# Patient Record
Sex: FEMALE | Race: WHITE | NOT HISPANIC OR LATINO | Employment: UNEMPLOYED | URBAN - METROPOLITAN AREA
[De-identification: names, ages, dates, MRNs, and addresses within clinical notes are randomized per-mention and may not be internally consistent; named-entity substitution may affect disease eponyms.]

---

## 2020-04-08 DIAGNOSIS — K21.9 GASTROESOPHAGEAL REFLUX DISEASE, ESOPHAGITIS PRESENCE NOT SPECIFIED: ICD-10-CM

## 2020-04-08 DIAGNOSIS — I10 HYPERTENSION, UNSPECIFIED TYPE: Primary | ICD-10-CM

## 2020-04-08 RX ORDER — OLANZAPINE 7.5 MG/1
7.5 TABLET ORAL
COMMUNITY
End: 2022-01-10 | Stop reason: SDUPTHER

## 2020-04-08 RX ORDER — MULTIVIT-MIN/IRON/FOLIC ACID/K 18-600-40
50 CAPSULE ORAL DAILY
COMMUNITY
End: 2020-05-05 | Stop reason: SDUPTHER

## 2020-04-08 RX ORDER — ATENOLOL 100 MG/1
100 TABLET ORAL EVERY OTHER DAY
Qty: 90 TABLET | Refills: 0 | Status: SHIPPED | OUTPATIENT
Start: 2020-04-08 | End: 2020-09-04 | Stop reason: SDUPTHER

## 2020-04-08 RX ORDER — PANTOPRAZOLE SODIUM 40 MG/1
40 TABLET, DELAYED RELEASE ORAL DAILY
Qty: 90 TABLET | Refills: 0 | Status: SHIPPED | OUTPATIENT
Start: 2020-04-08 | End: 2020-06-29 | Stop reason: SDUPTHER

## 2020-04-08 RX ORDER — ATENOLOL 100 MG/1
100 TABLET ORAL EVERY OTHER DAY
COMMUNITY
End: 2020-04-08 | Stop reason: SDUPTHER

## 2020-04-08 RX ORDER — PANTOPRAZOLE SODIUM 40 MG/1
40 TABLET, DELAYED RELEASE ORAL DAILY
COMMUNITY
End: 2020-04-08 | Stop reason: SDUPTHER

## 2020-04-08 RX ORDER — ALENDRONATE SODIUM 70 MG/1
70 TABLET ORAL
COMMUNITY
End: 2021-06-23

## 2020-04-08 RX ORDER — LEVOTHYROXINE SODIUM 0.03 MG/1
25 TABLET ORAL DAILY
COMMUNITY
End: 2020-06-29 | Stop reason: SDUPTHER

## 2020-05-05 ENCOUNTER — TELEMEDICINE (OUTPATIENT)
Dept: FAMILY MEDICINE CLINIC | Facility: CLINIC | Age: 55
End: 2020-05-05
Payer: COMMERCIAL

## 2020-05-05 VITALS — WEIGHT: 94 LBS | BODY MASS INDEX: 16.05 KG/M2 | HEIGHT: 64 IN

## 2020-05-05 DIAGNOSIS — E55.9 VITAMIN D DEFICIENCY: Primary | ICD-10-CM

## 2020-05-05 PROCEDURE — G2012 BRIEF CHECK IN BY MD/QHP: HCPCS | Performed by: NURSE PRACTITIONER

## 2020-05-05 RX ORDER — MULTIVIT-MIN/IRON/FOLIC ACID/K 18-600-40
2000 CAPSULE ORAL DAILY
Qty: 90 CAPSULE | Refills: 1 | Status: SHIPPED | OUTPATIENT
Start: 2020-05-05 | End: 2020-09-04 | Stop reason: SDUPTHER

## 2020-06-17 ENCOUNTER — TELEPHONE (OUTPATIENT)
Dept: FAMILY MEDICINE CLINIC | Facility: CLINIC | Age: 55
End: 2020-06-17

## 2020-06-18 ENCOUNTER — TELEPHONE (OUTPATIENT)
Dept: FAMILY MEDICINE CLINIC | Facility: CLINIC | Age: 55
End: 2020-06-18

## 2020-06-29 DIAGNOSIS — K21.9 GASTROESOPHAGEAL REFLUX DISEASE, ESOPHAGITIS PRESENCE NOT SPECIFIED: ICD-10-CM

## 2020-06-29 DIAGNOSIS — E03.9 HYPOTHYROIDISM, UNSPECIFIED TYPE: Primary | ICD-10-CM

## 2020-06-29 RX ORDER — LEVOTHYROXINE SODIUM 0.03 MG/1
25 TABLET ORAL DAILY
Qty: 90 TABLET | Refills: 0 | Status: SHIPPED | OUTPATIENT
Start: 2020-06-29 | End: 2020-09-04 | Stop reason: SDUPTHER

## 2020-06-29 RX ORDER — PANTOPRAZOLE SODIUM 40 MG/1
40 TABLET, DELAYED RELEASE ORAL DAILY
Qty: 90 TABLET | Refills: 0 | Status: SHIPPED | OUTPATIENT
Start: 2020-06-29 | End: 2020-09-04 | Stop reason: SDUPTHER

## 2020-08-11 ENCOUNTER — TELEPHONE (OUTPATIENT)
Dept: FAMILY MEDICINE CLINIC | Facility: CLINIC | Age: 55
End: 2020-08-11

## 2020-08-11 NOTE — TELEPHONE ENCOUNTER
COVID Pre-Visit Screening     1  Is this a family member screening? No  2  Have you traveled outside of your state in the past 2 weeks? No  3  Do you presently have a fever or flu-like symptoms? No  4  Do you have symptoms of an upper respiratory infection like runny nose, sore throat, or cough? No  5  Are you suffering from new headache that you have not had in the past?  No  6  Do you have/have you experienced any new shortness of breath recently? No  7  Do you have any new diarrhea, nausea or vomiting? No  8  Have you been in contact with anyone who has been sick or diagnosed with COVID-19? No  9  Do you have any new loss of taste or smell? No  10  Are you able to wear a mask without a valve for the entire visit? Yes  Appt confirmed and made aware of check in process    [employer]

## 2020-09-03 ENCOUNTER — TELEPHONE (OUTPATIENT)
Dept: FAMILY MEDICINE CLINIC | Facility: CLINIC | Age: 55
End: 2020-09-03

## 2020-09-03 NOTE — TELEPHONE ENCOUNTER
COVID Pre-Visit Screening     1  Is this a family member screening? No  2  Have you traveled outside of your state in the past 2 weeks? No  3  Do you presently have a fever or flu-like symptoms? No  4  Do you have symptoms of an upper respiratory infection like runny nose, sore throat, or cough? No  5  Are you suffering from new headache that you have not had in the past?  No  6  Do you have/have you experienced any new shortness of breath recently? No  7  Do you have any new diarrhea, nausea or vomiting? No  8  Have you been in contact with anyone who has been sick or diagnosed with COVID-19? No  9  Do you have any new loss of taste or smell? No  10  Are you able to wear a mask without a valve for the entire visit?  Yes  Called pt to confirm appt and made aware of check in process  [facility]

## 2020-09-04 ENCOUNTER — OFFICE VISIT (OUTPATIENT)
Dept: FAMILY MEDICINE CLINIC | Facility: CLINIC | Age: 55
End: 2020-09-04
Payer: COMMERCIAL

## 2020-09-04 VITALS
HEART RATE: 93 BPM | OXYGEN SATURATION: 98 % | TEMPERATURE: 97.6 F | HEIGHT: 64 IN | RESPIRATION RATE: 12 BRPM | WEIGHT: 97 LBS | SYSTOLIC BLOOD PRESSURE: 134 MMHG | DIASTOLIC BLOOD PRESSURE: 86 MMHG | BODY MASS INDEX: 16.56 KG/M2

## 2020-09-04 DIAGNOSIS — E56.9 DEFICIENCY OF MULTIPLE VITAMINS: ICD-10-CM

## 2020-09-04 DIAGNOSIS — E03.1 CONGENITAL HYPOTHYROIDISM WITHOUT GOITER: ICD-10-CM

## 2020-09-04 DIAGNOSIS — Z11.4 SCREENING FOR HIV WITHOUT PRESENCE OF RISK FACTORS: ICD-10-CM

## 2020-09-04 DIAGNOSIS — Z11.59 ENCOUNTER FOR HEPATITIS C SCREENING TEST FOR LOW RISK PATIENT: ICD-10-CM

## 2020-09-04 DIAGNOSIS — I10 HYPERTENSION, UNSPECIFIED TYPE: ICD-10-CM

## 2020-09-04 DIAGNOSIS — E55.9 VITAMIN D DEFICIENCY: Primary | ICD-10-CM

## 2020-09-04 DIAGNOSIS — E03.9 HYPOTHYROIDISM, UNSPECIFIED TYPE: ICD-10-CM

## 2020-09-04 DIAGNOSIS — Z76.0 ENCOUNTER FOR MEDICATION REFILL: ICD-10-CM

## 2020-09-04 DIAGNOSIS — K21.9 GASTROESOPHAGEAL REFLUX DISEASE, ESOPHAGITIS PRESENCE NOT SPECIFIED: ICD-10-CM

## 2020-09-04 PROCEDURE — 3725F SCREEN DEPRESSION PERFORMED: CPT | Performed by: FAMILY MEDICINE

## 2020-09-04 PROCEDURE — 99214 OFFICE O/P EST MOD 30 MIN: CPT | Performed by: FAMILY MEDICINE

## 2020-09-04 PROCEDURE — 3079F DIAST BP 80-89 MM HG: CPT | Performed by: FAMILY MEDICINE

## 2020-09-04 PROCEDURE — 1036F TOBACCO NON-USER: CPT | Performed by: FAMILY MEDICINE

## 2020-09-04 RX ORDER — ATENOLOL 100 MG/1
100 TABLET ORAL EVERY OTHER DAY
Qty: 90 TABLET | Refills: 0 | Status: SHIPPED | OUTPATIENT
Start: 2020-09-04 | End: 2020-11-06 | Stop reason: SDUPTHER

## 2020-09-04 RX ORDER — MULTIVIT-MIN/IRON/FOLIC ACID/K 18-600-40
2000 CAPSULE ORAL DAILY
Qty: 90 CAPSULE | Refills: 1 | Status: SHIPPED | OUTPATIENT
Start: 2020-09-04 | End: 2020-11-06 | Stop reason: SDUPTHER

## 2020-09-04 RX ORDER — PANTOPRAZOLE SODIUM 40 MG/1
40 TABLET, DELAYED RELEASE ORAL DAILY
Qty: 90 TABLET | Refills: 0 | Status: SHIPPED | OUTPATIENT
Start: 2020-09-04 | End: 2020-11-05 | Stop reason: SDUPTHER

## 2020-09-04 RX ORDER — LEVOTHYROXINE SODIUM 0.03 MG/1
25 TABLET ORAL DAILY
Qty: 90 TABLET | Refills: 0 | Status: SHIPPED | OUTPATIENT
Start: 2020-09-04 | End: 2020-09-17 | Stop reason: DRUGHIGH

## 2020-09-04 NOTE — PROGRESS NOTES
BMI Counseling: Body mass index is 16 65 kg/m²  The BMI is below normal  Patient advised to gain weight and dietary education for weight gain was provided  Assessment/Plan:         Problem List Items Addressed This Visit        Digestive    Gastroesophageal reflux disease    Relevant Medications    pantoprazole (PROTONIX) 40 mg tablet       Endocrine    Hypothyroidism    Relevant Medications    atenolol (TENORMIN) 100 mg tablet    levothyroxine 25 mcg tablet    Other Relevant Orders    TSH, 3rd generation with Free T4 reflex       Cardiovascular and Mediastinum    Hypertension    Relevant Medications    atenolol (TENORMIN) 100 mg tablet    Other Relevant Orders    CBC and differential    Comprehensive metabolic panel    Lipid panel    UA w Reflex to Microscopic w Reflex to Culture       Other    Encounter for medication refill    Vitamin D deficiency - Primary    Relevant Medications    Cholecalciferol (Vitamin D) 50 MCG (2000 UT) CAPS    Other Relevant Orders    Vitamin D 25 hydroxy    Deficiency of multiple vitamins    Relevant Medications    Multiple Vitamins-Minerals (Womens 50+ Multi Vitamin/Min) TABS      Other Visit Diagnoses     Screening for HIV without presence of risk factors        Relevant Orders    HIV 1/2 Antigen/Antibody (4th Generation) w Reflex [facility]    Encounter for hepatitis C screening test for low risk patient        Relevant Orders    Hepatitis C antibody            Subjective:      Patient ID: Catie Lee is a 63 y o  female  Patient is a 63year old female present for follow-up and medication refills  The following portions of the patient's history were reviewed and updated as appropriate:   She has a past medical history of Abnormal weight loss, Anorexia nervosa, Elevated blood sugar, Fibroadenoma of both breasts, GERD (gastroesophageal reflux disease), Hyperthyroidism, and Osteopenia ,  does not have any pertinent problems on file  ,   has a past surgical history that includes Cholecystectomy and Breast cyst excision (Left, 2011)  ,  family history is not on file  She was adopted  ,   reports that she has never smoked  She has never used smokeless tobacco  She reports current alcohol use of about 1 0 standard drinks of alcohol per week  No history on file for drug ,  has No Known Allergies     Current Outpatient Medications   Medication Sig Dispense Refill    atenolol (TENORMIN) 100 mg tablet Take 1 tablet (100 mg total) by mouth every other day Take 100 mg by mouth every other morning  90 tablet 0    Cholecalciferol (Vitamin D) 50 MCG (2000 UT) CAPS Take 1 capsule (2,000 Units total) by mouth daily Take 1 capsule every day by oral route for 30 days  90 capsule 1    levothyroxine 25 mcg tablet Take 1 tablet (25 mcg total) by mouth daily 90 tablet 0    OLANZapine (ZyPREXA) 7 5 mg tablet Take 7 5 mg by mouth daily at bedtime      pantoprazole (PROTONIX) 40 mg tablet Take 1 tablet (40 mg total) by mouth daily 90 tablet 0    alendronate (FOSAMAX) 70 mg tablet Take 70 mg by mouth every 7 days      Multiple Vitamins-Minerals (Womens 50+ Multi Vitamin/Min) TABS Take 1 tablet by mouth daily 100 tablet 1     No current facility-administered medications for this visit  Review of Systems   Constitutional: Negative for appetite change, chills, fatigue and fever  HENT: Negative for congestion, ear pain, postnasal drip, rhinorrhea, sinus pain and sore throat  Eyes: Negative for pain, redness and visual disturbance  Respiratory: Negative for cough and shortness of breath  Cardiovascular: Negative for chest pain  Gastrointestinal: Negative for abdominal pain, diarrhea, nausea and vomiting  Genitourinary: Negative for dysuria and hematuria  Musculoskeletal: Negative for arthralgias  Skin: Negative  Allergic/Immunologic: Negative for environmental allergies and food allergies  Neurological: Negative  Hematological: Negative  Psychiatric/Behavioral: Negative  Objective:  Vitals:    09/04/20 1039   BP: 134/86   BP Location: Left arm   Patient Position: Sitting   Cuff Size: Standard   Pulse: 93   Resp: 12   Temp: 97 6 °F (36 4 °C)   TempSrc: Temporal   SpO2: 98%   Weight: 44 kg (97 lb)   Height: 5' 4" (1 626 m)     Body mass index is 16 65 kg/m²  Physical Exam  Vitals signs and nursing note reviewed  Constitutional:       Appearance: She is well-developed  HENT:      Head: Normocephalic and atraumatic  Right Ear: Tympanic membrane, ear canal and external ear normal       Left Ear: Tympanic membrane, ear canal and external ear normal       Nose: Nose normal       Mouth/Throat:      Mouth: Mucous membranes are moist    Eyes:      Extraocular Movements: Extraocular movements intact  Conjunctiva/sclera: Conjunctivae normal       Pupils: Pupils are equal, round, and reactive to light  Neck:      Musculoskeletal: Normal range of motion  Cardiovascular:      Rate and Rhythm: Normal rate and regular rhythm  Heart sounds: No murmur  Pulmonary:      Effort: Pulmonary effort is normal       Breath sounds: Normal breath sounds  Abdominal:      General: Bowel sounds are normal       Palpations: Abdomen is soft  Musculoskeletal: Normal range of motion  Skin:     General: Skin is warm  Capillary Refill: Capillary refill takes less than 2 seconds  Neurological:      General: No focal deficit present  Mental Status: She is alert and oriented to person, place, and time     Psychiatric:         Mood and Affect: Mood normal          Behavior: Behavior normal

## 2020-09-04 NOTE — PATIENT INSTRUCTIONS
Vitamin D Deficiency   WHAT YOU NEED TO KNOW:   What is vitamin D deficiency? Vitamin D deficiency is a low level of vitamin D in your body  Vitamin D helps your body absorb calcium from foods  Your body makes vitamin D when your skin is exposed to sunlight  You can also get vitamin D from certain foods such as fish, eggs, and meat  Most of the vitamin D in your body comes from sunlight exposure  What increases my risk for vitamin D deficiency? · Low amount of sun exposure    · Low intake of foods that contain vitamin D    · Having dark skin    · Age older than 72    · Pregnancy or breastfeeding    · Infants who are     · Inability to absorb vitamin D from food    · Obesity    · Use of certain medicines such as antiseizure, steroid, or antifungal medicines  What are the signs and symptoms of vitamin D deficiency? Low levels of vitamin D can lead to weak and brittle bones that are more likely to fracture  You may not have any signs and symptoms, or you may have any of the following:  · Bone pain or discomfort in your lower back, pelvis, or legs    · Muscle aches and weakness    · Low back pain in women    · Poor growth, irritability, and frequent respiratory tract infections in infants    · Deformed bones and slow growth in children  How is vitamin D deficiency diagnosed and treated? Blood tests will be done to measure the amount of vitamin D in your blood  Your healthcare provider may give you high doses of vitamin D for 8 to 12 weeks to increase your levels  Your levels will then be rechecked  If your levels are still low, you will need to take vitamin D supplements for another 8 weeks  After your levels have gone back to normal, you may need to continue to take a vitamin D supplement  How much vitamin D do I need each day? The amount of vitamin D you need depends on your age  You may need more than the recommended amounts below if you take certain medicines or you are obese   Ask your healthcare provider how much vitamin D you need  · Infants up to 1 year of age: 0 international units (IU)    · Children 1 year and older: 600 IU    · Adults aged 23to 79years old: 600 IU    · Adults older than 70 years: 800 IU  How can I help prevent vitamin D deficiency? · Eat foods that are high in vitamin D  Fatty fish such as mackerel, canned tuna and sardines, and salmon are good sources of vitamin D  Certain foods such as milk, juice, and cereal are fortified with vitamin D      · Give your  infant a vitamin D supplement  of 400 IU each day  · Take vitamin D supplements as directed  High doses of vitamin D can be toxic  Your healthcare provider will tell you how much vitamin D you should take each day  Vitamin D is best absorbed when taken with food  · Expose your skin to sunlight as directed  Ask your healthcare provider how you can safely expose your skin to sunlight and for how long  Too much exposure to sunlight can cause skin cancer  When should I contact my healthcare provider? · You or your child still have symptoms, or your symptoms get worse  · You think you took too much of a vitamin D supplement, and you have nausea, vomiting, or a headache  · You have questions or concerns about your condition or care  CARE AGREEMENT:   You have the right to help plan your care  Learn about your health condition and how it may be treated  Discuss treatment options with your caregivers to decide what care you want to receive  You always have the right to refuse treatment  The above information is an  only  It is not intended as medical advice for individual conditions or treatments  Talk to your doctor, nurse or pharmacist before following any medical regimen to see if it is safe and effective for you  © 2017 [employer] Information is for End User's use only and may not be sold, redistributed or otherwise used for commercial purposes   All illustrations and images included in CareNotes® are the copyrighted property of [employer]  or [employer]up [employer]  Gastroesophageal Reflux Disease   WHAT YOU NEED TO KNOW:   What is gastroesophageal reflux disease? Gastroesophageal reflux occurs when acid and food in the stomach back up into the esophagus  Gastroesophageal reflux disease (GERD) is reflux that occurs more than twice a week for a few weeks  It usually causes heartburn and other symptoms  GERD can cause other health problems over time if it is not treated  What causes GERD? GERD often occurs when the lower muscle (sphincter) of the esophagus does not close properly  The sphincter normally opens to let food into the stomach  It then closes to keep food and stomach acid in the stomach  If the sphincter does not close properly, stomach acid and food back up (reflux) into the esophagus  The following may increase your risk for GERD:  · Certain foods such as spicy foods, chocolate, foods that contain caffeine, peppermint, and fried foods    · Hiatal hernia    · Certain medicines such as calcium channel blockers (used to treat high blood pressure), allergy medicines, sedatives, or antidepressants    · Pregnancy or obesity    · Lying down after a meal    · Drinking alcohol or smoking  What are the signs and symptoms of GERD? Heartburn is the most common symptom of GERD  You may feel burning pain in your chest or below the breast bone  This usually occurs after meals and spreads to your neck, jaw, or shoulder  The pain gets better when you change positions  You may also have any of the following:  · Bitter or acid taste in your mouth    · Dry cough    · Trouble swallowing or pain with swallowing    · Hoarseness or sore throat    · Frequent burping or hiccups    · Feeling of fullness soon after you start eating  How is GERD diagnosed? Your healthcare provider will ask about your symptoms and when they started   Tell him or her about other medical conditions you have, your eating habits, and your activities  You may also need any of the following:  · Esophageal pH monitoring  is used to place a small probe inside your esophagus and stomach to check the amount of acid  · An endoscopy  is a procedure used to look at the inside of your esophagus and stomach  An endoscope is a bendable tube with a light and camera on the end  Your healthcare provider may remove a small sample of tissue and send it to a lab for tests  · Upper GI x-rays  are done to take pictures of your stomach and intestines (bowel)  You may be given a chalky liquid to drink before the pictures are taken  This liquid helps your stomach and intestines show up better on the x-rays  · Esophageal manometry  is a test that shows how your esophagus pushes food and fluid to your stomach  It also shows the pressures in your esophagus and stomach  It may show a hiatal hernia  How is GERD treated? · Medicines  are used to decrease stomach acid  Medicine may also be used to help your lower esophageal sphincter and stomach contract (tighten) more  · Surgery  is done to wrap the upper part of the stomach around the esophageal sphincter  This will strengthen the sphincter and prevent reflux  How can I help manage GERD? · Do not have foods or drinks that may increase heartburn  These include chocolate, peppermint, fried or fatty foods, drinks that contain caffeine, or carbonated drinks (soda)  Other foods include spicy foods, onions, tomatoes, and tomato-based foods  Do not have foods or drinks that can irritate your esophagus, such as citrus fruits, juices, and alcohol  · Do not eat large meals  When you eat a lot of food at one time, your stomach needs more acid to digest it  Eat 6 small meals each day instead of 3 large ones, and eat slowly  Do not eat meals 2 to 3 hours before bedtime  · Elevate the head of your bed  Place 6-inch blocks under the head of your bed frame   You may also use more than one pillow under your head and shoulders while you sleep  · Maintain a healthy weight  If you are overweight, weight loss may help relieve symptoms of GERD  · Do not smoke  Smoking weakens the lower esophageal sphincter and increases the risk of GERD  Ask your healthcare provider for information if you currently smoke and need help to quit  E-cigarettes or smokeless tobacco still contain nicotine  Talk to your healthcare provider before you use these products  · Do not wear clothing that is tight around your waist   Tight clothing can put pressure on your stomach and cause or worsen GERD symptoms  When should I seek immediate care? · You feel full and cannot burp or vomit  · You have severe chest pain and sudden trouble breathing  · Your bowel movements are black, bloody, or tarry-looking  · Your vomit looks like coffee grounds or has blood in it  When should I contact my healthcare provider? · You vomit large amounts, or you vomit often  · You have trouble breathing after you vomit  · You have trouble swallowing, or pain with swallowing  · You are losing weight without trying  · Your symptoms get worse or do not improve with treatment  · You have questions or concerns about your condition or care  CARE AGREEMENT:   You have the right to help plan your care  Learn about your health condition and how it may be treated  Discuss treatment options with your caregivers to decide what care you want to receive  You always have the right to refuse treatment  The above information is an  only  It is not intended as medical advice for individual conditions or treatments  Talk to your doctor, nurse or pharmacist before following any medical regimen to see if it is safe and effective for you  © 2017 [facility] Information is for End User's use only and may not be sold, redistributed or otherwise used for commercial purposes   All illustrations and images included in CareNotes® are the copyrighted property of [employer]  or [employer]up Santana  Hypertension   WHAT YOU NEED TO KNOW:   What is hypertension? Hypertension is high blood pressure (BP)  Your BP is the force of your blood moving against the walls of your arteries  Normal BP is less than 120/80  Prehypertension is between 120/80 and 139/89  Hypertension is 140/90 or higher  Hypertension causes your BP to get so high that your heart has to work much harder than normal  This can damage your heart  You can control hypertension with a healthy lifestyle or medicines  A controlled blood pressure helps protect your organs, such as your heart, lungs, brain, and kidneys  What causes hypertension? The cause of hypertension may not be known  This type of hypertension is called essential or primary hypertension  Hypertension can sometimes be caused by other medical conditions, such as kidney disease  This type of hypertension is called secondary hypertension  What increases my risk for hypertension? · Age older than 54 years (men)    · Age older than 72 years (women)    · A family history of hypertension or heart disease     · Obesity or lack of exercise     · Too many high-sodium foods    · Stress     · Use of tobacco, alcohol, or illegal drugs     · A medical condition, such as diabetes, kidney disease, thyroid disease, or adrenal gland disorder     · Certain medicines, such as steroids or birth control pills  What are the signs and symptoms of hypertension? You may have no signs or symptoms, or you may have any of the following:  · Headache     · Blurred vision     · Chest pain     · Dizziness or weakness     · Trouble breathing    · Nosebleeds  How is hypertension diagnosed? Your healthcare provider will ask about your symptoms and the medicines you take  He or she will also ask if you have a family history of high blood pressure and about any health conditions you have   He or she will also check your blood pressure and weight and examine your heart, lungs, and eyes  You may need any of the following tests:  · Blood tests  may help healthcare providers find the cause of your hypertension  Blood tests can also help find other health problems caused by hypertension  · Urine tests  will be done to check your kidney function  Kidney problems can increase your risk for hypertension  How is hypertension treated? Your healthcare provider will recommend lifestyle changes to lower your BP  You may also need the following medicines:  · Medicine  may be used to help lower your BP  You may need more than one type of medicine  Take the medicine exactly as directed  · Diuretics  help decrease extra fluid that collects in your body  This will help lower your BP  You may urinate more often while you take this medicine  · Cholesterol medicine  helps lower your cholesterol level  A low cholesterol level helps prevent heart disease and makes it easier to control your blood pressure  What can I do to manage hypertension? Talk with your healthcare provider about these and other ways to manage hypertension:  · Check your BP at home  Sit and rest for 5 minutes before you take your BP  Extend your arm and support it on a flat surface  Your arm should be at the same level as your heart  Follow the directions that came with your BP monitor  If possible, take at least 2 BP readings each time  Take your BP at least twice a day at the same times each day, such as morning and evening  Keep a record of your BP readings and bring it to your follow-up visits  Ask your healthcare provider what your BP should be  · Limit sodium (salt) as directed  Too much sodium can affect your fluid balance  Check labels to find low-sodium or no-salt-added foods  Some low-sodium foods use potassium salts for flavor  Too much potassium can also cause health problems   Your healthcare provider will tell you how much sodium and potassium are safe for you to have in a day  He or she may recommend that you limit sodium to 2,300 mg a day  · Follow the meal plan recommended by your healthcare provider  A dietitian or your provider can give you more information on low-sodium plans or the DASH (Dietary Approaches to Stop Hypertension) eating plan  The DASH plan is low in sodium, unhealthy fats, and total fat  It is high in potassium, calcium, and fiber  · Exercise to maintain a healthy weight  Exercise at least 30 minutes per day, on most days of the week  This will help decrease your blood pressure  Ask your healthcare provider about the best exercise plan for you  · Decrease stress  This may help lower your BP  Learn ways to relax, such as deep breathing or listening to music  · Limit alcohol  Women should limit alcohol to 1 drink a day  Men should limit alcohol to 2 drinks a day  A drink of alcohol is 12 ounces of beer, 5 ounces of wine, or 1½ ounces of liquor  · Do not smoke  Nicotine and other chemicals in cigarettes and cigars can increase your BP and also cause lung damage  Ask your healthcare provider for information if you currently smoke and need help to quit  E-cigarettes or smokeless tobacco still contain nicotine  Talk to your healthcare provider before you use these products  · Manage any other health conditions you have  Health conditions such as diabetes can increase your risk for hypertension  Follow your healthcare provider's instructions and take all your medicines as directed  Call 911 for any of the following:   · You have discomfort in your chest that feels like squeezing, pressure, fullness, or pain  · You become confused or have difficulty speaking  · You suddenly feel lightheaded or have trouble breathing  · You have pain or discomfort in your back, neck, jaw, stomach, or arm  When should I seek immediate care? · You have a severe headache or vision loss      · You have weakness in an arm or leg  When should I contact my healthcare provider? · You feel faint, dizzy, confused, or drowsy  · You have been taking your BP medicine and your BP is still higher than your healthcare provider says it should be  · You have questions or concerns about your condition or care  CARE AGREEMENT:   You have the right to help plan your care  Learn about your health condition and how it may be treated  Discuss treatment options with your caregivers to decide what care you want to receive  You always have the right to refuse treatment  The above information is an  only  It is not intended as medical advice for individual conditions or treatments  Talk to your doctor, nurse or pharmacist before following any medical regimen to see if it is safe and effective for you  © 2017 [address] [facility] Information is for End User's use only and may not be sold, redistributed or otherwise used for commercial purposes  All illustrations and images included in CareNotes® are the copyrighted property of [employer]  or [employer]up Santana

## 2020-09-15 LAB
EXTERNAL HIV SCREEN: NORMAL
HCV AB SER-ACNC: NON REACTIVE

## 2020-09-17 DIAGNOSIS — E03.9 HYPOTHYROIDISM, UNSPECIFIED TYPE: Primary | ICD-10-CM

## 2020-09-17 RX ORDER — LEVOTHYROXINE SODIUM 0.05 MG/1
50 TABLET ORAL
Qty: 90 TABLET | Refills: 3 | Status: SHIPPED | OUTPATIENT
Start: 2020-09-17 | End: 2020-11-05 | Stop reason: SDUPTHER

## 2020-11-06 ENCOUNTER — TELEPHONE (OUTPATIENT)
Dept: OBGYN CLINIC | Facility: CLINIC | Age: 55
End: 2020-11-06

## 2020-11-06 ENCOUNTER — OFFICE VISIT (OUTPATIENT)
Dept: FAMILY MEDICINE CLINIC | Facility: CLINIC | Age: 55
End: 2020-11-06
Payer: COMMERCIAL

## 2020-11-06 VITALS
WEIGHT: 94.6 LBS | TEMPERATURE: 98.2 F | HEART RATE: 122 BPM | HEIGHT: 64 IN | SYSTOLIC BLOOD PRESSURE: 130 MMHG | OXYGEN SATURATION: 98 % | RESPIRATION RATE: 16 BRPM | DIASTOLIC BLOOD PRESSURE: 78 MMHG | BODY MASS INDEX: 16.15 KG/M2

## 2020-11-06 DIAGNOSIS — E03.9 HYPOTHYROIDISM, UNSPECIFIED TYPE: ICD-10-CM

## 2020-11-06 DIAGNOSIS — Z00.00 ANNUAL PHYSICAL EXAM: Primary | ICD-10-CM

## 2020-11-06 DIAGNOSIS — E55.9 VITAMIN D DEFICIENCY: ICD-10-CM

## 2020-11-06 DIAGNOSIS — K21.9 GERD WITHOUT ESOPHAGITIS: ICD-10-CM

## 2020-11-06 DIAGNOSIS — Z23 ENCOUNTER FOR IMMUNIZATION: ICD-10-CM

## 2020-11-06 DIAGNOSIS — Z12.4 SCREENING FOR CERVICAL CANCER: ICD-10-CM

## 2020-11-06 DIAGNOSIS — I10 HYPERTENSION, UNSPECIFIED TYPE: ICD-10-CM

## 2020-11-06 PROBLEM — Z12.12 SCREENING FOR COLORECTAL CANCER: Status: ACTIVE | Noted: 2020-11-06

## 2020-11-06 PROBLEM — Z12.11 SCREENING FOR COLORECTAL CANCER: Status: ACTIVE | Noted: 2020-11-06

## 2020-11-06 PROCEDURE — 3008F BODY MASS INDEX DOCD: CPT | Performed by: NURSE PRACTITIONER

## 2020-11-06 PROCEDURE — 3078F DIAST BP <80 MM HG: CPT | Performed by: NURSE PRACTITIONER

## 2020-11-06 PROCEDURE — 3075F SYST BP GE 130 - 139MM HG: CPT | Performed by: NURSE PRACTITIONER

## 2020-11-06 PROCEDURE — 1036F TOBACCO NON-USER: CPT | Performed by: NURSE PRACTITIONER

## 2020-11-06 PROCEDURE — 99396 PREV VISIT EST AGE 40-64: CPT | Performed by: NURSE PRACTITIONER

## 2020-11-06 RX ORDER — ATENOLOL 100 MG/1
100 TABLET ORAL EVERY OTHER DAY
Qty: 90 TABLET | Refills: 0 | Status: SHIPPED | OUTPATIENT
Start: 2020-11-06 | End: 2021-09-17 | Stop reason: SDUPTHER

## 2020-11-06 RX ORDER — LEVOTHYROXINE SODIUM 0.05 MG/1
50 TABLET ORAL
Qty: 90 TABLET | Refills: 1 | Status: SHIPPED | OUTPATIENT
Start: 2020-11-06 | End: 2021-07-01 | Stop reason: SDUPTHER

## 2020-11-06 RX ORDER — PANTOPRAZOLE SODIUM 40 MG/1
40 TABLET, DELAYED RELEASE ORAL DAILY
Qty: 90 TABLET | Refills: 1 | Status: SHIPPED | OUTPATIENT
Start: 2020-11-06 | End: 2021-05-04 | Stop reason: SDUPTHER

## 2020-11-06 RX ORDER — MULTIVIT-MIN/IRON/FOLIC ACID/K 18-600-40
2000 CAPSULE ORAL DAILY
Qty: 90 CAPSULE | Refills: 1 | Status: SHIPPED | OUTPATIENT
Start: 2020-11-06 | End: 2021-05-04 | Stop reason: SDUPTHER

## 2020-12-01 ENCOUNTER — OFFICE VISIT (OUTPATIENT)
Dept: OBGYN CLINIC | Facility: CLINIC | Age: 55
End: 2020-12-01
Payer: COMMERCIAL

## 2020-12-01 VITALS
WEIGHT: 95.6 LBS | DIASTOLIC BLOOD PRESSURE: 80 MMHG | SYSTOLIC BLOOD PRESSURE: 122 MMHG | HEIGHT: 64 IN | BODY MASS INDEX: 16.32 KG/M2

## 2020-12-01 DIAGNOSIS — M85.80 OSTEOPENIA, UNSPECIFIED LOCATION: ICD-10-CM

## 2020-12-01 DIAGNOSIS — N63.20 BILATERAL BREAST LUMP: ICD-10-CM

## 2020-12-01 DIAGNOSIS — Z01.419 WOMEN'S ANNUAL ROUTINE GYNECOLOGICAL EXAMINATION: Primary | ICD-10-CM

## 2020-12-01 DIAGNOSIS — Z11.51 SCREENING FOR HUMAN PAPILLOMAVIRUS: ICD-10-CM

## 2020-12-01 DIAGNOSIS — N63.10 BILATERAL BREAST LUMP: ICD-10-CM

## 2020-12-01 DIAGNOSIS — Z12.4 SCREENING FOR MALIGNANT NEOPLASM OF THE CERVIX: ICD-10-CM

## 2020-12-01 PROCEDURE — 3074F SYST BP LT 130 MM HG: CPT | Performed by: OBSTETRICS & GYNECOLOGY

## 2020-12-01 PROCEDURE — 87624 HPV HI-RISK TYP POOLED RSLT: CPT | Performed by: OBSTETRICS & GYNECOLOGY

## 2020-12-01 PROCEDURE — 1036F TOBACCO NON-USER: CPT | Performed by: OBSTETRICS & GYNECOLOGY

## 2020-12-01 PROCEDURE — 99386 PREV VISIT NEW AGE 40-64: CPT | Performed by: OBSTETRICS & GYNECOLOGY

## 2020-12-01 PROCEDURE — G0145 SCR C/V CYTO,THINLAYER,RESCR: HCPCS | Performed by: OBSTETRICS & GYNECOLOGY

## 2020-12-01 PROCEDURE — 3079F DIAST BP 80-89 MM HG: CPT | Performed by: OBSTETRICS & GYNECOLOGY

## 2020-12-01 PROCEDURE — 3008F BODY MASS INDEX DOCD: CPT | Performed by: OBSTETRICS & GYNECOLOGY

## 2020-12-04 LAB
HPV HR 12 DNA CVX QL NAA+PROBE: NEGATIVE
HPV16 DNA CVX QL NAA+PROBE: NEGATIVE
HPV18 DNA CVX QL NAA+PROBE: NEGATIVE

## 2020-12-09 LAB
LAB AP GYN PRIMARY INTERPRETATION: NORMAL
Lab: NORMAL

## 2021-01-04 ENCOUNTER — TRANSCRIBE ORDERS (OUTPATIENT)
Dept: ADMINISTRATIVE | Facility: HOSPITAL | Age: 56
End: 2021-01-04

## 2021-01-04 DIAGNOSIS — M85.80 OSTEOPENIA, UNSPECIFIED LOCATION: Primary | ICD-10-CM

## 2021-03-18 ENCOUNTER — HOSPITAL ENCOUNTER (OUTPATIENT)
Dept: RADIOLOGY | Facility: HOSPITAL | Age: 56
Discharge: HOME | End: 2021-03-18
Attending: RADIOLOGY

## 2021-03-18 DIAGNOSIS — Z76.89 REFERRAL OF PATIENT WITHOUT EXAMINATION OR TREATMENT: ICD-10-CM

## 2021-03-23 ENCOUNTER — HOSPITAL ENCOUNTER (OUTPATIENT)
Dept: MAMMOGRAPHY | Facility: CLINIC | Age: 56
Discharge: HOME | End: 2021-03-23
Payer: COMMERCIAL

## 2021-03-23 VITALS — WEIGHT: 95 LBS | BODY MASS INDEX: 16.22 KG/M2 | HEIGHT: 64 IN

## 2021-03-23 DIAGNOSIS — N63.0 BREAST LUMP: ICD-10-CM

## 2021-03-23 PROCEDURE — G0279 TOMOSYNTHESIS, MAMMO: HCPCS

## 2021-03-23 PROCEDURE — 77066 DX MAMMO INCL CAD BI: CPT

## 2021-03-23 PROCEDURE — 76642 ULTRASOUND BREAST LIMITED: CPT

## 2021-03-23 NOTE — PROGRESS NOTES
Patient met with Dr Augustine Durán at [facility] regarding recommendation for;      __x___ RIGHT ___x___LEFT      __x___Ultrasound guided  ______Stereotactic  Breast biopsy  _x__Verbalized understanding  Blood thinners:  _____yes __x___no    Date stopped: ___________    Biopsy teaching sheet given at [facility] and reviewed with patient today via telephone with patient verbalizing understanding and questions and concerns addressed at this time:  __x_____yes ______no      Med Hx: HTN; Hypothyroid; GERD; Schizophrenia;  Hx of multiple benign breasts biopsies

## 2021-03-26 DIAGNOSIS — R92.8 ABNORMAL MAMMOGRAM: Primary | ICD-10-CM

## 2021-03-31 DIAGNOSIS — R92.8 ABNORMAL MAMMOGRAM OF BOTH BREASTS: Primary | ICD-10-CM

## 2021-04-16 ENCOUNTER — HOSPITAL ENCOUNTER (OUTPATIENT)
Dept: MAMMOGRAPHY | Facility: CLINIC | Age: 56
Discharge: HOME | End: 2021-04-16
Payer: COMMERCIAL

## 2021-04-16 ENCOUNTER — HOSPITAL ENCOUNTER (OUTPATIENT)
Dept: ULTRASOUND IMAGING | Facility: CLINIC | Age: 56
Discharge: HOME | End: 2021-04-16
Payer: COMMERCIAL

## 2021-04-16 VITALS — HEART RATE: 64 BPM | SYSTOLIC BLOOD PRESSURE: 110 MMHG | DIASTOLIC BLOOD PRESSURE: 78 MMHG

## 2021-04-16 DIAGNOSIS — R92.8 ABNORMAL ULTRASOUND OF BREAST: ICD-10-CM

## 2021-04-16 DIAGNOSIS — R92.8 ABNORMAL MAMMOGRAM: ICD-10-CM

## 2021-04-16 PROCEDURE — 88305 TISSUE EXAM BY PATHOLOGIST: CPT | Performed by: PATHOLOGY

## 2021-04-16 PROCEDURE — 88341 IMHCHEM/IMCYTCHM EA ADD ANTB: CPT | Performed by: PATHOLOGY

## 2021-04-16 PROCEDURE — 19084 BX BREAST ADD LESION US IMAG: CPT

## 2021-04-16 PROCEDURE — 88361 TUMOR IMMUNOHISTOCHEM/COMPUT: CPT | Performed by: PATHOLOGY

## 2021-04-16 PROCEDURE — 88342 IMHCHEM/IMCYTCHM 1ST ANTB: CPT | Performed by: PATHOLOGY

## 2021-04-16 PROCEDURE — A4648 IMPLANTABLE TISSUE MARKER: HCPCS

## 2021-04-16 PROCEDURE — 19083 BX BREAST 1ST LESION US IMAG: CPT

## 2021-04-16 RX ORDER — LIDOCAINE HYDROCHLORIDE 10 MG/ML
5 INJECTION, SOLUTION EPIDURAL; INFILTRATION; INTRACAUDAL; PERINEURAL ONCE
Status: COMPLETED | OUTPATIENT
Start: 2021-04-16 | End: 2021-04-16

## 2021-04-16 RX ORDER — LIDOCAINE HYDROCHLORIDE 10 MG/ML
5 INJECTION, SOLUTION EPIDURAL; INFILTRATION; INTRACAUDAL; PERINEURAL ONCE
Status: DISCONTINUED | OUTPATIENT
Start: 2021-04-16 | End: 2021-04-17 | Stop reason: HOSPADM

## 2021-04-16 RX ADMIN — LIDOCAINE HYDROCHLORIDE 5 ML: 10 INJECTION, SOLUTION EPIDURAL; INFILTRATION; INTRACAUDAL; PERINEURAL at 15:09

## 2021-04-16 RX ADMIN — LIDOCAINE HYDROCHLORIDE 5 ML: 10 INJECTION, SOLUTION EPIDURAL; INFILTRATION; INTRACAUDAL; PERINEURAL at 15:00

## 2021-04-16 NOTE — DISCHARGE INSTR - OTHER ORDERS
POST LARGE CORE BREAST BIOPSY PATIENT INFORMATION      1  Place an ice pack inside your bra over the top of the dressing every hour for 20 minutes (20 minutes on, 60 minutes off)  Do this until bedtime  2  Do not shower or bathe until the following morning  3  You may bathe your breast carefully with the steri-strips in place  Be careful    Not to loosen them  The steri-strips will fall off in 3-5 days  4  You may have mild discomfort, and you may have some bruising where the   Needle entered the skin  This should clear within 5-7 days  5  If you need medicine for discomfort, take acetaminophen products such as   Tylenol  You may also take Advil or Motrin products  6  Do not participate in strenuous activities such as-tennis, aerobics, skiing,  Weight lifting, etc  for 24 hours  Refrain from swimming/soaking for 72 hours  7  Wearing a bra for sleeping may be more comfortable for the first 24-48 hours  8  Watch for continued bleeding, pain or fever over 101; please call with any questions or concerns  For procedures done at the [facility] "[facility] call:  Digna De La Cruz RN at [facility] RN at [phone]                    *After 4 PM call the Interventional Radiology Department                    [phone] and ask to speak with the nurse on call  For procedures done at the [facility] call:         Sonia Downs RN at   *After 4 PM call the Interventional Radiology Department   [phone] and ask to speak with the nurse on call  For procedures done at [address] [place] [address] call: The Radiology Nurse at [phone]  *After 4 PM call your physician, or go to the Emergency Department  9          The final results of your biopsy are usually available within one week

## 2021-04-16 NOTE — PROGRESS NOTES
Procedure type:    __x___ultrasound guided _____stereotactic    Breast:    _____Left __x___Right    Location: 11 oclock 7 cmfn    Needle: 12g Marquee    # of passes: 3    Clip: Ultraxlip Wing    Performed by: Dr Kavya Kaufman held for 5 minutes by: Ara Nageotte    Steri Strips:    __x___yes _____no    Band aid:    ___x__yes_____no    Tape and guaze:    _____yes __x___no    Tolerated procedure:    __x___yes _____no                Procedure type:    __x___ultrasound guided _____stereotactic    Breast:    _____Left ___x__Right    Location: 10 oclock 7 cmfn    Needle: 12g Marquee    # of passes: 4    Clip: Ultraclip Ribbon    Performed by: Dr Kavya Kaufman held for 5 minutes by: Ara Nageotte Steri Strips:    __x___yes _____no    Band aid:    __x___yes_____no    Tape and guaze:    _____yes __x___no    Tolerated procedure:    ___x__yes _____no

## 2021-04-16 NOTE — PROGRESS NOTES
Patient arrived via:    _x____ambulatory    _____wheelchair    _____stretcher      Domestic violence screen    ___x___negative______positive    Breast Implants:    _______yes ___x_____no

## 2021-04-19 NOTE — PROGRESS NOTES
Post procedure call completed    Bleeding: _____yes __X___no    Pain: _____yes ___X___no    Redness/Swelling: ______yes ___X___no    Band aid removed: _____yes __x___no (patient will remove today)    Steri-Strips intact: ___X___yes _____no (discussed with patient to remove steri strips after 5 days if they have not come off on their own)    Pt with no questions at this time, adv will call when results available, adv to call with any questions or concerns, has name/# for contact

## 2021-04-20 ENCOUNTER — TELEPHONE (OUTPATIENT)
Dept: MAMMOGRAPHY | Facility: CLINIC | Age: 56
End: 2021-04-20

## 2021-04-20 ENCOUNTER — TELEPHONE (OUTPATIENT)
Dept: HEMATOLOGY ONCOLOGY | Facility: CLINIC | Age: 56
End: 2021-04-20

## 2021-04-20 NOTE — TELEPHONE ENCOUNTER
Spoke with the patient, results discussed explained to her importance of following up with breast surgeon and patient informed to call us back if she has any question or concern

## 2021-04-20 NOTE — TELEPHONE ENCOUNTER
New Patient Breast Form   Patient Details: Julio Cesar [facility]     1965     [id number]     Background Information:   [address] starts by opening a telephone encounter and gathering the following information   Who is calling to schedule and relationship? [facility] Gavi Avilez   Referring Provider [facility]   To which speciality is the referral?  Surgical Oncology   Reason for Visit? New Diagnosis   Tumor Type? Rt Breast - Invasive Lobular Carcinoma/Invasive Mammary Carcinoma   Is there a confimed diagnosis from biopsy/tissue reviewed by Pathology? Yes   Date of Tissue Diagnosis (If done outside of [facility] please obtain report and slides) 4/16/21   Is patient aware of diagnosis, and who made them aware? yes   If no tissue diagnosis, please stop and discuss with Navigator prior to scheduling   When was the diagnosis made? 4/20/21   Were outside slides requested  No   If biopsy done externally, obtain reports and slides for internal review   Have you had any imaging or labs done? Yes   If YES, when and  where was the blood work done? [facility]   If outside of [facility] obtain records   Was the patient told to bring a disk? No   Are records in EPIC? Yes   Is there a personal history of cancer? No   If YES please list type and YR diagnosed na   If patient has a prior history of breast cancer were old records obtained? na   Have you ever had genetic testing done for breast cancer? If so, can you please bring a copy to appointment for timely treatment planning No   Is there a family history of cancer? No   If YES please list type na   Scheduling Information:   Preferred Alm [facility]   Are there any days the patient cannot be seen? Tuesdays and Fridays    How was New Patient Packet Sent? Will Come Early   Miscellaneous:   After completing the above information, please route to finance, nurse navigation and clinical trials for review

## 2021-04-22 DIAGNOSIS — C50.919 INVASIVE LOBULAR CARCINOMA OF BREAST IN FEMALE (HCC): Primary | ICD-10-CM

## 2021-04-23 DIAGNOSIS — C50.411 MALIGNANT NEOPLASM OF UPPER-OUTER QUADRANT OF RIGHT FEMALE BREAST, UNSPECIFIED ESTROGEN RECEPTOR STATUS (HCC): Primary | ICD-10-CM

## 2021-04-30 ENCOUNTER — HOSPITAL ENCOUNTER (OUTPATIENT)
Dept: RADIOLOGY | Facility: HOSPITAL | Age: 56
Discharge: HOME | End: 2021-04-30
Attending: SURGERY
Payer: COMMERCIAL

## 2021-04-30 DIAGNOSIS — C50.411 MALIGNANT NEOPLASM OF UPPER-OUTER QUADRANT OF RIGHT FEMALE BREAST, UNSPECIFIED ESTROGEN RECEPTOR STATUS (HCC): ICD-10-CM

## 2021-04-30 PROCEDURE — A9585 GADOBUTROL INJECTION: HCPCS | Performed by: SURGERY

## 2021-04-30 PROCEDURE — C8908 MRI W/O FOL W/CONT, BREAST,: HCPCS

## 2021-04-30 PROCEDURE — G1004 CDSM NDSC: HCPCS

## 2021-04-30 RX ADMIN — GADOBUTROL 4 ML: 604.72 INJECTION INTRAVENOUS at 20:19

## 2021-05-04 DIAGNOSIS — K21.9 GERD WITHOUT ESOPHAGITIS: ICD-10-CM

## 2021-05-04 DIAGNOSIS — E55.9 VITAMIN D DEFICIENCY: ICD-10-CM

## 2021-05-04 RX ORDER — MULTIVIT-MIN/IRON/FOLIC ACID/K 18-600-40
2000 CAPSULE ORAL DAILY
Qty: 90 CAPSULE | Refills: 1 | Status: SHIPPED | OUTPATIENT
Start: 2021-05-04 | End: 2021-11-08

## 2021-05-04 RX ORDER — PANTOPRAZOLE SODIUM 40 MG/1
40 TABLET, DELAYED RELEASE ORAL DAILY
Qty: 90 TABLET | Refills: 1 | Status: SHIPPED | OUTPATIENT
Start: 2021-05-04 | End: 2021-11-08

## 2021-05-10 PROBLEM — Z23 ENCOUNTER FOR IMMUNIZATION: Status: RESOLVED | Noted: 2020-11-06 | Resolved: 2021-05-10

## 2021-05-10 PROBLEM — Z17.0 MALIGNANT NEOPLASM OF OVERLAPPING SITES OF RIGHT BREAST IN FEMALE, ESTROGEN RECEPTOR POSITIVE (HCC): Status: ACTIVE | Noted: 2021-04-23

## 2021-05-10 PROBLEM — Z76.0 ENCOUNTER FOR MEDICATION REFILL: Status: RESOLVED | Noted: 2020-09-04 | Resolved: 2021-05-10

## 2021-05-10 PROBLEM — Z12.4 SCREENING FOR CERVICAL CANCER: Status: RESOLVED | Noted: 2020-11-06 | Resolved: 2021-05-10

## 2021-05-10 PROBLEM — C50.811 MALIGNANT NEOPLASM OF OVERLAPPING SITES OF RIGHT BREAST IN FEMALE, ESTROGEN RECEPTOR POSITIVE (HCC): Status: ACTIVE | Noted: 2021-04-23

## 2021-05-11 ENCOUNTER — TELEPHONE (OUTPATIENT)
Dept: SURGICAL ONCOLOGY | Facility: CLINIC | Age: 56
End: 2021-05-11

## 2021-05-12 ENCOUNTER — TRANSCRIBE ORDERS (OUTPATIENT)
Dept: LAB | Facility: CLINIC | Age: 56
End: 2021-05-12

## 2021-05-12 ENCOUNTER — APPOINTMENT (OUTPATIENT)
Dept: LAB | Facility: CLINIC | Age: 56
End: 2021-05-12
Payer: COMMERCIAL

## 2021-05-12 ENCOUNTER — CONSULT (OUTPATIENT)
Dept: SURGICAL ONCOLOGY | Facility: CLINIC | Age: 56
End: 2021-05-12
Payer: COMMERCIAL

## 2021-05-12 ENCOUNTER — OFFICE VISIT (OUTPATIENT)
Dept: LAB | Facility: CLINIC | Age: 56
End: 2021-05-12
Payer: COMMERCIAL

## 2021-05-12 ENCOUNTER — HOSPITAL ENCOUNTER (OUTPATIENT)
Dept: RADIOLOGY | Facility: HOSPITAL | Age: 56
Discharge: HOME | End: 2021-05-12
Attending: SURGERY
Payer: COMMERCIAL

## 2021-05-12 VITALS
RESPIRATION RATE: 16 BRPM | WEIGHT: 100 LBS | TEMPERATURE: 98 F | HEART RATE: 100 BPM | SYSTOLIC BLOOD PRESSURE: 138 MMHG | HEIGHT: 64 IN | BODY MASS INDEX: 17.07 KG/M2 | DIASTOLIC BLOOD PRESSURE: 80 MMHG

## 2021-05-12 DIAGNOSIS — I10 HYPERTENSION, UNSPECIFIED TYPE: ICD-10-CM

## 2021-05-12 DIAGNOSIS — Z17.0 MALIGNANT NEOPLASM OF OVERLAPPING SITES OF RIGHT BREAST IN FEMALE, ESTROGEN RECEPTOR POSITIVE (HCC): ICD-10-CM

## 2021-05-12 DIAGNOSIS — Z17.0 MALIGNANT NEOPLASM OF OVERLAPPING SITES OF RIGHT BREAST IN FEMALE, ESTROGEN RECEPTOR POSITIVE (HCC): Primary | ICD-10-CM

## 2021-05-12 DIAGNOSIS — C50.811 MALIGNANT NEOPLASM OF OVERLAPPING SITES OF RIGHT FEMALE BREAST, UNSPECIFIED ESTROGEN RECEPTOR STATUS (HCC): Primary | ICD-10-CM

## 2021-05-12 DIAGNOSIS — C50.811 MALIGNANT NEOPLASM OF OVERLAPPING SITES OF RIGHT BREAST IN FEMALE, ESTROGEN RECEPTOR POSITIVE (HCC): ICD-10-CM

## 2021-05-12 DIAGNOSIS — Z01.818 PREOPERATIVE TESTING: ICD-10-CM

## 2021-05-12 DIAGNOSIS — E55.9 VITAMIN D DEFICIENCY: ICD-10-CM

## 2021-05-12 DIAGNOSIS — Z11.59 ENCOUNTER FOR HEPATITIS C SCREENING TEST FOR LOW RISK PATIENT: ICD-10-CM

## 2021-05-12 DIAGNOSIS — C50.811 MALIGNANT NEOPLASM OF OVERLAPPING SITES OF RIGHT BREAST IN FEMALE, ESTROGEN RECEPTOR POSITIVE (HCC): Primary | ICD-10-CM

## 2021-05-12 DIAGNOSIS — Z11.4 SCREENING FOR HIV WITHOUT PRESENCE OF RISK FACTORS: ICD-10-CM

## 2021-05-12 LAB
25(OH)D3 SERPL-MCNC: 66.3 NG/ML (ref 30–100)
ALBUMIN SERPL BCP-MCNC: 4.2 G/DL (ref 3.5–5)
ALP SERPL-CCNC: 72 U/L (ref 46–116)
ALT SERPL W P-5'-P-CCNC: 36 U/L (ref 12–78)
ANION GAP SERPL CALCULATED.3IONS-SCNC: 10 MMOL/L (ref 4–13)
AST SERPL W P-5'-P-CCNC: 12 U/L (ref 5–45)
ATRIAL RATE: 64 BPM
BASOPHILS # BLD AUTO: 0.05 THOUSANDS/ΜL (ref 0–0.1)
BASOPHILS NFR BLD AUTO: 1 % (ref 0–1)
BILIRUB SERPL-MCNC: 0.53 MG/DL (ref 0.2–1)
BILIRUB UR QL STRIP: NEGATIVE
BUN SERPL-MCNC: 15 MG/DL (ref 5–25)
CALCIUM SERPL-MCNC: 9 MG/DL (ref 8.3–10.1)
CHLORIDE SERPL-SCNC: 105 MMOL/L (ref 100–108)
CLARITY UR: CLEAR
CO2 SERPL-SCNC: 27 MMOL/L (ref 21–32)
COLOR UR: ABNORMAL
CREAT SERPL-MCNC: 0.91 MG/DL (ref 0.6–1.3)
EOSINOPHIL # BLD AUTO: 0.02 THOUSAND/ΜL (ref 0–0.61)
EOSINOPHIL NFR BLD AUTO: 0 % (ref 0–6)
ERYTHROCYTE [DISTWIDTH] IN BLOOD BY AUTOMATED COUNT: 12.2 % (ref 11.6–15.1)
GFR SERPL CREATININE-BSD FRML MDRD: 70 ML/MIN/1.73SQ M
GLUCOSE SERPL-MCNC: 87 MG/DL (ref 65–140)
GLUCOSE UR STRIP-MCNC: ABNORMAL MG/DL
HCT VFR BLD AUTO: 41.2 % (ref 34.8–46.1)
HCV AB SER QL: NORMAL
HGB BLD-MCNC: 13.3 G/DL (ref 11.5–15.4)
HGB UR QL STRIP.AUTO: NEGATIVE
IMM GRANULOCYTES # BLD AUTO: 0.01 THOUSAND/UL (ref 0–0.2)
IMM GRANULOCYTES NFR BLD AUTO: 0 % (ref 0–2)
KETONES UR STRIP-MCNC: NEGATIVE MG/DL
LEUKOCYTE ESTERASE UR QL STRIP: NEGATIVE
LYMPHOCYTES # BLD AUTO: 1.21 THOUSANDS/ΜL (ref 0.6–4.47)
LYMPHOCYTES NFR BLD AUTO: 21 % (ref 14–44)
MCH RBC QN AUTO: 30.8 PG (ref 26.8–34.3)
MCHC RBC AUTO-ENTMCNC: 32.3 G/DL (ref 31.4–37.4)
MCV RBC AUTO: 95 FL (ref 82–98)
MONOCYTES # BLD AUTO: 0.46 THOUSAND/ΜL (ref 0.17–1.22)
MONOCYTES NFR BLD AUTO: 8 % (ref 4–12)
NEUTROPHILS # BLD AUTO: 4.04 THOUSANDS/ΜL (ref 1.85–7.62)
NEUTS SEG NFR BLD AUTO: 70 % (ref 43–75)
NITRITE UR QL STRIP: NEGATIVE
NRBC BLD AUTO-RTO: 0 /100 WBCS
P AXIS: 68 DEGREES
PH UR STRIP.AUTO: 7 [PH]
PLATELET # BLD AUTO: 274 THOUSANDS/UL (ref 149–390)
PMV BLD AUTO: 11.4 FL (ref 8.9–12.7)
POTASSIUM SERPL-SCNC: 4.2 MMOL/L (ref 3.5–5.3)
PR INTERVAL: 142 MS
PROT SERPL-MCNC: 7.8 G/DL (ref 6.4–8.2)
PROT UR STRIP-MCNC: NEGATIVE MG/DL
QRS AXIS: 51 DEGREES
QRSD INTERVAL: 86 MS
QT INTERVAL: 408 MS
QTC INTERVAL: 420 MS
RBC # BLD AUTO: 4.32 MILLION/UL (ref 3.81–5.12)
SODIUM SERPL-SCNC: 142 MMOL/L (ref 136–145)
SP GR UR STRIP.AUTO: 1.01 (ref 1–1.03)
T WAVE AXIS: 59 DEGREES
UROBILINOGEN UR QL STRIP.AUTO: 0.2 E.U./DL
VENTRICULAR RATE: 64 BPM
WBC # BLD AUTO: 5.79 THOUSAND/UL (ref 4.31–10.16)

## 2021-05-12 PROCEDURE — 93010 ELECTROCARDIOGRAM REPORT: CPT | Performed by: INTERNAL MEDICINE

## 2021-05-12 PROCEDURE — 86803 HEPATITIS C AB TEST: CPT

## 2021-05-12 PROCEDURE — 80053 COMPREHEN METABOLIC PANEL: CPT

## 2021-05-12 PROCEDURE — 93005 ELECTROCARDIOGRAM TRACING: CPT

## 2021-05-12 PROCEDURE — 71046 X-RAY EXAM CHEST 2 VIEWS: CPT

## 2021-05-12 PROCEDURE — 87389 HIV-1 AG W/HIV-1&-2 AB AG IA: CPT

## 2021-05-12 PROCEDURE — 36415 COLL VENOUS BLD VENIPUNCTURE: CPT

## 2021-05-12 PROCEDURE — 81003 URINALYSIS AUTO W/O SCOPE: CPT | Performed by: NURSE PRACTITIONER

## 2021-05-12 PROCEDURE — 82306 VITAMIN D 25 HYDROXY: CPT

## 2021-05-12 PROCEDURE — 99245 OFF/OP CONSLTJ NEW/EST HI 55: CPT | Performed by: SURGERY

## 2021-05-12 PROCEDURE — 85025 COMPLETE CBC W/AUTO DIFF WBC: CPT

## 2021-05-12 RX ORDER — OXYCODONE HYDROCHLORIDE AND ACETAMINOPHEN 5; 325 MG/1; MG/1
1 TABLET ORAL EVERY 6 HOURS PRN
Qty: 15 TABLET | Refills: 0 | Status: SHIPPED | OUTPATIENT
Start: 2021-05-12

## 2021-05-12 NOTE — PATIENT INSTRUCTIONS
Pre-Surgery Instructions:   Medication Instructions    atenolol (TENORMIN) 100 mg tablet Take morning of surgery if this is when you would normally take it    Cholecalciferol (Vitamin D) 50 MCG (2000 UT) CAPS Stop taking 1 week prior to surgery    levothyroxine 50 mcg tablet Take morning of surgery    Multiple Vitamins-Minerals (Womens 50+ Multi Vitamin/Min) TABS Stop taking 1 week prior to surgery    OLANZapine (ZyPREXA) 7 5 mg tablet Take morning of surgery if this is when you would normally take it    pantoprazole (PROTONIX) 40 mg tablet Stop taking 1 days prior to surgery     Please stop all supplements and vitamins one week prior to your surgery  Mastectomy   AMBULATORY CARE:   What you need to know about a mastectomy:  A mastectomy is surgery to remove all or part of one or both breasts  Tissue, lymph nodes, or muscle near the breast may also be removed  A mastectomy may be done to treat breast cancer or prevent the cancer from spreading  The type of mastectomy used depends on the size of the tumor and if the cancer has spread  A mastectomy can also be done to prevent breast cancer  This may be a choice if you are at high risk for breast cancer  How to prepare for a mastectomy:   · Your surgeon will talk to you about how to prepare for surgery  He or she may tell you not to eat or drink anything after midnight on the day before your surgery  Arrange for someone to drive you home and stay with you after surgery  This person can help care for you and watch for complications from surgery  · Tell your surgeon about all medicines you currently take  He or she will tell you if you need to stop any medicine for the surgery, and when to stop  You may need to stop taking aspirin or blood thinners several days before surgery  He or she will tell you which medicines to take or not take on the day of surgery      · Tell your healthcare provider or surgeon about all your allergies, including allergic reactions to medicine, anesthesia, or antibiotics  What will happen during a mastectomy:   · You will be given general anesthesia to keep you asleep and free from pain during surgery  You may be given an antibiotic to help prevent a bacterial infection  · Your surgeon will make an incision over your breast  He or she will remove the tumor and breast tissue  The nipple and areola (area around the nipple) may be removed  Surgery that leaves these in place is called nipple-sparing mastectomy  Your surgeon may also remove muscle from behind your breast  If lymph nodes will be taken, a small incision will be made in your armpit  The lymph nodes will be removed and tested for cancer  · Your surgeon may leave extra skin if you are having reconstruction surgery  This surgery is called skin-sparing mastectomy  Reconstruction surgery helps make the breast look more natural  It is done right after the mastectomy  · One or more drains may be inserted near your incision  A drain removes extra fluid and helps your incision heal  Your surgeon will close your incision with stitches and cover it with a bandage  He or she may also wrap a tight-fitting bandage around both of your breasts  This may decrease swelling, bleeding, and pain  What to expect after a mastectomy:   · You will be helped to walk around after surgery to help prevent blood clots  · Healthcare providers will monitor you until you are awake  You may need to spend 1 to 2 nights in the hospital     · You may have trouble moving the arm closest to your mastectomy  This should get better in a few days  Risks of a mastectomy:  You may bleed more than expected or develop an infection  Nerves, blood vessels, and muscles may be damaged during your surgery  Blood or fluid may collect under your skin  You may need other procedures to remove the fluid or blood  You may have swelling in the arm closest to the mastectomy or where lymph nodes were removed  This swelling is called lymphedema  Lymphedema may cause tingling, numbness, stiffness, and weakness in your arm  This may be permanent  You may get a blood clot in your arm or leg  The blood clot may travel to your heart, lungs, or brain  This may become life-threatening  Call your local emergency number (911 in the [facility] [facility]) for any of the following:   · You feel lightheaded, short of breath, and have chest pain  · You have trouble breathing  · You cough up blood  Seek care immediately if:   · Blood soaks through your bandage  · Your stitches come apart  · Your bruise suddenly gets bigger  · Your leg or arm is larger than usual and painful  Call your doctor or surgeon if:   · You have a fever or chills  · Your wound is red, swollen, or draining pus  · You have nausea or are vomiting  · Your skin is itchy, swollen, or you have a rash  · Your pain does not get better after you take pain medicine  · Your drain falls out or stops draining fluid  · Your drain has pus or foul-smelling fluid coming out of it  · You have numbness, tingling, or swelling in your arm or hand  · You feel very sad or anxious, or are having trouble coping with changes from the mastectomy  · You have questions or concerns about your condition or care  Medicines: You may need any of the following:  · Antibiotics  help prevent a bacterial infection  · Prescription pain medicine  may be given  Ask your healthcare provider how to take this medicine safely  Some prescription pain medicines contain acetaminophen  Do not take other medicines that contain acetaminophen without talking to your healthcare provider  Too much acetaminophen may cause liver damage  Prescription pain medicine may cause constipation  Ask your healthcare provider how to prevent or treat constipation  · NSAIDs , such as ibuprofen, help decrease swelling, pain, and fever   NSAIDs can cause stomach bleeding or kidney problems in certain people  If you take blood thinner medicine, always ask your healthcare provider if NSAIDs are safe for you  Always read the medicine label and follow directions  · Take your medicine as directed  Contact your healthcare provider if you think your medicine is not helping or if you have side effects  Tell him or her if you are allergic to any medicine  Keep a list of the medicines, vitamins, and herbs you take  Include the amounts, and when and why you take them  Bring the list or the pill bottles to follow-up visits  Carry your medicine list with you in case of an emergency  Care for your wound as directed: If you have a tight-fitting bandage, you can remove it in 24 to 48 hours, or as directed  Ask your healthcare provider when your incision can get wet  You may need to take a sponge bath until your drain is removed  Carefully wash around the incision with soap and water  It is okay to allow the soap and water to gently run over your incision  Gently pat dry the area and put on new, clean bandages as directed  Change your bandages when they get wet or dirty  If lymph nodes were removed from your armpit, ask your healthcare provider when you can wear deodorant  Check your incision every day for redness, pus, or swelling  Self-care:   · Apply ice  on your incision for 15 to 20 minutes every hour or as directed  Use an ice pack, or put crushed ice in a plastic bag  Cover it with a towel  Ice helps prevent tissue damage and decreases swelling and pain  · Elevate  your arm nearest to your incision above the level of your heart  Do this as often as you can  This will help decrease swelling and pain  Prop your arm on pillows or blankets to keep it elevated comfortably  · Rest  as directed  Do not lift anything heavier than 5 pounds  Do not push or pull with your arms  You can use your arms to groom, eat, and bathe  Take short walks around the house  Gradually walk further as you feel better   Ask your healthcare provider when you can return to your normal activities  · Do not sleep on your stomach  This will put too much pressure on your incision  Sleep on your back or on the opposite side of your incision  · Empty your drain  as directed  You may need to write down how much fluid you empty from your drain each day  Ask your healthcare provider for more information about how to empty your drain  · Wear a supportive bra  as directed  Wait until you remove the tight-fitting bandage to wear a bra  You may be given a surgical bra or told to wear a sports bra  A supportive bra may help hold your bandages in place  It may also help with swelling and pain  Do not  wear bras with lace or underwire  They may rub against your incision and cause discomfort  Arm stretches: Your healthcare provider may show you how to do arm stretches  Arm stretches may prevent stiff arms or shoulders  You may need to wait until after your drains are removed to begin stretching  Do not do arm stretches until your healthcare provider says it is okay  Ask your healthcare provider for more information about arm stretches  Follow up with your doctor or surgeon as directed:  Write down your questions so you remember to ask them during your visits  For support and more information:  You may have difficulty coping with the changes to your body  Talk to your family or friends about how you are feeling  Ask your healthcare provider about support groups  It may be helpful to talk with other women who have had a mastectomy  · Celeste Wilson [address] [place] [address]  Phone: [phone]  Web Address: http://[employer]/  org  · [address] [place] [address]  Phone: [phone]  Web Address: http://[employer]/  [address] Information is for End User's use only and may not be sold, redistributed or otherwise used for commercial purposes  All illustrations and images included in CareNotes® are the copyrighted property of [employer]  or Gaurav Archer  The above information is an  only  It is not intended as medical advice for individual conditions or treatments  Talk to your doctor, nurse or pharmacist before following any medical regimen to see if it is safe and effective for you  Breast Cancer [place] Lymph Node Biopsy   AMBULATORY CARE:   What you need to know about a sentinel lymph node biopsy (SLNB):  A sentinel lymph node (SLN) is usually the lymph node closest to the breast tumor  It is usually found in the armpit, or along the sternum (breastbone) or collarbone  A biopsy is a procedure used to find and remove a SLN  During the biopsy, the SLN will be tested for cancer cells  If the test is positive, it may mean that breast cancer has spread outside of your breast  This information can help your healthcare provider decide what other treatments you need  How to prepare for a SLNB:   · You may need a nuclear scan before your procedure  During a nuclear scan, healthcare providers will inject a small amount of radioactive liquid in your breast  Radioactive liquid will move to the location of your lymph nodes and help them show up better in pictures  A camera will take pictures of the lymph nodes  The pictures will help your healthcare provider plan for your procedure  · Your healthcare provider will talk to you about how to prepare for your procedure  He or she may tell you not to eat or drink anything after midnight on the day of your procedure  He or she will tell you what medicines to take or not take on the day of your procedure  You may be given contrast liquid during your biopsy  Tell your healthcare provider if you have ever had an allergic reaction to contrast liquid  Arrange for someone to drive you home and stay with you after your procedure      What will happen during a SLNB: · You may be given an antibiotic through your IV to help prevent a bacterial infection  Tell the healthcare provider if you have ever had an allergic reaction to an antibiotic  You may be given general anesthesia to keep you asleep and free from pain during your procedure  You may instead be given local anesthesia to numb the area  With local anesthesia, you may still feel pressure or pushing during the procedure, but you should not feel any pain  · Your healthcare provider will inject blue contrast liquid, radioactive liquid, or both near the tumor  The liquid will move to the SLN  Your healthcare provider may use an instrument to help find the SLN  He or she will do this by gently moving an instrument over your skin  The instrument will show pictures of the SLN on a monitor  Your healthcare provider will make a small incision in the skin that covers the SLN  The incision is usually in your armpit or chest  The SLN will be removed and checked for cancer cells  If cancer is found, your healthcare provider may remove several more lymph nodes for testing  Your incision may be closed with stitches or strips of medical tape and covered with a bandage  What will happen after a SLNB:  Healthcare providers will monitor you until you are awake  You may be able to go home after you are awake and your pain is controlled  Your urine or bowel movement may be blue for 24 to 48 hours after your procedure  This is caused by the blue contrast liquid given to you during the procedure  You may have bruising or swelling at the biopsy site  This is normal and expected  The arm closest to the biopsy site may be sore  This should get better within 48 to 72 hours  Risks of a SLNB:  You may bleed more than expected or get an infection  You may develop a condition called lymphedema  Lymphedema is tissue swelling in your arm nearest to where the SLN was removed  You may have long-term pain or discomfort in your arm   Your skin in the arm may be permanently thick or hard  Your nerves may be damaged during your procedure  This may cause numbness or tingling in your arm  It may also cause difficulty moving your arm  You may have an allergic reaction to the contrast liquid  This may require medicine or other treatments  Seek care immediately if:   · Blood soaks through your bandage  · Your stitches come apart  · Your bruise suddenly gets larger and feels firm  Call your doctor if:   · You have a fever or chills  · Your wound is red, swollen, or draining pus  · You have nausea or are vomiting  · Your skin is itchy, swollen, or you have a rash  · Your pain does not get better after you take medicine for pain  · You have questions or concerns about your condition or care  Medicines: You may need any of the following:  · NSAIDs , such as ibuprofen, help decrease swelling, pain, and fever  This medicine is available with or without a doctor's order  NSAIDs can cause stomach bleeding or kidney problems in certain people  If you take blood thinner medicine, always ask your healthcare provider if NSAIDs are safe for you  Always read the medicine label and follow directions  · Acetaminophen  decreases pain and fever  It is available without a doctor's order  Ask how much to take and how often to take it  Follow directions  Read the labels of all other medicines you are using to see if they also contain acetaminophen, or ask your doctor or pharmacist  Acetaminophen can cause liver damage if not taken correctly  Do not use more than 4 grams (4,000 milligrams) total of acetaminophen in one day  · Prescription pain medicine  may be given  Ask your healthcare provider how to take this medicine safely  Some prescription pain medicines contain acetaminophen  Do not take other medicines that contain acetaminophen without talking to your healthcare provider  Too much acetaminophen may cause liver damage   Prescription pain medicine may cause constipation  Ask your healthcare provider how to prevent or treat constipation  · Take your medicine as directed  Contact your healthcare provider if you think your medicine is not helping or if you have side effects  Tell him or her if you are allergic to any medicine  Keep a list of the medicines, vitamins, and herbs you take  Include the amounts, and when and why you take them  Bring the list or the pill bottles to follow-up visits  Carry your medicine list with you in case of an emergency  Care for your incision wound as directed:  Ask your healthcare provider when your wound can get wet  Carefully wash around the wound with soap and water  It is okay to let soap and water gently run over your wound  Do not  scrub your wound  Gently pat dry the area and put on new, clean bandages as directed  Change your bandages when they get wet or dirty  If you have strips of medical tape, let them fall of on their own  It may take 10 to 14 days for them to fall off  Check your wound every day for signs of infection, such as redness, swelling, or pus  Do not put powders or lotions on your wound  If lymph nodes have been taken from your armpit, ask your healthcare provider when you can wear deodorant  Self-care:   · Apply ice  on your wound for 15 to 20 minutes every hour or as directed  Use an ice pack, or put crushed ice in a plastic bag  Cover it with a towel before you apply it to your skin  Ice helps prevent tissue damage and decreases swelling and pain  · Elevate  your arm nearest to the biopsy site as often as you can  This will help decrease swelling and pain  Prop your arm on pillows or blankets to keep it elevated above the level of your heart comfortably  · Do not do strenuous activities  for 24 to 48 hours  Strenuous activities include heavy lifting, sports, or running  If lymph nodes were taken from your armpit, do not push or pull with your arm   These activities may put too much stress on your wound  Rest and take short walks around the house  Ask your healthcare provider when you can return to your normal activities  · Drink plenty of liquids  as directed  This will help flush out contrast liquid from your body  Ask how much liquid to drink each day and which liquids are best for you  Ask your healthcare provider how to prevent lymphedema and infection:  Lymphedema is fluid buildup in fatty tissues under your skin  Lymphedema may happen in the arm closest to where lymph nodes were removed  An infection in your skin can make lymphedema worse  Ask your healthcare provider how you can decrease your risk for skin infections and lymphedema  Follow up with your doctor as directed:  Write down your questions so you remember to ask them during your visits  © Copyright [address] Information is for End User's use only and may not be sold, redistributed or otherwise used for commercial purposes  All illustrations and images included in CareNotes® are the copyrighted property of A D A M , Inc  or [employer]   The above information is an  only  It is not intended as medical advice for individual conditions or treatments  Talk to your doctor, nurse or pharmacist before following any medical regimen to see if it is safe and effective for you  Collin-Seals Drain Care   AMBULATORY CARE:   A Collin-Seals (UMM) drain  is used to remove fluids that build up in an area of your body after surgery  The UMM drain is a bulb shaped device connected to a tube  One end of the tube is placed inside you during surgery  The other end comes out through a small cut in your skin  The bulb is connected to this end  You may have a stitch to hold the tube in place  Seek care immediately if:   · Your UMM drain breaks or comes out  · You have cloudy yellow or brown drainage from your UMM drain site, or the drainage smells bad      Contact your healthcare provider if:   · You drain less than 30 milliliters (2 tablespoons) in 24 hours  This may mean your drain can be removed  · You suddenly stop draining fluid or think your UMM drain is blocked  · You have a fever higher than 101 5°F (38 6°C)  · You have increased pain, redness, or swelling around the drain site  · You have questions about your UMM drain care  How a Collin-Seals drain works: The UMM drain removes fluids by creating suction in the tube  The bulb is squeezed flat and connected to the tube that sticks out of your body  The bulb expands as it fills with fluid  How to change the bandage around your Collin-Seals drain:  If you have a bandage, change it once a day  You may need to change your bandage more than once a day if it gets completely wet  · Wash your hands with soap and water  · Loosen the tape and gently remove the old bandage  Throw the old bandage into a plastic trash bag  · Use soap and water or saline (salt water) solution to clean your UMM drain site  Dip a cotton swab or gauze pad in the solution and gently clean your skin  · Pat the area dry  · Place a new bandage on your UMM drain site and secure it to your skin with medical tape  · Wash your hands  How to empty the Collin-Seals drain:  Empty the bulb when it is half full or every 8 to 12 hours  · Wash your hands with soap and water  · Remove the plug from the bulb  · Pour the fluid into a measuring cup  · Clean the plug with an alcohol swab or a cotton ball dipped in rubbing alcohol  · Squeeze the bulb flat and put the plug back in  The bulb should stay flat until it starts to fill with fluid again  · Measure the amount of fluid you pour out  Write down how much fluid you empty from the UMM drain and the date and time you collected it  · Flush the fluid down the toilet  Wash your hands      Clear clogged tubing: Use the following steps to clear your Collin-Seals tubing:  · Hold the tubing between your thumb and first finger at the place closest to your skin  This hand will prevent the tube from being pulled out of your skin  · Use your other thumb and first finger to slide the clog down the tubing toward the bulb  You may have to repeat the sliding until the tubing is unclogged  Collin-Seals drain removal:  The amount of fluid that you drain will decrease as your wound heals  The UMM drain usually is removed when less than 30 milliliters (2 tablespoons) is collected in 24 hours  Ask your healthcare provider when and how your UMM drain will be removed  Follow up with your healthcare provider as directed:  Write down your questions so you remember to ask them during your visits  © Copyright [address] Information is for End User's use only and may not be sold, redistributed or otherwise used for commercial purposes  All illustrations and images included in CareNotes® are the copyrighted property of [employer] D [employer] M , Inc  or [facility] Kandis Shah   The above information is an  only  It is not intended as medical advice for individual conditions or treatments  Talk to your doctor, nurse or pharmacist before following any medical regimen to see if it is safe and effective for you

## 2021-05-12 NOTE — PROGRESS NOTES
Surgical Oncology Consult Note       [address]  [facility]  [address]  [facility] [address]    [facility]  1965  [id number]  [address] [facility],[address] [facility]  [facility]  [address] [address]      Chief Complaint:     Chief Complaint   Patient presents with    Consult    Breast Cancer     New Diagnosis       Assessment and Plan:   Assessment/Plan     The patient has had new diagnosis of multifocal right breast cancer  I have recommended a mastectomy  The 2 tumors encompass an area of approximately 5 cm  And they are both ER OR positive and HER2 negative  I have recommended we place a ANJALI clip in the smaller of the tumor which is adjacent to the skin the other tumor is palpable and is also adjacent to the skin  The patient does not want reconstruction  Oncology History:     Oncology History   Malignant neoplasm of overlapping sites of right breast in female, estrogen receptor positive ([facility] [address] )   4/16/2021 Biopsy    Right breast US guided biopsy:  A  11 o'clock 7 cm from the nipple  Invasive lobular carcinoma  Grade 1  ER/OR/HER2 pending  Lymphovascular invasion: not identified    B  10 o'clock 7 cm from the nipple  Invasive mammary carcinoma of no special type  Grade 2  ER/OR/HER2 pending  Lymphovascular invasion: not identified    Concordant  Malignancy appears multifocal  As seen on the additional imaging on the day of biopsy there is a suspicion for possible additional smaller foci along the radiating between the primary mass in the nipple  Given the lobular tissue type, if conservation surgery is being considered, suggest further evaluation with MRI  Right axilla negative        4/30/2021 Observation    Bilateral breast MRI  Multifocal right breast cancer  10:00 mass measures up to 1 8 cm  The 2 areas of carcinoma have a total extent of disease of 5 cm  10:00 mass abuts pectoral muscle, without evidence of invasion  Left breast clear  Axilla negative  History of Present Illness: This is a 59-year-old woman who was adopted and does not know her family history  She has dense breast and presented for a  Diagnostic mammograms and she was able to palpate 1 of the masses  Two lesions were identified and ultimately biopsied the large 1 was at the 10 o'clock position 7 cm from the nipple was invasive ductal carcinoma 1 8 cm and was adjacent to the skin by 2 mm and on the pectoralis muscle  It was grade 2 % % and HER2 1+/ negative  There is a ribbon clip in this biopsy  The 2nd lesion also in the right breast at the 11 o'clock position 7 cm from the nipple was invasive lobular carcinoma grade 1 ER 90%, IL 45%, HER2 negative  This tumors approximately 4 mm in size  She had a wean clip placed here  Her axillary ultrasound was negative  She subsequent had an MRI which demonstrated the 2 areas encompass an area of approximately 5 cm  Review of Systems:   Review of Systems   Constitutional: Negative for activity change, appetite change and fatigue  HENT: Negative  Eyes: Negative  Respiratory: Negative for cough, shortness of breath and wheezing  Cardiovascular: Negative for chest pain and leg swelling  Gastrointestinal: Negative  Endocrine: Negative  Genitourinary: Negative  Musculoskeletal:        No new changes or complaints of bone pain   Skin: Negative  Allergic/Immunologic: Negative  Neurological: Negative  Hematological: Negative  Psychiatric/Behavioral: Negative          Past Medical History:      Patient Active Problem List   Diagnosis    GERD without esophagitis    Hypothyroidism    Vitamin D deficiency    Hypertension    Deficiency of multiple vitamins    Malignant neoplasm of overlapping sites of right breast in female, estrogen receptor positive ([facility] [address] )        Past Medical History:   Diagnosis Date    Abnormal weight loss     Anorexia nervosa     Elevated blood sugar     Occasional     Fibroadenoma of both breasts     GERD (gastroesophageal reflux disease)     Hyperthyroidism     Osteopenia         Past Surgical History:   Procedure Laterality Date    BREAST BIOPSY Right 04/16/2021    BREAST CYST EXCISION Left 2011    CHOLECYSTECTOMY      US GUIDANCE BREAST BIOPSY RIGHT EACH ADDITIONAL Right 4/16/2021    US GUIDED BREAST BIOPSY RIGHT COMPLETE Right 4/16/2021        Family History   Adopted: Yes        Social History     Socioeconomic History    Marital status: Single     Spouse name: Not on file    Number of children: 0    Years of education: 15    Highest education level: Not on file   Occupational History    Occupation: never worked    Social Needs    Financial resource strain: Not hard at all   [employer] insecurity     Worry: Never true     Inability: Never true    Transportation needs     Medical: No     Non-medical: No   Tobacco Use    Smoking status: Never Smoker    Smokeless tobacco: Never Used   Substance and Sexual Activity    Alcohol use:  Yes     Alcohol/week: 6 0 standard drinks     Types: 6 Cans of beer per week     Frequency: 4 or more times a week     Drinks per session: 1 or 2    Drug use: Never     Comment: Illicit drugs:   none - As per Paula Gallagher Sexual activity: Not Currently     Comment: Sexually active:   No - As per Jose Angel Hastings    Lifestyle    Physical activity     Days per week: 7 days     Minutes per session: 60 min    Stress: Not at all   Relationships    Social connections     Talks on phone: More than three times a week     Gets together: More than three times a week     Attends Latter-day service: Never     Active member of club or organization: No     Attends meetings of clubs or organizations: Never     Relationship status: Never     Intimate partner violence     Fear of current or ex partner: No     Emotionally abused: No     Physically abused: No     Forced sexual activity: No   Other Topics Concern    Not on file   Social History Narrative    · Most recent tobacco use screenin2019      · Do you currently or have you served in the [employer]:   No      · Were you activated, into active duty, as a member of the [employer] or as a Reservist:   No      · Caffeine intake:   None decaf coffee only     · Sexual orientation:   Heterosexual      · General stress level:   Low      · Diet:   Regular      · Single or multi-level home/work:   single level home      · Live alone or with others:   alone      · Exercise level: Moderate outside walk only     · Overweight:   No      · Obese:   No      · Guns present in home:   No      · Seat belts used routinely:   Yes      · Advance directive: Yes      · Sunscreen used routinely:   Yes      · Smoke alarm in home: Yes      · Performs monthly self-breast exam:   Yes      · Legally blind in one or both eyes:   No      · Hard of hearing or deaf in one or both ears:   No      · Presence of domestic violence:   No      · Are there stairs in your home:   No      · Pets:   No         Current Outpatient Medications:     atenolol (TENORMIN) 100 mg tablet, Take 1 tablet (100 mg total) by mouth every other day Take 100 mg by mouth every other morning , Disp: 90 tablet, Rfl: 0    Cholecalciferol (Vitamin D) 50 MCG (2000 UT) CAPS, Take 1 capsule (2,000 Units total) by mouth daily Take 1 capsule every day by oral route for 30 days  , Disp: 90 capsule, Rfl: 1    levothyroxine 50 mcg tablet, Take 1 tablet (50 mcg total) by mouth daily in the early morning, Disp: 90 tablet, Rfl: 1    Multiple Vitamins-Minerals (Womens 50+ Multi Vitamin/Min) TABS, Take 1 tablet by mouth daily, Disp: 100 tablet, Rfl: 1    OLANZapine (ZyPREXA) 7 5 mg tablet, Take 7 5 mg by mouth daily at bedtime, Disp: , Rfl:     pantoprazole (PROTONIX) 40 mg tablet, Take 1 tablet (40 mg total) by mouth daily, Disp: 90 tablet, Rfl: 1    alendronate (FOSAMAX) 70 mg tablet, Take 70 mg by mouth every 7 days, Disp: , Rfl:     oxyCODONE-acetaminophen (PERCOCET) 5-325 mg per tablet, Take 1 tablet by mouth every 6 (six) hours as needed for moderate painMax Daily Amount: 4 tablets, Disp: 15 tablet, Rfl: 0   No Known Allergies    Physical Exam:     Vitals:    05/12/21 1006   BP: 138/80   Pulse: 100   Resp: 16   Temp: 98 °F (36 7 °C)     Physical Exam  Vitals signs reviewed  Constitutional:       Appearance: She is well-developed  HENT:      Head: Normocephalic and atraumatic  Eyes:      Pupils: Pupils are equal, round, and reactive to light  Neck:      Musculoskeletal: Normal range of motion and neck supple  Thyroid: No thyromegaly  Vascular: No JVD  Trachea: No tracheal deviation  Cardiovascular:      Rate and Rhythm: Normal rate and regular rhythm  Heart sounds: Normal heart sounds  No murmur  No friction rub  No gallop  Pulmonary:      Effort: Pulmonary effort is normal  No respiratory distress  Breath sounds: Normal breath sounds  No wheezing or rales  Chest:          Comments:   Examination of the right breast demonstrates a dominant masses outlined on the diagram   This seems some we more in the 9 o'clock position when she is supine  The location of the 2nd tumor is somewhat dubious   By examination as well as reviewing her images  I cannot appreciate any axillary adenopathy there are no worrisome skin findings  The left breast was examined in the sitting and supine position  There are no worrisome skin changes, tenderness, inverted nipple, nipple discharge, swelling, bleeding or evidence of a mass in any quadrant  Noble survey demonstrated no evidence of any clinically suspicious axillary, pectoral or paraclavicular lymph nodes  Abdominal:      General: There is no distension  Palpations: Abdomen is soft  There is no hepatomegaly or mass  Tenderness: There is no abdominal tenderness   There is no guarding or rebound  Musculoskeletal: Normal range of motion  General: No tenderness  Lymphadenopathy:      Cervical: No cervical adenopathy  Skin:     General: Skin is warm and dry  Findings: No erythema or rash  Neurological:      Mental Status: She is alert and oriented to person, place, and time  Cranial Nerves: No cranial nerve deficit  Psychiatric:         Behavior: Behavior normal          Results:     I reviewed her MRI as well as her mammograms as well as discuss this with Dr Danita Singh  I explained the smaller lesion is difficult to determine anatomically and I have recommended and he concurs, a ANJALI clip be placed at the smaller lesion to help make sure it is encompassed during the mastectomy  ( this tumor also is quite close to the skin)  Discussion/Summary:    given the small breast as well as the large tumor and also close proximity to the skin as well as a smaller 2nd tumor I have recommended a mastectomy  The patient is agreeable to this approach  She does not want reconstruction  We also talked about removing lymph node and possibly more lymph nodes and she is agreeable to this as well  She understands she may need adjuvant therapies and certainly an anti hormone pill  The patient and I underwent the process of consent for   Right mastectomy  (With ANJALI direction) in conjunction with intraoperative lymphatic mapping with blue and radioactive dye sentinel lymph node biopsy and possible axillary dissection  The complications outlined on the consent including relatively minor problems (wound infection, wound healing problems, hematomas, scarring, chronic discomfort/pain), moderate problems (injury to nerves or blood vessels, allergic reactions) and major complications (extensive blood loss requiring transfusions or possible addtional surgeries, cardiac arrest, stroke and possible death)    We also reviewed specific complications as outlined on the consent form including recurrent cancer, arm swelling, need for additional treatments  All questions were answered to the patient's satisfaction  We will coordinate the surgery at our next mutual convience  Advance Care Planning/Advance Directives:  I discussed the disease status, treatment plans and follow-up with the patient

## 2021-05-13 LAB — HIV 1+2 AB+HIV1 P24 AG SERPL QL IA: NORMAL

## 2021-05-13 NOTE — PROGRESS NOTES
Call placed to patient regarding recommendation for;    __X___ RIGHT ______LEFT      __X___SAVI  placement  Procedure explained to patient, additional questions answered at this time    __X___Verbalized understanding        Blood thinners:  _____yes __X___no    Date stopped: ___N/A____    All teaching points discussed during call, pt with no questions at this time, pt adv to arrive at 1430 for 1500 insertion    Pt given name/# for any further questions/needs

## 2021-05-14 ENCOUNTER — PATIENT OUTREACH (OUTPATIENT)
Dept: SURGICAL ONCOLOGY | Facility: CLINIC | Age: 56
End: 2021-05-14

## 2021-05-17 ENCOUNTER — PATIENT OUTREACH (OUTPATIENT)
Dept: SURGICAL ONCOLOGY | Facility: CLINIC | Age: 56
End: 2021-05-17

## 2021-05-17 NOTE — PROGRESS NOTES
Patient called with questions regarding if and when she can safely receive COVID vaccine as well as when her surgery date and time is  Instructed that she can get her COVID vaccine anytime except for 24 hours prior to surgery and provided with surgery date, advised that she will receive a call the evening prior to surgery with a surgical arrival time  Patient verbalized understanding and with no additional questions at this time  Encouraged to call as needed with any additional questions or needs

## 2021-06-10 ENCOUNTER — OFFICE VISIT (OUTPATIENT)
Dept: SURGICAL ONCOLOGY | Facility: CLINIC | Age: 56
End: 2021-06-10
Payer: COMMERCIAL

## 2021-06-10 ENCOUNTER — APPOINTMENT (OUTPATIENT)
Dept: LAB | Facility: CLINIC | Age: 56
End: 2021-06-10
Payer: COMMERCIAL

## 2021-06-10 VITALS
HEART RATE: 88 BPM | BODY MASS INDEX: 16.22 KG/M2 | HEIGHT: 64 IN | DIASTOLIC BLOOD PRESSURE: 78 MMHG | RESPIRATION RATE: 14 BRPM | SYSTOLIC BLOOD PRESSURE: 118 MMHG | WEIGHT: 95 LBS | OXYGEN SATURATION: 100 % | TEMPERATURE: 98.6 F

## 2021-06-10 DIAGNOSIS — Z17.0 MALIGNANT NEOPLASM OF OVERLAPPING SITES OF RIGHT BREAST IN FEMALE, ESTROGEN RECEPTOR POSITIVE (HCC): ICD-10-CM

## 2021-06-10 DIAGNOSIS — C50.811 MALIGNANT NEOPLASM OF OVERLAPPING SITES OF RIGHT BREAST IN FEMALE, ESTROGEN RECEPTOR POSITIVE (HCC): ICD-10-CM

## 2021-06-10 DIAGNOSIS — C50.811 MALIGNANT NEOPLASM OF OVERLAPPING SITES OF RIGHT FEMALE BREAST, UNSPECIFIED ESTROGEN RECEPTOR STATUS (HCC): ICD-10-CM

## 2021-06-10 DIAGNOSIS — Z01.818 PREOP EXAMINATION: Primary | ICD-10-CM

## 2021-06-10 LAB — MISCELLANEOUS LAB TEST RESULT: NORMAL

## 2021-06-10 PROCEDURE — 3074F SYST BP LT 130 MM HG: CPT | Performed by: SURGERY

## 2021-06-10 PROCEDURE — 1036F TOBACCO NON-USER: CPT | Performed by: SURGERY

## 2021-06-10 PROCEDURE — 36415 COLL VENOUS BLD VENIPUNCTURE: CPT

## 2021-06-10 PROCEDURE — 3078F DIAST BP <80 MM HG: CPT | Performed by: SURGERY

## 2021-06-10 PROCEDURE — 3008F BODY MASS INDEX DOCD: CPT | Performed by: SURGERY

## 2021-06-10 PROCEDURE — 99213 OFFICE O/P EST LOW 20 MIN: CPT | Performed by: SURGERY

## 2021-06-10 NOTE — H&P (VIEW-ONLY)
Surgical Oncology Follow Up       [address]  [facility]  [address]  [facility] [address]    [facility]  1965  [id number]  [address]  [facility]  [address] [address]    Chief Complaint   Patient presents with    Updated History and Physical          Assessment & Plan:     Patient would like genetic testing  She will have her ANJALI  placed on Monday  Cancer History:     Oncology History   Malignant neoplasm of overlapping sites of right breast in female, estrogen receptor positive ([address] )   4/16/2021 Biopsy    Right breast US guided biopsy:  A  11 o'clock 7 cm from the nipple  Invasive lobular carcinoma  Grade 1  ER 90, ID 45, HER2 1+  Lymphovascular invasion: not identified    B  10 o'clock 7 cm from the nipple  Invasive mammary carcinoma of no special type  Grade 2  , , HER2 1+  Lymphovascular invasion: not identified    Concordant  Malignancy appears multifocal  MRI recommended  4/30/2021 Observation    Bilateral breast MRI  Multifocal right breast cancer  10:00 mass measures up to 1 8 cm  The 2 areas of carcinoma have a total extent of disease of 5 cm  10:00 mass abuts pectoral muscle, without evidence of invasion  Left breast clear  Axilla negative  Interval History:     Patient presents today for preoperative check  She asked if her EKG and chest x-ray were normal   I clarified that they were  She also asked for genetic testing  She will try to coordinate that for her   Today  She has not appreciated any changes in her overall health or breast     Review of Systems:   Review of Systems   All other systems reviewed and are negative        Past Medical History     Patient Active Problem List   Diagnosis    GERD without esophagitis    Hypothyroidism    Vitamin D deficiency    Hypertension    Deficiency of multiple vitamins    Malignant neoplasm of overlapping sites of right breast in female, estrogen receptor positive ([address] )     Past Medical History:   Diagnosis Date    Abnormal weight loss     Anorexia nervosa     Elevated blood sugar     Occasional     Fibroadenoma of both breasts     GERD (gastroesophageal reflux disease)     Hyperthyroidism     Osteopenia      Past Surgical History:   Procedure Laterality Date    BREAST BIOPSY Right 04/16/2021    BREAST CYST EXCISION Left 2011    CHOLECYSTECTOMY      US GUIDANCE BREAST BIOPSY RIGHT EACH ADDITIONAL Right 4/16/2021    US GUIDED BREAST BIOPSY RIGHT COMPLETE Right 4/16/2021     Family History   Adopted: Yes     Social History     Socioeconomic History    Marital status: Single     Spouse name: Not on file    Number of children: 0    Years of education: 15    Highest education level: Not on file   Occupational History    Occupation: never worked    Social Needs    Financial resource strain: Not hard at all   [employer] [place] insecurity     Worry: Never true     Inability: Never true    Transportation needs     Medical: No     Non-medical: No   Tobacco Use    Smoking status: Never Smoker    Smokeless tobacco: Never Used   Substance and Sexual Activity    Alcohol use:  Yes     Alcohol/week: 6 0 standard drinks     Types: 6 Cans of beer per week     Frequency: 4 or more times a week     Drinks per session: 1 or 2    Drug use: Never     Comment: Illicit drugs:   none - As per Meir Tenorio Sexual activity: Not Currently     Comment: Sexually active:   No - As per [place]    Lifestyle    Physical activity     Days per week: 7 days     Minutes per session: 60 min    Stress: Not at all   Relationships    Social connections     Talks on phone: More than three times a week     Gets together: More than three times a week     Attends Mosque service: Never     Active member of club or organization: No     Attends meetings of clubs or organizations: Never     Relationship status: Never     Intimate partner violence     Fear of current or ex partner: No     Emotionally abused: No     Physically abused: No     Forced sexual activity: No   Other Topics Concern    Not on file   Social History Narrative    · Most recent tobacco use screenin2019      · Do you currently or have you served in the [employer] [address] [employer]:   No      · Were you activated, into active duty, as a member of the [employer] or as a Reservist:   No      · Caffeine intake:   None decaf coffee only     · Sexual orientation:   Heterosexual      · General stress level:   Low      · Diet:   Regular      · Single or multi-level home/work:   single level home      · Live alone or with others:   alone      · Exercise level: Moderate outside walk only     · Overweight:   No      · Obese:   No      · Guns present in home:   No      · Seat belts used routinely:   Yes      · Advance directive: Yes      · Sunscreen used routinely:   Yes      · Smoke alarm in home: Yes      · Performs monthly self-breast exam:   Yes      · Legally blind in one or both eyes:   No      · Hard of hearing or deaf in one or both ears:   No      · Presence of domestic violence:   No      · Are there stairs in your home:   No      · Pets:   No        Current Outpatient Medications:     alendronate (FOSAMAX) 70 mg tablet, Take 70 mg by mouth every 7 days, Disp: , Rfl:     atenolol (TENORMIN) 100 mg tablet, Take 1 tablet (100 mg total) by mouth every other day Take 100 mg by mouth every other morning , Disp: 90 tablet, Rfl: 0    Cholecalciferol (Vitamin D) 50 MCG (2000 UT) CAPS, Take 1 capsule (2,000 Units total) by mouth daily Take 1 capsule every day by oral route for 30 days  , Disp: 90 capsule, Rfl: 1    levothyroxine 50 mcg tablet, Take 1 tablet (50 mcg total) by mouth daily in the early morning, Disp: 90 tablet, Rfl: 1    Multiple Vitamins-Minerals (Womens 50+ Multi Vitamin/Min) TABS, Take 1 tablet by mouth daily, Disp: 100 tablet, Rfl: 1    OLANZapine (ZyPREXA) 7 5 mg tablet, Take 7 5 mg by mouth daily at bedtime, Disp: , Rfl:     pantoprazole (PROTONIX) 40 mg tablet, Take 1 tablet (40 mg total) by mouth daily, Disp: 90 tablet, Rfl: 1    oxyCODONE-acetaminophen (PERCOCET) 5-325 mg per tablet, Take 1 tablet by mouth every 6 (six) hours as needed for moderate painMax Daily Amount: 4 tablets (Patient not taking: Reported on 6/10/2021), Disp: 15 tablet, Rfl: 0  No Known Allergies    Physical Exam:     Vitals:    06/10/21 1005   BP: 118/78   Pulse: 88   Resp: 14   Temp: 98 6 °F (37 °C)   SpO2: 100%     Physical Exam  Vitals signs reviewed  Constitutional:       Appearance: She is well-developed  HENT:      Head: Normocephalic and atraumatic  Eyes:      Pupils: Pupils are equal, round, and reactive to light  Neck:      Musculoskeletal: Normal range of motion and neck supple  Thyroid: No thyromegaly  Vascular: No JVD  Trachea: No tracheal deviation  Cardiovascular:      Rate and Rhythm: Normal rate and regular rhythm  Heart sounds: Normal heart sounds  No murmur  No friction rub  No gallop  Pulmonary:      Effort: Pulmonary effort is normal  No respiratory distress  Breath sounds: Normal breath sounds  No wheezing or rales  Chest:      Breasts: Breasts are symmetrical          Right: No inverted nipple, mass, nipple discharge, skin change or tenderness  Left: No inverted nipple, mass, nipple discharge, skin change or tenderness  Comments:   Examination the right breast demonstrates a subtle dominant mass in the upper outer quadrant  This appears to be slightly different last time when she was supine but is most certainly the same area  Abdominal:      General: There is no distension  Palpations: Abdomen is soft  There is no hepatomegaly or mass  Tenderness: There is no abdominal tenderness  There is no guarding or rebound  Musculoskeletal: Normal range of motion           General: No tenderness  Lymphadenopathy:      Cervical: No cervical adenopathy  Skin:     General: Skin is warm and dry  Findings: No erythema or rash  Neurological:      Mental Status: She is alert and oriented to person, place, and time  Cranial Nerves: No cranial nerve deficit  Psychiatric:         Behavior: Behavior normal            Results & Discussion:    patient would like to have genetic testing  We will try to coordinate that for her today  Her physical exam is essentially unchanged  Her preoperative studies are normal   She will have her ANJALI is  clip placed next Monday  Advance Care Planning/Advance Directives:  I discussed the disease status, treatment plans and follow-up with the patient

## 2021-06-10 NOTE — PROGRESS NOTES
Surgical Oncology Follow Up       [address] Danette Menendez Rajesh  [facility]  [address]  [facility] [address]    [facility]  1965  [id number]  [address] Danette Mena  CANCER [facility]COL[facility]  [address] [address]    Chief Complaint   Patient presents with    Updated History and Physical          Assessment & Plan:     Patient would like genetic testing  She will have her ANJALI  placed on Monday  Cancer History:     Oncology History   Malignant neoplasm of overlapping sites of right breast in female, estrogen receptor positive ([facility] [address] )   4/16/2021 Biopsy    Right breast US guided biopsy:  A  11 o'clock 7 cm from the nipple  Invasive lobular carcinoma  Grade 1  ER 90, WY 45, HER2 1+  Lymphovascular invasion: not identified    B  10 o'clock 7 cm from the nipple  Invasive mammary carcinoma of no special type  Grade 2  , , HER2 1+  Lymphovascular invasion: not identified    Concordant  Malignancy appears multifocal  MRI recommended  4/30/2021 Observation    Bilateral breast MRI  Multifocal right breast cancer  10:00 mass measures up to 1 8 cm  The 2 areas of carcinoma have a total extent of disease of 5 cm  10:00 mass abuts pectoral muscle, without evidence of invasion  Left breast clear  Axilla negative  Interval History:     Patient presents today for preoperative check  She asked if her EKG and chest x-ray were normal   I clarified that they were  She also asked for genetic testing  She will try to coordinate that for her   Today  She has not appreciated any changes in her overall health or breast     Review of Systems:   Review of Systems   All other systems reviewed and are negative        Past Medical History     Patient Active Problem List   Diagnosis    GERD without esophagitis    Hypothyroidism    Vitamin D deficiency    Hypertension    Deficiency of multiple vitamins    Malignant neoplasm of overlapping sites of right breast in female, estrogen receptor positive ([address] )     Past Medical History:   Diagnosis Date    Abnormal weight loss     Anorexia nervosa     Elevated blood sugar     Occasional     Fibroadenoma of both breasts     GERD (gastroesophageal reflux disease)     Hyperthyroidism     Osteopenia      Past Surgical History:   Procedure Laterality Date    BREAST BIOPSY Right 04/16/2021    BREAST CYST EXCISION Left 2011    CHOLECYSTECTOMY      US GUIDANCE BREAST BIOPSY RIGHT EACH ADDITIONAL Right 4/16/2021    US GUIDED BREAST BIOPSY RIGHT COMPLETE Right 4/16/2021     Family History   Adopted: Yes     Social History     Socioeconomic History    Marital status: Single     Spouse name: Not on file    Number of children: 0    Years of education: 15    Highest education level: Not on file   Occupational History    Occupation: never worked    Social Needs    Financial resource strain: Not hard at all   [employer] insecurity     Worry: Never true     Inability: Never true    Transportation needs     Medical: No     Non-medical: No   Tobacco Use    Smoking status: Never Smoker    Smokeless tobacco: Never Used   Substance and Sexual Activity    Alcohol use:  Yes     Alcohol/week: 6 0 standard drinks     Types: 6 Cans of beer per week     Frequency: 4 or more times a week     Drinks per session: 1 or 2    Drug use: Never     Comment: Illicit drugs:   none - As per [place] [facility] Sexual activity: Not Currently     Comment: Sexually active:   No - As per Luz Vargas    Lifestyle    Physical activity     Days per week: 7 days     Minutes per session: 60 min    Stress: Not at all   Relationships    Social connections     Talks on phone: More than three times a week     Gets together: More than three times a week     Attends Scientologist service: Never     Active member of club or organization: No     Attends meetings of clubs or organizations: Never     Relationship status: Never     Intimate partner violence     Fear of current or ex partner: No     Emotionally abused: No     Physically abused: No     Forced sexual activity: No   Other Topics Concern    Not on file   Social History Narrative    · Most recent tobacco use screenin2019      · Do you currently or have you served in the [address] [employer]:   No      · Were you activated, into active duty, as a member of the [employer] or as a Reservist:   No      · Caffeine intake:   None decaf coffee only     · Sexual orientation:   Heterosexual      · General stress level:   Low      · Diet:   Regular      · Single or multi-level home/work:   single level home      · Live alone or with others:   alone      · Exercise level: Moderate outside walk only     · Overweight:   No      · Obese:   No      · Guns present in home:   No      · Seat belts used routinely:   Yes      · Advance directive: Yes      · Sunscreen used routinely:   Yes      · Smoke alarm in home: Yes      · Performs monthly self-breast exam:   Yes      · Legally blind in one or both eyes:   No      · Hard of hearing or deaf in one or both ears:   No      · Presence of domestic violence:   No      · Are there stairs in your home:   No      · Pets:   No        Current Outpatient Medications:     alendronate (FOSAMAX) 70 mg tablet, Take 70 mg by mouth every 7 days, Disp: , Rfl:     atenolol (TENORMIN) 100 mg tablet, Take 1 tablet (100 mg total) by mouth every other day Take 100 mg by mouth every other morning , Disp: 90 tablet, Rfl: 0    Cholecalciferol (Vitamin D) 50 MCG (2000 UT) CAPS, Take 1 capsule (2,000 Units total) by mouth daily Take 1 capsule every day by oral route for 30 days  , Disp: 90 capsule, Rfl: 1    levothyroxine 50 mcg tablet, Take 1 tablet (50 mcg total) by mouth daily in the early morning, Disp: 90 tablet, Rfl: 1    Multiple Vitamins-Minerals (Womens 50+ Multi Vitamin/Min) TABS, Take 1 tablet by mouth daily, Disp: 100 tablet, Rfl: 1    OLANZapine (ZyPREXA) 7 5 mg tablet, Take 7 5 mg by mouth daily at bedtime, Disp: , Rfl:     pantoprazole (PROTONIX) 40 mg tablet, Take 1 tablet (40 mg total) by mouth daily, Disp: 90 tablet, Rfl: 1    oxyCODONE-acetaminophen (PERCOCET) 5-325 mg per tablet, Take 1 tablet by mouth every 6 (six) hours as needed for moderate painMax Daily Amount: 4 tablets (Patient not taking: Reported on 6/10/2021), Disp: 15 tablet, Rfl: 0  No Known Allergies    Physical Exam:     Vitals:    06/10/21 1005   BP: 118/78   Pulse: 88   Resp: 14   Temp: 98 6 °F (37 °C)   SpO2: 100%     Physical Exam  Vitals signs reviewed  Constitutional:       Appearance: She is well-developed  HENT:      Head: Normocephalic and atraumatic  Eyes:      Pupils: Pupils are equal, round, and reactive to light  Neck:      Musculoskeletal: Normal range of motion and neck supple  Thyroid: No thyromegaly  Vascular: No JVD  Trachea: No tracheal deviation  Cardiovascular:      Rate and Rhythm: Normal rate and regular rhythm  Heart sounds: Normal heart sounds  No murmur  No friction rub  No gallop  Pulmonary:      Effort: Pulmonary effort is normal  No respiratory distress  Breath sounds: Normal breath sounds  No wheezing or rales  Chest:      Breasts: Breasts are symmetrical          Right: No inverted nipple, mass, nipple discharge, skin change or tenderness  Left: No inverted nipple, mass, nipple discharge, skin change or tenderness  Comments:   Examination the right breast demonstrates a subtle dominant mass in the upper outer quadrant  This appears to be slightly different last time when she was supine but is most certainly the same area  Abdominal:      General: There is no distension  Palpations: Abdomen is soft  There is no hepatomegaly or mass  Tenderness: There is no abdominal tenderness  There is no guarding or rebound  Musculoskeletal: Normal range of motion           General: No tenderness  Lymphadenopathy:      Cervical: No cervical adenopathy  Skin:     General: Skin is warm and dry  Findings: No erythema or rash  Neurological:      Mental Status: She is alert and oriented to person, place, and time  Cranial Nerves: No cranial nerve deficit  Psychiatric:         Behavior: Behavior normal            Results & Discussion:    patient would like to have genetic testing  We will try to coordinate that for her today  Her physical exam is essentially unchanged  Her preoperative studies are normal   She will have her ANJALI is  clip placed next Monday  Advance Care Planning/Advance Directives:  I discussed the disease status, treatment plans and follow-up with the patient

## 2021-06-14 ENCOUNTER — HOSPITAL ENCOUNTER (OUTPATIENT)
Dept: MAMMOGRAPHY | Facility: CLINIC | Age: 56
Discharge: HOME | End: 2021-06-14
Payer: COMMERCIAL

## 2021-06-14 ENCOUNTER — HOSPITAL ENCOUNTER (OUTPATIENT)
Dept: ULTRASOUND IMAGING | Facility: CLINIC | Age: 56
Discharge: HOME | End: 2021-06-14
Payer: COMMERCIAL

## 2021-06-14 DIAGNOSIS — Z17.0 MALIGNANT NEOPLASM OF OVERLAPPING SITES OF RIGHT BREAST IN FEMALE, ESTROGEN RECEPTOR POSITIVE (HCC): ICD-10-CM

## 2021-06-14 DIAGNOSIS — C50.811 MALIGNANT NEOPLASM OF OVERLAPPING SITES OF RIGHT BREAST IN FEMALE, ESTROGEN RECEPTOR POSITIVE (HCC): ICD-10-CM

## 2021-06-14 DIAGNOSIS — R93.89 ABNORMAL ULTRASOUND: ICD-10-CM

## 2021-06-14 PROCEDURE — A4648 IMPLANTABLE TISSUE MARKER: HCPCS

## 2021-06-14 PROCEDURE — 19286 PERQ DEV BREAST ADD US IMAG: CPT

## 2021-06-14 PROCEDURE — 19285 PERQ DEV BREAST 1ST US IMAG: CPT

## 2021-06-14 RX ORDER — LIDOCAINE HYDROCHLORIDE 10 MG/ML
5 INJECTION, SOLUTION EPIDURAL; INFILTRATION; INTRACAUDAL; PERINEURAL ONCE
Status: COMPLETED | OUTPATIENT
Start: 2021-06-14 | End: 2021-06-14

## 2021-06-14 RX ADMIN — LIDOCAINE HYDROCHLORIDE 5 ML: 10 INJECTION, SOLUTION EPIDURAL; INFILTRATION; INTRACAUDAL; PERINEURAL at 13:44

## 2021-06-14 RX ADMIN — LIDOCAINE HYDROCHLORIDE 5 ML: 10 INJECTION, SOLUTION EPIDURAL; INFILTRATION; INTRACAUDAL; PERINEURAL at 13:40

## 2021-06-15 VITALS — HEART RATE: 84 BPM | DIASTOLIC BLOOD PRESSURE: 87 MMHG | SYSTOLIC BLOOD PRESSURE: 140 MMHG

## 2021-06-15 NOTE — PROGRESS NOTES
Post procedure call completed    Bleeding: _____yes _x____no    Pain: _____yes ___x___no    Redness/Swelling: ______yes __x____no "slight streak of bruising"    Band aid removed: _____yes __x___no    Steri-Strips intact: ______yes ___x__none

## 2021-06-15 NOTE — PROGRESS NOTES
Patient arrived via:    __X___ambulatory    _____wheelchair    _____stretcher      Domestic violence screen    __X____negative______positive    Breast Implants:    _______yes ______X__no

## 2021-06-15 NOTE — PROGRESS NOTES
Procedure type:    __X___ultrasound guided jenna placement _____stereotactic    Breast:    _____Left ___X__Right    Location: Site 1: 11 o'clock 7 cm from nipple                   Site 2: 10 o'clock 7 cm from nipple                  Site3: 11 o'clock 7 cm from nipple       Needle: 7 5 cm; 16 gauge     # of passes: N/A    Clip: Jenna  reflector     Performed by: Dr Vergie Litten held for 5 minutes by: Lindsay CALDWELL    Steri Strips:    _____yes __X___no    Band aid:    __X___yes_____no    Tape and guaze:    _____yes ___X__no    Tolerated procedure:    __X___yes _____no

## 2021-06-15 NOTE — DISCHARGE INSTR - OTHER ORDERS
POST LARGE CORE BREAST BIOPSY PATIENT INFORMATION      1  Place an ice pack inside your bra over the top of the dressing every hour for 20 minutes (20 minutes on, 60 minutes off)  Do this until bedtime  2  Do not shower or bathe until the following morning  3  You may bathe your breast carefully with the steri-strips in place  Be careful    Not to loosen them  The steri-strips will fall off in 3-5 days  4  You may have mild discomfort, and you may have some bruising where the   Needle entered the skin  This should clear within 5-7 days  5  If you need medicine for discomfort, take acetaminophen products such as   Tylenol  You may also take Advil or Motrin products  6  Do not participate in strenuous activities such as-tennis, aerobics, skiing,  Weight lifting, etc  for 24 hours  Refrain from swimming/soaking for 72 hours  7  Wearing a bra for sleeping may be more comfortable for the first 24-48 hours  8  Watch for continued bleeding, pain or fever over 101; please call with any questions or concerns  For procedures done at the [facility] "[address] [address] call:  Damir Up RN at [facility] RN at [phone]                    *After 4 PM call the Interventional Radiology Department                    [phone] and ask to speak with the nurse on call  For procedures done at the [address] [place] [address] call:         Raven Ledbetter RN at   *After 4 PM call the Interventional Radiology Department   [phone] and ask to speak with the nurse on call  For procedures done at [address] [place] [address] call: The Radiology Nurse at [phone]  *After 4 PM call your physician, or go to the Emergency Department  9          The final results of your biopsy are usually available within one week

## 2021-06-15 NOTE — PROGRESS NOTES
Ice pack given:    __X___yes _____no    Discharge instructions signed by patient:    __X___yes _____no    Discharge instructions given to patient:    __X___yes _____no    Discharged via:    ___X__ambulatory    _____wheelchair    _____stretcher    Stable on discharge:    ___X__yes; although the patient's pre-vital HR was high at 80; nurse and the patient were made aware    ____no

## 2021-06-18 ENCOUNTER — TELEPHONE (OUTPATIENT)
Dept: SURGICAL ONCOLOGY | Facility: CLINIC | Age: 56
End: 2021-06-18

## 2021-06-23 NOTE — PRE-PROCEDURE INSTRUCTIONS
Pre-Surgery Instructions:   Medication Instructions    atenolol (TENORMIN) 100 mg tablet Instructed to take per normal schedule including DOS with sips water    Cholecalciferol (Vitamin D) 50 MCG (2000 UT) CAPS Instructed to take per normal schedule except DOS    levothyroxine 50 mcg tablet Instructed to take per normal schedule including DOS with sips water    Multiple Vitamins-Minerals (Womens 50+ Multi Vitamin/Min) TABS Instructed to avoid all ASA/NSAIDs and OTC Vit/Supp from now until after surgery  Tylenol ok prn    OLANZapine (ZyPREXA) 7 5 mg tablet Instructed to take per normal schedule    pantoprazole (PROTONIX) 40 mg tablet Instructed to take as needed including DOS with sips water    Pre op,medications and showering instructions reviewed-Patient has hibiclens  Instructed to avoid all ASA/NSAIDs and OTC Vit/Supp from now until after surgery  Tylenol ok prn  Pt  Verbalized an understanding of all instructions reviewed and offers no concerns at this time

## 2021-06-24 ENCOUNTER — TELEPHONE (OUTPATIENT)
Dept: SURGICAL ONCOLOGY | Facility: CLINIC | Age: 56
End: 2021-06-24

## 2021-06-24 ENCOUNTER — ANESTHESIA EVENT (OUTPATIENT)
Dept: PERIOP | Facility: HOSPITAL | Age: 56
End: 2021-06-24
Payer: COMMERCIAL

## 2021-06-24 NOTE — TELEPHONE ENCOUNTER
Called patient to discuss the message received  Patient wanted to know if she needed to bring a urine sample along with her in the "container" that was given to her in the office  Explained to patient that the container provided was for her to use to measure the drain output only and that she did not need a urine test  Reviewed post-op drain instructions with patient again, in detail  Patient verbalized understanding

## 2021-06-24 NOTE — TELEPHONE ENCOUNTER
Patient called and asked if she would need a urine sample before her surgery tomorrow  Otilia Roe can be contacted at [phone]

## 2021-06-25 ENCOUNTER — HOSPITAL ENCOUNTER (OUTPATIENT)
Dept: NUCLEAR MEDICINE | Facility: HOSPITAL | Age: 56
Discharge: HOME | End: 2021-06-25
Attending: SURGERY
Payer: COMMERCIAL

## 2021-06-25 ENCOUNTER — HOSPITAL ENCOUNTER (OUTPATIENT)
Facility: HOSPITAL | Age: 56
Setting detail: OUTPATIENT SURGERY
Discharge: HOME | End: 2021-06-26
Attending: SURGERY | Admitting: SURGERY
Payer: COMMERCIAL

## 2021-06-25 ENCOUNTER — ANESTHESIA (OUTPATIENT)
Dept: PERIOP | Facility: HOSPITAL | Age: 56
End: 2021-06-25
Payer: COMMERCIAL

## 2021-06-25 DIAGNOSIS — C50.811 MALIGNANT NEOPLASM OF OVERLAPPING SITES OF RIGHT BREAST IN FEMALE, ESTROGEN RECEPTOR POSITIVE (HCC): Primary | ICD-10-CM

## 2021-06-25 DIAGNOSIS — Z17.0 MALIGNANT NEOPLASM OF OVERLAPPING SITES OF RIGHT BREAST IN FEMALE, ESTROGEN RECEPTOR POSITIVE (HCC): Primary | ICD-10-CM

## 2021-06-25 DIAGNOSIS — C50.811 MALIGNANT NEOPLASM OF OVERLAPPING SITES OF RIGHT BREAST IN FEMALE, ESTROGEN RECEPTOR POSITIVE (HCC): ICD-10-CM

## 2021-06-25 DIAGNOSIS — Z17.0 MALIGNANT NEOPLASM OF OVERLAPPING SITES OF RIGHT BREAST IN FEMALE, ESTROGEN RECEPTOR POSITIVE (HCC): ICD-10-CM

## 2021-06-25 PROCEDURE — 88307 TISSUE EXAM BY PATHOLOGIST: CPT | Performed by: PATHOLOGY

## 2021-06-25 PROCEDURE — 88342 IMHCHEM/IMCYTCHM 1ST ANTB: CPT | Performed by: PATHOLOGY

## 2021-06-25 PROCEDURE — 88341 IMHCHEM/IMCYTCHM EA ADD ANTB: CPT | Performed by: PATHOLOGY

## 2021-06-25 PROCEDURE — 38792 RA TRACER ID OF SENTINL NODE: CPT

## 2021-06-25 PROCEDURE — 88333 PATH CONSLTJ SURG CYTO XM 1: CPT | Performed by: PATHOLOGY

## 2021-06-25 PROCEDURE — 19307 MAST MOD RAD: CPT | Performed by: PHYSICIAN ASSISTANT

## 2021-06-25 PROCEDURE — NC001 PR NO CHARGE: Performed by: PHYSICIAN ASSISTANT

## 2021-06-25 PROCEDURE — 38792 RA TRACER ID OF SENTINL NODE: CPT | Performed by: SURGERY

## 2021-06-25 PROCEDURE — A9541 TC99M SULFUR COLLOID: HCPCS

## 2021-06-25 PROCEDURE — 19307 MAST MOD RAD: CPT | Performed by: SURGERY

## 2021-06-25 PROCEDURE — 38900 IO MAP OF SENT LYMPH NODE: CPT | Performed by: SURGERY

## 2021-06-25 PROCEDURE — C9290 INJ, BUPIVACAINE LIPOSOME: HCPCS | Performed by: ANESTHESIOLOGY

## 2021-06-25 RX ORDER — GLYCOPYRROLATE 0.2 MG/ML
INJECTION INTRAMUSCULAR; INTRAVENOUS AS NEEDED
Status: DISCONTINUED | OUTPATIENT
Start: 2021-06-25 | End: 2021-06-25

## 2021-06-25 RX ORDER — ACETAMINOPHEN 325 MG/1
650 TABLET ORAL EVERY 6 HOURS PRN
Status: DISCONTINUED | OUTPATIENT
Start: 2021-06-25 | End: 2021-06-26 | Stop reason: HOSPADM

## 2021-06-25 RX ORDER — PANTOPRAZOLE SODIUM 40 MG/1
40 TABLET, DELAYED RELEASE ORAL DAILY
Status: DISCONTINUED | OUTPATIENT
Start: 2021-06-26 | End: 2021-06-26 | Stop reason: HOSPADM

## 2021-06-25 RX ORDER — LIDOCAINE HYDROCHLORIDE 10 MG/ML
0.5 INJECTION, SOLUTION EPIDURAL; INFILTRATION; INTRACAUDAL; PERINEURAL ONCE AS NEEDED
Status: DISCONTINUED | OUTPATIENT
Start: 2021-06-25 | End: 2021-06-25 | Stop reason: HOSPADM

## 2021-06-25 RX ORDER — FENTANYL CITRATE/PF 50 MCG/ML
25 SYRINGE (ML) INJECTION
Status: DISCONTINUED | OUTPATIENT
Start: 2021-06-25 | End: 2021-06-25 | Stop reason: HOSPADM

## 2021-06-25 RX ORDER — OLANZAPINE 2.5 MG/1
7.5 TABLET ORAL
Status: DISCONTINUED | OUTPATIENT
Start: 2021-06-25 | End: 2021-06-26 | Stop reason: HOSPADM

## 2021-06-25 RX ORDER — SODIUM CHLORIDE 9 MG/ML
INJECTION, SOLUTION INTRAVENOUS CONTINUOUS PRN
Status: DISCONTINUED | OUTPATIENT
Start: 2021-06-25 | End: 2021-06-25

## 2021-06-25 RX ORDER — ONDANSETRON 2 MG/ML
4 INJECTION INTRAMUSCULAR; INTRAVENOUS ONCE AS NEEDED
Status: COMPLETED | OUTPATIENT
Start: 2021-06-25 | End: 2021-06-25

## 2021-06-25 RX ORDER — FENTANYL CITRATE 50 UG/ML
INJECTION, SOLUTION INTRAMUSCULAR; INTRAVENOUS AS NEEDED
Status: DISCONTINUED | OUTPATIENT
Start: 2021-06-25 | End: 2021-06-25

## 2021-06-25 RX ORDER — MIDAZOLAM HYDROCHLORIDE 2 MG/2ML
INJECTION, SOLUTION INTRAMUSCULAR; INTRAVENOUS AS NEEDED
Status: DISCONTINUED | OUTPATIENT
Start: 2021-06-25 | End: 2021-06-25

## 2021-06-25 RX ORDER — OXYCODONE HYDROCHLORIDE 5 MG/1
5 TABLET ORAL EVERY 4 HOURS PRN
Status: DISCONTINUED | OUTPATIENT
Start: 2021-06-25 | End: 2021-06-26 | Stop reason: HOSPADM

## 2021-06-25 RX ORDER — LEVOTHYROXINE SODIUM 0.05 MG/1
50 TABLET ORAL
Status: DISCONTINUED | OUTPATIENT
Start: 2021-06-26 | End: 2021-06-26 | Stop reason: HOSPADM

## 2021-06-25 RX ORDER — BUPIVACAINE HYDROCHLORIDE 2.5 MG/ML
INJECTION, SOLUTION EPIDURAL; INFILTRATION; INTRACAUDAL
Status: DISCONTINUED | OUTPATIENT
Start: 2021-06-25 | End: 2021-06-25

## 2021-06-25 RX ORDER — HYDROMORPHONE HCL IN WATER/PF 6 MG/30 ML
0.2 PATIENT CONTROLLED ANALGESIA SYRINGE INTRAVENOUS
Status: DISCONTINUED | OUTPATIENT
Start: 2021-06-25 | End: 2021-06-25 | Stop reason: HOSPADM

## 2021-06-25 RX ORDER — CEFAZOLIN SODIUM 1 G/50ML
1000 SOLUTION INTRAVENOUS ONCE
Status: COMPLETED | OUTPATIENT
Start: 2021-06-25 | End: 2021-06-25

## 2021-06-25 RX ORDER — OXYCODONE HYDROCHLORIDE 10 MG/1
10 TABLET ORAL EVERY 4 HOURS PRN
Status: DISCONTINUED | OUTPATIENT
Start: 2021-06-25 | End: 2021-06-26 | Stop reason: HOSPADM

## 2021-06-25 RX ORDER — LIDOCAINE HYDROCHLORIDE 10 MG/ML
INJECTION, SOLUTION EPIDURAL; INFILTRATION; INTRACAUDAL; PERINEURAL AS NEEDED
Status: DISCONTINUED | OUTPATIENT
Start: 2021-06-25 | End: 2021-06-25

## 2021-06-25 RX ORDER — HYDROMORPHONE HCL/PF 1 MG/ML
0.5 SYRINGE (ML) INJECTION EVERY 4 HOURS PRN
Status: DISCONTINUED | OUTPATIENT
Start: 2021-06-25 | End: 2021-06-26 | Stop reason: HOSPADM

## 2021-06-25 RX ORDER — NEOSTIGMINE METHYLSULFATE 1 MG/ML
INJECTION INTRAVENOUS AS NEEDED
Status: DISCONTINUED | OUTPATIENT
Start: 2021-06-25 | End: 2021-06-25

## 2021-06-25 RX ORDER — ONDANSETRON 2 MG/ML
INJECTION INTRAMUSCULAR; INTRAVENOUS AS NEEDED
Status: DISCONTINUED | OUTPATIENT
Start: 2021-06-25 | End: 2021-06-25

## 2021-06-25 RX ORDER — DEXAMETHASONE SODIUM PHOSPHATE 4 MG/ML
INJECTION, SOLUTION INTRA-ARTICULAR; INTRALESIONAL; INTRAMUSCULAR; INTRAVENOUS; SOFT TISSUE AS NEEDED
Status: DISCONTINUED | OUTPATIENT
Start: 2021-06-25 | End: 2021-06-25

## 2021-06-25 RX ORDER — ONDANSETRON 2 MG/ML
4 INJECTION INTRAMUSCULAR; INTRAVENOUS EVERY 6 HOURS PRN
Status: DISCONTINUED | OUTPATIENT
Start: 2021-06-25 | End: 2021-06-26 | Stop reason: HOSPADM

## 2021-06-25 RX ORDER — BUPIVACAINE HYDROCHLORIDE AND EPINEPHRINE 2.5; 5 MG/ML; UG/ML
INJECTION, SOLUTION EPIDURAL; INFILTRATION; INTRACAUDAL; PERINEURAL AS NEEDED
Status: DISCONTINUED | OUTPATIENT
Start: 2021-06-25 | End: 2021-06-25 | Stop reason: HOSPADM

## 2021-06-25 RX ORDER — ROCURONIUM BROMIDE 10 MG/ML
INJECTION, SOLUTION INTRAVENOUS AS NEEDED
Status: DISCONTINUED | OUTPATIENT
Start: 2021-06-25 | End: 2021-06-25

## 2021-06-25 RX ORDER — PROPOFOL 10 MG/ML
INJECTION, EMULSION INTRAVENOUS AS NEEDED
Status: DISCONTINUED | OUTPATIENT
Start: 2021-06-25 | End: 2021-06-25

## 2021-06-25 RX ORDER — SODIUM CHLORIDE, SODIUM LACTATE, POTASSIUM CHLORIDE, CALCIUM CHLORIDE 600; 310; 30; 20 MG/100ML; MG/100ML; MG/100ML; MG/100ML
75 INJECTION, SOLUTION INTRAVENOUS CONTINUOUS
Status: DISCONTINUED | OUTPATIENT
Start: 2021-06-25 | End: 2021-06-26

## 2021-06-25 RX ADMIN — PHENYLEPHRINE HYDROCHLORIDE 20 MCG/MIN: 10 INJECTION INTRAVENOUS at 13:33

## 2021-06-25 RX ADMIN — MIDAZOLAM HYDROCHLORIDE 2 MG: 1 INJECTION, SOLUTION INTRAMUSCULAR; INTRAVENOUS at 12:55

## 2021-06-25 RX ADMIN — NEOSTIGMINE METHYLSULFATE 3 MG: 1 INJECTION INTRAVENOUS at 14:42

## 2021-06-25 RX ADMIN — OLANZAPINE 7.5 MG: 2.5 TABLET, FILM COATED ORAL at 21:34

## 2021-06-25 RX ADMIN — SODIUM CHLORIDE, SODIUM LACTATE, POTASSIUM CHLORIDE, AND CALCIUM CHLORIDE: .6; .31; .03; .02 INJECTION, SOLUTION INTRAVENOUS at 12:57

## 2021-06-25 RX ADMIN — LIDOCAINE HYDROCHLORIDE 50 MG: 10 INJECTION, SOLUTION EPIDURAL; INFILTRATION; INTRACAUDAL at 13:01

## 2021-06-25 RX ADMIN — CEFAZOLIN SODIUM 1000 MG: 1 SOLUTION INTRAVENOUS at 13:00

## 2021-06-25 RX ADMIN — BUPIVACAINE HYDROCHLORIDE 10 ML: 2.5 INJECTION, SOLUTION EPIDURAL; INFILTRATION; INTRACAUDAL at 15:12

## 2021-06-25 RX ADMIN — ROCURONIUM BROMIDE 30 MG: 10 SOLUTION INTRAVENOUS at 13:01

## 2021-06-25 RX ADMIN — FENTANYL CITRATE 50 MCG: 50 INJECTION, SOLUTION INTRAMUSCULAR; INTRAVENOUS at 13:01

## 2021-06-25 RX ADMIN — DEXAMETHASONE SODIUM PHOSPHATE 4 MG: 4 INJECTION INTRA-ARTICULAR; INTRALESIONAL; INTRAMUSCULAR; INTRAVENOUS; SOFT TISSUE at 13:22

## 2021-06-25 RX ADMIN — ONDANSETRON 4 MG: 2 INJECTION INTRAMUSCULAR; INTRAVENOUS at 14:35

## 2021-06-25 RX ADMIN — ACETAMINOPHEN 650 MG: 325 TABLET, FILM COATED ORAL at 23:38

## 2021-06-25 RX ADMIN — ONDANSETRON 4 MG: 2 INJECTION INTRAMUSCULAR; INTRAVENOUS at 15:14

## 2021-06-25 RX ADMIN — SODIUM CHLORIDE: 0.9 INJECTION, SOLUTION INTRAVENOUS at 13:10

## 2021-06-25 RX ADMIN — GLYCOPYRROLATE 0.4 MG: 0.2 INJECTION, SOLUTION INTRAMUSCULAR; INTRAVENOUS at 14:42

## 2021-06-25 RX ADMIN — SODIUM CHLORIDE, SODIUM LACTATE, POTASSIUM CHLORIDE, AND CALCIUM CHLORIDE 75 ML/HR: .6; .31; .03; .02 INJECTION, SOLUTION INTRAVENOUS at 17:38

## 2021-06-25 RX ADMIN — PROPOFOL 150 MG: 10 INJECTION, EMULSION INTRAVENOUS at 13:01

## 2021-06-25 NOTE — ANESTHESIA PREPROCEDURE EVALUATION
Procedure:  BREAST MASTECTOMY WITH BIOPSY LYMPH NODE SENTINEL; ANJALI  GUIDED, LYMPHATIC MAPPING WITH BLUE DYE AND RADIOACTIVE DYE (INJECT AT 36 BY DR WALSH IN THE OR) (Right Breast)    Relevant Problems   CARDIO   (+) Hypertension      ENDO   (+) Hypothyroidism      GI/HEPATIC   (+) GERD without esophagitis      GYN   (+) Malignant neoplasm of overlapping sites of right breast in female, estrogen receptor positive ([address] )      Other   (+) Anorexia nervosa        Physical Exam    Airway    Mallampati score: II  TM Distance: >3 FB  Neck ROM: full     Dental       Cardiovascular  Rhythm: regular, Rate: normal,     Pulmonary  Breath sounds clear to auscultation,     Other Findings        Anesthesia Plan  ASA Score- 2     Anesthesia Type- general and regional with ASA Monitors  Additional Monitors:   Airway Plan: ETT  Comment: Right PEC block  Plan Factors-    Chart reviewed  Patient is not a current smoker  Induction- intravenous  Postoperative Plan- Plan for postoperative opioid use  Informed Consent- Anesthetic plan and risks discussed with patient  I personally reviewed this patient with the CRNA  Discussed and agreed on the Anesthesia Plan with the CRNA  Raúl Bautista

## 2021-06-25 NOTE — OP NOTE
OPERATIVE REPORT  PATIENT NAME: Ericka Kingsley    :  1965  MRN: [id number]  Pt Location: AN OR ROOM 01    SURGERY DATE: 2021    Surgeon(s) and Role:     * Lorie Mooney MD - Primary     * Celestino Bridges PA-C - Assisting    Preop Diagnosis:  Malignant neoplasm of overlapping sites of right breast in female, estrogen receptor positive ([facility] [address] ) [C50 811, Z17 0]    Post-Op Diagnosis Codes:     * Malignant neoplasm of overlapping sites of right breast in female, estrogen receptor positive ([facility] [address] ) [C50 811, Z17 0]    Procedure(s) (LRB):  BREAST MASTECTOMY WITH BIOPSY LYMPH NODE SENTINEL; ANJALI  GUIDED, LYMPHATIC MAPPING WITH BLUE DYE AND RADIOACTIVE DYE (INJECT AT 1130 BY DR WALSH IN THE OR) (Right)    Specimen(s):  ID Type Source Tests Collected by Time Destination   1 : right axillary sentinrl lymph node Tissue Lymph Node, [place] TISSUE EXAM Lorie Mooney MD 2021 1349    2 : right breast Tissue Breast, Right TISSUE EXAM Lorie Mooney MD 2021 1403        Estimated Blood Loss:   Minimal    Drains:  Closed/Suction Drain Lateral;Right Breast Bulb 19 Fr  (Active)   Number of days: 0       Closed/Suction Drain Lateral;Right Chest Bulb 19 Fr  (Active)   Number of days: 0       Anesthesia Type:   General    Operative Indications:  Malignant neoplasm of overlapping sites of right breast in female, estrogen receptor positive ([facility] [address] ) [C50 811, Z17 0]      Operative Findings:  [place] lymph node was blue and radioactive and grossly normal it was negative on touch prep  Patient had a ANJALI clips within the specimen based on the ANJALI detector  Complications:   None    Preoperatively the patient had a pectoral nerve block by Anesthesia        Procedure and Technique:  The patient was brought to the operation room and placed under general anesthesia   Attention was paid to ensure appropriate padding and correct positioning   The right breast was injected intradermally with 0 3cc of methylene blue and technetium sulfur colloid and then prepped and draped in a sterile fashion   I initiated a time-out, identifying the patient, the correct side and the above procedure   All parties agreed with the time out      The planned incision was then sharply incised   Thin flaps were then elevated using electrocautery starting superiorly and then continuing medially, inferiorly and finally laterally   The tail Laurence Wilson was then dissected away from the axilla proper leaving the breast tethered to the underlying musculature       The sentinel lymph node was identified and removed  The node was  blue, radioactive, and grossly normal   There were no other radioactive, blue or enlarged lymph nodes in the axilla  The sentinel lymph node was sent to pathology and there was no microscopic evidence of metastasis  The breast was then removed from the muscles in a sub-facial plane   The breast was oriented with sutures (short=superior; medium=medial; long=lateral)  The breast was sent to pathology in formalin for permanent pathologic evaluation  Meticulous hemostasis was maintained with electrocautery   The wound was extensively irrigated and two 19mm, slotted, round UMM drains were placed and brought out through separate incisions and secured with nylon sutures   The wound was then closed in two layers - an inner layer of 2-0 vicryl and a subcuticular closure with 4-0 Monocryl  Steri-strips were applied  The counts were correct x2  I was present for the entire case  A surgical resident was not available  I used and advanced practitioner to facilitate retraction dissection and closure      Patient Disposition:  PACU     SIGNATURE: Vanessa Erazo MD  DATE: June 25, 2021  TIME: 2:27 PM

## 2021-06-25 NOTE — ANESTHESIA POSTPROCEDURE EVALUATION
Post-Op Assessment Note    CV Status:  Stable    Pain management: adequate     Mental Status:  Arousable and sleepy   Hydration Status:  Euvolemic   PONV Controlled:  Controlled   Airway Patency:  Patent      Post Op Vitals Reviewed: Yes      Staff: CRNA         No complications documented      BP   125/80   Temp  96 9   Pulse  60   Resp   14   SpO2   100 3 L fm

## 2021-06-25 NOTE — INTERVAL H&P NOTE
H&P reviewed  After examining the patient I find no changes in the patients condition since the H&P had been written      Vitals:    06/25/21 1115   BP: 164/94   Pulse: 76   Resp: 17   Temp: (!) 96 7 °F (35 9 °C)   SpO2: 100%

## 2021-06-25 NOTE — ANESTHESIA PROCEDURE NOTES
Peripheral Block    Patient location during procedure: OR  Start time: 6/25/2021 1:16 PM  Reason for block: procedure for pain, at surgeon's request and post-op pain management  Staffing  Anesthesiologist: Barrera Anders MD  Resident/CRNA: Arlette Rodriguez CRNA  Preanesthetic Checklist  Completed: patient identified, IV checked, site marked, risks and benefits discussed, surgical consent, monitors and equipment checked, pre-op evaluation and timeout performed  Peripheral Block  Patient position: supine  Prep: ChloraPrep  Patient monitoring: heart rate, cardiac monitor, continuous pulse ox and frequent blood pressure checks  Anesthesia block type: PEC 1 and 2 Block  Laterality: right  Injection technique: single-shot  Procedures: ultrasound guided, Ultrasound guidance required for the procedure to increase accuracy and safety of medication placement and decrease risk of complications    Ultrasound permanent image savedbupivacaine (MARCAINE) 0 25 % perineural infiltration, 10 mL (with 20 of exparel)  Needle  Needle type: Stimuplex   Needle gauge: 22 G  Needle length: 10 cm  Needle localization: ultrasound guidance  Assessment  Injection assessment: incremental injection, negative aspiration for heme and no paresthesia on injection  Paresthesia pain: none  Heart rate change: no  Slow fractionated injection: yes  Post-procedure:  site cleaned  patient tolerated the procedure well with no immediate complications

## 2021-06-26 VITALS
WEIGHT: 95 LBS | DIASTOLIC BLOOD PRESSURE: 68 MMHG | HEART RATE: 71 BPM | BODY MASS INDEX: 16.22 KG/M2 | TEMPERATURE: 97.7 F | SYSTOLIC BLOOD PRESSURE: 102 MMHG | HEIGHT: 64 IN | RESPIRATION RATE: 15 BRPM | OXYGEN SATURATION: 98 %

## 2021-06-26 LAB
BASOPHILS # BLD AUTO: 0.07 THOUSANDS/ΜL (ref 0–0.1)
BASOPHILS NFR BLD AUTO: 1 % (ref 0–1)
EOSINOPHIL # BLD AUTO: 0.01 THOUSAND/ΜL (ref 0–0.61)
EOSINOPHIL NFR BLD AUTO: 0 % (ref 0–6)
ERYTHROCYTE [DISTWIDTH] IN BLOOD BY AUTOMATED COUNT: 12.8 % (ref 11.6–15.1)
HCT VFR BLD AUTO: 34.6 % (ref 34.8–46.1)
HGB BLD-MCNC: 11.2 G/DL (ref 11.5–15.4)
IMM GRANULOCYTES # BLD AUTO: 0.04 THOUSAND/UL (ref 0–0.2)
IMM GRANULOCYTES NFR BLD AUTO: 0 % (ref 0–2)
LYMPHOCYTES # BLD AUTO: 2.03 THOUSANDS/ΜL (ref 0.6–4.47)
LYMPHOCYTES NFR BLD AUTO: 21 % (ref 14–44)
MCH RBC QN AUTO: 30.8 PG (ref 26.8–34.3)
MCHC RBC AUTO-ENTMCNC: 32.4 G/DL (ref 31.4–37.4)
MCV RBC AUTO: 95 FL (ref 82–98)
MONOCYTES # BLD AUTO: 0.87 THOUSAND/ΜL (ref 0.17–1.22)
MONOCYTES NFR BLD AUTO: 9 % (ref 4–12)
NEUTROPHILS # BLD AUTO: 6.75 THOUSANDS/ΜL (ref 1.85–7.62)
NEUTS SEG NFR BLD AUTO: 69 % (ref 43–75)
NRBC BLD AUTO-RTO: 0 /100 WBCS
PLATELET # BLD AUTO: 230 THOUSANDS/UL (ref 149–390)
PMV BLD AUTO: 10.9 FL (ref 8.9–12.7)
RBC # BLD AUTO: 3.64 MILLION/UL (ref 3.81–5.12)
WBC # BLD AUTO: 9.77 THOUSAND/UL (ref 4.31–10.16)

## 2021-06-26 PROCEDURE — 85025 COMPLETE CBC W/AUTO DIFF WBC: CPT | Performed by: PHYSICIAN ASSISTANT

## 2021-06-26 PROCEDURE — NC001 PR NO CHARGE: Performed by: SURGERY

## 2021-06-26 PROCEDURE — 99024 POSTOP FOLLOW-UP VISIT: CPT | Performed by: SURGERY

## 2021-06-26 RX ADMIN — PANTOPRAZOLE SODIUM 40 MG: 40 TABLET, DELAYED RELEASE ORAL at 09:31

## 2021-06-26 RX ADMIN — SODIUM CHLORIDE, SODIUM LACTATE, POTASSIUM CHLORIDE, AND CALCIUM CHLORIDE 75 ML/HR: .6; .31; .03; .02 INJECTION, SOLUTION INTRAVENOUS at 06:13

## 2021-06-26 RX ADMIN — LEVOTHYROXINE SODIUM 50 MCG: 50 TABLET ORAL at 06:11

## 2021-06-26 RX ADMIN — ACETAMINOPHEN 650 MG: 325 TABLET, FILM COATED ORAL at 06:11

## 2021-06-26 NOTE — PROGRESS NOTES
Progress Note - General Surgery   [facility] Amant 61 y o  female MRN: [id number]  Unit/Bed#: S -01 Encounter: [id number]    Assessment:  25-year-old female status post right mastectomy with sentinel lymph node biopsy on 06/25    Vital stable  UMM drains with serosanguineous output UMM 1: 80 cc, UMM 2: 55 cc  Hemoglobin 11 2 from 13 3  Plan:  -Diet:  As tolerated  -I/Os  -DVT ppx  -Control Pain/Nausea   -OOB as tolerated  -Incentive Spirometry  -lines continue UMM drain to suction record output  -case management for home VNA  -plan for DC today      Subjective/Objective       Subjective:  Patient seen examined at bedside  Patient states her pain is well controlled  She denies any nausea, vomiting, fever, chills  Objective:    Blood pressure 140/80, pulse (!) 54, temperature 97 7 °F (36 5 °C), temperature source Oral, resp  rate 15, height 5' 4" (1 626 m), weight 43 1 kg (95 lb), SpO2 96 %  ,Body mass index is 16 31 kg/m²  Intake/Output Summary (Last 24 hours) at 6/26/2021 0631  Last data filed at 6/26/2021 8642  Gross per 24 hour   Intake 2470 ml   Output 1045 ml   Net 1425 ml       Invasive Devices     Peripheral Intravenous Line            Peripheral IV 06/25/21 Distal;Left Forearm <1 day    Peripheral IV 06/25/21 Left Arm <1 day          Drain            Closed/Suction Drain Lateral;Right Breast Bulb 19 Fr  <1 day    Closed/Suction Drain Lateral;Right Chest Bulb 19 Fr  <1 day                Physical Exam:  General: NAD  Head: normocephalic, atraumatic  CV: Pulse regular  Lungs: no conversational dyspnea  Chest:  Incision clean dry intact, UMM drains with serosanguineous output  Abdomen: soft, non distended, non tender, no guarding or rebound  Extremities: LACY, motor sensory intact  Neuro: awake, alert, answers questions appropriately      Lab, Imaging and other studies:I have personally reviewed pertinent lab results      VTE Pharmacologic Prophylaxis: Sequential compression device (Venodyne)   VTE Mechanical Prophylaxis: sequential compression device

## 2021-06-26 NOTE — CASE MANAGEMENT
LUZ MARINA met with Pt to discuss the recommendation of [facility] services for drainage care which she reported being agreeable to and requested a referral to [facility]   CM discussed freedom of choice and made the requested referral

## 2021-06-26 NOTE — PLAN OF CARE
Problem: METABOLIC, FLUID AND ELECTROLYTES - ADULT  Goal: Electrolytes maintained within normal limits  Description: INTERVENTIONS:  - Monitor labs and assess patient for signs and symptoms of electrolyte imbalances  - Administer electrolyte replacement as ordered  - Monitor response to electrolyte replacements, including repeat lab results as appropriate  - Instruct patient on fluid and nutrition as appropriate  Outcome: Progressing  Goal: Fluid balance maintained  Description: INTERVENTIONS:  - Monitor labs   - Monitor I/O and WT  - Instruct patient on fluid and nutrition as appropriate  - Assess for signs & symptoms of volume excess or deficit  Outcome: Progressing  Goal: Glucose maintained within target range  Description: INTERVENTIONS:  - Monitor Blood Glucose as ordered  - Assess for signs and symptoms of hyperglycemia and hypoglycemia  - Administer ordered medications to maintain glucose within target range  - Assess nutritional intake and initiate nutrition service referral as needed  Outcome: Progressing     Problem: SKIN/TISSUE INTEGRITY - ADULT  Goal: Incision(s), wounds(s) or drain site(s) healing without S/S of infection  Description: INTERVENTIONS  - Assess and document dressing, incision, wound bed, drain sites and surrounding tissue  - Provide patient and family education  - Perform skin care/dressing changes PRN  Outcome: Progressing     Problem: HEMATOLOGIC - ADULT  Goal: Maintains hematologic stability  Description: INTERVENTIONS  - Assess for signs and symptoms of bleeding or hemorrhage  - Monitor labs  - Administer supportive blood products/factors as ordered and appropriate  Outcome: Progressing     Problem: PAIN - ADULT  Goal: Verbalizes/displays adequate comfort level or baseline comfort level  Description: Interventions:  - Encourage patient to monitor pain and request assistance  - Assess pain using appropriate pain scale  - Administer analgesics based on type and severity of pain and evaluate response  - Implement non-pharmacological measures as appropriate and evaluate response  - Consider cultural and social influences on pain and pain management  - Notify physician/advanced practitioner if interventions unsuccessful or patient reports new pain  Outcome: Progressing     Problem: SAFETY ADULT  Goal: Patient will remain free of falls  Description: INTERVENTIONS:  - Educate patient/family on patient safety including physical limitations  - Instruct patient to call for assistance with activity   - Consult OT/PT to assist with strengthening/mobility   - Keep Call bell within reach  - Keep bed low and locked with side rails adjusted as appropriate  - Keep care items and personal belongings within reach  - Initiate and maintain comfort rounds  - Make Fall Risk Sign visible to staff  - Apply yellow socks and bracelet for high fall risk patients  - Consider moving patient to room near nurses station  Outcome: Progressing  Goal: Maintain or return to baseline ADL function  Description: INTERVENTIONS:  -  Assess patient's ability to carry out ADLs; assess patient's baseline for ADL function and identify physical deficits which impact ability to perform ADLs (bathing, care of mouth/teeth, toileting, grooming, dressing, etc )  - Assess/evaluate cause of self-care deficits   - Assess range of motion  - Assess patient's mobility; develop plan if impaired  - Assess patient's need for assistive devices and provide as appropriate  - Encourage maximum independence but intervene and supervise when necessary  - Involve family in performance of ADLs  - Assess for home care needs following discharge   - Consider OT consult to assist with ADL evaluation and planning for discharge  - Provide patient education as appropriate  Outcome: Progressing  Goal: Maintains/Returns to pre admission functional level  Description: INTERVENTIONS:  - Perform BMAT or MOVE assessment daily    - Set and communicate daily mobility goal to care team and patient/family/caregiver     - Collaborate with rehabilitation services on mobility goals if consulted  - Out of bed for toileting  - Record patient progress and toleration of activity level   Outcome: Progressing     Problem: DISCHARGE PLANNING  Goal: Discharge to home or other facility with appropriate resources  Description: INTERVENTIONS:  - Identify barriers to discharge w/patient and caregiver  - Arrange for needed discharge resources and transportation as appropriate  - Identify discharge learning needs (meds, wound care, etc )  - Arrange for interpretive services to assist at discharge as needed  - Refer to Case Management Department for coordinating discharge planning if the patient needs post-hospital services based on physician/advanced practitioner order or complex needs related to functional status, cognitive ability, or social support system  Outcome: Progressing

## 2021-06-26 NOTE — DISCHARGE SUMMARY
Discharge Summary - Surgical Oncology   [facility] 61 y o  female MRN: [id number]  Unit/Bed#: S -01 Encounter: [id number]    Admission Date:     Admitting Diagnosis: Malignant neoplasm of overlapping sites of right breast in female, estrogen receptor positive ([facility] [address] ) [C50 811, Z17 0]    HPI:  41-year-old female presents with right-sided multifocal breast cancer  Presents for right mastectomy  Procedures Performed: No orders of the defined types were placed in this encounter  BREAST MASTECTOMY WITH BIOPSY LYMPH NODE SENTINEL; ANJALI  GUIDED, LYMPHATIC MAPPING WITH BLUE DYE AND RADIOACTIVE DYE (INJECT AT 1130 BY DR WALSH IN THE OR) (Right)    Hospital Course: Following the procedure the patient was admitted for observation  On postop day 1 her pain was well controlled  She was tolerating diet  UMM drains were functional appropriate output  She was cleared for discharge after being seen by attending on postoperative day 1   was consulted for VNA and home drain care  Significant Findings, Care, Treatment and Services Provided:  A  Lymph Node, [place], right axillary sentinel lymph node, lymphadenectomy:  - One lymph node (1/1mi) positive for micrometastatic carcinoma  -- Subcapsular and sinusoidal metastases; largest metastatic deposit 0 4 mm in a 1 1 mm area  -- No extranodal extension  -- Confirmed by positive pankeratin (CKAE1/3) immunostain       B  Breast, Right, right breast, mastectomy:  Multifocal (2) invasive mammary carcinoma  Largest invasive carcinoma  - Invasive mammary carcinoma of no special type (ductal), 20 mm in greatest gross dimension,      present in lower outer quadrant (LOQ)      -- [place] histologic grade 2 of 3 (total score: 7 of 9)        * Glandular (acinar) Tubular Differentiation 10-75%, score 2         * Nuclear Pleomorphism 2-3 of 3, score 3         * Mitotic Rate 4-7/mm2, (8-14 mitoses/10HPF; 12 mitoses/10 HPF), score 2     -- Confirmed by         * Negative: p63 and SMMHC immunostains  -- Associated prior biopsy site changes with ribbon clip and ANJALI  marker  Second invasive carcinoma  - Invasive lobular carcinoma, classic pattern with histiocytoid differentiation; 7 mm in      greatest gross dimension, present in upper outer quadrant (UOQ)  -- [place] histologic grade 2 of 3 (total score: 6 of 9)        * Glandular (acinar) Tubular Differentiation <10%, score 3         * Nuclear Pleomorphism 1-2 of 3, score 2         * Mitotic Rate < 3/mm2, (</= 7 mitoses/10HPF; 0 mitoses/10 hPF), score 1     -- Confirmed by tumor cell immunophenotype:        * Positive: Pankeratin (CKAE1/3) and P120 (cytoplasmic)  -- Associated prior biopsy site changes with wing clip and two ANJALI  markers  - Focus suspicious for microinvasive carcinoma of no special type (ductal), present in tissue     between both invasive carcinomas; 0 7 mm   - Ductal carcinoma in-situ (DCIS): Present; not an extensive intraductal component; associated with     largest invasive carcinoma (< 5% of total tumor)  -- Size and Extent: 7 mm in greatest estimated extent; present in 1 of 21 blocks  -- Architectural Patterns: Micropapillary and cribriform  -- Nuclear grade: II (intermediate)  -- Necrosis: Present, focal necrosis  - Margins uninvolved by invasive and in-situ carcinoma  -- Invasive carcinoma 5 mm from nearest posterior margin; 7 mm from nearest anterior UOQ margin  -- DCIS > 5 mm from nearest gross posterior margin    - Benign nipple and skin  -- Invasive carcinoma does not invade into the dermis or epidermis; no ulceration  -- DCIS does not involve the nipple epidermis  -- No dermal lymphovascular invasion    - Lymphovascular invasion: Present  -- Confirmed by D2-40 and CD31 immunostains    - Perineural invasion: Present     - Microcalcifications: Present, associated with invasive carcinoma, lobular neoplasia and non-neoplastic breast tissue    - Benign proliferative and non-proliferative fibrocystic changes with atypia consisting of extensive     non-invasive lobular neoplasia (lobular carcinoma in-situ and atypical lobular hyperplasia; focally     involving sclerosing adenosis), sclerosing and apocrine adenosis, fibroadenomatoid changes and     cystic and papillary apocrine metaplasia  -- Lobular neoplasia confirmed by positive p120 (cytoplasmic), SMMHC and p63; negative E-cadherin        immunostains  Lab Results: I have personally reviewed pertinent lab results  Complications:  None immediate    Discharge Diagnosis:  Breast cancer    Medical Problems     Resolved Problems  Date Reviewed: 6/10/2021    None                Condition at Discharge: good         Discharge instructions/Information to patient and family:   See after visit summary for information provided to patient and family  Provisions for Follow-Up Care:  See after visit summary for information related to follow-up care and any pertinent home health orders  Disposition: See After Visit Summary for discharge disposition information  Planned Readmission: No    Discharge Statement   I spent 30 minutes discharging the patient  This time was spent on the day of discharge  I had direct contact with the patient on the day of discharge  Additional documentation is required if more than 30 minutes were spent on discharge  Discharge Medications:  See after visit summary for reconciled discharge medications provided to patient and family

## 2021-06-26 NOTE — DISCHARGE INSTRUCTIONS
You may resume your home medications  You have Steri-Strips over your incisions that will fall off on their own  You may shower  No bath tubs or swimming  Please walk frequently  No lifting anything heavier than 15 lbs  Please into your drains daily and record how much is coming out as well as the color  Bring this record with you to your office visit  Please call or return to the ER if you have nausea, vomiting, fevers, chills, chest pain, shortness of breath, redness around your incisions, or discharge from your incisions  Please follow up in one week  Call the office to schedule your appointment  Collin-Seals Drain Care   AMBULATORY CARE:   A Collin-Seals (UMM) drain  is used to remove fluids that build up in an area of your body after surgery  The UMM drain is a bulb shaped device connected to a tube  One end of the tube is placed inside you during surgery  The other end comes out through a small cut in your skin  The bulb is connected to this end  You may have a stitch to hold the tube in place  Seek care immediately if:   · Your UMM drain breaks or comes out  · You have cloudy yellow or brown drainage from your UMM drain site, or the drainage smells bad  Contact your healthcare provider if:   · You drain less than 30 milliliters (2 tablespoons) in 24 hours  This may mean your drain can be removed  · You suddenly stop draining fluid or think your UMM drain is blocked  · You have a fever higher than 101 5°F (38 6°C)  · You have increased pain, redness, or swelling around the drain site  · You have questions about your UMM drain care  How a Collin-Seals drain works: The UMM drain removes fluids by creating suction in the tube  The bulb is squeezed flat and connected to the tube that sticks out of your body  The bulb expands as it fills with fluid  How to change the bandage around your Collin-Seals drain:  If you have a bandage, change it once a day   You may need to change your bandage more than once a day if it gets completely wet  · Wash your hands with soap and water  · Loosen the tape and gently remove the old bandage  Throw the old bandage into a plastic trash bag  · Use soap and water or saline (salt water) solution to clean your UMM drain site  Dip a cotton swab or gauze pad in the solution and gently clean your skin  · Pat the area dry  · Place a new bandage on your UMM drain site and secure it to your skin with medical tape  · Wash your hands  How to empty the Collin-Seals drain:  Empty the bulb when it is half full or every 8 to 12 hours  · Wash your hands with soap and water  · Remove the plug from the bulb  · Pour the fluid into a measuring cup  · Clean the plug with an alcohol swab or a cotton ball dipped in rubbing alcohol  · Squeeze the bulb flat and put the plug back in  The bulb should stay flat until it starts to fill with fluid again  · Measure the amount of fluid you pour out  Write down how much fluid you empty from the UMM drain and the date and time you collected it  · Flush the fluid down the toilet  Wash your hands  Clear clogged tubing: Use the following steps to clear your Collin-Seals tubing:  · Hold the tubing between your thumb and first finger at the place closest to your skin  This hand will prevent the tube from being pulled out of your skin  · Use your other thumb and first finger to slide the clog down the tubing toward the bulb  You may have to repeat the sliding until the tubing is unclogged  Collin-Seals drain removal:  The amount of fluid that you drain will decrease as your wound heals  The UMM drain usually is removed when less than 30 milliliters (2 tablespoons) is collected in 24 hours  Ask your healthcare provider when and how your UMM drain will be removed    Follow up with your healthcare provider as directed:  Write down your questions so you remember to ask them during your visits  © Copyright [address] [place] [address] Information is for End User's use only and may not be sold, redistributed or otherwise used for commercial purposes  All illustrations and images included in CareNotes® are the copyrighted property of A D A M , Inc  or Gaurav Archer  The above information is an  only  It is not intended as medical advice for individual conditions or treatments  Talk to your doctor, nurse or pharmacist before following any medical regimen to see if it is safe and effective for you

## 2021-06-28 ENCOUNTER — TELEPHONE (OUTPATIENT)
Dept: FAMILY MEDICINE CLINIC | Facility: CLINIC | Age: 56
End: 2021-06-28

## 2021-06-30 NOTE — PATIENT INSTRUCTIONS
Arm Stretches After Mastectomy or Breast Reconstruction   Breastcancer  org: Exercise after surgery  KHANH Esqueda  2014  Available from URL: NotSimilar no  [email or web address]  As accessed 2016-01-13  Cancer  org: Exercises after breast surgery  [address], [address] [facility]  Available from URL: [email or web address]  [email or web address]/[email or web address]-[email or web address]-surgery  As accessed 2016-01-13  Thyroid (By mouth)   Thyroid (THYE-roid)  Treats hypothyroidism (the body does not produce enough thyroid hormone)  Brand Name(s): Broadbent Thyroid, NP Thyroid, Nature-Throid, WP Thyroid, Westhroid   There may be other brand names for this medicine  When This Medicine Should Not Be Used: You should not use this medicine if you have had an allergic reaction to any type of thyroid hormone  How to Use This Medicine:   Tablet, Capsule  · Your doctor will tell you how much to take and how often  · Take your medicine at the same time every day  · Take the medicine on an empty stomach  If a dose is missed:   · Take your medicine as soon as you remember that you missed a dose  · Skip the missed dose if it is almost time for your next dose  · You should not use two doses at the same time  How to Store and Dispose of This Medicine:   · Store at room temperature, away from heat and moisture  · Keep all medicine out of the reach of children  Drugs and Foods to Avoid:   Ask your doctor or pharmacist before using any other medicine, including over-the-counter medicines, vitamins, and herbal products  · If you are taking cholestyramine Ethelene Alcaraz), take it either 4 hours before or 4 hours after you take levothyroxine  Warnings While Using This Medicine:   · Talk with your doctor before taking this medicine if you have heart disease, high blood pressure, or diabetes  · Do not stop taking this medicine without first checking with your doctor    Possible Side Effects While Using This Medicine:   Call your doctor right away if you notice any of these side effects:  · Skin rash or hives with intense itching  · Shortness of breath or trouble breathing  · Chest pain  · Severe irritability or nervousness, tremors  · Fast or irregular heartbeat  · Severe diarrhea or intense sweating  If you notice these less serious side effects, talk with your doctor:   · Anxiety  · Headache  · Trouble sleeping  · Appetite changes, weight loss  · Irregular menstrual periods  If you notice other side effects that you think are caused by this medicine, tell your doctor  Call your doctor for medical advice about side effects  You may report side effects to FDA at 1-961-FDA-6904  © Copyright [employer] 2021 Information is for End User's use only and may not be sold, redistributed or otherwise used for commercial purposes  The above information is an  only  It is not intended as medical advice for individual conditions or treatments  Talk to your doctor, nurse or pharmacist before following any medical regimen to see if it is safe and effective for you  Vitamin D (By mouth)   Vitamin D (VYE-ta-min D)  Maintains calcium and phosphorus in your body and makes your bones strong  This medicine is a vitamin  Brand Name(s): Baby Vitamin D, Basic's Natural Vitamin D-3, Calcidol, Penaloza Baby Ddrops, Vincent Hand for [employer], Decara, Delta D3, Dialyvite Vitamin D3 Max, Drisdol, Enfamil D-Vi-Sol, Infants Aqueous Vitamin D, Shankar Natural , Nature's Blend Super Strength Vitamin D3, Thera-D 2000   There may be other brand names for this medicine  When This Medicine Should Not Be Used: You should not use this medicine if you have had an allergic reaction to vitamin D  How to Use This Medicine:   Tablet, Liquid, Liquid Filled Capsule  · Your doctor will tell you how much medicine to use  Do not use more than directed    · Follow the instructions on the medicine label if you are using this medicine without a prescription  · It is best to take this medicine with food or milk  · Carefully follow your doctor's instructions about any special diet  If a dose is missed:   · Take a dose as soon as you remember  If it is almost time for your next dose, wait until then and take a regular dose  Do not take extra medicine to make up for a missed dose  How to Store and Dispose of This Medicine:   · Store the medicine in a closed container at room temperature, away from heat, moisture, and direct light  · Ask your pharmacist, doctor, or health caregiver about the best way to dispose of any outdated medicine or medicine no longer needed  · Keep all medicine out of the reach of children  Never share your medicine with anyone  Drugs and Foods to Avoid:   Ask your doctor or pharmacist before using any other medicine, including over-the-counter medicines, vitamins, and herbal products  · Make sure your doctor knows about all other medicines you are using  Warnings While Using This Medicine:   · Make sure your doctor knows if you are pregnant or breast feeding  · Make sure your doctor knows if you have kidney stones, sarcoidosis, or overactive parathyroid gland  · You should not use this medicine if you are under 25years of age  Possible Side Effects While Using This Medicine:   Call your doctor right away if you notice any of these side effects:  · Allergic reaction: Itching or hives, swelling in your face or hands, swelling or tingling in your mouth or throat, chest tightness, trouble breathing  · Change in how much or how often you urinate  If you notice these less serious side effects, talk with your doctor:   · Diarrhea  · Headache  · Weight loss  If you notice other side effects that you think are caused by this medicine, tell your doctor  Call your doctor for medical advice about side effects   You may report side effects to FDA at 1-944-FDA-8360  © Copyright [employer] 2021 Information is for [employer] use only and may not be sold, redistributed or otherwise used for commercial purposes  The above information is an  only  It is not intended as medical advice for individual conditions or treatments  Talk to your doctor, nurse or pharmacist before following any medical regimen to see if it is safe and effective for you  Breastcancer  org: Mastectomy: what to expect  KHANH Mccarthy  2013  Available from URL: NotSimilar no  [email or web address]/expectations  As accessed 2016-01-13  © [address] Information is for End User's use only and may not be sold, redistributed or otherwise used for commercial purposes  All illustrations and images included in CareNotes® are the copyrighted property of A D A M , Inc  or [facility] Kandis Shah   The above information is an  only  It is not intended as medical advice for individual conditions or treatments  Talk to your doctor, nurse or pharmacist before following any medical regimen to see if it is safe and effective for you

## 2021-06-30 NOTE — PROGRESS NOTES
BMI Counseling: Body mass index is 16 82 kg/m²  The BMI is below normal  Patient advised to gain weight and dietary education for weight gain was provided  Depression Screening and Follow-up Plan: Clincally patient does not have depression  No treatment is required  Assessment/Plan:         Problem List Items Addressed This Visit        Digestive    GERD without esophagitis     And takes Protonix 40 mg p o  Daily  Maintained on the GERD diet  Endocrine    Hypothyroidism - Primary     Patient on levothyroxine 50 mcg p o  Daily  Patient ordered for thyroid function studies at this time for evaluation  Cardiovascular and Mediastinum    Hypertension     Blood pressure currently under control at 108/68  Patient takes atenolol 100 mg every other day  Instructed on low-sodium diet  Routine exercise 3 to 5 times a week for at least 30 60 minutes  Other    Vitamin D deficiency     Patient takes vitamin-D supplementation of vitamin-D 50 mcg p o  Daily 2000 units  Currently order for routine vitamin-D level  Malignant neoplasm of overlapping sites of right breast in female, estrogen receptor positive ([facility] [address] )     24 cm right anterior chest incision cdi, with steri-strips present  Right lateral UMM drainage tubes present x2  Draining thick tan secretions  Patient tolerated well no complaints of pain site looks clean, dry and intact  Other Visit Diagnoses     Screening for lipid disorders                Subjective:      Patient ID: Nahum Rincon is a 61 y o  female  Patient is a 25-year-old female presents for follow-up evaluation sub post right breast mastectomy on 06/25/2021  Doing well tolerated procedure well no cane or discomfort noted  Currently right side incision clean dry intact with Steri-Strips present  Right side thoracic drain clean dry intact x2 draining clear tan secretions to [employer]  No reports of incisional pain    Blood pressure currently BP- 108/68, pulse -70, respirations-18, temperature - 97 3  O2 saturation on room air 98%  The following portions of the patient's history were reviewed and updated as appropriate:   Past Medical History:  She has a past medical history of Abnormal weight loss, Anorexia nervosa, Disease of thyroid gland, Elevated blood sugar, Fibroadenoma of both breasts, GERD (gastroesophageal reflux disease), Hypertension, Hyperthyroidism, and Osteopenia  ,  _______________________________________________________________________  Medical Problems:  does not have any pertinent problems on file ,  _______________________________________________________________________  Past Surgical History:   has a past surgical history that includes Cholecystectomy; Breast cyst excision (Left, 2011); Breast biopsy (Right, 04/16/2021); US guided breast biopsy right complete (Right, 4/16/2021); US guidance breast biopsy right each additional (Right, 4/16/2021); US breast medardo  right (Right, 6/14/2021); US breast needle loc right each additional (Right, 6/14/2021); and Mastectomy w/ sentinel node biopsy (Right, 6/25/2021)  ,  _______________________________________________________________________  Family History:  family history is not on file  She was adopted  ,  _______________________________________________________________________  Social History:   reports that she has never smoked  She has never used smokeless tobacco  She reports current alcohol use of about 6 0 standard drinks of alcohol per week  She reports that she does not use drugs  ,  _______________________________________________________________________  Allergies:  has No Known Allergies     _______________________________________________________________________  Current Outpatient Medications   Medication Sig Dispense Refill    atenolol (TENORMIN) 100 mg tablet Take 1 tablet (100 mg total) by mouth every other day Take 100 mg by mouth every other morning   90 tablet 0    Cholecalciferol (Vitamin D) 50 MCG (2000 UT) CAPS Take 1 capsule (2,000 Units total) by mouth daily Take 1 capsule every day by oral route for 30 days  90 capsule 1    levothyroxine 50 mcg tablet Take 1 tablet (50 mcg total) by mouth daily in the early morning 90 tablet 1    Multiple Vitamins-Minerals (Womens 50+ Multi Vitamin/Min) TABS Take 1 tablet by mouth daily 100 tablet 1    OLANZapine (ZyPREXA) 7 5 mg tablet Take 7 5 mg by mouth daily at bedtime      oxyCODONE-acetaminophen (PERCOCET) 5-325 mg per tablet Take 1 tablet by mouth every 6 (six) hours as needed for moderate painMax Daily Amount: 4 tablets 15 tablet 0    pantoprazole (PROTONIX) 40 mg tablet Take 1 tablet (40 mg total) by mouth daily 90 tablet 1     No current facility-administered medications for this visit      _______________________________________________________________________  Review of Systems   Constitutional: Negative for activity change, appetite change, chills, fatigue, fever and unexpected weight change  HENT: Negative for congestion, ear discharge, ear pain, nosebleeds, postnasal drip, rhinorrhea, sinus pressure, sinus pain, sneezing, sore throat and voice change  Eyes: Negative for pain, redness and visual disturbance  Respiratory: Negative for cough, chest tightness, shortness of breath and wheezing  Cardiovascular: Negative for chest pain and palpitations  Gastrointestinal: Negative for abdominal distention, abdominal pain, diarrhea, nausea and vomiting  Endocrine: Negative  Genitourinary: Negative for difficulty urinating, dysuria, flank pain, frequency, hematuria, pelvic pain and urgency  Musculoskeletal: Negative for arthralgias and myalgias  Skin: Positive for wound  24 cm right anterior chest incision cdi, with steri-strips present  Right lateral UMM drainage tubes present x2  Draining thick tan secretions  Allergic/Immunologic: Negative  Neurological: Negative      Hematological: Negative  Psychiatric/Behavioral: Negative  Objective:  Vitals:    07/01/21 1539   BP: 108/68   Pulse: 70   Resp: 18   Temp: (!) 96 3 °F (35 7 °C)   SpO2: 98%   Weight: 44 5 kg (98 lb)   Height: 5' 4" (1 626 m)     Body mass index is 16 82 kg/m²  Physical Exam  Vitals and nursing note reviewed  Constitutional:       Appearance: Normal appearance  She is well-developed and normal weight  HENT:      Head: Normocephalic and atraumatic  Right Ear: Tympanic membrane, ear canal and external ear normal       Left Ear: Tympanic membrane, ear canal and external ear normal       Nose: Nose normal       Mouth/Throat:      Mouth: Mucous membranes are moist    Eyes:      Extraocular Movements: Extraocular movements intact  Conjunctiva/sclera: Conjunctivae normal       Pupils: Pupils are equal, round, and reactive to light  Cardiovascular:      Rate and Rhythm: Normal rate and regular rhythm  Pulses: Normal pulses  Heart sounds: Normal heart sounds  No murmur heard  Pulmonary:      Effort: Pulmonary effort is normal       Breath sounds: Normal breath sounds  Abdominal:      General: Bowel sounds are normal       Palpations: Abdomen is soft  Musculoskeletal:         General: Normal range of motion  Cervical back: Normal range of motion and neck supple  Skin:     General: Skin is warm  Capillary Refill: Capillary refill takes less than 2 seconds  Comments: 24 cm right anterior chest incision cdi, with steri-strips present  Right lateral UMM drainage tubes present x2  Draining thick tan secretions  Neurological:      General: No focal deficit present  Mental Status: She is alert and oriented to person, place, and time     Psychiatric:         Mood and Affect: Mood normal          Behavior: Behavior normal

## 2021-07-01 ENCOUNTER — OFFICE VISIT (OUTPATIENT)
Dept: FAMILY MEDICINE CLINIC | Facility: CLINIC | Age: 56
End: 2021-07-01
Payer: COMMERCIAL

## 2021-07-01 ENCOUNTER — TELEPHONE (OUTPATIENT)
Dept: FAMILY MEDICINE CLINIC | Facility: CLINIC | Age: 56
End: 2021-07-01

## 2021-07-01 VITALS
DIASTOLIC BLOOD PRESSURE: 68 MMHG | BODY MASS INDEX: 16.73 KG/M2 | OXYGEN SATURATION: 98 % | TEMPERATURE: 96.3 F | HEART RATE: 70 BPM | WEIGHT: 98 LBS | HEIGHT: 64 IN | SYSTOLIC BLOOD PRESSURE: 108 MMHG | RESPIRATION RATE: 18 BRPM

## 2021-07-01 DIAGNOSIS — E03.9 HYPOTHYROIDISM, UNSPECIFIED TYPE: Primary | ICD-10-CM

## 2021-07-01 DIAGNOSIS — C50.811 MALIGNANT NEOPLASM OF OVERLAPPING SITES OF RIGHT BREAST IN FEMALE, ESTROGEN RECEPTOR POSITIVE (HCC): ICD-10-CM

## 2021-07-01 DIAGNOSIS — I10 ESSENTIAL HYPERTENSION: ICD-10-CM

## 2021-07-01 DIAGNOSIS — E03.8 OTHER SPECIFIED HYPOTHYROIDISM: ICD-10-CM

## 2021-07-01 DIAGNOSIS — Z13.220 SCREENING FOR LIPID DISORDERS: ICD-10-CM

## 2021-07-01 DIAGNOSIS — Z17.0 MALIGNANT NEOPLASM OF OVERLAPPING SITES OF RIGHT BREAST IN FEMALE, ESTROGEN RECEPTOR POSITIVE (HCC): ICD-10-CM

## 2021-07-01 DIAGNOSIS — K21.9 GERD WITHOUT ESOPHAGITIS: ICD-10-CM

## 2021-07-01 DIAGNOSIS — E55.9 VITAMIN D DEFICIENCY: ICD-10-CM

## 2021-07-01 PROCEDURE — 1036F TOBACCO NON-USER: CPT | Performed by: NURSE PRACTITIONER

## 2021-07-01 PROCEDURE — 3074F SYST BP LT 130 MM HG: CPT | Performed by: NURSE PRACTITIONER

## 2021-07-01 PROCEDURE — 99214 OFFICE O/P EST MOD 30 MIN: CPT | Performed by: NURSE PRACTITIONER

## 2021-07-01 PROCEDURE — 3078F DIAST BP <80 MM HG: CPT | Performed by: NURSE PRACTITIONER

## 2021-07-01 RX ORDER — LEVOTHYROXINE SODIUM 0.05 MG/1
50 TABLET ORAL
Qty: 90 TABLET | Refills: 1 | Status: SHIPPED | OUTPATIENT
Start: 2021-07-01 | End: 2022-01-10 | Stop reason: SDUPTHER

## 2021-07-01 NOTE — ASSESSMENT & PLAN NOTE
Blood pressure currently under control at 108/68  Patient takes atenolol 100 mg every other day  Instructed on low-sodium diet  Routine exercise 3 to 5 times a week for at least 30 60 minutes

## 2021-07-01 NOTE — ASSESSMENT & PLAN NOTE
Patient on levothyroxine 50 mcg p o  Daily  Patient ordered for thyroid function studies at this time for evaluation

## 2021-07-01 NOTE — ASSESSMENT & PLAN NOTE
24 cm right anterior chest incision cdi, with steri-strips present  Right lateral MUM drainage tubes present x2  Draining thick tan secretions  Patient tolerated well no complaints of pain site looks clean, dry and intact

## 2021-07-01 NOTE — ASSESSMENT & PLAN NOTE
Patient takes vitamin-D supplementation of vitamin-D 50 mcg p o  Daily 2000 units  Currently order for routine vitamin-D level

## 2021-07-07 ENCOUNTER — OFFICE VISIT (OUTPATIENT)
Dept: SURGICAL ONCOLOGY | Facility: CLINIC | Age: 56
End: 2021-07-07

## 2021-07-07 ENCOUNTER — TELEPHONE (OUTPATIENT)
Dept: HEMATOLOGY ONCOLOGY | Facility: CLINIC | Age: 56
End: 2021-07-07

## 2021-07-07 ENCOUNTER — DOCUMENTATION (OUTPATIENT)
Dept: OTHER | Facility: HOSPITAL | Age: 56
End: 2021-07-07

## 2021-07-07 VITALS
HEIGHT: 64 IN | RESPIRATION RATE: 14 BRPM | HEART RATE: 88 BPM | SYSTOLIC BLOOD PRESSURE: 120 MMHG | TEMPERATURE: 98.1 F | DIASTOLIC BLOOD PRESSURE: 80 MMHG | OXYGEN SATURATION: 99 % | BODY MASS INDEX: 15.96 KG/M2 | WEIGHT: 93.5 LBS

## 2021-07-07 DIAGNOSIS — Z90.11 STATUS POST RIGHT MASTECTOMY: ICD-10-CM

## 2021-07-07 DIAGNOSIS — Z71.89 OTHER SPECIFIED COUNSELING: Primary | ICD-10-CM

## 2021-07-07 DIAGNOSIS — C50.811 MALIGNANT NEOPLASM OF OVERLAPPING SITES OF RIGHT BREAST IN FEMALE, ESTROGEN RECEPTOR POSITIVE (HCC): ICD-10-CM

## 2021-07-07 DIAGNOSIS — Z17.0 MALIGNANT NEOPLASM OF OVERLAPPING SITES OF RIGHT BREAST IN FEMALE, ESTROGEN RECEPTOR POSITIVE (HCC): ICD-10-CM

## 2021-07-07 PROCEDURE — 3008F BODY MASS INDEX DOCD: CPT | Performed by: NURSE PRACTITIONER

## 2021-07-07 PROCEDURE — 99024 POSTOP FOLLOW-UP VISIT: CPT | Performed by: SURGERY

## 2021-07-07 NOTE — TELEPHONE ENCOUNTER
New Patient Request   Patient Details: Julio Cesar [facility]      1965      [id number]      Reason for Appointment   Who is calling to schedule? Physician Office   If not Patient, what is their name? What is the diagnosis? Malignant neoplasm of overlapping sites of right breast in female, estrogen receptor positive ([facility] [address] )   Who is the referring doctor? Dr Reji Juan are you scheduling with ? [address] Number to call back on? If calling from the [facility], use the Nurse number   [phone]   Miscellaneous Information:     Please advise the patient, a new patient  will be calling them back within 1 business day

## 2021-07-07 NOTE — TELEPHONE ENCOUNTER
Patient is calling questioning since she had her drains out at today's office visit with Dr Yuni Choi if she can have a beer with dinner? Patient was advised that Dr Yuni Choi did refer her to Medical Oncology  Patient advised that a new patient navigator would be calling her within 1 business day  Patient verbalized understanding of above  Will forward to Dr Cullen Arce RN and Surgical Oncology [facility] [place] to address above question      Jojo Biswas (Self) [phone] (M)

## 2021-07-07 NOTE — PROGRESS NOTES
Documentation of Informed Consent Process for Clinical Research Study    Study Title: [facility]  Patient Name: Amada Hoffmann  YOB: 1965    This signed and dated document shall serve as certification that all of the below listed required elements of informed consent were provided to the subject or legally authorized representative signing the actual Informed Consent Document, both in written and verbal format  The HIPAA consent is contained within the Informed Consent document, and has also been discussed  A copy of the actual signed and dated Informed Consent has also been provided to the subject or legally authorized representative, and the original signed document shall be maintained in the research shadow chart  Element of Informed Consent Discussed Date Initials/ Person obtaining consent   A statement that the study involves research, an explanation of the purposes of the research and the expected duration of the subject's participation, a description of the procedures to be followed, and identification of any procedures which are experimental 7/7/2021 VC   A description of any reasonably foreseeable risks or discomforts to the subject  7/7/2021 VC   A description of any benefits to the subject or to others which may reasonably be expected from the research  7/7/2021 VC   A disclosure of appropriate alternative procedures or courses of treatment, if any, that might be advantageous to the subject   7/7/2021 VC   A statement describing the extent, if any, to which confidentiality of records identifying the subject will be maintained and that notes the possibility that the Food and Drug Administration may inspect the records 7/7/2021 VC   For research involving more than minimal risk, an explanation as to whether any compensation and an explanation as to whether any medical treatments are available if injury occurs and, if so, what they consist of, or where further information may be obtained  7/7/2021 VC   An explanation of whom to contact for answers to pertinent questions about the research and research subjects' rights, and whom to contact in the event of a research-related injury to the subject  7/7/2021 VC   A statement that participation is voluntary, that refusal to participate will involve no penalty or loss of benefits to which the subject is otherwise entitled, and that the subject may discontinue participation at any time without penalty or loss of benefits to which the subject is otherwise entitled  7/7/2021 VC   A statement that the particular treatment or procedure may involve risks to the subject (or to the embryo or fetus, if the subject is or may become pregnant) which are currently unforeseeable 7/7/2021 VC   Anticipated circumstances under which the subject's participation may be terminated by the investigator without regard to the subject's consent  7/7/2021 VC   Any additional costs to the subject that may result from participation in the research 7/7/2021 VC   The consequences of a subjects' decision to withdraw from the research and procedures for orderly termination of participation by the subject  7/7/2021 VC   A statement that significant new findings developed during the course of the research which may relate to the subject's willingness to continue participation will be provided to the subject  7/7/2021 VC   The approximate number of subjects involved in the study  7/7/2021 VC     The patient speaks, reads and understands English? YES    If NO, Name of :______Yes_____________________________      speaks, reads, and understands English? Process utilized to obtain consent:_______________________________________________     Subject meets eligibility criteria as outline in the current protocol?  YES   N/A - Reason: _________Yes__________________________________________________    The protocol consent was reviewed with the patient and all questions were addressed and answered? YES    The Informed Consent Teach-back section was reviewed with the subject and all questions and/or lack of understanding were addressed? YES     The subject agreed to participate and the consent was signed and dated? YES       Consent was obtained prior to any research procedures being performed? YES       Date & Time ICF signed _7/7/2021 1:30pm      Were any recruitment materials or advertisements used/discussed with the subject?  NO    **If yes file a copy with the signed consent form and provide a copy to the subject**      Certification of Person Conducting the Consent Process:                                                                                _Jacqui Pittman__ ___  Printed Name Alert-The patient is alert, awake and responds to voice. The patient is oriented to time, place, and person. The triage nurse is able to obtain subjective information.

## 2021-07-07 NOTE — PROGRESS NOTES
Surgical Oncology Breast Post-Op       [address] [facility] SURGICAL ONCOLOGY [place]  [address]  [place] KHANH Soto Amant  1965  [id number]  [facility] [place]  [address] [id number]    Chief Complaint: Denys Chacon is seen for a post-operative visit of her recent breast surgery  Oncology History:     Oncology History   Malignant neoplasm of overlapping sites of right breast in female, estrogen receptor positive ([facility] [address] )   4/16/2021 Biopsy    Right breast US guided biopsy:  A  11 o'clock 7 cm from the nipple  Invasive lobular carcinoma  Grade 1  ER 90, VA 45, HER2 1+  Lymphovascular invasion: not identified    B  10 o'clock 7 cm from the nipple  Invasive mammary carcinoma of no special type  Grade 2  , , HER2 1+  Lymphovascular invasion: not identified    Concordant  Malignancy appears multifocal  MRI recommended  4/30/2021 Observation    Bilateral breast MRI  Multifocal right breast cancer; 10:00 mass measures up to 1 8 cm and abuts pectoral muscle, without evidence of invasion  Two areas of carcinoma have a total extent of disease of 5 cm  Left breast clear  Axilla negative  6/10/2021 Genetic Testing    The following genes were evaluated: BATSHEVA, BRCA1, BRCA2, CDH1, CHEK2, PALB2, PTEN, STK11, TP53  Additional genes analyzed for a total of 20  Negative result  No pathogenic sequence variants or deletions/dupllications identified  [employer]     6/25/2021 Surgery    Right breast ANJALI  directed mastectomy with sentinel lymph node biopsy  Invasive carcinoma of no special type (ductal)  Grade 2  2 cm (2 foci)  Margins negative  1/1 Lymph node with micrometastases (0 4 mm)  Anatomic Stage IB  Prognostic Stage IA         Assessment & Plan:   Assessment/Plan      Patient presents for a postoperative visit  Her tumors were removed with a mastectomy her margins are clear  Her largest tumors approximately 2 cm there was a 2nd smaller tumor  She did have a micro met in 1 of her sentinel lymph nodes measuring 0 4 mm  I have recommended she see Medical Oncology for an opinion regarding adjuvant therapies  I have also ordered a mammo print to help clarify the role of chemotherapy  Since she does have a small amount of cancer to lymph node I have recommended she see Radiation Oncology for an opinion though I think the benefit might be very small if any     History of Present Illness:   See Oncology History    Interval History:   See Assessment & Plan,   The patient has no complaints referable to her surgery  We will remove her drains today  She has no evidence of hematoma seroma  I will have her see Medical Oncology as well as Radi    Review of Systems:   Review of Systems   All other systems reviewed and are negative  Past Medical History:     Patient Active Problem List   Diagnosis    GERD without esophagitis    Hypothyroidism    Vitamin D deficiency    Hypertension    Deficiency of multiple vitamins    Screening for lipid disorders    Malignant neoplasm of overlapping sites of right breast in female, estrogen receptor positive ([facility] [address] )    Anorexia nervosa     Past Medical History:   Diagnosis Date    Abnormal weight loss     Anorexia nervosa     Disease of thyroid gland     Elevated blood sugar     Occasional     Fibroadenoma of both breasts     GERD (gastroesophageal reflux disease)     Hypertension     Hyperthyroidism     Osteopenia      Past Surgical History:   Procedure Laterality Date    BREAST BIOPSY Right 04/16/2021    BREAST CYST EXCISION Left 2011    CHOLECYSTECTOMY      MASTECTOMY W/ SENTINEL NODE BIOPSY Right 6/25/2021    Procedure: BREAST MASTECTOMY WITH BIOPSY LYMPH NODE SENTINEL; ANJALI  GUIDED, LYMPHATIC MAPPING WITH BLUE DYE AND RADIOACTIVE DYE (INJECT AT 1130 BY DR WALSH IN THE OR);   Surgeon: River Samaniego MD;  Location: [facility]; Service: Surgical Oncology    US BREAST NEEDLE LOC RIGHT EACH ADDITIONAL Right 2021    US BREAST ANJALI  NEEDLE LOC RIGHT Right 2021    US GUIDANCE BREAST BIOPSY RIGHT EACH ADDITIONAL Right 2021    US GUIDED BREAST BIOPSY RIGHT COMPLETE Right 2021     Family History   Adopted: Yes     Social History     Socioeconomic History    Marital status: Single     Spouse name: Not on file    Number of children: 0    Years of education: 12    Highest education level: Not on file   Occupational History    Occupation: never worked    Tobacco Use    Smoking status: Never Smoker    Smokeless tobacco: Never Used   Vaping Use    Vaping Use: Never used   Substance and Sexual Activity    Alcohol use: Yes     Alcohol/week: 6 0 standard drinks     Types: 6 Cans of beer per week    Drug use: Never     Comment: Illicit drugs:   none - As per Meir Borer Sexual activity: Not Currently     Comment: Sexually active:   No - As per [place] Tierney    Other Topics Concern    Not on file   Social History Narrative    · Most recent tobacco use screenin2019      · Do you currently or have you served in the [employer]:   No      · Were you activated, into active duty, as a member of the [employer] or as a Reservist:   No      · Caffeine intake:   None decaf coffee only     · Sexual orientation:   Heterosexual      · General stress level:   Low      · Diet:   Regular      · Single or multi-level home/work:   single level home      · Live alone or with others:   alone      · Exercise level: Moderate outside walk only     · Overweight:   No      · Obese:   No      · Guns present in home:   No      · Seat belts used routinely:   Yes      · Advance directive: Yes      · Sunscreen used routinely:   Yes      · Smoke alarm in home:    Yes      · Performs monthly self-breast exam:   Yes      · Legally blind in one or both eyes:   No      · Hard of hearing or deaf in one or both ears:   No      · Presence of domestic violence:   No      · Are there stairs in your home:   No      · Pets:   No      Social Determinants of Health     Financial Resource Strain:     Difficulty of Paying Living Expenses:    Food Insecurity:     Worried About Running Out of Food in the Last Year:     [address] in the Last Year:    Transportation Needs:     Lack of Transportation (Medical):  Lack of Transportation (Non-Medical):    Physical Activity:     Days of Exercise per Week:     Minutes of Exercise per Session:    Stress:     Feeling of Stress :    Social Connections:     Frequency of Communication with Friends and Family:     Frequency of Social Gatherings with Friends and Family:     Attends [employer] Services:     Active Member of Clubs or Organizations:     Attends Club or Organization Meetings:     Marital Status:    Intimate Partner Violence:     Fear of Current or Ex-Partner:     Emotionally Abused:     Physically Abused:     Sexually Abused:        Current Outpatient Medications:     atenolol (TENORMIN) 100 mg tablet, Take 1 tablet (100 mg total) by mouth every other day Take 100 mg by mouth every other morning , Disp: 90 tablet, Rfl: 0    Cholecalciferol (Vitamin D) 50 MCG (2000 UT) CAPS, Take 1 capsule (2,000 Units total) by mouth daily Take 1 capsule every day by oral route for 30 days  , Disp: 90 capsule, Rfl: 1    levothyroxine 50 mcg tablet, Take 1 tablet (50 mcg total) by mouth daily in the early morning, Disp: 90 tablet, Rfl: 1    Multiple Vitamins-Minerals (Womens 50+ Multi Vitamin/Min) TABS, Take 1 tablet by mouth daily, Disp: 100 tablet, Rfl: 1    OLANZapine (ZyPREXA) 7 5 mg tablet, Take 7 5 mg by mouth daily at bedtime, Disp: , Rfl:     pantoprazole (PROTONIX) 40 mg tablet, Take 1 tablet (40 mg total) by mouth daily, Disp: 90 tablet, Rfl: 1    oxyCODONE-acetaminophen (PERCOCET) 5-325 mg per tablet, Take 1 tablet by mouth every 6 (six) hours as needed for moderate painMax Daily Amount: 4 tablets, Disp: 15 tablet, Rfl: 0  No Known Allergies    Physical Exams:     Vitals:    07/07/21 1250   BP: 120/80   Pulse: 88   Resp: 14   Temp: 98 1 °F (36 7 °C)   SpO2: 99%     Physical Exam  Chest:      Comments:  Examination of the right mastectomy site shows no evidence of infection  There is no hematoma her drains were removed without difficulty  Results & Discussion:     The patient's margins are free  She has a micro met in a single lymph node  I will have her see Dr Alonzo Kwok for adjuvant therapies  I have ordered a mammo print to help clarify any issues  I have asked her to see Radiation Oncology for an opinion though I think her benefit from RT is relatively small  We will see her back in 3 months

## 2021-07-08 ENCOUNTER — TELEPHONE (OUTPATIENT)
Dept: SURGICAL ONCOLOGY | Facility: CLINIC | Age: 56
End: 2021-07-08

## 2021-07-13 LAB
CHOLEST SERPL-MCNC: 156 MG/DL
CHOLEST/HDLC SERPL: 2.1 (CALC)
HDLC SERPL-MCNC: 76 MG/DL
LDLC SERPL CALC-MCNC: 64 MG/DL (CALC)
NONHDLC SERPL-MCNC: 80 MG/DL (CALC)
TRIGL SERPL-MCNC: 79 MG/DL
TSH SERPL-ACNC: 1.05 MIU/L (ref 0.4–4.5)

## 2021-07-19 ENCOUNTER — TELEPHONE (OUTPATIENT)
Dept: SURGICAL ONCOLOGY | Facility: CLINIC | Age: 56
End: 2021-07-19

## 2021-07-19 NOTE — TELEPHONE ENCOUNTER
Called patient to review her MammaPrint results  Explained that it came back low risk and that it was unlikely that she would need chemotherapy  Also discussed that the ultimate decision would be between her and Dr Pamela Gleason  Informed patient that it would be explained in more detail at her medical oncology consult next week  Patient verbalized understanding and was appreciative of the phone call

## 2021-07-20 ENCOUNTER — TELEPHONE (OUTPATIENT)
Dept: HEMATOLOGY ONCOLOGY | Facility: CLINIC | Age: 56
End: 2021-07-20

## 2021-07-20 NOTE — TELEPHONE ENCOUNTER
César Willett from [facility] called to inform the office that they have ended services as of today because her wounds are all healed

## 2021-07-22 LAB — SCAN RESULT: NORMAL

## 2021-07-27 ENCOUNTER — EVALUATION (OUTPATIENT)
Dept: PHYSICAL THERAPY | Facility: CLINIC | Age: 56
End: 2021-07-27
Payer: COMMERCIAL

## 2021-07-27 DIAGNOSIS — Z17.0 MALIGNANT NEOPLASM OF OVERLAPPING SITES OF RIGHT BREAST IN FEMALE, ESTROGEN RECEPTOR POSITIVE (HCC): ICD-10-CM

## 2021-07-27 DIAGNOSIS — C50.811 MALIGNANT NEOPLASM OF OVERLAPPING SITES OF RIGHT BREAST IN FEMALE, ESTROGEN RECEPTOR POSITIVE (HCC): ICD-10-CM

## 2021-07-27 PROCEDURE — 97110 THERAPEUTIC EXERCISES: CPT | Performed by: PHYSICAL THERAPIST

## 2021-07-27 PROCEDURE — 97161 PT EVAL LOW COMPLEX 20 MIN: CPT | Performed by: PHYSICAL THERAPIST

## 2021-07-27 NOTE — PROGRESS NOTES
PT Evaluation and Discharge    Today's date: 2021  Patient name: Jesusita Maxwell  : 1965  MRN: [id number]  Referring provider: Sylvia Sánchez MD  Dx:   Encounter Diagnosis     ICD-10-CM    1  Malignant neoplasm of overlapping sites of right breast in female, estrogen receptor positive [facility])  C50 [id number] Ambulatory referral to Physical Therapy    Z17 0               No further treatment indicated at this time  Patient may return if radiation therapy is warranted  Thank you for your referral     Assessment  Assessment details: Jesusita Maxwell is a 61 y o  female s/p R mastectomy with SLNB  She presents with  decreased ROM, mild axillary web syndrome and mild functional deficits    She will benefit from education regarding lymphedema s/s as well as hep     She has been given an home exercise program and is in agreement with the plan of care  Thank you for your referral     Impairments: abnormal or restricted ROM and lacks appropriate home exercise program    Symptom irritability: lowUnderstanding of Dx/Px/POC: excellent  Plan  Patient would benefit from: skilled physical therapy  Planned therapy interventions: joint mobilization, manual therapy, massage, neuromuscular re-education, strengthening, stretching, therapeutic activities, therapeutic exercise and home exercise program  Frequency: 2x week  Duration in visits: 20  Duration in weeks: 20  Plan of Care beginning date: 2021  Plan of Care expiration date: 10/27/2021  Treatment plan discussed with: patient        Subjective Evaluation    History of Present Illness  Mechanism of injury: Patient underwent R mastectomy on 21SLNB  She is not a candidate for chemotherapy at this time  StaginB   She is scheduled to see radiation therapy in the next few days  CC:  Patient reports very little pain or dysfunction in the arm or chest area  She indicates that if she has any discomfort it has been in the armpit    She denies having swelling  Function:  She is able to garden, vacuum and has been walking  She notes no discomfort with sleeping either  She is not working at this time nor does she drive  She is RHD            Recurrent probem    Quality of life: excellent    Pain  Current pain ratin  At best pain ratin  At worst pain ratin          Objective     Postural Observations  Seated posture: fair  Standing posture: fair        Observations     Additional Observation Details  Well healed incision;  Mild axillary cording noted    Active Range of Motion   Left Shoulder   Flexion: 130 degrees   Abduction: 145 degrees     Right Shoulder   Flexion: 133 degrees   Abduction: 117 degrees WFL and with pain  External rotation BTH: C5 WFL    Swelling   Left shoulder swelling details: Lymphedema MMTs                             LEFT    palm               17 7  wrist                           14   Wrist + 10 cm               16 5   Wrist + 16 cm     18 5  Elbow                 20 8  Wrist + 35 5                19 5   Wrist + 40                 22       Right shoulder swelling details: Lymphedema MMTs RIGHT          palm                 18 5  wrist                  14 4  Wrist + 10 cm       15  Wrist + 16 cm       18 8   Elbow                    21  Wrist + 32                   18 5   Wrist + 40         21 5                 Precautions:BCA      Manuals                                                                 Neuro Re-Ed                                                                                                        Ther Ex                          Wall Climbs 5 x :20                                                                                          Ther Activity                                       Gait Training                                       Modalities

## 2021-07-28 ENCOUNTER — TELEPHONE (OUTPATIENT)
Dept: HEMATOLOGY ONCOLOGY | Facility: CLINIC | Age: 56
End: 2021-07-28

## 2021-07-28 ENCOUNTER — CONSULT (OUTPATIENT)
Dept: HEMATOLOGY ONCOLOGY | Facility: CLINIC | Age: 56
End: 2021-07-28
Payer: COMMERCIAL

## 2021-07-28 ENCOUNTER — APPOINTMENT (OUTPATIENT)
Dept: LAB | Facility: CLINIC | Age: 56
End: 2021-07-28
Payer: COMMERCIAL

## 2021-07-28 VITALS
BODY MASS INDEX: 15.54 KG/M2 | DIASTOLIC BLOOD PRESSURE: 78 MMHG | HEART RATE: 60 BPM | WEIGHT: 91 LBS | TEMPERATURE: 96.3 F | SYSTOLIC BLOOD PRESSURE: 122 MMHG | RESPIRATION RATE: 18 BRPM | OXYGEN SATURATION: 99 % | HEIGHT: 64 IN

## 2021-07-28 DIAGNOSIS — C50.811 MALIGNANT NEOPLASM OF OVERLAPPING SITES OF RIGHT BREAST IN FEMALE, ESTROGEN RECEPTOR POSITIVE (HCC): ICD-10-CM

## 2021-07-28 DIAGNOSIS — Z17.0 MALIGNANT NEOPLASM OF OVERLAPPING SITES OF RIGHT BREAST IN FEMALE, ESTROGEN RECEPTOR POSITIVE (HCC): Primary | ICD-10-CM

## 2021-07-28 DIAGNOSIS — Z17.0 MALIGNANT NEOPLASM OF OVERLAPPING SITES OF RIGHT BREAST IN FEMALE, ESTROGEN RECEPTOR POSITIVE (HCC): ICD-10-CM

## 2021-07-28 DIAGNOSIS — C50.811 MALIGNANT NEOPLASM OF OVERLAPPING SITES OF RIGHT BREAST IN FEMALE, ESTROGEN RECEPTOR POSITIVE (HCC): Primary | ICD-10-CM

## 2021-07-28 LAB
ESTRADIOL SERPL-MCNC: <11 PG/ML
FSH SERPL-ACNC: 194.5 MIU/ML

## 2021-07-28 PROCEDURE — 99245 OFF/OP CONSLTJ NEW/EST HI 55: CPT | Performed by: INTERNAL MEDICINE

## 2021-07-28 PROCEDURE — 83001 ASSAY OF GONADOTROPIN (FSH): CPT

## 2021-07-28 PROCEDURE — 82670 ASSAY OF TOTAL ESTRADIOL: CPT

## 2021-07-28 PROCEDURE — 36415 COLL VENOUS BLD VENIPUNCTURE: CPT

## 2021-07-28 RX ORDER — ANASTROZOLE 1 MG/1
1 TABLET ORAL DAILY
Qty: 90 TABLET | Refills: 1 | Status: SHIPPED | OUTPATIENT
Start: 2021-07-28 | End: 2022-01-27

## 2021-07-28 NOTE — TELEPHONE ENCOUNTER
----- Message from Anatoliy Donnelly MD sent at 7/28/2021  1:53 PM EDT -----  She is postmenopausal based on estradiol and FSH  Let her start anastrozole  Let her know

## 2021-07-28 NOTE — LETTER
July 28, 2021     [address]    Patient: Arvin Quiroz   YOB: 1965   Date of Visit: 7/28/2021       Dear Dr Juliette Peterson: Thank you for referring Arvin Quiroz to me for evaluation  Below are my notes for this consultation  If you have questions, please do not hesitate to call me  I look forward to following your patient along with you  Sincerely,        Cary Briceño MD        CC: MD Cary Boyd MD  7/28/2021 10:48 AM  Sign when Signing Visit  Hematology / Oncology Outpatient Consult Note    Christina Patches Amant 61 y o  female RII0/41/0004 [id number]         Date:  7/28/2021    Assessment / Plan:    A 77-year-old female with newly diagnosed stage II A right breast cancer, multifocal disease with combination of invasive ductal as well as invasive lobular carcinoma, grade 2  This was ER 90% positive, IL 45% positive, HER2 negative disease  Her tumor was low risk, based on MammaPrint  She underwent mastectomy and sentinel lymph node biopsy, resulting in TAMELA  She had 1 positive lymph node with micrometastasis measuring 0 4 mm  She presents today with her sister to discuss adjuvant treatment options  We had extensive discussion regarding the diagnosis, staging information, tumor phenotype, interpretation of MammaPrint, good prognosis and treatment options  Due to the phenotype and tumor grade as was low risk and a MammaPrint, adjuvant chemotherapy is not indicated  I recommended her to have adjuvant hormonal therapy  She had regular menstrual cycle until 2-3 months ago  Therefore, her menopausal condition is not known  I recommended her to have estradiol and FSH to determine this  If she is still premenopausal, I would recommend tamoxifen 20 mg daily    Side effects of tamoxifen was thoroughly discussed, including but not limited to hot flashes, small chance of DVT, stroke and endometrial cancer  If she is postmenopausal, I would recommend anastrozole 1 mg once a day  Side effects of anastrozole was thoroughly discussed, including but not limited to hot flashes, musculoskeletal symptom and bone mineral density loss  I recommended her to take calcium vitamin-D on a regular basis  She is in agreement with my recommendation  Once obtain the [facility] and estradiol, I will contact her and giver prescription of appropriate medication accordingly  I will see her again in 6 months for routine follow-up  All the patient and her sister's questions were answered to their satisfaction  Subjective:     HPI:   A 58-year-old female who had regular menstrual cycle until 2-3 months ago  She was found to have abnormality in her right breast based on a screening mammography  She had right breast biopsy for the multiple lesions in April 6, 2021  1 biopsy showed invasive ductal carcinoma, grade 2, % positive, % positive, HER2 negative disease  The other biopsy from the same breast showed invasive lobular carcinoma, grade 2, ER 90% positive, NH 45% positive, HER2 negative disease  She underwent genetic testing which showed negative for BRCA gene mutation  Because of the multiple lesions in the right breast, she underwent mastectomy and sentinel lymph node biopsy by Dr Tre Quiroz in June 25, 2021  She had multifocal disease measuring 20 mm of invasive ductal carcinoma, grade 2 as well as 7 mm of invasive lobular carcinoma  Lymphovascular invasion was present  1 sentinel lymph node was positive for micrometastasis measuring 0 4 mm  She did not have reconstruction  She presents today with her sister to discuss the adjuvant treatment options  Her tumor was found to be low risk, based on MammaPrint  She has history of left breast biopsy which was benign approximately 2 years ago  Her recent MRI of the bilateral breast was not concerning in her left breast, BI-RADS 2  She feels well    She has no complaint of pain  She has no respiratory symptoms  She has hypertension as well as hypothyroidism, for which she takes respective medications  She is adopted  Therefore, she does not know biological family history  She is a lifetime never smoker  Her performance status is normal         Interval History:          Objective:     Primary Diagnosis:    1  Right breast cancer, stage II A ( pT1c, pN1 (mi ), M0) grade 2, ER 90% positive, DC 45% positive, HER2 negative disease  MammaPrint low risk  Diagnosed in June 2021     2  BRCA gene mutation negative  Cancer Staging:  Cancer Staging  Malignant neoplasm of overlapping sites of right breast in female, estrogen receptor positive ([facility] [address] )  Staging form: Breast, AJCC 8th Edition  - Pathologic: Stage IA (pT1c, pN1mi(sn), cM0, G2, ER+, DC+, HER2-) - Unsigned  Method of lymph node assessment: [place] lymph node biopsy  Histologic grading system: 3 grade system        Previous Hematologic/ Oncologic Treatment:         Current Hematologic/ Oncologic Treatment:        Adjuvant hormonal therapy with anastrozole to be started in August 2021  Disease Status:       TAMELA status post mastectomy and sentinel lymph node biopsy  Test Results:    Pathology:     multifocal invasive mammary carcinoma, grade 2  20 mm of invasive ductal carcinoma as well as 7 mm of invasive lobular carcinoma  Lymphovascular invasion was present  1 sentinel lymph node had micrometastasis measuring 0 4 mm   1 lesion was % positive, % positive, HER2 negative disease  The other lesion was ER 90% positive, DC 45% positive, HER2 negative disease  MammaPrint low risk  Stage II A ( pT1c, pN1 (mi ), M0)    Radiology:     chest x-ray was negative for pulmonary disease  Laboratory:      See below      Physical Exam:      General Appearance:    Alert, oriented        Eyes:    PERRL   Ears:    Normal external ear canals, both ears   Nose:   Nares normal, septum midline   Throat: Mucosa moist  Pharynx without injection  Neck:   Supple       Lungs:     Clear to auscultation bilaterally   Chest Wall:    No tenderness or deformity    Heart:    Regular rate and rhythm       Abdomen:     Soft, non-tender, bowel sounds +, no organomegaly           Extremities:   Extremities no cyanosis or edema       Skin:   no rash or icterus  Lymph nodes:   Cervical, supraclavicular, and axillary nodes normal   Neurologic:   CNII-XII intact, normal strength, sensation and reflexes     Throughout          Breast exam:     Status post right mastectomy without reconstruction  No palpable abnormality in her right chest wall  Left breast was quite lumpy  ROS: Review of Systems   All other systems reviewed and are negative  Imaging: No results found  Labs:   Lab Results   Component Value Date    WBC 9 77 06/26/2021    HGB 11 2 (L) 06/26/2021    HCT 34 6 (L) 06/26/2021    MCV 95 06/26/2021     06/26/2021     Lab Results   Component Value Date    K 4 2 05/12/2021     05/12/2021    CO2 27 05/12/2021    BUN 15 05/12/2021    CREATININE 0 91 05/12/2021    CALCIUM 9 0 05/12/2021    AST 12 05/12/2021    ALT 36 05/12/2021    ALKPHOS 72 05/12/2021    EGFR 70 05/12/2021         Vital Sign:    Body surface area is 1 4 meters squared      Wt Readings from Last 3 Encounters:   07/28/21 41 3 kg (91 lb)   07/07/21 42 4 kg (93 lb 8 oz)   07/01/21 44 5 kg (98 lb)        Temp Readings from Last 3 Encounters:   07/28/21 (!) 96 3 °F (35 7 °C) (Tympanic Core)   07/07/21 98 1 °F (36 7 °C)   07/01/21 (!) 96 3 °F (35 7 °C)        BP Readings from Last 3 Encounters:   07/28/21 122/78   07/07/21 120/80   07/01/21 108/68         Pulse Readings from Last 3 Encounters:   07/28/21 60   07/07/21 88   07/01/21 70     @LASTSAO2(3)@    Active Problems:   Patient Active Problem List   Diagnosis    GERD without esophagitis    Hypothyroidism    Vitamin D deficiency    Hypertension    Deficiency of multiple vitamins    Screening for lipid disorders    Malignant neoplasm of overlapping sites of right breast in female, estrogen receptor positive ([facility] [address] )    Anorexia nervosa       Past Medical History:   Past Medical History:   Diagnosis Date    Abnormal weight loss     Anorexia nervosa     Disease of thyroid gland     Elevated blood sugar     Occasional     Fibroadenoma of both breasts     GERD (gastroesophageal reflux disease)     Hypertension     Hyperthyroidism     Osteopenia        Surgical History:   Past Surgical History:   Procedure Laterality Date    BREAST BIOPSY Right 04/16/2021    BREAST CYST EXCISION Left 2011    CHOLECYSTECTOMY      MASTECTOMY W/ SENTINEL NODE BIOPSY Right 6/25/2021    Procedure: BREAST MASTECTOMY WITH BIOPSY LYMPH NODE SENTINEL; ANJALI  GUIDED, LYMPHATIC MAPPING WITH BLUE DYE AND RADIOACTIVE DYE (INJECT AT 1130 BY DR WALSH IN THE OR); Surgeon: River Samaniego MD;  Location: [facility];  Service: Surgical Oncology    US BREAST NEEDLE LOC RIGHT EACH ADDITIONAL Right 6/14/2021    US BREAST ANJALI  NEEDLE LOC RIGHT Right 6/14/2021    US GUIDANCE BREAST BIOPSY RIGHT EACH ADDITIONAL Right 4/16/2021    US GUIDED BREAST BIOPSY RIGHT COMPLETE Right 4/16/2021       Family History:    Family History   Adopted: Yes       Cancer-related family history is not on file  She was adopted  Social History:   Social History     Socioeconomic History    Marital status: Single     Spouse name: Not on file    Number of children: 0    Years of education: 15    Highest education level: Not on file   Occupational History    Occupation: never worked    Tobacco Use    Smoking status: Never Smoker    Smokeless tobacco: Never Used   Vaping Use    Vaping Use: Never used   Substance and Sexual Activity    Alcohol use:  Yes     Alcohol/week: 6 0 standard drinks     Types: 6 Cans of beer per week    Drug use: Never     Comment: Illicit drugs:   none - As per [facility]al[facility] Sexual activity: Not Currently     Comment: Sexually active:   No - As per [place]    Other Topics Concern    Not on file   Social History Narrative    · Most recent tobacco use screenin2019      · Do you currently or have you served in the [employer]:   No      · Were you activated, into active duty, as a member of the [employer] or as a Reservist:   No      · Caffeine intake:   None decaf coffee only     · Sexual orientation:   Heterosexual      · General stress level:   Low      · Diet:   Regular      · Single or multi-level home/work:   single level home      · Live alone or with others:   alone      · Exercise level: Moderate outside walk only     · Overweight:   No      · Obese:   No      · Guns present in home:   No      · Seat belts used routinely:   Yes      · Advance directive: Yes      · Sunscreen used routinely:   Yes      · Smoke alarm in home: Yes      · Performs monthly self-breast exam:   Yes      · Legally blind in one or both eyes:   No      · Hard of hearing or deaf in one or both ears:   No      · Presence of domestic violence:   No      · Are there stairs in your home:   No      · Pets:   No      Social Determinants of Health     Financial Resource Strain:     Difficulty of Paying Living Expenses:    Food Insecurity:     Worried About Running Out of Food in the Last Year:     [address] in the Last Year:    Transportation Needs:     Lack of Transportation (Medical):      Lack of Transportation (Non-Medical):    Physical Activity:     Days of Exercise per Week:     Minutes of Exercise per Session:    Stress:     Feeling of Stress :    Social Connections:     Frequency of Communication with Friends and Family:     Frequency of Social Gatherings with Friends and Family:     Attends [employer] Services:     Active Member of Clubs or Organizations:     Attends Club or Organization Meetings:     Marital Status:    Intimate Partner Violence:     Fear of Current or Ex-Partner:  Emotionally Abused:     Physically Abused:     Sexually Abused:        Current Medications:   Current Outpatient Medications   Medication Sig Dispense Refill    atenolol (TENORMIN) 100 mg tablet Take 1 tablet (100 mg total) by mouth every other day Take 100 mg by mouth every other morning  90 tablet 0    Cholecalciferol (Vitamin D) 50 MCG (2000 UT) CAPS Take 1 capsule (2,000 Units total) by mouth daily Take 1 capsule every day by oral route for 30 days  90 capsule 1    levothyroxine 50 mcg tablet Take 1 tablet (50 mcg total) by mouth daily in the early morning 90 tablet 1    Multiple Vitamins-Minerals (Womens 50+ Multi Vitamin/Min) TABS Take 1 tablet by mouth daily 100 tablet 1    OLANZapine (ZyPREXA) 7 5 mg tablet Take 7 5 mg by mouth daily at bedtime      oxyCODONE-acetaminophen (PERCOCET) 5-325 mg per tablet Take 1 tablet by mouth every 6 (six) hours as needed for moderate painMax Daily Amount: 4 tablets 15 tablet 0    pantoprazole (PROTONIX) 40 mg tablet Take 1 tablet (40 mg total) by mouth daily 90 tablet 1     No current facility-administered medications for this visit         Allergies: No Known Allergies

## 2021-07-28 NOTE — PROGRESS NOTES
Hematology / Oncology Outpatient Consult Note    Nolan Kingsley 61 y o  female [id number]         Date:  7/28/2021    Assessment / Plan:    A 58-year-old female with newly diagnosed stage II A right breast cancer, multifocal disease with combination of invasive ductal as well as invasive lobular carcinoma, grade 2  This was ER 90% positive, ME 45% positive, HER2 negative disease  Her tumor was low risk, based on MammaPrint  She underwent mastectomy and sentinel lymph node biopsy, resulting in TAMELA  She had 1 positive lymph node with micrometastasis measuring 0 4 mm  She presents today with her sister to discuss adjuvant treatment options  We had extensive discussion regarding the diagnosis, staging information, tumor phenotype, interpretation of MammaPrint, good prognosis and treatment options  Due to the phenotype and tumor grade as was low risk and a MammaPrint, adjuvant chemotherapy is not indicated  I recommended her to have adjuvant hormonal therapy  She had regular menstrual cycle until 2-3 months ago  Therefore, her menopausal condition is not known  I recommended her to have estradiol and FSH to determine this  If she is still premenopausal, I would recommend tamoxifen 20 mg daily  Side effects of tamoxifen was thoroughly discussed, including but not limited to hot flashes, small chance of DVT, stroke and endometrial cancer  If she is postmenopausal, I would recommend anastrozole 1 mg once a day  Side effects of anastrozole was thoroughly discussed, including but not limited to hot flashes, musculoskeletal symptom and bone mineral density loss  I recommended her to take calcium vitamin-D on a regular basis  She is in agreement with my recommendation  Once obtain the [facility] and estradiol, I will contact her and giver prescription of appropriate medication accordingly  I will see her again in 6 months for routine follow-up    All the patient and her sister's questions were answered to their satisfaction  Subjective:     HPI:   A 58-year-old female who had regular menstrual cycle until 2-3 months ago  She was found to have abnormality in her right breast based on a screening mammography  She had right breast biopsy for the multiple lesions in April 6, 2021  1 biopsy showed invasive ductal carcinoma, grade 2, % positive, % positive, HER2 negative disease  The other biopsy from the same breast showed invasive lobular carcinoma, grade 2, ER 90% positive, ND 45% positive, HER2 negative disease  She underwent genetic testing which showed negative for BRCA gene mutation  Because of the multiple lesions in the right breast, she underwent mastectomy and sentinel lymph node biopsy by Dr Shannon Erazo in June 25, 2021  She had multifocal disease measuring 20 mm of invasive ductal carcinoma, grade 2 as well as 7 mm of invasive lobular carcinoma  Lymphovascular invasion was present  1 sentinel lymph node was positive for micrometastasis measuring 0 4 mm  She did not have reconstruction  She presents today with her sister to discuss the adjuvant treatment options  Her tumor was found to be low risk, based on MammaPrint  She has history of left breast biopsy which was benign approximately 2 years ago  Her recent MRI of the bilateral breast was not concerning in her left breast, BI-RADS 2  She feels well  She has no complaint of pain  She has no respiratory symptoms  She has hypertension as well as hypothyroidism, for which she takes respective medications  She is adopted  Therefore, she does not know biological family history  She is a lifetime never smoker  Her performance status is normal         Interval History:          Objective:     Primary Diagnosis:    1  Right breast cancer, stage II A ( pT1c, pN1 (mi ), M0) grade 2, ER 90% positive, ND 45% positive, HER2 negative disease  MammaPrint low risk  Diagnosed in June 2021     2    BRCA gene mutation negative  Cancer Staging:  Cancer Staging  Malignant neoplasm of overlapping sites of right breast in female, estrogen receptor positive ([facility] [address] )  Staging form: Breast, AJCC 8th Edition  - Pathologic: Stage IA (pT1c, pN1mi(sn), cM0, G2, ER+, MI+, HER2-) - Unsigned  Method of lymph node assessment: [facility] lymph node biopsy  Histologic grading system: 3 grade system        Previous Hematologic/ Oncologic Treatment:         Current Hematologic/ Oncologic Treatment:        Adjuvant hormonal therapy with anastrozole to be started in August 2021  Disease Status:       TAMELA status post mastectomy and sentinel lymph node biopsy  Test Results:    Pathology:     multifocal invasive mammary carcinoma, grade 2  20 mm of invasive ductal carcinoma as well as 7 mm of invasive lobular carcinoma  Lymphovascular invasion was present  1 sentinel lymph node had micrometastasis measuring 0 4 mm   1 lesion was % positive, % positive, HER2 negative disease  The other lesion was ER 90% positive, MI 45% positive, HER2 negative disease  MammaPrint low risk  Stage II A ( pT1c, pN1 (mi ), M0)    Radiology:     chest x-ray was negative for pulmonary disease  Laboratory:      See below  Physical Exam:      General Appearance:    Alert, oriented        Eyes:    PERRL   Ears:    Normal external ear canals, both ears   Nose:   Nares normal, septum midline   Throat:   Mucosa moist  Pharynx without injection  Neck:   Supple       Lungs:     Clear to auscultation bilaterally   Chest Wall:    No tenderness or deformity    Heart:    Regular rate and rhythm       Abdomen:     Soft, non-tender, bowel sounds +, no organomegaly           Extremities:   Extremities no cyanosis or edema       Skin:   no rash or icterus      Lymph nodes:   Cervical, supraclavicular, and axillary nodes normal   Neurologic:   CNII-XII intact, normal strength, sensation and reflexes     Throughout          Breast exam:     Status post right mastectomy without reconstruction  No palpable abnormality in her right chest wall  Left breast was quite lumpy  ROS: Review of Systems   All other systems reviewed and are negative  Imaging: No results found  Labs:   Lab Results   Component Value Date    WBC 9 77 06/26/2021    HGB 11 2 (L) 06/26/2021    HCT 34 6 (L) 06/26/2021    MCV 95 06/26/2021     06/26/2021     Lab Results   Component Value Date    K 4 2 05/12/2021     05/12/2021    CO2 27 05/12/2021    BUN 15 05/12/2021    CREATININE 0 91 05/12/2021    CALCIUM 9 0 05/12/2021    AST 12 05/12/2021    ALT 36 05/12/2021    ALKPHOS 72 05/12/2021    EGFR 70 05/12/2021         Vital Sign:    Body surface area is 1 4 meters squared      Wt Readings from Last 3 Encounters:   07/28/21 41 3 kg (91 lb)   07/07/21 42 4 kg (93 lb 8 oz)   07/01/21 44 5 kg (98 lb)        Temp Readings from Last 3 Encounters:   07/28/21 (!) 96 3 °F (35 7 °C) (Tympanic Core)   07/07/21 98 1 °F (36 7 °C)   07/01/21 (!) 96 3 °F (35 7 °C)        BP Readings from Last 3 Encounters:   07/28/21 122/78   07/07/21 120/80   07/01/21 108/68         Pulse Readings from Last 3 Encounters:   07/28/21 60   07/07/21 88   07/01/21 70     @LASTSAO2(3)@    Active Problems:   Patient Active Problem List   Diagnosis    GERD without esophagitis    Hypothyroidism    Vitamin D deficiency    Hypertension    Deficiency of multiple vitamins    Screening for lipid disorders    Malignant neoplasm of overlapping sites of right breast in female, estrogen receptor positive ([facility] [address] )    Anorexia nervosa       Past Medical History:   Past Medical History:   Diagnosis Date    Abnormal weight loss     Anorexia nervosa     Disease of thyroid gland     Elevated blood sugar     Occasional     Fibroadenoma of both breasts     GERD (gastroesophageal reflux disease)     Hypertension     Hyperthyroidism     Osteopenia        Surgical History:   Past Surgical History:   Procedure Laterality Date    BREAST BIOPSY Right 2021    BREAST CYST EXCISION Left     CHOLECYSTECTOMY      MASTECTOMY W/ SENTINEL NODE BIOPSY Right 2021    Procedure: BREAST MASTECTOMY WITH BIOPSY LYMPH NODE SENTINEL; ANJALI  GUIDED, LYMPHATIC MAPPING WITH BLUE DYE AND RADIOACTIVE DYE (INJECT AT 1130 BY DR WALSH IN THE OR); Surgeon: Amina An MD;  [facility];  Service: Surgical Oncology    US BREAST NEEDLE LOC RIGHT EACH ADDITIONAL Right 2021    US BREAST ANJALI  NEEDLE LOC RIGHT Right 2021    US GUIDANCE BREAST BIOPSY RIGHT EACH ADDITIONAL Right 2021    US GUIDED BREAST BIOPSY RIGHT COMPLETE Right 2021       Family History:    Family History   Adopted: Yes       Cancer-related family history is not on file  She was adopted  Social History:   Social History     Socioeconomic History    Marital status: Single     Spouse name: Not on file    Number of children: 0    Years of education: 15    Highest education level: Not on file   Occupational History    Occupation: never worked    Tobacco Use    Smoking status: Never Smoker    Smokeless tobacco: Never Used   Vaping Use    Vaping Use: Never used   Substance and Sexual Activity    Alcohol use:  Yes     Alcohol/week: 6 0 standard drinks     Types: 6 Cans of beer per week    Drug use: Never     Comment: Illicit drugs:   none - As per Annice Files Sexual activity: Not Currently     Comment: Sexually active:   No - As per Kenia Rivers    Other Topics Concern    Not on file   Social History Narrative    · Most recent tobacco use screenin2019      · Do you currently or have you served in the [employer]:   No      · Were you activated, into active duty, as a member of the [employer] and [employer] or as a Reservist:   No      · Caffeine intake:   None decaf coffee only     · Sexual orientation:   Heterosexual      · General stress level:   Low      · Diet:   Regular      · Single or multi-level home/work:   single level home      · Live alone or with others:   alone      · Exercise level: Moderate outside walk only     · Overweight:   No      · Obese:   No      · Guns present in home:   No      · Seat belts used routinely:   Yes      · Advance directive: Yes      · Sunscreen used routinely:   Yes      · Smoke alarm in home: Yes      · Performs monthly self-breast exam:   Yes      · Legally blind in one or both eyes:   No      · Hard of hearing or deaf in one or both ears:   No      · Presence of domestic violence:   No      · Are there stairs in your home:   No      · Pets:   No      Social Determinants of Health     Financial Resource Strain:     Difficulty of Paying Living Expenses:    Food Insecurity:     Worried About Running Out of Food in the Last Year:     [address] in the Last Year:    Transportation Needs:     Lack of Transportation (Medical):  Lack of Transportation (Non-Medical):    Physical Activity:     Days of Exercise per Week:     Minutes of Exercise per Session:    Stress:     Feeling of Stress :    Social Connections:     Frequency of Communication with Friends and Family:     Frequency of Social Gatherings with Friends and Family:     Attends [employer] Services:     Active Member of Clubs or Organizations:     Attends Club or Organization Meetings:     Marital Status:    Intimate Partner Violence:     Fear of Current or Ex-Partner:     Emotionally Abused:     Physically Abused:     Sexually Abused:        Current Medications:   Current Outpatient Medications   Medication Sig Dispense Refill    atenolol (TENORMIN) 100 mg tablet Take 1 tablet (100 mg total) by mouth every other day Take 100 mg by mouth every other morning  90 tablet 0    Cholecalciferol (Vitamin D) 50 MCG (2000 UT) CAPS Take 1 capsule (2,000 Units total) by mouth daily Take 1 capsule every day by oral route for 30 days   90 capsule 1    levothyroxine 50 mcg tablet Take 1 tablet (50 mcg total) by mouth daily in the early morning 90 tablet 1    Multiple Vitamins-Minerals (Womens 50+ Multi Vitamin/Min) TABS Take 1 tablet by mouth daily 100 tablet 1    OLANZapine (ZyPREXA) 7 5 mg tablet Take 7 5 mg by mouth daily at bedtime      oxyCODONE-acetaminophen (PERCOCET) 5-325 mg per tablet Take 1 tablet by mouth every 6 (six) hours as needed for moderate painMax Daily Amount: 4 tablets 15 tablet 0    pantoprazole (PROTONIX) 40 mg tablet Take 1 tablet (40 mg total) by mouth daily 90 tablet 1     No current facility-administered medications for this visit         Allergies: No Known Allergies

## 2021-07-29 ENCOUNTER — CLINICAL SUPPORT (OUTPATIENT)
Dept: RADIATION ONCOLOGY | Facility: HOSPITAL | Age: 56
End: 2021-07-29
Attending: STUDENT IN AN ORGANIZED HEALTH CARE EDUCATION/TRAINING PROGRAM
Payer: COMMERCIAL

## 2021-07-29 VITALS
TEMPERATURE: 97.8 F | HEIGHT: 64 IN | OXYGEN SATURATION: 100 % | WEIGHT: 91.2 LBS | RESPIRATION RATE: 16 BRPM | DIASTOLIC BLOOD PRESSURE: 70 MMHG | BODY MASS INDEX: 15.57 KG/M2 | HEART RATE: 67 BPM | SYSTOLIC BLOOD PRESSURE: 100 MMHG

## 2021-07-29 DIAGNOSIS — C50.811 MALIGNANT NEOPLASM OF OVERLAPPING SITES OF RIGHT BREAST IN FEMALE, ESTROGEN RECEPTOR POSITIVE (HCC): ICD-10-CM

## 2021-07-29 DIAGNOSIS — Z17.0 MALIGNANT NEOPLASM OF OVERLAPPING SITES OF RIGHT BREAST IN FEMALE, ESTROGEN RECEPTOR POSITIVE (HCC): ICD-10-CM

## 2021-07-29 DIAGNOSIS — C50.811 MALIGNANT NEOPLASM OF OVERLAPPING SITES OF RIGHT BREAST IN FEMALE, ESTROGEN RECEPTOR POSITIVE (HCC): Primary | ICD-10-CM

## 2021-07-29 DIAGNOSIS — Z17.0 MALIGNANT NEOPLASM OF OVERLAPPING SITES OF RIGHT BREAST IN FEMALE, ESTROGEN RECEPTOR POSITIVE (HCC): Primary | ICD-10-CM

## 2021-07-29 PROCEDURE — 99211 OFF/OP EST MAY X REQ PHY/QHP: CPT | Performed by: STUDENT IN AN ORGANIZED HEALTH CARE EDUCATION/TRAINING PROGRAM

## 2021-07-29 PROCEDURE — 99244 OFF/OP CNSLTJ NEW/EST MOD 40: CPT | Performed by: STUDENT IN AN ORGANIZED HEALTH CARE EDUCATION/TRAINING PROGRAM

## 2021-07-29 NOTE — PROGRESS NOTES
[address] [place] [address] 1965 is a 61 y o  female    Patient here for radiation consult for right breast cancer referred by Dr Adnrew Crowell  61year old female presents with bilateral breast lumps  Diagnostic mammogram and ultrasound reveals suspicious appearing mass in the right upper outer breast  Previously biopsied mass in the upper outer left breast has increased in size  There is a left axillary lymph node with asymmetric cortical thickening  After reviewing prior imaging of left breast, it does not show a suspicious nodule or lymph node per radiology reading  Right breast biopsy reveals multifocal cancer, with combination of invasive ductal as well as invasive lobular carcinoma, grade 2  ER/DE positive and HER2 negative  BRCA negative     Right mastectomy performed on 6/26/2021 by Dr Andrew Crowell  Tumors were removed with clear margins  The largest tumor was approximately 2 cm, 1 sentinel lymph node with micrometastases measuring 0 4 mm  Mammaprint low risk  Dr Donnie Gabriel does not recommend adjuvant chemotherapy  She was started on anastrozole 7/28/2021  Upcoming:  10/7/2021 Surg/Onc - Dr Andrew Crowell  2/1/2021: Hem/Onc - Dr Donnie Gabriel    3/23/2021 Mammogram Diagnostic bilateral w 3d & cad  FINDINGS:   LEFT  2) MASS  Mammo diagnostic bilateral w 3d & cad: There is a 2 cm oval mass with circumscribed margins seen in the upper outer quadrant of the left breast in the posterior depth  There is a biopsy marker in place in keeping with history of prior biopsy of this mass  US breast bilateral limited (diagnostic): There is an 18 mm x 21 mm x 12 mm oval, parallel, hypoechoic mass with circumscribed margins seen in the left breast at 2 o'clock, 7 cm from the nipple  The mass correlates with the palpable mass noted during physical examination  This was previously biopsied with benign results however has increased in size in the interval   Therefore ultrasound-guided biopsy is recommended    Sonographic evaluation of the left axilla demonstrates a prominent left axillary lymph node with asymmetrical cortical thickening measuring up to 5 mm (labeled as left axilla 2 ) Ultrasound-guided sampling of this lymph node is also recommended  Right  1) MASS  Mammo diagnostic bilateral w 3d & cad: There is a 2 cm high density, irregularly shaped mass with spiculated margins seen in the upper outer quadrant of the right breast in the posterior depth  The mass correlates with the palpable mass noted during physical examination  US breast bilateral limited (diagnostic): There is a 21 mm x 11 mm x 19 mm irregularly shaped, hypoechoic mass with spiculated margins seen in the right breast at 10 o'clock, 7 cm from the nipple  The mass correlates with the palpable mass noted during physical examination  This finding is suspicious in appearance and ultrasound-guided biopsy is recommended  At the 2nd area of palpable concern right breast 10 o'clock position 6 cm from the nipple there is a 9 mm cyst   Targeted sonographic evaluation of the right axilla demonstrates typical appearing lymph nodes without suspicious cortical thickening or flow  These findings were discussed with the patient at time of examination  IMPRESSION:  1  Suspicious appearing mass in the right upper outer breast at area of palpable concern for which ultrasound-guided biopsy is recommended  2   Previously biopsied mass in the upper outer left breast has increased in size and therefore repeat biopsy is recommended  3  There is a left axillary lymph node with asymmetric cortical thickening  Ultrasound-guided sampling of this lymph node is also recommended  4/30/2021 MRI breast bilateral w and wo contrast w cad  IMPRESSION:   1  Multifocal right breast cancer  10:00 o'clock mass measures up to 1 8 cm in greatest dimension  The 2 areas of biopsy-proven carcinoma have a total linear extent of disease of approximately 5 cm    10:00 o'clock mass abuts pectoral muscle, without evidence of muscle invasion  2  No evidence of contralateral left breast cancer  3  No axillary or internal mammary adenopathy             Oncology History   Malignant neoplasm of overlapping sites of right breast in female, estrogen receptor positive ([facility] [address] )   4/16/2021 Biopsy    Right breast US guided biopsy:  A  11 o'clock 7 cm from the nipple  Invasive lobular carcinoma  Grade 1  ER 90, VT 45, HER2 1+  Lymphovascular invasion: not identified    B  10 o'clock 7 cm from the nipple  Invasive mammary carcinoma of no special type  Grade 2  , , HER2 1+  Lymphovascular invasion: not identified    Concordant  Malignancy appears multifocal  MRI recommended  4/30/2021 Observation    Bilateral breast MRI  Multifocal right breast cancer; 10:00 mass measures up to 1 8 cm and abuts pectoral muscle, without evidence of invasion  Two areas of carcinoma have a total extent of disease of 5 cm  Left breast clear  Axilla negative  6/10/2021 Genetic Testing    The following genes were evaluated: BATSHEVA, BRCA1, BRCA2, CDH1, CHEK2, PALB2, PTEN, STK11, TP53  Additional genes analyzed for a total of 20  Negative result   No pathogenic sequence variants or deletions/dupllications identified  [employer]     6/25/2021 Surgery    Right breast ANJALI  directed mastectomy with sentinel lymph node biopsy  Invasive carcinoma of no special type (ductal)  Grade 2  2 cm (2 foci)  Margins negative  1/1 Lymph node with micrometastases (0 4 mm)  Anatomic Stage IB  Prognostic Stage IA     7/7/2021 Genomic Testing    MammaPrint for FLEX trial  Low risk (Luminal A)         Clinical Trial: no        Health Maintenance   Topic Date Due    Pneumococcal Vaccine: Pediatrics (0 to 5 Years) and At-Risk Patients (6 to 59 Years) (1 of 4 - PCV13) Never done    COVID-19 Vaccine (1) Never done    Influenza Vaccine (1) 09/01/2021    DTaP,Tdap,and Td Vaccines (1 - Tdap) 11/06/2021 (Originally 2/27/1985)    Colorectal Cancer Screening 12/06/2028 (Originally 2/27/2014)    Annual Physical  12/01/2021    Breast Cancer Screening: Mammogram  03/23/2022    BMI: Followup Plan  07/01/2022    BMI: Adult  07/28/2022    Depression Screening PHQ  07/29/2022    Cervical Cancer Screening  12/01/2025    HIV Screening  Completed    Hepatitis C Screening  Completed    HIB Vaccine  Aged Out    Hepatitis B Vaccine  Aged Out    IPV Vaccine  Aged Out    Hepatitis A Vaccine  Aged Out    Meningococcal ACWY Vaccine  Aged Out    HPV Vaccine  Aged Out       Past Medical History:   Diagnosis Date    Abnormal weight loss     Anorexia nervosa     Breast cancer ([address] )     Disease of thyroid gland     Elevated blood sugar     Occasional     Fibroadenoma of both breasts     GERD (gastroesophageal reflux disease)     Hypertension     Hyperthyroidism     Osteopenia        Past Surgical History:   Procedure Laterality Date    BREAST BIOPSY Right 04/16/2021    BREAST CYST EXCISION Left 2011    CHOLECYSTECTOMY      MASTECTOMY W/ SENTINEL NODE BIOPSY Right 6/25/2021    Procedure: BREAST MASTECTOMY WITH BIOPSY LYMPH NODE SENTINEL; ANJALI  GUIDED, LYMPHATIC MAPPING WITH BLUE DYE AND RADIOACTIVE DYE (INJECT AT 1130 BY DR WALSH IN THE OR); Surgeon: Sunita Washburn MD;  [facility];  Service: Surgical Oncology    US BREAST NEEDLE LOC RIGHT EACH ADDITIONAL Right 6/14/2021    US BREAST ANJALI  NEEDLE LOC RIGHT Right 6/14/2021    US GUIDANCE BREAST BIOPSY RIGHT EACH ADDITIONAL Right 4/16/2021    US GUIDED BREAST BIOPSY RIGHT COMPLETE Right 4/16/2021       Family History   Adopted: Yes       Social History     Tobacco Use    Smoking status: Never Smoker    Smokeless tobacco: Never Used   Vaping Use    Vaping Use: Never used   Substance Use Topics    Alcohol use:  Yes     Alcohol/week: 2 0 standard drinks     Types: 2 Cans of beer per week    Drug use: Never     Comment: Illicit drugs:   none - As per Conda Chilo           Current Outpatient Medications:     anastrozole (ARIMIDEX) 1 mg tablet, Take 1 tablet (1 mg total) by mouth daily, Disp: 90 tablet, Rfl: 1    atenolol (TENORMIN) 100 mg tablet, Take 1 tablet (100 mg total) by mouth every other day Take 100 mg by mouth every other morning , Disp: 90 tablet, Rfl: 0    Cholecalciferol (Vitamin D) 50 MCG (2000 UT) CAPS, Take 1 capsule (2,000 Units total) by mouth daily Take 1 capsule every day by oral route for 30 days  , Disp: 90 capsule, Rfl: 1    levothyroxine 50 mcg tablet, Take 1 tablet (50 mcg total) by mouth daily in the early morning, Disp: 90 tablet, Rfl: 1    Multiple Vitamins-Minerals (Womens 50+ Multi Vitamin/Min) TABS, Take 1 tablet by mouth daily, Disp: 100 tablet, Rfl: 1    OLANZapine (ZyPREXA) 7 5 mg tablet, Take 7 5 mg by mouth daily at bedtime, Disp: , Rfl:     pantoprazole (PROTONIX) 40 mg tablet, Take 1 tablet (40 mg total) by mouth daily, Disp: 90 tablet, Rfl: 1    oxyCODONE-acetaminophen (PERCOCET) 5-325 mg per tablet, Take 1 tablet by mouth every 6 (six) hours as needed for moderate painMax Daily Amount: 4 tablets (Patient not taking: Reported on 7/29/2021), Disp: 15 tablet, Rfl: 0    No Known Allergies     Review of Systems:  Review of Systems   Constitutional: Positive for unexpected weight change (over past 3 years she has lost about 45lbs)  HENT: Negative  Eyes: Negative  Respiratory: Negative  Cardiovascular: Negative  Gastrointestinal: Negative  Endocrine: Negative  Genitourinary: Negative  Musculoskeletal: Negative  Skin: Negative  Incision is dry and intact   Allergic/Immunologic: Positive for food allergies (scallops)  Neurological: Negative  Hematological: Negative  Psychiatric/Behavioral: Negative          Vitals:    07/29/21 1349   BP: 100/70   BP Location: Left arm   Patient Position: Sitting   Cuff Size: Standard   Pulse: 67   Resp: 16   Temp: 97 8 °F (36 6 °C)   TempSrc: Temporal   SpO2: 100%   Weight: 41 4 kg (91 lb 3 2 oz)   Height: 5' 4" (1 626 m)       Pain Score: 0-No pain    OB/GYN History:  The patient underwent menarche at 11 years  Menopause Status postmenapausal  Menopause at 61  Menopause Reason: natural  Hormone replacement therapy: no      0   Para 0   Birth control pills: no     Pregnancy test needed:  no    PFT: na    Imaging:No images are attached to the encounter       Teaching: [facility] Radiation therapy, skin reactions, fatigue    MST: completed    Implantable Devices (Port, Pacemaker, pain stimulator): no    Hip Replacement: no

## 2021-07-29 NOTE — PROGRESS NOTES
Consultation - Radiation Oncology      OD : 1965  Encounter: [id number]  Patient Information: [address] COMPLAINT  Chief Complaint   Patient presents with    Consult     Cancer Staging  Malignant neoplasm of overlapping sites of right breast in female, estrogen receptor positive ([facility] [address] )  Staging form: Breast, AJCC 8th Edition  - Pathologic: Stage IA (pT1c, pN1mi(sn), cM0, G2, ER+, [place]+, HER2-) - Unsigned  Method of lymph node assessment: [place] lymph node biopsy  Histologic grading system: 3 grade system           History of Present Illness   Patient here for radiation consult for right breast cancer referred by Dr Meron Mayorga  Ms Edis Barreto is a 61year old perimenopausal woman with recently diagnosed [id number] IDC and pT1bN0 ILC of the right breast s/p mastectomy with SLNBx  Pathology was notable for a 20mm IDC primary, G2, LVSI+, 1/1 SLNs+ (0 4mm), margins-, low mammaprint  She is seen today in consideration of adjuvant RT  The patient was in her usual state of health until early  when she presented with bilateral palpable breast masses  3/23/2021 Mammogram Diagnostic bilateral w 3d & cad  FINDINGS:   LEFT  2) MASS  Mammo diagnostic bilateral w 3d & cad: There is a 2 cm oval mass with circumscribed margins seen in the upper outer quadrant of the left breast in the posterior depth  There is a biopsy marker in place in keeping with history of prior biopsy of this mass  US breast bilateral limited (diagnostic): There is an 18 mm x 21 mm x 12 mm oval, parallel, hypoechoic mass with circumscribed margins seen in the left breast at 2 o'clock, 7 cm from the nipple  The mass correlates with the palpable mass noted during physical examination  This was previously biopsied with benign results however has increased in size in the interval   Therefore ultrasound-guided biopsy is recommended    Sonographic evaluation of the left axilla demonstrates a prominent left axillary lymph node with asymmetrical cortical thickening measuring up to 5 mm (labeled as left axilla 2 ) Ultrasound-guided sampling of this lymph node is also recommended     Right  1) MASS  Mammo diagnostic bilateral w 3d & cad: There is a 2 cm high density, irregularly shaped mass with spiculated margins seen in the upper outer quadrant of the right breast in the posterior depth  The mass correlates with the palpable mass noted during physical examination  US breast bilateral limited (diagnostic): There is a 21 mm x 11 mm x 19 mm irregularly shaped, hypoechoic mass with spiculated margins seen in the right breast at 10 o'clock, 7 cm from the nipple  The mass correlates with the palpable mass noted during physical examination  This finding is suspicious in appearance and ultrasound-guided biopsy is recommended  At the 2nd area of palpable concern right breast 10 o'clock position 6 cm from the nipple there is a 9 mm cyst   Targeted sonographic evaluation of the right axilla demonstrates typical appearing lymph nodes without suspicious cortical thickening or flow  These findings were discussed with the patient at time of examination  IMPRESSION:  1   Suspicious appearing mass in the right upper outer breast at area of palpable concern for which ultrasound-guided biopsy is recommended  2   Previously biopsied mass in the upper outer left breast has increased in size and therefore repeat biopsy is recommended  3  There is a left axillary lymph node with asymmetric cortical thickening   Ultrasound-guided sampling of this lymph node is also recommended       After reviewing prior imaging of left breast, radiology felt the left breast nodule and LN were non-supicious  She proceeded with right breast biopsy revealing multifocal cancer, with combination of invasive ductal as well as invasive lobular carcinoma, grade 2  ER/OR positive and HER2 negative  BRCA negative        4/30/2021 MRI breast bilateral w and wo contrast w cad  IMPRESSION:   1  Multifocal right breast cancer   10:00 o'clock mass measures up to 1 8 cm in greatest dimension   The 2 areas of biopsy-proven carcinoma have a total linear extent of disease of approximately 5 cm  10:00 o'clock mass abuts pectoral muscle, without evidence of muscle invasion  2  No evidence of contralateral left breast cancer  3  No axillary or internal mammary adenopathy      She underwent right mastectomy (6/26/2021) under the care of Dr Dayna Denton; pathology demonstrated multifocal invasive mammary carcinoma; largest 20mm ductal carcinoma, G2 ([place] 7/9), second 7mm ILC, classic type, G2 ([place] 6/9); non-extensive DCIS present; margins- (nearest 5mm); LVSI+; 1/1 SLNs involved (0 4mm, JOSIAH-), ER+ (100%,3+)/IL+(100%,3+)/Her2-(1+ IHC), pT1c(m)N1a  Mammaprint genetic testing was low risk  She was seen by Dr Sina Romero, who did not recommend adjuvant chemotherapy  She was started on anastrozole 7/28/2021      Upcoming:  10/7/2021 Surg/Onc - Dr Dayna Denton  2/1/2021: Hem/Onc - Dr Sina Romero     Currently the patient is doing well overall  She has recovered well from mastectomy with minimal morbidity  She has some discomfort with stretching her arm upwards but otherwise has no pain, no swelling, and no edema  She is otherwise without complaints          Historical Information   Oncology History   Malignant neoplasm of overlapping sites of right breast in female, estrogen receptor positive ([facility] [address] )   4/16/2021 Biopsy    Right breast US guided biopsy:  A  11 o'clock 7 cm from the nipple  Invasive lobular carcinoma  Grade 1  ER 90, IL 45, HER2 1+  Lymphovascular invasion: not identified    B  10 o'clock 7 cm from the nipple  Invasive mammary carcinoma of no special type  Grade 2  , , HER2 1+  Lymphovascular invasion: not identified    Concordant  Malignancy appears multifocal  MRI recommended       4/30/2021 Observation    Bilateral breast MRI  Multifocal right breast cancer; 10:00 mass measures up to 1 8 cm and abuts pectoral muscle, without evidence of invasion  Two areas of carcinoma have a total extent of disease of 5 cm  Left breast clear  Axilla negative  6/10/2021 Genetic Testing    The following genes were evaluated: BATSHEVA, BRCA1, BRCA2, CDH1, CHEK2, PALB2, PTEN, STK11, TP53  Additional genes analyzed for a total of 20  Negative result  No pathogenic sequence variants or deletions/dupllications identified  [employer]     6/25/2021 Surgery    Right breast ANJALI  directed mastectomy with sentinel lymph node biopsy  Invasive carcinoma of no special type (ductal)  Grade 2  2 cm (2 foci)  Margins negative  1/1 Lymph node with micrometastases (0 4 mm)  Anatomic Stage IB  Prognostic Stage IA     7/7/2021 Genomic Testing    MammaPrint for FLEX trial  Low risk (Luminal A)     7/28/2021 -  Hormone Therapy    Anastrozole 1 mg           Past Medical History:   Diagnosis Date    Abnormal weight loss     Anorexia nervosa     Breast cancer (HCC)     Disease of thyroid gland     Elevated blood sugar     Occasional     Fibroadenoma of both breasts     GERD (gastroesophageal reflux disease)     Hypertension     Hyperthyroidism     Osteopenia      Past Surgical History:   Procedure Laterality Date    BREAST BIOPSY Right 04/16/2021    BREAST CYST EXCISION Left 2011    CHOLECYSTECTOMY      MASTECTOMY W/ SENTINEL NODE BIOPSY Right 6/25/2021    Procedure: BREAST MASTECTOMY WITH BIOPSY LYMPH NODE SENTINEL; ANJALI  GUIDED, LYMPHATIC MAPPING WITH BLUE DYE AND RADIOACTIVE DYE (INJECT AT 1130 BY DR WALSH IN THE OR);   Surgeon: Mercedes Perdomo MD;  Location: [facility];  Service: Surgical Oncology    US BREAST NEEDLE LOC RIGHT EACH ADDITIONAL Right 6/14/2021    US BREAST ANJALI  NEEDLE LOC RIGHT Right 6/14/2021    US GUIDANCE BREAST BIOPSY RIGHT EACH ADDITIONAL Right 4/16/2021    US GUIDED BREAST BIOPSY RIGHT COMPLETE Right 4/16/2021     OB/GYN History:  The patient underwent menarche at 11 years  Menopause Status postmenapausal  Menopause at 61  Menopause Reason: natural  Hormone replacement therapy: no      0   Para 0   Birth control pills: no     Pregnancy test needed:  no    Family History   Adopted: Yes       Social History   Social History     Substance and Sexual Activity   Alcohol Use Yes    Alcohol/week: 2 0 standard drinks    Types: 2 Cans of beer per week     Social History     Substance and Sexual Activity   Drug Use Never    Comment: Illicit drugs:   none - As per [place]      Social History     Tobacco Use   Smoking Status Never Smoker   Smokeless Tobacco Never Used       Meds/Allergies     Current Outpatient Medications:     anastrozole (ARIMIDEX) 1 mg tablet, Take 1 tablet (1 mg total) by mouth daily, Disp: 90 tablet, Rfl: 1    atenolol (TENORMIN) 100 mg tablet, Take 1 tablet (100 mg total) by mouth every other day Take 100 mg by mouth every other morning , Disp: 90 tablet, Rfl: 0    Cholecalciferol (Vitamin D) 50 MCG (2000 UT) CAPS, Take 1 capsule (2,000 Units total) by mouth daily Take 1 capsule every day by oral route for 30 days  , Disp: 90 capsule, Rfl: 1    levothyroxine 50 mcg tablet, Take 1 tablet (50 mcg total) by mouth daily in the early morning, Disp: 90 tablet, Rfl: 1    Multiple Vitamins-Minerals (Womens 50+ Multi Vitamin/Min) TABS, Take 1 tablet by mouth daily, Disp: 100 tablet, Rfl: 1    OLANZapine (ZyPREXA) 7 5 mg tablet, Take 7 5 mg by mouth daily at bedtime, Disp: , Rfl:     pantoprazole (PROTONIX) 40 mg tablet, Take 1 tablet (40 mg total) by mouth daily, Disp: 90 tablet, Rfl: 1    oxyCODONE-acetaminophen (PERCOCET) 5-325 mg per tablet, Take 1 tablet by mouth every 6 (six) hours as needed for moderate painMax Daily Amount: 4 tablets (Patient not taking: Reported on 2021), Disp: 15 tablet, Rfl: 0  No Known Allergies    Review of Systems:  Review of Systems   Constitutional: Positive for unexpected weight change (over past 3 years she has lost about 45lbs)  HENT: Negative  Eyes: Negative  Respiratory: Negative  Cardiovascular: Negative  Gastrointestinal: Negative  Endocrine: Negative  Genitourinary: Negative  Musculoskeletal: Negative  Skin: Negative  Incision is dry and intact   Allergic/Immunologic: Positive for food allergies (scallops)  Neurological: Negative  Hematological: Negative  Psychiatric/Behavioral: Negative  OBJECTIVE:   /70 (BP Location: Left arm, Patient Position: Sitting, Cuff Size: Standard)   Pulse 67   Temp 97 8 °F (36 6 °C) (Temporal)   Resp 16   Ht 5' 4" (1 626 m)   Wt 41 4 kg (91 lb 3 2 oz)   SpO2 100%   BMI 15 65 kg/m²   Pain Assessment:  0  Performance Status: ECOG/[place]ub[place]/WHO: 0 - Asymptomatic    Physical Exam   Well appearing, NAD  No appreciable cervical, supraclavicular, or axillary adenopathy bilaterally  Right sided mastectomy incision clean dry and intact; no masses appreciated along the right chest wall  Left breast with an ~2cm palpable firm, mobile mass (site of prior benign biopsy per patient)  RRR, no murmurs  CTA bilaterally, no increased work of breathing  No edema of the bilateral UEs  Normal ROM  RESULTS  Lab Results    Chemistry        Component Value Date/Time    K 4 2 05/12/2021 1149     05/12/2021 1149    CO2 27 05/12/2021 1149    BUN 15 05/12/2021 1149    CREATININE 0 91 05/12/2021 1149        Component Value Date/Time    CALCIUM 9 0 05/12/2021 1149    ALKPHOS 72 05/12/2021 1149    AST 12 05/12/2021 1149    ALT 36 05/12/2021 1149            Lab Results   Component Value Date    WBC 9 77 06/26/2021    HGB 11 2 (L) 06/26/2021    HCT 34 6 (L) 06/26/2021    MCV 95 06/26/2021     06/26/2021         Imaging Studies  No results found  Pathology:   A  Lymph Node, [place], right axillary sentinel lymph node, lymphadenectomy:  - One lymph node (1/1mi) positive for micrometastatic carcinoma       -- Subcapsular and sinusoidal metastases; largest metastatic deposit 0 4 mm in a 1 1 mm area  -- No extranodal extension  -- Confirmed by positive pankeratin (CKAE1/3) immunostain       B  Breast, Right, right breast, mastectomy:  Multifocal (2) invasive mammary carcinoma  Largest invasive carcinoma  - Invasive mammary carcinoma of no special type (ductal), 20 mm in greatest gross dimension,      present in lower outer quadrant (LOQ)  -- [place] histologic grade 2 of 3 (total score: 7 of 9)        * Glandular (acinar) Tubular Differentiation 10-75%, score 2         * Nuclear Pleomorphism 2-3 of 3, score 3         * Mitotic Rate 4-7/mm2, (8-14 mitoses/10HPF; 12 mitoses/10 HPF), score 2     -- Confirmed by         * Negative: p63 and SMMHC immunostains  -- Associated prior biopsy site changes with ribbon clip and ANJALI  marker  Second invasive carcinoma  - Invasive lobular carcinoma, classic pattern with histiocytoid differentiation; 7 mm in      greatest gross dimension, present in upper outer quadrant (UOQ)  -- [place] histologic grade 2 of 3 (total score: 6 of 9)        * Glandular (acinar) Tubular Differentiation <10%, score 3         * Nuclear Pleomorphism 1-2 of 3, score 2         * Mitotic Rate < 3/mm2, (</= 7 mitoses/10HPF; 0 mitoses/10 hPF), score 1     -- Confirmed by tumor cell immunophenotype:        * Positive: Pankeratin (CKAE1/3) and P120 (cytoplasmic)  -- Associated prior biopsy site changes with wing clip and two ANJALI  markers  - Focus suspicious for microinvasive carcinoma of no special type (ductal), present in tissue     between both invasive carcinomas; 0 7 mm   - Ductal carcinoma in-situ (DCIS): Present; not an extensive intraductal component; associated with     largest invasive carcinoma (< 5% of total tumor)  -- Size and Extent: 7 mm in greatest estimated extent; present in 1 of 21 blocks  -- Architectural Patterns: Micropapillary and cribriform       -- Nuclear grade: II (intermediate)  -- Necrosis: Present, focal necrosis  - Margins uninvolved by invasive and in-situ carcinoma  -- Invasive carcinoma 5 mm from nearest posterior margin; 7 mm from nearest anterior UOQ margin  -- DCIS > 5 mm from nearest gross posterior margin    - Benign nipple and skin  -- Invasive carcinoma does not invade into the dermis or epidermis; no ulceration  -- DCIS does not involve the nipple epidermis  -- No dermal lymphovascular invasion    - Lymphovascular invasion: Present  -- Confirmed by D2-40 and CD31 immunostains    - Perineural invasion: Present  - Microcalcifications: Present, associated with invasive carcinoma, lobular neoplasia and     non-neoplastic breast tissue    - Benign proliferative and non-proliferative fibrocystic changes with atypia consisting of extensive     non-invasive lobular neoplasia (lobular carcinoma in-situ and atypical lobular hyperplasia; focally     involving sclerosing adenosis), sclerosing and apocrine adenosis, fibroadenomatoid changes and     cystic and papillary apocrine metaplasia  -- Lobular neoplasia confirmed by positive p120 (cytoplasmic), SMMHC and p63; negative E-cadherin        immunostains    - Duct ectasia  ASSESSMENT  1  Malignant neoplasm of overlapping sites of right breast in female, estrogen receptor positive ([facility] [address] )  Ambulatory referral to Radiation Oncology     Cancer Staging  Malignant neoplasm of overlapping sites of right breast in female, estrogen receptor positive ([facility] [address] )  Staging form: Breast, AJCC 8th Edition  - Pathologic: Stage IA (pT1c, pN1mi(sn), cM0, G2, ER+, [place]+, HER2-) - Unsigned  Method of lymph node assessment: [place] lymph node biopsy  Histologic grading system: 3 grade system        PLAN/DISCUSSION  No orders of the defined types were placed in this encounter       Ms Arvin Quiroz is a 61year old woman with recently diagnosed stage IA ([id number]) G2, ER+/MA+/HER2- IDC of the right breast  She is s/p mastectomy with pathology demonstrating a 20mm IDC primary, G2, LVSI+, 1/1 SLNs+ (0 4mm), margins-, low mammaprint  She has been started on adjuvant anastrazole and is seen today in consideration of adjuvant RT  We discussed the role of adjuvant RT following mastectomy for early stage breast cancer  We additionally reviewed the clinical and pathologic features most pertinent to the patient's care  While she was found to have a micrometastasis in a single sentinel LN and an LVSI+ primary, we discussed that I would not strongly advocate for post-operative RT based on these risk factors alone  Given her other, more favorable risk features (G2 disease, T1 primary, HR+ disease, margins-) I believe she is at low risk for a recurrence and it is unclear whether the benefits of PMRT outweigh the risks  We discussed the potential acute and late toxicities associated with post mastectomy RT  Acute side effects of RT include, but are note limited to, fatigue, erythema of the irradiated skin, and chest wall pain  Late effects of RT include, but are not limited to, pneumonitis, fib pain, shoulder stiffness, lymphedema, and the non-trivial risk for a secondary radiation induced malignancy  The patient and her sister were given the opportunity to ask multiple questions, all of which were answered to their satisfaction  Following extensive discussion, the patient opted to forgo post-mastectomy RT at this time  She will continue to follow with her other managing medical providers including Dr Shannon Erazo and Dr Pelayo Lat  We will remain available should other radiation related questions arise  Shweta Lees MD  1/87/4181,3:86 PM      Portions of the record may have been created with voice recognition software  Occasional wrong word or "sound a like" substitutions may have occurred due to the inherent limitations of voice recognition software    Read the chart carefully and recognize, using context, where substitutions have occurred

## 2021-09-17 DIAGNOSIS — I10 HYPERTENSION, UNSPECIFIED TYPE: ICD-10-CM

## 2021-09-17 RX ORDER — ATENOLOL 100 MG/1
100 TABLET ORAL EVERY OTHER DAY
Qty: 90 TABLET | Refills: 0 | Status: SHIPPED | OUTPATIENT
Start: 2021-09-17 | End: 2022-01-10 | Stop reason: SDUPTHER

## 2021-09-17 NOTE — TELEPHONE ENCOUNTER
Pharmacy requesting refill on patients behalf  Atenolol 100 mg   Medication pended for your approval

## 2021-10-04 PROBLEM — Z79.811 USE OF ANASTROZOLE (ARIMIDEX): Status: ACTIVE | Noted: 2021-10-04

## 2021-10-07 ENCOUNTER — OFFICE VISIT (OUTPATIENT)
Dept: SURGICAL ONCOLOGY | Facility: CLINIC | Age: 56
End: 2021-10-07
Payer: COMMERCIAL

## 2021-10-07 VITALS
RESPIRATION RATE: 18 BRPM | DIASTOLIC BLOOD PRESSURE: 74 MMHG | TEMPERATURE: 97.6 F | HEIGHT: 64 IN | WEIGHT: 89.5 LBS | HEART RATE: 81 BPM | SYSTOLIC BLOOD PRESSURE: 120 MMHG | BODY MASS INDEX: 15.28 KG/M2 | OXYGEN SATURATION: 99 %

## 2021-10-07 DIAGNOSIS — C50.811 MALIGNANT NEOPLASM OF OVERLAPPING SITES OF RIGHT BREAST IN FEMALE, ESTROGEN RECEPTOR POSITIVE (HCC): Primary | ICD-10-CM

## 2021-10-07 DIAGNOSIS — Z79.811 USE OF ANASTROZOLE (ARIMIDEX): ICD-10-CM

## 2021-10-07 DIAGNOSIS — Z17.0 MALIGNANT NEOPLASM OF OVERLAPPING SITES OF RIGHT BREAST IN FEMALE, ESTROGEN RECEPTOR POSITIVE (HCC): Primary | ICD-10-CM

## 2021-10-07 PROCEDURE — 99214 OFFICE O/P EST MOD 30 MIN: CPT | Performed by: SURGERY

## 2021-10-07 RX ORDER — DIPHENOXYLATE HYDROCHLORIDE AND ATROPINE SULFATE 2.5; .025 MG/1; MG/1
TABLET ORAL
COMMUNITY
Start: 2021-10-05 | End: 2021-10-07

## 2021-10-15 ENCOUNTER — TELEPHONE (OUTPATIENT)
Dept: FAMILY MEDICINE CLINIC | Facility: CLINIC | Age: 56
End: 2021-10-15

## 2021-11-08 DIAGNOSIS — K21.9 GERD WITHOUT ESOPHAGITIS: ICD-10-CM

## 2021-11-08 DIAGNOSIS — E55.9 VITAMIN D DEFICIENCY: ICD-10-CM

## 2021-11-08 RX ORDER — ACETAMINOPHEN 160 MG
TABLET,DISINTEGRATING ORAL
Qty: 30 CAPSULE | Refills: 0 | Status: SHIPPED | OUTPATIENT
Start: 2021-11-08 | End: 2021-12-08

## 2021-11-08 RX ORDER — PANTOPRAZOLE SODIUM 40 MG/1
40 TABLET, DELAYED RELEASE ORAL DAILY
Qty: 30 TABLET | Refills: 0 | Status: SHIPPED | OUTPATIENT
Start: 2021-11-08 | End: 2021-12-08

## 2021-12-08 DIAGNOSIS — E55.9 VITAMIN D DEFICIENCY: ICD-10-CM

## 2021-12-08 DIAGNOSIS — K21.9 GERD WITHOUT ESOPHAGITIS: ICD-10-CM

## 2021-12-08 RX ORDER — ACETAMINOPHEN 160 MG
TABLET,DISINTEGRATING ORAL
Qty: 30 CAPSULE | Refills: 0 | Status: SHIPPED | OUTPATIENT
Start: 2021-12-08 | End: 2022-01-10

## 2021-12-08 RX ORDER — PANTOPRAZOLE SODIUM 40 MG/1
40 TABLET, DELAYED RELEASE ORAL DAILY
Qty: 30 TABLET | Refills: 0 | Status: SHIPPED | OUTPATIENT
Start: 2021-12-08 | End: 2022-01-10

## 2022-01-07 NOTE — PROGRESS NOTES
BMI Counseling: Body mass index is 15 21 kg/m²  The BMI is below normal  Patient advised to gain weight and dietary education for weight gain was provided  Rationale for BMI follow-up plan is due to patient being underweight  Depression Screening and Follow-up Plan: Clincally patient does not have depression  No treatment is required  Assessment/Plan:         Problem List Items Addressed This Visit        Digestive    GERD without esophagitis     Patient takes Protonix 40 mg p o  Daily  She enjoys spicy foods  Instructed on a GERD diet  Endocrine    Hypothyroidism     Patient currently taking levothyroxine 50 mcg p o  Daily  Will order for thyroid function studies in 6 months  Cardiovascular and Mediastinum    Hypertension     BP currently stable 118/66  Patient currently taking atenolol 100 mg p o  Daily  Instructed on low-sodium diet  Patient exercise 7 days a week for least lower half of moderate walking  Nervous and Auditory    Excessive cerumen in ear canal, bilateral     Patient order for Debrox drops for cerumen in the ears  May have irrigation in 6 months if she chooses to do so  Other    Vitamin D deficiency     Patient order for vitamin-D level to evaluate for deficiency  Currently taking vitamin D3 50 mcg 2000 unit dose capsules daily  Annual physical exam - Primary     Annual physical exam completed at this time  Anorexia nervosa     Patient instructed on increasing weight current BMI 15 21 kg/M2 goal of 18 kg/M2  Nutrition consult refused at this time  Patient encouraged to increase protein in the diet and drink Ensure protein drinks  Subjective:      Patient ID: Toña Galaviz is a 61 y o  female  Patient is a 50-year-old female presents for follow-up evaluation sub post right breast mastectomy, hypothyroidism, malnutrition, hypertension and vitamin-D deficiency        The following portions of the patient's history were reviewed and updated as appropriate:   Past Medical History:  She has a past medical history of Abnormal weight loss, Anorexia nervosa, Breast cancer ([address] ), Disease of thyroid gland, Elevated blood sugar, Fibroadenoma of both breasts, GERD (gastroesophageal reflux disease), Hypertension, Hyperthyroidism, and Osteopenia  ,  _______________________________________________________________________  Medical Problems:  does not have any pertinent problems on file ,  _______________________________________________________________________  Past Surgical History:   has a past surgical history that includes Cholecystectomy; Breast cyst excision (Left, 2011); Breast biopsy (Right, 04/16/2021); US guided breast biopsy right complete (Right, 4/16/2021); US guidance breast biopsy right each additional (Right, 4/16/2021); US breast medardo  right (Right, 6/14/2021); US breast needle loc right each additional (Right, 6/14/2021); and Mastectomy w/ sentinel node biopsy (Right, 6/25/2021)  ,  _______________________________________________________________________  Family History:  family history is not on file  She was adopted  ,  _______________________________________________________________________  Social History:   reports that she has never smoked  She has never used smokeless tobacco  She reports current alcohol use of about 2 0 standard drinks of alcohol per week  She reports that she does not use drugs  ,  _______________________________________________________________________  Allergies:  has No Known Allergies     _______________________________________________________________________  Current Outpatient Medications   Medication Sig Dispense Refill    anastrozole (ARIMIDEX) 1 mg tablet Take 1 tablet (1 mg total) by mouth daily 90 tablet 1    atenolol (TENORMIN) 100 mg tablet Take 1 tablet (100 mg total) by mouth every other day Take 100 mg by mouth every other morning   90 tablet 0    Cholecalciferol (Vitamin D3) 50 MCG (2000 UT) capsule Take 1 capsule (2,000 Units total) by mouth daily Take 1 capsule every day by oral route for 30 days  30 capsule 0    levothyroxine 50 mcg tablet Take 1 tablet (50 mcg total) by mouth daily in the early morning 90 tablet 1    Multiple Vitamins-Minerals (Womens 50+ Multi Vitamin/Min) TABS Take 1 tablet by mouth daily 100 tablet 1    multivitamin (THERAGRAN) TABS       OLANZapine (ZyPREXA) 7 5 mg tablet Take 7 5 mg by mouth daily at bedtime      oxyCODONE-acetaminophen (PERCOCET) 5-325 mg per tablet Take 1 tablet by mouth every 6 (six) hours as needed for moderate painMax Daily Amount: 4 tablets (Patient not taking: Reported on 7/29/2021) 15 tablet 0    pantoprazole (PROTONIX) 40 mg tablet Take 1 tablet (40 mg total) by mouth daily 30 tablet 0     No current facility-administered medications for this visit      _______________________________________________________________________  Review of Systems   Constitutional: Negative for activity change, appetite change, chills, fatigue, fever and unexpected weight change  HENT: Negative for congestion, ear discharge, ear pain, nosebleeds, postnasal drip, rhinorrhea, sinus pressure, sinus pain, sneezing, sore throat and voice change  Eyes: Negative for pain, redness and visual disturbance  Respiratory: Negative for cough, chest tightness, shortness of breath and wheezing  Cardiovascular: Negative for chest pain and palpitations  Gastrointestinal: Negative for abdominal distention, abdominal pain, constipation, diarrhea, nausea and vomiting  Endocrine: Negative  Genitourinary: Negative for difficulty urinating, dysuria, flank pain, frequency, hematuria and urgency  Musculoskeletal: Negative for arthralgias and myalgias  Skin: Negative  Allergic/Immunologic: Negative  Neurological: Negative  Hematological: Negative  Psychiatric/Behavioral: Negative            Objective:  Vitals:    01/10/22 1125   BP: 118/66   Pulse: 105   Resp: 18   Temp: (!) 96 5 °F (35 8 °C)   SpO2: 99%   Weight: 40 2 kg (88 lb 9 6 oz)   Height: 5' 4" (1 626 m)     Body mass index is 15 21 kg/m²  Physical Exam  Vitals and nursing note reviewed  Constitutional:       Appearance: Normal appearance  She is well-developed  HENT:      Head: Normocephalic and atraumatic  Right Ear: Tympanic membrane, ear canal and external ear normal       Left Ear: Tympanic membrane, ear canal and external ear normal       Ears:      Comments: Excess cerumen bilateral ears  Nose: Nose normal  No congestion or rhinorrhea  Mouth/Throat:      Mouth: Mucous membranes are moist       Pharynx: No oropharyngeal exudate or posterior oropharyngeal erythema  Eyes:      Extraocular Movements: Extraocular movements intact  Conjunctiva/sclera: Conjunctivae normal       Pupils: Pupils are equal, round, and reactive to light  Cardiovascular:      Rate and Rhythm: Normal rate and regular rhythm  Pulses: Normal pulses  Heart sounds: Normal heart sounds  Pulmonary:      Effort: Pulmonary effort is normal       Breath sounds: Normal breath sounds  Abdominal:      General: Bowel sounds are normal       Palpations: Abdomen is soft  Musculoskeletal:         General: Normal range of motion  Cervical back: Normal range of motion and neck supple  Skin:     General: Skin is warm and dry  Neurological:      General: No focal deficit present  Mental Status: She is alert and oriented to person, place, and time     Psychiatric:         Mood and Affect: Mood normal          Behavior: Behavior normal

## 2022-01-07 NOTE — PATIENT INSTRUCTIONS
Hypertension   WHAT YOU NEED TO KNOW:   What is hypertension? Hypertension is high blood pressure  Your blood pressure is the force of your blood moving against the walls of your arteries  Hypertension causes your blood pressure to get so high that your heart has to work much harder than normal  This can damage your heart  The cause of hypertension may not be known  This is called essential or primary hypertension  Hypertension caused by another medical condition, such as kidney disease, is called secondary hypertension  What do I need to know about the stages of hypertension? · Normal blood pressure is 119/79 or lower   Your healthcare provider may only check your blood pressure each year if it stays at a normal level  · Elevated blood pressure is 120/79 to 129/79   This is sometimes called prehypertension  Your healthcare provider may suggest lifestyle changes to help lower your blood pressure to a normal level  He or she may then check it again in 3 to 6 months  · Stage 1 hypertension is 130/80  to 139/89   Your provider may recommend lifestyle changes, medication, and checks every 3 to 6 months until your blood pressure is controlled  · Stage 2 hypertension is 140/90 or higher   Your provider will recommend lifestyle changes and have you take 2 kinds of hypertension medicines  You will also need to have your blood pressure checked monthly until it is controlled  What increases my risk for hypertension? · Age older than 54 years (men) or 72 (women)    · Stress, or a family history of hypertension or heart disease    · Obesity, lack of exercise, or too many high-sodium foods    · Use of tobacco, alcohol, or illegal drugs    · A medical condition, such as diabetes, kidney disease, thyroid disease, or adrenal gland disorder    · Certain medicines, such as steroids or birth control pills    What are the signs and symptoms of hypertension?   You may have no signs or symptoms, or you may have any of the following:  · Headache    · Blurred vision    · Chest pain    · Dizziness or weakness    · Trouble breathing    · Nosebleeds    How is hypertension diagnosed? Your healthcare provider will take your blood pressure at several visits  You may also need to check your blood pressure at home  The provider will examine you and ask what medicines you take  He or she will also ask if you have a family history of high blood pressure and about any health conditions you have  He or she will also check your blood pressure and weight and examine your heart, lungs, and eyes  You may need any of the following tests:  · An ambulatory blood pressure monitor (ABPM)  is a device that you wear  ABPM measures your blood pressure while you do your regular daily activities  It records your blood pressure every 15 to 30 minutes during the day  It also records your blood pressure every 15 minutes to 1 hour at night  The recorded blood pressures help your healthcare provider know if you have hypertension not seen at your appointment  · Blood tests  may help healthcare providers find the cause of your hypertension  Blood tests can also help find other health problems caused by hypertension  · Urine tests  will be done to check your kidney function  Kidney problems can increase your risk for hypertension  Which medicines are used to treat hypertension? · Antihypertensives  may be used to help lower your blood pressure  Several kinds of medicines are available  Your healthcare provider will choose medicines based on the kind of hypertension you have  You may need more than one type of medicine  Take the medicine exactly as directed  · Diuretics  help decrease extra fluid that collects in your body  This will help lower your blood pressure  You may urinate more often while you take this medicine  · Cholesterol medicine  helps lower your cholesterol level   A low cholesterol level helps prevent heart disease and makes it easier to control your blood pressure  What can I do to manage hypertension? · Check your blood pressure at home  Avoid smoking, caffeine, and exercise at least 30 minutes before checking your blood pressure  Sit and rest for 5 minutes before you take your blood pressure  Extend your arm and support it on a flat surface  Your arm should be at the same level as your heart  Follow the directions that came with your blood pressure monitor  Check your blood pressure 2 times, 1 minute apart, before you take your medicine in the morning  Also check your blood pressure before your evening meal  Keep a record of your readings and bring it to your follow-up visits  Ask your healthcare provider what your blood pressure should be  · Manage any other health conditions you have  Health conditions such as diabetes can increase your risk for hypertension  Follow your healthcare provider's instructions and take all your medicines as directed  · Ask about all medicines  Certain medicines can increase your blood pressure  Examples include oral birth control pills, decongestants, herbal supplements, and NSAIDs, such as ibuprofen  Your healthcare provider can tell you which medicines are safe for you to take  This includes prescription and over-the-counter medicines  What lifestyle changes can I make to manage hypertension? Your healthcare provider may recommend you work with a team to manage hypertension  The team may include medical experts such as a dietitian, an exercise or physical therapist, and a behavior therapist  Your family members may be included in helping you create lifestyle changes  · Limit sodium (salt) as directed  Too much sodium can affect your fluid balance  Check labels to find low-sodium or no-salt-added foods  Some low-sodium foods use potassium salts for flavor  Too much potassium can also cause health problems   Your healthcare provider will tell you how much sodium and potassium are safe for you to have in a day  He or she may recommend that you limit sodium to 2,300 mg a day  · Follow the meal plan recommended by your healthcare provider  A dietitian or your provider can give you more information on low-sodium plans or the DASH (Dietary Approaches to Stop Hypertension) eating plan  The DASH plan is low in sodium, processed sugar, unhealthy fats, and total fat  It is high in potassium, calcium, and fiber  These can be found in vegetables, fruit, and whole-grain foods  · Be physically active throughout the day  Physical activity, such as exercise, can help control your blood pressure and your weight  Be physically active for at least 30 minutes per day, on most days of the week  Include aerobic activity, such as walking or riding a bicycle  Also include strength training at least 2 times each week  Your healthcare providers can help you create a physical activity plan  · Decrease stress  This may help lower your blood pressure  Learn ways to relax, such as deep breathing or listening to music  · Limit alcohol as directed  Alcohol can increase your blood pressure  A drink of alcohol is 12 ounces of beer, 5 ounces of wine, or 1½ ounces of liquor  · Do not smoke  Nicotine and other chemicals in cigarettes and cigars can increase your blood pressure and also cause lung damage  Ask your healthcare provider for information if you currently smoke and need help to quit  E-cigarettes or smokeless tobacco still contain nicotine  Talk to your healthcare provider before you use these products  Call your local emergency number (911 in the [address] [place] [address],[address]) or have someone call if:   · You have chest pain  · You have any of the following signs of a heart attack:      ?  Squeezing, pressure, or pain in your chest    ? You may  also have any of the following:     § Discomfort or pain in your back, neck, jaw, stomach, or arm    § Shortness of breath    § Nausea or vomiting    § Lightheadedness or a sudden cold sweat    · You become confused or have trouble speaking  · You suddenly feel lightheaded or have trouble breathing  When should I seek immediate care? · You have a severe headache or vision loss  · You have weakness in an arm or leg  When should I call my doctor? · You feel faint, dizzy, confused, or drowsy  · You have been taking your blood pressure medicine but your pressure is higher than your provider says it should be  · You have questions or concerns about your condition or care  CARE AGREEMENT:   You have the right to help plan your care  Learn about your health condition and how it may be treated  Discuss treatment options with your healthcare providers to decide what care you want to receive  You always have the right to refuse treatment  The above information is an  only  It is not intended as medical advice for individual conditions or treatments  Talk to your doctor, nurse or pharmacist before following any medical regimen to see if it is safe and effective for you  © Copyright [employer] 2021 Information is for End User's use only and may not be sold, redistributed or otherwise used for commercial purposes  All illustrations and images included in CareNotes® are the copyrighted property of A D A M , Inc  or [address]    Vitamin D (By mouth)   Vitamin D (VYE-ta-min D)  Maintains calcium and phosphorus in your body and makes your bones strong  This medicine is a vitamin  Brand Name(s): Baby Vitamin D, Basic's Natural Vitamin D-3, Calcidol, Penaloza Baby Ddrops, Vernida Elders, [place] [employer], Decara, Delta D3, Dialyvite Vitamin D3 Max, Drisdol, Enfamil D-Vi-Sol, Infants Aqueous Vitamin D, Shankar Natural , Nature's Blend Super Strength Vitamin D3, Thera-D 2000   There may be other brand names for this medicine  When This Medicine Should Not Be Used:    You should not use this medicine if you have had an allergic reaction to vitamin D  How to Use This Medicine:   Tablet, Liquid, Liquid Filled Capsule  · Your doctor will tell you how much medicine to use  Do not use more than directed  · Follow the instructions on the medicine label if you are using this medicine without a prescription  · It is best to take this medicine with food or milk  · Carefully follow your doctor's instructions about any special diet  If a dose is missed:   · Take a dose as soon as you remember  If it is almost time for your next dose, wait until then and take a regular dose  Do not take extra medicine to make up for a missed dose  How to Store and Dispose of This Medicine:   · Store the medicine in a closed container at room temperature, away from heat, moisture, and direct light  · Ask your pharmacist, doctor, or health caregiver about the best way to dispose of any outdated medicine or medicine no longer needed  · Keep all medicine out of the reach of children  Never share your medicine with anyone  Drugs and Foods to Avoid:   Ask your doctor or pharmacist before using any other medicine, including over-the-counter medicines, vitamins, and herbal products  · Make sure your doctor knows about all other medicines you are using  Warnings While Using This Medicine:   · Make sure your doctor knows if you are pregnant or breast feeding  · Make sure your doctor knows if you have kidney stones, sarcoidosis, or overactive parathyroid gland  · You should not use this medicine if you are under 25years of age  Possible Side Effects While Using This Medicine:   Call your doctor right away if you notice any of these side effects:  · Allergic reaction: Itching or hives, swelling in your face or hands, swelling or tingling in your mouth or throat, chest tightness, trouble breathing  · Change in how much or how often you urinate    If you notice these less serious side effects, talk with your doctor: · Diarrhea  · Headache  · Weight loss  If you notice other side effects that you think are caused by this medicine, tell your doctor  Call your doctor for medical advice about side effects  You may report side effects to FDA at 0-747-FRL-8416    © Copyright [employer] 2021 Information is for End User's use only and may not be sold, redistributed or otherwise used for commercial purposes  The above information is an  only  It is not intended as medical advice for individual conditions or treatments  Talk to your doctor, nurse or pharmacist before following any medical regimen to see if it is safe and effective for you  Hypothyroidism   WHAT YOU NEED TO KNOW:   What is hypothyroidism? Hypothyroidism is a condition that develops when the thyroid gland does not make enough thyroid hormone  Thyroid hormones help control body temperature, heart rate, growth, and weight  What causes or increases my risk for hypothyroidism? · A family history of hypothyroidism    · Age 61 years or older or being female    · Autoimmune disease, such as inflammation of your thyroid, or Hashimoto disease    · Thyroid surgery, head or neck radiation therapy, or medicines such as lithium, sedatives, or narcotics    · Thyroid cancer, a goiter, or a viral infection    · Low iodine level    · Down syndrome or Farah syndrome    What are the signs and symptoms of hypothyroidism? The signs and symptoms may develop slowly, sometimes over several years  · Exhaustion, depression, or irritability    · Sensitivity to cold    · Muscle aches, headaches, weakness, or trouble concentrating    · Dry, flaky skin, brittle nails, or thinning hair    · Recent weight gain without overeating, or constipation    · Heavy or irregular monthly periods    · Puffiness around your eyes or swelling in your hands and feet    · Low heart rate, changes in your blood pressure    How is hypothyroidism diagnosed?   Your healthcare provider will ask about your symptoms and what medicines you take  He or she will ask about your medical history and if anyone in your family has hypothyroidism  A blood test will show your thyroid hormone level  How is hypothyroidism treated? Thyroid hormone replacement medicine may bring your thyroid hormone level back to normal  You will need to take this medicine for the rest of your life to control hypothyroidism  It is important to take the medicine every day as directed  You will be given instructions for what to do if you miss a dose  Ask your healthcare provider for more information on other medicines you may need  How can I manage hypothyroidism? · Get more iodine  The thyroid gland uses iodine to work correctly and to make thyroid hormones  Your healthcare provider may tell you to eat foods that are rich in iodine  He or she will tell you how much of these foods to eat  Milk and seafood are good sources of iodine  You may also need iodine supplements  · Keep track of your blood pressure and weight:      ? Check your blood pressure  and write it down as often as directed  It is important to measure your blood pressure on the same arm and in the same position each time  Keep track of your blood pressure readings, along with the date and time you took them  Take this record with you to your followup visits  ? Weigh yourself daily  before breakfast after you urinate  Weight gain may be a sign of extra fluid in your body  Keep track of your daily weights and take the record with you to your followup visits  Call your local emergency number (911 in the [address] [place] [address],[address]) or have someone call if:   · You have sudden chest pain or shortness of breath  · You have a seizure  · You feel like you are going to faint  When should I seek immediate care? · You have diarrhea, tremors, or trouble sleeping  · Your legs, ankles, or feet are swollen  When should I call my doctor? · You have a fever      · You have chills, a cough, or feel weak and achy  · You have pain and swelling in your muscles and joints  · Your skin is itchy, swollen, or you have a rash  · Your signs and symptoms return or get worse, even after treatment  · You have questions or concerns about your condition or care  CARE AGREEMENT:   You have the right to help plan your care  Learn about your health condition and how it may be treated  Discuss treatment options with your healthcare providers to decide what care you want to receive  You always have the right to refuse treatment  The above information is an  only  It is not intended as medical advice for individual conditions or treatments  Talk to your doctor, nurse or pharmacist before following any medical regimen to see if it is safe and effective for you  © Copyright [employer] 2021 Information is for End User's use only and may not be sold, redistributed or otherwise used for commercial purposes  All illustrations and images included in CareNotes® are the copyrighted property of A D A M , Inc  or [address] [place] [address]  GERD (Gastroesophageal Reflux Disease)   WHAT YOU NEED TO KNOW:   What is gastroesophageal reflux disease (GERD)? GERD is reflux that occurs more than twice a week for a few weeks  Reflux means acid and food in the stomach back up into the esophagus  It usually causes heartburn and other symptoms  GERD can cause other health problems over time if it is not treated  What causes GERD? GERD often occurs when the lower muscle (sphincter) of the esophagus does not close properly  The sphincter normally opens to let food into the stomach  It then closes to keep food and stomach acid in the stomach  If the sphincter does not close properly, stomach acid and food back up (reflux) into the esophagus   The following may increase your risk for GERD:  · Certain foods such as spicy foods, chocolate, foods that contain caffeine, peppermint, and fried foods    · Hiatal hernia    · Certain medicines such as calcium channel blockers (used to treat high blood pressure), allergy medicines, sedatives, or antidepressants    · Pregnancy or obesity    · Lying down after a meal    · Drinking alcohol or smoking    What are the signs and symptoms of GERD? · Heartburn (burning pain in your chest)    · Pain after meals that spreads to your neck, jaw, or shoulder    · Pain that gets better when you change positions    · Bitter or acid taste in your mouth    · A dry cough    · Trouble swallowing or pain with swallowing    · Hoarseness or a sore throat    · Burping or hiccups    · Feeling full soon after you start eating    How is GERD diagnosed? Your healthcare provider will ask about your symptoms and when they started  Tell him or her about other medical conditions you have, your eating habits, and your activities  You may also need any of the following:  · The amount of acid  in your esophagus and stomach may be checked  A small probe is used to check the amount  · An endoscopy  is a procedure used to look at the inside of your esophagus and stomach  An endoscope is a bendable tube with a light and camera on the end  Your healthcare provider may remove a small sample of tissue and send it to a lab for tests  · X-ray  pictures may be taken of your stomach and intestines (bowel)  You may be given a chalky liquid to drink before the pictures are taken  This liquid helps your stomach and intestines show up better on the x-rays  · Pressure and function  tests of your esophagus can help find problems such as a hiatal hernia  How is GERD treated? · Medicines  are used to decrease stomach acid  Medicine may also be used to help your lower esophageal sphincter and stomach contract (tighten) more  · Surgery  is done to wrap the upper part of the stomach around the esophageal sphincter  This will strengthen the sphincter and prevent reflux  How can I manage GERD? · Do not have foods or drinks that may increase heartburn  These include chocolate, peppermint, fried or fatty foods, drinks that contain caffeine, or carbonated drinks (soda)  Other foods include spicy foods, onions, tomatoes, and tomato-based foods  Do not have foods or drinks that can irritate your esophagus, such as citrus fruits, juices, and alcohol  · Do not eat large meals  When you eat a lot of food at one time, your stomach needs more acid to digest it  Eat 6 small meals each day instead of 3 large ones, and eat slowly  Do not eat meals 2 to 3 hours before bedtime  · Elevate the head of your bed  Place 6-inch blocks under the head of your bed frame  You may also use more than one pillow under your head and shoulders while you sleep  · Maintain a healthy weight  If you are overweight, weight loss may help relieve symptoms of GERD  · Do not smoke  Smoking weakens the lower esophageal sphincter and increases the risk of GERD  Ask your healthcare provider for information if you currently smoke and need help to quit  E-cigarettes or smokeless tobacco still contain nicotine  Talk to your healthcare provider before you use these products  · Do not wear clothing that is tight around your waist   Tight clothing can put pressure on your stomach and cause or worsen GERD symptoms  Call your local emergency number (911 in the [address]) if:   · You have severe chest pain and sudden trouble breathing  When should I seek immediate care? · You have trouble breathing after you vomit  · You have trouble swallowing, or pain with swallowing  · Your bowel movements are black, bloody, or tarry-looking  · Your vomit looks like coffee grounds or has blood in it  When should I call my doctor? · You feel full and cannot burp or vomit  · You vomit large amounts, or you vomit often  · You are losing weight without trying      · Your symptoms get worse or do not improve with treatment  · You have questions or concerns about your condition or care  CARE AGREEMENT:   You have the right to help plan your care  Learn about your health condition and how it may be treated  Discuss treatment options with your healthcare providers to decide what care you want to receive  You always have the right to refuse treatment  The above information is an  only  It is not intended as medical advice for individual conditions or treatments  Talk to your doctor, nurse or pharmacist before following any medical regimen to see if it is safe and effective for you  © Copyright [employer] 2021 Information is for End User's use only and may not be sold, redistributed or otherwise used for commercial purposes  All illustrations and images included in CareNotes® are the copyrighted property of A D A M , Inc  or [email or web address]   Vitamin D (By mouth)   Vitamin D (VYE-ta-min D)  Maintains calcium and phosphorus in your body and makes your bones strong  This medicine is a vitamin  Brand Name(s): Baby Vitamin D, Basic's Natural Vitamin D-3, Calcidol, Penaloza Baby Ddrops, [place] [employer], Decara, Delta D3, Dialyvite Vitamin D3 Max, Drisdol, Enfamil D-Vi-Sol, Infants Aqueous Vitamin D, Shankar Natural , Nature's Blend Super Strength Vitamin D3, Thera-D 2000   There may be other brand names for this medicine  When This Medicine Should Not Be Used: You should not use this medicine if you have had an allergic reaction to vitamin D  How to Use This Medicine:   Tablet, Liquid, Liquid Filled Capsule  · Your doctor will tell you how much medicine to use  Do not use more than directed  · Follow the instructions on the medicine label if you are using this medicine without a prescription  · It is best to take this medicine with food or milk  · Carefully follow your doctor's instructions about any special diet    If a dose is missed:   · Take a dose as soon as you remember  If it is almost time for your next dose, wait until then and take a regular dose  Do not take extra medicine to make up for a missed dose  How to Store and Dispose of This Medicine:   · Store the medicine in a closed container at room temperature, away from heat, moisture, and direct light  · Ask your pharmacist, doctor, or health caregiver about the best way to dispose of any outdated medicine or medicine no longer needed  · Keep all medicine out of the reach of children  Never share your medicine with anyone  Drugs and Foods to Avoid:   Ask your doctor or pharmacist before using any other medicine, including over-the-counter medicines, vitamins, and herbal products  · Make sure your doctor knows about all other medicines you are using  Warnings While Using This Medicine:   · Make sure your doctor knows if you are pregnant or breast feeding  · Make sure your doctor knows if you have kidney stones, sarcoidosis, or overactive parathyroid gland  · You should not use this medicine if you are under 25years of age  Possible Side Effects While Using This Medicine:   Call your doctor right away if you notice any of these side effects:  · Allergic reaction: Itching or hives, swelling in your face or hands, swelling or tingling in your mouth or throat, chest tightness, trouble breathing  · Change in how much or how often you urinate  If you notice these less serious side effects, talk with your doctor:   · Diarrhea  · Headache  · Weight loss  If you notice other side effects that you think are caused by this medicine, tell your doctor  Call your doctor for medical advice about side effects  You may report side effects to FDA at 5-317-FDA-5752    © Copyright [employer] 2021 Information is for End User's use only and may not be sold, redistributed or otherwise used for commercial purposes  The above information is an  only   It is not intended as medical advice for individual conditions or treatments  Talk to your doctor, nurse or pharmacist before following any medical regimen to see if it is safe and effective for you

## 2022-01-07 NOTE — PROGRESS NOTES
ADULT ANNUAL PHYSICAL  [address] [facility] [place]    NAME: Pepito Kingsley  AGE: 61 y o  SEX: female  : 1965     DATE: 1/10/2022     Assessment and Plan:     Problem List Items Addressed This Visit        Digestive    GERD without esophagitis     Patient takes Protonix 40 mg p o  Daily  She enjoys spicy foods  Instructed on a GERD diet  Endocrine    Hypothyroidism     Patient currently taking levothyroxine 50 mcg p o  Daily  Will order for thyroid function studies in 6 months  Cardiovascular and Mediastinum    Hypertension     BP currently stable 118/66  Patient currently taking atenolol 100 mg p o  Daily  Instructed on low-sodium diet  Patient exercise 7 days a week for least lower half of moderate walking  Nervous and Auditory    Excessive cerumen in ear canal, bilateral     Patient order for Debrox drops for cerumen in the ears  May have irrigation in 6 months if she chooses to do so  Other    Vitamin D deficiency     Patient order for vitamin-D level to evaluate for deficiency  Currently taking vitamin D3 50 mcg 2000 unit dose capsules daily  Annual physical exam - Primary     Annual physical exam completed at this time  Anorexia nervosa     Patient instructed on increasing weight current BMI 15 21 kg/M2 goal of 18 kg/M2  Nutrition consult refused at this time  Patient encouraged to increase protein in the diet and drink Ensure protein drinks  Immunizations and preventive care screenings were discussed with patient today  Appropriate education was printed on patient's after visit summary  Counseling:  Alcohol/drug use: discussed moderation in alcohol intake, the recommendations for healthy alcohol use, and avoidance of illicit drug use  Dental Health: discussed importance of regular tooth brushing, flossing, and dental visits    Injury prevention: discussed safety/seat belts, safety helmets, smoke detectors, carbon dioxide detectors, and smoking near bedding or upholstery  Sexual health: discussed sexually transmitted diseases, partner selection, use of condoms, avoidance of unintended pregnancy, and contraceptive alternatives  · Exercise: the importance of regular exercise/physical activity was discussed  Recommend exercise 3-5 times per week for at least 30 minutes  No follow-ups on file  Chief Complaint:     Chief Complaint   Patient presents with    Follow-up     no complaints      History of Present Illness:     Adult Annual Physical   Patient here for a comprehensive physical exam  The patient reports no problems  Diet and Physical Activity  · Diet/Nutrition: well balanced diet, limited junk food, low calorie diet, low fat diet, low carb diet, consuming 3-5 servings of fruits/vegetables daily, adequate fiber intake and adequate whole grain intake  · Exercise: walking, moderate cardiovascular exercise, 5-7 times a week on average and 1-2 hours on average  Depression Screening  PHQ-2/9 Depression Screening         General Health  · Sleep: sleeps well and gets more than 8 hours of sleep on average  · Hearing: normal - bilateral   · Vision: no vision problems and most recent eye exam >1 year ago  · Dental: no dental visits for >1 year, brushes teeth three times daily and flosses teeth occasionally  /GYN Health  · Patient is: postmenopausal  · Last menstrual period: 2019  · Contraceptive method: na      Review of Systems:     Review of Systems   Constitutional: Negative for activity change, appetite change, chills, fatigue, fever and unexpected weight change  HENT: Negative for congestion, ear discharge, ear pain, nosebleeds, postnasal drip, rhinorrhea, sinus pressure, sinus pain, sneezing, sore throat and voice change  Eyes: Negative for pain, redness and visual disturbance     Respiratory: Negative for cough, chest tightness, shortness of breath and wheezing  Cardiovascular: Negative for chest pain and palpitations  Gastrointestinal: Negative for abdominal distention, abdominal pain, constipation, diarrhea, nausea and vomiting  Endocrine: Negative  Genitourinary: Negative for difficulty urinating, dysuria, flank pain, frequency, hematuria and urgency  Musculoskeletal: Negative for arthralgias and myalgias  Skin: Negative  Allergic/Immunologic: Negative  Neurological: Negative  Hematological: Negative  Psychiatric/Behavioral: Negative  Past Medical History:     Past Medical History:   Diagnosis Date    Abnormal weight loss     Anorexia nervosa     Breast cancer ([address] )     Disease of thyroid gland     Elevated blood sugar     Occasional     Fibroadenoma of both breasts     GERD (gastroesophageal reflux disease)     Hypertension     Hyperthyroidism     Osteopenia       Past Surgical History:     Past Surgical History:   Procedure Laterality Date    BREAST BIOPSY Right 04/16/2021    BREAST CYST EXCISION Left 2011    CHOLECYSTECTOMY      MASTECTOMY W/ SENTINEL NODE BIOPSY Right 6/25/2021    Procedure: BREAST MASTECTOMY WITH BIOPSY LYMPH NODE SENTINEL; ANJALI  GUIDED, LYMPHATIC MAPPING WITH BLUE DYE AND RADIOACTIVE DYE (INJECT AT 1130 BY DR WALSH IN THE OR);   Surgeon: Fili Tucekr MD;  [facility]: [facility];  Service: Surgical Oncology    US BREAST NEEDLE LOC RIGHT EACH ADDITIONAL Right 6/14/2021    US BREAST ANJALI  NEEDLE LOC RIGHT Right 6/14/2021    US GUIDANCE BREAST BIOPSY RIGHT EACH ADDITIONAL Right 4/16/2021    US GUIDED BREAST BIOPSY RIGHT COMPLETE Right 4/16/2021      Social History:     Social History     Socioeconomic History    Marital status: Single     Spouse name: None    Number of children: 0    Years of education: 15    Highest education level: None   Occupational History    Occupation: never worked    Tobacco Use    Smoking status: Never Smoker    Smokeless tobacco: Never Used Vaping Use    Vaping Use: Never used   Substance and Sexual Activity    Alcohol use: Yes     Alcohol/week: 2 0 standard drinks     Types: 2 Cans of beer per week    Drug use: Never     Comment: Illicit drugs:   none - As per Nazario Longoria Sexual activity: Not Currently     Comment: Sexually active:   No - As per Elias Johnson    Other Topics Concern    None   Social History Narrative    · Most recent tobacco use screenin2019      · Do you currently or have you served in the [employer]:   No      · Were you activated, into active duty, as a member of the [employer] or as a Reservist:   No      · Caffeine intake:   None decaf coffee only     · Sexual orientation:   Heterosexual      · General stress level:   Low      · Diet:   Regular      · Single or multi-level home/work:   single level home      · Live alone or with others:   alone      · Exercise level: Moderate outside walk only     · Overweight:   No      · Obese:   No      · Guns present in home:   No      · Seat belts used routinely:   Yes      · Advance directive: Yes      · Sunscreen used routinely:   Yes      · Smoke alarm in home:    Yes      · Performs monthly self-breast exam:   Yes      · Legally blind in one or both eyes:   No      · Hard of hearing or deaf in one or both ears:   No      · Presence of domestic violence:   No      · Are there stairs in your home:   No      · Pets:   No      Social Determinants of Health     Financial Resource Strain: Not on file   Food Insecurity: Not on file   Transportation Needs: Not on file   Physical Activity: Not on file   Stress: Not on file   Social Connections: Not on file   Intimate Partner Violence: Not on file   Housing Stability: Not on file      Family History:     Family History   Adopted: Yes      Current Medications:     Current Outpatient Medications   Medication Sig Dispense Refill    anastrozole (ARIMIDEX) 1 mg tablet Take 1 tablet (1 mg total) by mouth daily 90 tablet 1    atenolol (TENORMIN) 100 mg tablet Take 1 tablet (100 mg total) by mouth every other day Take 100 mg by mouth every other morning  90 tablet 0    Cholecalciferol (Vitamin D3) 50 MCG (2000 UT) capsule Take 1 capsule (2,000 Units total) by mouth daily Take 1 capsule every day by oral route for 30 days  30 capsule 0    levothyroxine 50 mcg tablet Take 1 tablet (50 mcg total) by mouth daily in the early morning 90 tablet 1    Multiple Vitamins-Minerals (Womens 50+ Multi Vitamin/Min) TABS Take 1 tablet by mouth daily 100 tablet 1    multivitamin (THERAGRAN) TABS       OLANZapine (ZyPREXA) 7 5 mg tablet Take 7 5 mg by mouth daily at bedtime      oxyCODONE-acetaminophen (PERCOCET) 5-325 mg per tablet Take 1 tablet by mouth every 6 (six) hours as needed for moderate painMax Daily Amount: 4 tablets (Patient not taking: Reported on 7/29/2021) 15 tablet 0    pantoprazole (PROTONIX) 40 mg tablet Take 1 tablet (40 mg total) by mouth daily 30 tablet 0     No current facility-administered medications for this visit  Allergies:     No Known Allergies   Physical Exam:     /66   Pulse 105   Temp (!) 96 5 °F (35 8 °C)   Resp 18   Ht 5' 4" (1 626 m)   Wt 40 2 kg (88 lb 9 6 oz)   SpO2 99%   BMI 15 21 kg/m²     Physical Exam  Vitals and nursing note reviewed  Constitutional:       General: She is not in acute distress  Appearance: Normal appearance  She is well-developed  HENT:      Head: Normocephalic and atraumatic  Right Ear: Tympanic membrane, ear canal and external ear normal       Left Ear: Tympanic membrane, ear canal and external ear normal       Ears:      Comments: Excess cerumen bilateral ears     Nose: Nose normal       Mouth/Throat:      Mouth: Mucous membranes are dry  Eyes:      Extraocular Movements: Extraocular movements intact  Conjunctiva/sclera: Conjunctivae normal       Pupils: Pupils are equal, round, and reactive to light     Cardiovascular:      Rate and Rhythm: Normal rate and regular rhythm  Heart sounds: No murmur heard  Pulmonary:      Effort: Pulmonary effort is normal  No respiratory distress  Breath sounds: Normal breath sounds  Abdominal:      Palpations: Abdomen is soft  Tenderness: There is no abdominal tenderness  Musculoskeletal:         General: Normal range of motion  Cervical back: Normal range of motion and neck supple  Skin:     General: Skin is warm and dry  Neurological:      General: No focal deficit present  Mental Status: She is alert and oriented to person, place, and time     Psychiatric:         Mood and Affect: Mood normal          Behavior: Behavior normal           Iman Heart, CRNP  [facility]

## 2022-01-10 ENCOUNTER — OFFICE VISIT (OUTPATIENT)
Dept: FAMILY MEDICINE CLINIC | Facility: CLINIC | Age: 57
End: 2022-01-10
Payer: COMMERCIAL

## 2022-01-10 VITALS
BODY MASS INDEX: 15.13 KG/M2 | OXYGEN SATURATION: 99 % | RESPIRATION RATE: 18 BRPM | HEART RATE: 105 BPM | HEIGHT: 64 IN | TEMPERATURE: 96.5 F | WEIGHT: 88.6 LBS | SYSTOLIC BLOOD PRESSURE: 118 MMHG | DIASTOLIC BLOOD PRESSURE: 66 MMHG

## 2022-01-10 DIAGNOSIS — K21.9 GERD WITHOUT ESOPHAGITIS: ICD-10-CM

## 2022-01-10 DIAGNOSIS — H61.23 EXCESSIVE CERUMEN IN EAR CANAL, BILATERAL: ICD-10-CM

## 2022-01-10 DIAGNOSIS — I10 HYPERTENSION, UNSPECIFIED TYPE: ICD-10-CM

## 2022-01-10 DIAGNOSIS — Z00.00 ANNUAL PHYSICAL EXAM: Primary | ICD-10-CM

## 2022-01-10 DIAGNOSIS — E55.9 VITAMIN D DEFICIENCY: ICD-10-CM

## 2022-01-10 DIAGNOSIS — Z13.89 SCREENING FOR BLOOD OR PROTEIN IN URINE: ICD-10-CM

## 2022-01-10 DIAGNOSIS — E03.9 HYPOTHYROIDISM, UNSPECIFIED TYPE: ICD-10-CM

## 2022-01-10 DIAGNOSIS — F50.00 ANOREXIA NERVOSA: ICD-10-CM

## 2022-01-10 DIAGNOSIS — Z13.220 SCREENING, LIPID: ICD-10-CM

## 2022-01-10 PROCEDURE — 99214 OFFICE O/P EST MOD 30 MIN: CPT | Performed by: NURSE PRACTITIONER

## 2022-01-10 PROCEDURE — 99396 PREV VISIT EST AGE 40-64: CPT | Performed by: NURSE PRACTITIONER

## 2022-01-10 RX ORDER — LEVOTHYROXINE SODIUM 0.05 MG/1
50 TABLET ORAL
Qty: 90 TABLET | Refills: 3 | Status: SHIPPED | OUTPATIENT
Start: 2022-01-10

## 2022-01-10 RX ORDER — ACETAMINOPHEN 160 MG
TABLET,DISINTEGRATING ORAL
Qty: 30 CAPSULE | Refills: 0 | Status: SHIPPED | OUTPATIENT
Start: 2022-01-10 | End: 2022-02-07

## 2022-01-10 RX ORDER — OLANZAPINE 7.5 MG/1
7.5 TABLET ORAL
Qty: 90 TABLET | Refills: 3 | Status: SHIPPED | OUTPATIENT
Start: 2022-01-10

## 2022-01-10 RX ORDER — PANTOPRAZOLE SODIUM 40 MG/1
40 TABLET, DELAYED RELEASE ORAL DAILY
Qty: 30 TABLET | Refills: 0 | Status: SHIPPED | OUTPATIENT
Start: 2022-01-10 | End: 2022-01-10 | Stop reason: SDUPTHER

## 2022-01-10 RX ORDER — ATENOLOL 100 MG/1
100 TABLET ORAL EVERY OTHER DAY
Qty: 90 TABLET | Refills: 1 | Status: SHIPPED | OUTPATIENT
Start: 2022-01-10

## 2022-01-10 RX ORDER — PANTOPRAZOLE SODIUM 40 MG/1
40 TABLET, DELAYED RELEASE ORAL DAILY
Qty: 90 TABLET | Refills: 3 | Status: SHIPPED | OUTPATIENT
Start: 2022-01-10

## 2022-01-10 RX ORDER — DIPHENOXYLATE HYDROCHLORIDE AND ATROPINE SULFATE 2.5; .025 MG/1; MG/1
TABLET ORAL
COMMUNITY
Start: 2021-11-23

## 2022-01-10 NOTE — ASSESSMENT & PLAN NOTE
BP currently stable 118/66  Patient currently taking atenolol 100 mg p o  Daily  Instructed on low-sodium diet  Patient exercise 7 days a week for least lower half of moderate walking

## 2022-01-10 NOTE — ASSESSMENT & PLAN NOTE
Patient instructed on increasing weight current BMI 15 21 kg/M2 goal of 18 kg/M2  Nutrition consult refused at this time  Patient encouraged to increase protein in the diet and drink Ensure protein drinks

## 2022-01-10 NOTE — ASSESSMENT & PLAN NOTE
Patient order for Debrox drops for cerumen in the ears  May have irrigation in 6 months if she chooses to do so

## 2022-01-10 NOTE — ASSESSMENT & PLAN NOTE
Patient currently taking levothyroxine 50 mcg p o  Daily  Will order for thyroid function studies in 6 months

## 2022-01-10 NOTE — ASSESSMENT & PLAN NOTE
Patient order for vitamin-D level to evaluate for deficiency  Currently taking vitamin D3 50 mcg 2000 unit dose capsules daily

## 2022-01-26 DIAGNOSIS — C50.811 MALIGNANT NEOPLASM OF OVERLAPPING SITES OF RIGHT BREAST IN FEMALE, ESTROGEN RECEPTOR POSITIVE (HCC): ICD-10-CM

## 2022-01-26 DIAGNOSIS — Z17.0 MALIGNANT NEOPLASM OF OVERLAPPING SITES OF RIGHT BREAST IN FEMALE, ESTROGEN RECEPTOR POSITIVE (HCC): ICD-10-CM

## 2022-01-27 RX ORDER — ANASTROZOLE 1 MG/1
1 TABLET ORAL DAILY
Qty: 90 TABLET | Refills: 1 | Status: SHIPPED | OUTPATIENT
Start: 2022-01-27 | End: 2022-07-25

## 2022-01-31 ENCOUNTER — TELEPHONE (OUTPATIENT)
Dept: HEMATOLOGY ONCOLOGY | Facility: CLINIC | Age: 57
End: 2022-01-31

## 2022-01-31 NOTE — TELEPHONE ENCOUNTER
Appointment Cancellation Or Reschedule     Person calling in Patient    Provider Dr Azeem Diaz   Office Visit Date and Time 2/1/22 @ 10:40am   Office Visit [facility]   Did patient want to reschedule their office appointment? If so, when was it scheduled to? Yes 4/5/22 @ 9:20am   Is this patient calling to reschedule an infusion appointment? No   When is their next infusion appointment? n/a   Is this patient a Chemo patient? No   Reason for Cancellation or Reschedule Unable to keep appointment     If the patient is a treatment patient, please route this to the office nurse  If the patient is not on treatment, please route to the office MA

## 2022-02-03 DIAGNOSIS — N89.8 VAGINAL DRYNESS: Primary | ICD-10-CM

## 2022-02-03 RX ORDER — ESTRADIOL 0.1 MG/G
1 CREAM VAGINAL DAILY
Status: CANCELLED | OUTPATIENT
Start: 2022-02-03

## 2022-02-03 NOTE — TELEPHONE ENCOUNTER
Patient called complaining of vaginal dryness, she is postmenopausal, would like a cream called in to help, she has an annual with you in march

## 2022-02-06 LAB
25(OH)D3 SERPL-MCNC: 86 NG/ML (ref 30–100)
ALBUMIN SERPL-MCNC: 4.5 G/DL (ref 3.6–5.1)
ALBUMIN/GLOB SERPL: 1.9 (CALC) (ref 1–2.5)
ALP SERPL-CCNC: 53 U/L (ref 37–153)
ALT SERPL-CCNC: 23 U/L (ref 6–29)
APPEARANCE UR: CLEAR
AST SERPL-CCNC: 21 U/L (ref 10–35)
BACTERIA UR QL AUTO: ABNORMAL /HPF
BASOPHILS # BLD AUTO: 83 CELLS/UL (ref 0–200)
BASOPHILS NFR BLD AUTO: 1.6 %
BILIRUB SERPL-MCNC: 0.4 MG/DL (ref 0.2–1.2)
BILIRUB UR QL STRIP: NEGATIVE
BUN SERPL-MCNC: 7 MG/DL (ref 7–25)
BUN/CREAT SERPL: ABNORMAL (CALC) (ref 6–22)
CALCIUM SERPL-MCNC: 9.2 MG/DL (ref 8.6–10.4)
CHLORIDE SERPL-SCNC: 104 MMOL/L (ref 98–110)
CHOLEST SERPL-MCNC: 175 MG/DL
CHOLEST/HDLC SERPL: 2.1 (CALC)
CO2 SERPL-SCNC: 28 MMOL/L (ref 20–32)
COLOR UR: YELLOW
CREAT SERPL-MCNC: 0.81 MG/DL (ref 0.5–1.05)
EOSINOPHIL # BLD AUTO: 83 CELLS/UL (ref 15–500)
EOSINOPHIL NFR BLD AUTO: 1.6 %
ERYTHROCYTE [DISTWIDTH] IN BLOOD BY AUTOMATED COUNT: 11.8 % (ref 11–15)
GLOBULIN SER CALC-MCNC: 2.4 G/DL (CALC) (ref 1.9–3.7)
GLUCOSE SERPL-MCNC: 112 MG/DL (ref 65–99)
GLUCOSE UR QL STRIP: NEGATIVE
HCT VFR BLD AUTO: 36 % (ref 35–45)
HDLC SERPL-MCNC: 83 MG/DL
HGB BLD-MCNC: 12.3 G/DL (ref 11.7–15.5)
HGB UR QL STRIP: NEGATIVE
HYALINE CASTS #/AREA URNS LPF: ABNORMAL /LPF
KETONES UR QL STRIP: NEGATIVE
LDLC SERPL CALC-MCNC: 75 MG/DL (CALC)
LEUKOCYTE ESTERASE UR QL STRIP: ABNORMAL
LYMPHOCYTES # BLD AUTO: 1524 CELLS/UL (ref 850–3900)
LYMPHOCYTES NFR BLD AUTO: 29.3 %
MCH RBC QN AUTO: 31.4 PG (ref 27–33)
MCHC RBC AUTO-ENTMCNC: 34.2 G/DL (ref 32–36)
MCV RBC AUTO: 91.8 FL (ref 80–100)
MONOCYTES # BLD AUTO: 374 CELLS/UL (ref 200–950)
MONOCYTES NFR BLD AUTO: 7.2 %
NEUTROPHILS # BLD AUTO: 3136 CELLS/UL (ref 1500–7800)
NEUTROPHILS NFR BLD AUTO: 60.3 %
NITRITE UR QL STRIP: NEGATIVE
NONHDLC SERPL-MCNC: 92 MG/DL (CALC)
PH UR STRIP: 7 [PH] (ref 5–8)
PLATELET # BLD AUTO: 242 THOUSAND/UL (ref 140–400)
PMV BLD REES-ECKER: 11.7 FL (ref 7.5–12.5)
POTASSIUM SERPL-SCNC: 3.6 MMOL/L (ref 3.5–5.3)
PROT SERPL-MCNC: 6.9 G/DL (ref 6.1–8.1)
PROT UR QL STRIP: NEGATIVE
RBC # BLD AUTO: 3.92 MILLION/UL (ref 3.8–5.1)
RBC #/AREA URNS HPF: ABNORMAL /HPF
SL AMB EGFR AFRICAN AMERICAN: 93 ML/MIN/1.73M2
SL AMB EGFR NON AFRICAN AMERICAN: 81 ML/MIN/1.73M2
SODIUM SERPL-SCNC: 139 MMOL/L (ref 135–146)
SP GR UR STRIP: 1.01 (ref 1–1.03)
SQUAMOUS #/AREA URNS HPF: ABNORMAL /HPF
TRIGL SERPL-MCNC: 85 MG/DL
TSH SERPL-ACNC: 1.32 MIU/L (ref 0.4–4.5)
WBC # BLD AUTO: 5.2 THOUSAND/UL (ref 3.8–10.8)
WBC #/AREA URNS HPF: ABNORMAL /HPF

## 2022-02-06 NOTE — TELEPHONE ENCOUNTER
Patient has appointment schedule a March 16 will discussed with her in details all the option if she would like to come earlier any to discussed with her risk benefit of medication in the meanwhile she can use oil-based moisturizer twice a week

## 2022-02-07 DIAGNOSIS — E55.9 VITAMIN D DEFICIENCY: ICD-10-CM

## 2022-02-07 RX ORDER — ACETAMINOPHEN 160 MG
TABLET,DISINTEGRATING ORAL
Qty: 30 CAPSULE | Refills: 0 | Status: SHIPPED | OUTPATIENT
Start: 2022-02-07 | End: 2022-03-09

## 2022-02-07 NOTE — TELEPHONE ENCOUNTER
Spoke with the  patient and made aware of recommendations ,Patient has appointment schedule a March 16 will discussed with her in details all the option if she would like to come earlier any to discussed with her risk benefit of medication in the meanwhile she can use oil-based moisturizer twice a week

## 2022-02-10 ENCOUNTER — TELEPHONE (OUTPATIENT)
Dept: FAMILY MEDICINE CLINIC | Facility: CLINIC | Age: 57
End: 2022-02-10

## 2022-02-11 NOTE — TELEPHONE ENCOUNTER
Recent lab work 2/4/2022 within normal limits, I can call her Tuesday to discuss results      Urine culture    Status: Final result     Result Narrative    FASTING:YES     FASTING: YES      Imaging    Urine culture (Order: [id number]) - 2/4/2022    Patient Images     Show images for Jorje Purdy [[id number]]    Lab Order Result Printout    Urine culture (Order #[id number]) on 2/4/22     External Results Report    Open External Results Report     Lab Component SmartPhrase Guide    Urine culture (Order #[id number]) on 2/4/22     Urine culture  Order: [id number]  Status: Final result      Visible to patient: No (inaccessible in [address])      Next appt: 03/08/2022 at 10:45 AM in Radiology (BE MAMMO RBC 2)           0 Result Notes      Component    Urine Culture Result     Comment:      CULTURE, URINE, ROUTINE         Micro Number:      [id number]     Test Status:       Final     Specimen Source:   Urine     Specimen Quality:  Adequate     Result:            No Growth                Narrative    FASTING:YES     FASTING: YES      Specimen Collected: 02/04/22  9:43 AM Last Resulted: 02/06/22  2:23 AM       Order Details       View Encounter       Lab and Collection Details       Routing       Result History               Result Care Coordination        Patient Communication     Add Comments   Not seen Back to Top             Ordered On 2/4/2022  9:43 AM    Ordering Provider Authorizing Provider Ordering User Ordering Department   [facility], [address] [facility], [address] [place] [address] Lab Results In [place] [address]   Nurse Practitioner Nurse Practitioner  Family Medicine    [phone]  [phone]   [phone]     Other Results from 2/4/2022      Lipid panel  Order: [id number]  Status: Final result      Visible to patient: No (inaccessible in [address])      Next appt: 03/08/2022 at 10:45 AM in Radiology (BE MAMMO RBC 2)      0 Result Notes      Component Ref Range & Units 2/4/22 9:43 AM 7/13/21  8:10 AM   Total Cholesterol <200 mg/dL 175  156    HDL > OR = 50 mg/dL 83  76    Triglycerides <150 mg/dL 85  79    LDL Calculated mg/dL (calc) 75  64 CM    Comment: Reference range: <100       Desirable range <100 mg/dL for primary prevention;     <70 mg/dL for patients with CHD or diabetic patients   with > or = 2 CHD risk factors  LDL-C is now calculated using the Yair-Kemp   calculation, which is a validated novel method providing   better accuracy than the Friedewald equation in the   estimation of LDL-C  Irais Sales  Estela Toledo  2610;014: 3840-1771   (http://docTrackr/faq/EWB793)    Chol HDLC Ratio <5 0 (calc) 2 1  2 1    Non-HDL Cholesterol <130 mg/dL (calc) 92  80 CM    Comment: For patients with diabetes plus 1 major ASCVD risk   factor, treating to a non-HDL-C goal of <100 mg/dL   (LDL-C of <70 mg/dL) is considered a therapeutic   option                  Narrative    FASTING:YES     FASTING: YES      Specimen Collected: 02/04/22  9:43 AM Last Resulted: 02/06/22  2:23 AM        Lab Flowsheet       Order Details       View Encounter       Lab and Collection Details       Routing       Result History          CM=Additional comments          Result Care Coordination        Patient Communication     Add Comments   Not seen Back to Top             Ordered On 2/4/2022  9:43 AM    Ordering Provider Authorizing Provider Ordering User Ordering Department   [facility], 4 [facility], [address] [place] [address] [facility]   Nurse Practitioner Nurse Practitioner  Family Medicine    [phone]         Contains abnormal data Comprehensive metabolic panel  Order: [id number]  Status: Final result      Visible to patient: No (inaccessible in [address])      Next appt: 03/08/2022 at 10:45 AM in Radiology (BE MAMMO RBC 2)      0 Result Notes      Component Ref Range & Units 2/4/22  9:43 AM 5/12/21 11:49 AM   Glucose, Random 65 - 99 mg/dL 112 High   87 R, CM    Comment:                Fasting reference interval       For someone without known diabetes, a glucose value   between 100 and 125 mg/dL is consistent with   prediabetes and should be confirmed with a   follow-up test         BUN 7 - 25 mg/dL 7  15 R    Creatinine 0 50 - 1 05 mg/dL 0 81  0 91 R, CM    Comment: For patients >52years of age, the reference limit   for Creatinine is approximately 13% higher for people   identified as -American          eGFR Non African American > OR = 60 mL/min/1 73m2 81     eGFR African American > OR = 60 mL/min/1 73m2 93     SL AMB BUN/CREATININE RATIO 6 - 22 (calc) NOT APPLICABLE     Sodium 340 - 146 mmol/L 139  142 R    Potassium 3 5 - 5 3 mmol/L 3 6  4 2    Chloride 98 - 110 mmol/L 104  105 R    CO2 20 - 32 mmol/L 28  27 R    Calcium 8 6 - 10 4 mg/dL 9 2  9 0 R    Protein, Total 6 1 - 8 1 g/dL 6 9  7 8 R    Albumin 3 6 - 5 1 g/dL 4 5  4 2 R    Globulin 1 9 - 3 7 g/dL (calc) 2 4     Albumin/Globulin Ratio 1 0 - 2 5 (calc) 1 9     TOTAL BILIRUBIN 0 2 - 1 2 mg/dL 0 4  0 53 R, CM    Alkaline Phosphatase 37 - 153 U/L 53  72 R    AST 10 - 35 U/L 21  12 R, CM    ALT 6 - 29 U/L 23  36 R, CM                Narrative    FASTING:YES     FASTING: YES      Specimen Collected: 02/04/22  9:43 AM Last Resulted: 02/06/22  2:23 AM        Lab Flowsheet       Order Details       View Encounter       Lab and Collection Details       Routing       Result History          CM=Additional comments  R=Reference range differs from displayed range          Result Care Coordination        Patient Communication     Add Comments   Not seen Back to Top             Ordered On 2/4/2022  9:43 AM    Ordering Provider Authorizing Provider Ordering User Ordering Department   Jasmin Gill, [address], CRNP Interface, [facility]   Nurse Practitioner Nurse Practitioner  Family Medicine    [phone]   [phone]         Contains abnormal data UA w Reflex to Microscopic w Reflex to Culture  Order: [id number]  Status: Final result      Visible to patient: No (inaccessible in [place])      Next appt: 03/08/2022 at 10:45 AM in Radiology (BE MAMMO RBC 2)           0 Result Notes      Component Ref Range & Units 2/4/22  9:43 AM 5/12/21 11:49 AM   Color UA YELLOW YELLOW  Light Yellow R    Urine Appearance CLEAR CLEAR     Specific Gravity 1 001 - 1 035 1 010     Ph 5 0 - 8 0 7 0     Glucose, Urine NEGATIVE NEGATIVE  100 (1/10%) Abnormal  R    Bilirubin, Urine NEGATIVE NEGATIVE     Ketone, Urine NEGATIVE NEGATIVE  Negative R    Blood, Urine NEGATIVE NEGATIVE  Negative R    Protein, Urine NEGATIVE NEGATIVE  Negative R    SL AMB NITRITES URINE, QUAL   NEGATIVE NEGATIVE  Negative R    Leukocyte Esterase NEGATIVE 1+ Abnormal   Negative R    SL AMB WBC, URINE < OR = 5 /HPF 0-5     RBC, Urine < OR = 2 /HPF 0-2     Squamous Epithelial Cells < OR = 5 /HPF NONE SEEN     Bacteria, UA NONE SEEN /HPF NONE SEEN     Hyaline Casts NONE SEEN /LPF NONE SEEN                 Narrative    FASTING:YES     FASTING: YES      Specimen Collected: 02/04/22  9:43 AM Last Resulted: 02/06/22  2:23 AM        Lab Flowsheet       Order Details       View Encounter       Lab and Collection Details       Routing       Result History          R=Reference range differs from displayed range          Result Care Coordination        Patient Communication     Add Comments   Not seen Back to Top             Ordered On 2/4/2022  9:43 AM    Ordering Provider Authorizing Provider Ordering User Ordering Department   [facility], 4 [facility], [address] [place] [address] Lab Results In [place] [address]   Nurse Practitioner Nurse Practitioner  Family Medicine    [phone]  [phone]        REFLEXIVE URINE CULTURE  Order: [id number]  Status: Final result      Visible to patient: No (inaccessible in [facility]  [facility]'s MyChart)      Next appt: 03/08/2022 at 10:45 AM in Radiology (BE MAMMO RBC 2)           0 Result Notes      Component    Urine Culture, Comprehensive     Comment: CULTURE INDICATED - RESULTS TO FOLLOW               Narrative    FASTING:YES     FASTING: YES      Specimen Collected: 02/04/22  9:43 AM Last Resulted: 02/06/22  2:23 AM       Order Details       View Encounter       Lab and Collection Details       Routing       Result History               Result Care Coordination        Patient Communication     Add Comments   Not seen Back to Top             Ordered On 2/4/2022  9:43 AM    Ordering Provider Authorizing Provider Ordering User Ordering Department   Reed Saldana, 4 [address] [facility], ELZA Interface, [facility] Lab Results In [facility] 1   Nurse Practitioner Nurse Practitioner  Family Medicine    [phone]   [phone]        CBC and differential  Order: [id number]  Status: Final result      Visible to patient: No (inaccessible in [address])      Next appt: 03/08/2022 at 10:45 AM in Radiology (BE MAMMO RBC 2)      0 Result Notes       Ref Range & Units 2/4/22  9:43 AM   White Blood Cell Count 3 8 - 10 8 Thousand/uL 5 2    Red Blood Cell Count 3 80 - 5 10 Million/uL 3 92    Hemoglobin 11 7 - 15 5 g/dL 12 3    HCT 35 0 - 45 0 % 36 0    MCV 80 0 - 100 0 fL 91 8    MCH 27 0 - 33 0 pg 31 4    MCHC 32 0 - 36 0 g/dL 34 2    RDW 11 0 - 15 0 % 11 8    Platelet Count 179 - 400 Thousand/uL 242    SL AMB MPV 7 5 - 12 5 fL 11 7    Neutrophils (Absolute) 1,500 - 7,800 cells/uL 3,136    Lymphocytes (Absolute) 850 - 3,900 cells/uL 1,524    Monocytes (Absolute) 200 - 950 cells/uL 374    Eosinophils (Absolute) 15 - 500 cells/uL 83    Basophils ABS 0 - 200 cells/uL 83    Neutrophils % 60 3    Lymphocytes % 29 3    Monocytes % 7 2    Eosinophils % 1 6    Basophils PCT % 1 6                Narrative    FASTING:YES     FASTING: YES      Specimen Collected: 02/04/22  9:43 AM Last Resulted: 02/06/22  2:23 AM        Lab Flowsheet       Order Details       View Encounter       Lab and Collection Details       Routing       Result History               Result Care Coordination        Patient Communication     Add Comments   Not seen Back to Top             Ordered On 2/4/2022  9:43 AM    Ordering Provider Authorizing Provider Ordering User Ordering Department   [address], [email or web address] Lab Results In [place]   Nurse Practitioner Nurse Practitioner  Family Medicine    [phone]        TSH, 3rd generation with Free T4 reflex  Order: [id number]  Status: Final result      Visible to patient: No (inaccessible in [address])      Next appt: 03/08/2022 at 10:45 AM in Radiology (BE MAMMO RBC 2)      0 Result Notes      Component Ref Range & Units 2/4/22  9:43 AM 7/13/21  8:10 AM   TSH W/RFX TO FREE T4 0 40 - 4 50 mIU/L 1 32  1 05                Narrative    FASTING:YES     FASTING: YES      Specimen Collected: 02/04/22  9:43 AM Last Resulted: 02/06/22  2:23 AM        Lab Flowsheet       Order Details       View Encounter       Lab and Collection Details       Routing       Result History               Result Care Coordination        Patient Communication     Add Comments   Not seen Back to Top             Ordered On 2/4/2022  9:43 AM    Ordering Provider Authorizing Provider Ordering User Ordering Department   [facility], [address] [place] [address], [email or web address] [facility]   Nurse Practitioner Nurse Practitioner  Family Medicine    [phone]  [phone]   [phone]        Vitamin D 25 hydroxy  Order: [id number]  Status: Final result      Visible to patient: No (inaccessible in [address])      Next appt: 03/08/2022 at 10:45 AM in Radiology (BE MAMMO RBC 2)      0 Result Notes       Ref Range & Units 2/4/22  9:43 AM   Vitamin D, 25-Hydroxy, Serum 30 - 100 ng/mL 86    Comment: Vitamin D Status         25-OH Vitamin D:       Deficiency:                    <20 ng/mL   Insufficiency:             20 - 29 ng/mL   Optimal:                 > or = 30 ng/mL       For 25-OH Vitamin D testing on patients on   D2-supplementation and patients for whom quantitation   of D2 and D3 fractions is required, the [facility]TM)   25-OH VIT D, (D2,D3), LC/MS/MS is recommended: order   code 88524 (patients >2yrs)  See Note 1       Note 1       For additional information, please refer to   http://education  Artklikk/faq/WGP412   (This link is being provided for informational/   educational purposes only )                Narrative    FASTING:YES     FASTING: YES      Specimen Collected: 02/04/22  9:43 AM Last Resulted: 02/06/22  2:23 AM        Lab Flowsheet       Order Details       View Encounter       Lab and Collection Details       Routing       Result History               Result Care Coordination        Patient Communication     Add Comments   Not seen Back to Top             Ordered On 2/4/2022  9:43 AM    Ordering Provider Authorizing Provider Ordering User Ordering Department   Jose Manuel Fontenot, [address] Lab Results In [place] [address]   Nurse Practitioner Nurse Practitioner  Family Medicine    [phone]   Specimen Description:           2/6/2022  2:30 AM           Component   Urine Culture Result   Comment:       CULTURE, URINE, ROUTINE         Micro Number:      [id number]     Test Status:       Final     Specimen Source:   Urine     Specimen Quality:  Adequate     Result:            No Growth        Narrative    FASTING:YES     FASTING: YES     Lab Information    Lab   [facility]   No contact information on file              Additional Information    Specimen ID Bill Type Client ID   [id number]  [id number]            Specimen Date Taken Specimen Time Taken Specimen Received Date Specimen Received Time Result Date Result Time   Feb 4, 2022  9:43 AM Feb 4, 2022  9:44 AM Feb 6, 2022  2:23 AM       Routing History    Priority Sent On From To Message Type    2/6/2022  2:30 AM Interface, [facility] Lab Results In [place] Results     Results Routing Details    Order ID: [id number]   Result contact date: 2/6/22   Result status: Final result   Outcome: Routed using routing scheme   Routing Scheme Used:  SYSTEM DEFINITION RESULTS ROUTING [de-identified]   Routing Scheme Line: Default   Resulting User: Interface, [facility] Lab Results In [facility]   Routing Instant: Sun Feb 6, 2022  2:30 AM   Current Status: Routing Complete   Status History: Result contact created Sun Feb 6, 2022  2:30 AM     Routing started Sun Feb 6, 2022  2:30 AM     Routing Complete Sun Feb 6, 2022  2:30 AM   In [place] Sent: Message ID: [id number]  Recipients:         Daphne Hull, [address]              Responsible: Yes     Other Results from 2/4/2022     Lipid panel  Final result 2/4/2022    Comprehensive metabolic panel  Final result 2/4/2022    UA w Reflex to Microscopic w Reflex to Culture  Final result 2/4/2022    REFLEXIVE URINE CULTURE  Final result 2/4/2022    CBC and differential  Final result 2/4/2022    TSH, 3rd generation with Free T4 reflex  Final result 2/4/2022    Vitamin D 25 hydroxy  Final result 2/4/2022

## 2022-03-09 DIAGNOSIS — E55.9 VITAMIN D DEFICIENCY: ICD-10-CM

## 2022-03-09 RX ORDER — ACETAMINOPHEN 160 MG
TABLET,DISINTEGRATING ORAL
Qty: 30 CAPSULE | Refills: 0 | Status: SHIPPED | OUTPATIENT
Start: 2022-03-09 | End: 2022-04-08

## 2022-03-14 ENCOUNTER — ANNUAL EXAM (OUTPATIENT)
Dept: OBGYN CLINIC | Facility: CLINIC | Age: 57
End: 2022-03-14
Payer: COMMERCIAL

## 2022-03-14 VITALS
BODY MASS INDEX: 15.71 KG/M2 | DIASTOLIC BLOOD PRESSURE: 78 MMHG | HEIGHT: 64 IN | WEIGHT: 92 LBS | SYSTOLIC BLOOD PRESSURE: 126 MMHG

## 2022-03-14 DIAGNOSIS — Z78.0 MENOPAUSE: ICD-10-CM

## 2022-03-14 DIAGNOSIS — Z01.419 WOMEN'S ANNUAL ROUTINE GYNECOLOGICAL EXAMINATION: Primary | ICD-10-CM

## 2022-03-14 DIAGNOSIS — Z12.4 SCREENING FOR MALIGNANT NEOPLASM OF THE CERVIX: ICD-10-CM

## 2022-03-14 PROCEDURE — 99396 PREV VISIT EST AGE 40-64: CPT | Performed by: OBSTETRICS & GYNECOLOGY

## 2022-03-14 PROCEDURE — G0145 SCR C/V CYTO,THINLAYER,RESCR: HCPCS | Performed by: OBSTETRICS & GYNECOLOGY

## 2022-03-14 PROCEDURE — 0503F POSTPARTUM CARE VISIT: CPT | Performed by: OBSTETRICS & GYNECOLOGY

## 2022-03-14 PROCEDURE — G0476 HPV COMBO ASSAY CA SCREEN: HCPCS | Performed by: OBSTETRICS & GYNECOLOGY

## 2022-03-14 NOTE — PROGRESS NOTES
Subjective      Sapphire Doe is a 61 y o  female who presents for annual well woman exam    Patient is postmenopausal was diagnosed last year with breast cancer and has right mastectomy currently taking anastrozole    Menstrual History:  OB History        0    Para   0    Term   0       0    AB   0    Living   0       SAB   0    IAB   0    Ectopic   0    Multiple   0    Live Births   0           Obstetric Comments   Age of menarche: 14  Never pregnant              No LMP recorded  Patient is premenopausal        The following portions of the patient's history were reviewed and updated as appropriate: allergies, current medications, past family history, past medical history, past social history, past surgical history and problem list     Review of Systems  Review of Systems   Constitutional: Negative for activity change, appetite change, chills, fatigue and fever  Respiratory: Negative for cough and shortness of breath  Cardiovascular: Negative for chest pain, palpitations and leg swelling  Gastrointestinal: Negative for abdominal pain, constipation, diarrhea, nausea and vomiting  Genitourinary: Negative for difficulty urinating, dysuria, flank pain, frequency, hematuria, urgency and vaginal discharge  Neurological: Negative for dizziness and headaches  Psychiatric/Behavioral: Negative for confusion            Objective      /78 (BP Location: Left arm, Patient Position: Sitting, Cuff Size: Adult)   Ht 5' 4" (1 626 m)   Wt 41 7 kg (92 lb)   BMI 15 79 kg/m²     Physical Exam  OBGyn Exam     General:   alert and oriented, in no acute distress, alert, appears stated age and cooperative   Heart: regular rate and rhythm, S1, S2 normal, no murmur, click, rub or gallop   Lungs: clear to auscultation bilaterally   Abdomen: soft, non-tender, without masses or organomegaly   Vulva: normal   Vagina: normal mucosa, normal discharge   Cervix: no cervical motion tenderness and no lesions Uterus: normal size   Adnexa:  Breast Exam:  normal adnexa  Patient has mastectomy on the right side, on the left breast patient still have cystic mass noted at 2:00 o'clock  and density at 6:00 o'clock  patient already follow-up with breast specialist as well as Hematology Oncology  Assessment      @well woman@   59-year-old female  Annual exam  Nulliparous  Low BMI  Post menopause  Right mastectomy follow-up with breast surgeon breast CA  Unknown family history  osteopenia  Plan   Pap/HPV  Diet/exercise  dexa  Calcium/vitamin-D  Follow-up with breast specialist   All questions answered  There are no Patient Instructions on file for this visit

## 2022-03-18 LAB
LAB AP GYN PRIMARY INTERPRETATION: NORMAL
Lab: NORMAL

## 2022-03-21 ENCOUNTER — TELEPHONE (OUTPATIENT)
Dept: SURGICAL ONCOLOGY | Facility: CLINIC | Age: 57
End: 2022-03-21

## 2022-04-05 ENCOUNTER — OFFICE VISIT (OUTPATIENT)
Dept: HEMATOLOGY ONCOLOGY | Facility: CLINIC | Age: 57
End: 2022-04-05
Payer: COMMERCIAL

## 2022-04-05 VITALS
RESPIRATION RATE: 18 BRPM | BODY MASS INDEX: 15.96 KG/M2 | DIASTOLIC BLOOD PRESSURE: 70 MMHG | OXYGEN SATURATION: 99 % | HEIGHT: 64 IN | TEMPERATURE: 97.2 F | WEIGHT: 93.5 LBS | SYSTOLIC BLOOD PRESSURE: 118 MMHG | HEART RATE: 81 BPM

## 2022-04-05 DIAGNOSIS — Z17.0 MALIGNANT NEOPLASM OF OVERLAPPING SITES OF RIGHT BREAST IN FEMALE, ESTROGEN RECEPTOR POSITIVE (HCC): Primary | ICD-10-CM

## 2022-04-05 DIAGNOSIS — C50.811 MALIGNANT NEOPLASM OF OVERLAPPING SITES OF RIGHT BREAST IN FEMALE, ESTROGEN RECEPTOR POSITIVE (HCC): Primary | ICD-10-CM

## 2022-04-05 PROCEDURE — 99214 OFFICE O/P EST MOD 30 MIN: CPT | Performed by: INTERNAL MEDICINE

## 2022-04-05 NOTE — PROGRESS NOTES
Hematology / Oncology Outpatient Follow Up Note    Ayse Kingsley 61 y o  female :1965 U:[id number]         Date:  2022    Assessment / Plan:    A 70-year-old postmenopausal woman with stage II A right breast cancer, multifocal disease with combination of invasive ductal as well as invasive lobular carcinoma, grade 2  This was ER 90% positive, NC 45% positive, HER2 negative disease  Her tumor was low risk, based on MammaPrint  She underwent mastectomy and sentinel lymph node biopsy, resulting in TAMELA  She had 1 positive lymph node with micrometastasis measuring 0 4 mm   She is currently on adjuvant hormonal therapy with anastrozole with excellent tolerance  Based on her physical examination as well as her symptoms, she has no evidence recurrent disease  I recommended her to continue anastrozole 1 mg once a day  I will see her again in a year for routine follow-up  She is in agreement with my recommendations            Subjective:      HPI:   A 70-year-old female who had regular menstrual cycle until 2-3 months ago  She was found to have abnormality in her right breast based on a screening mammography  She had right breast biopsy for the multiple lesions in 2021  1 biopsy showed invasive ductal carcinoma, grade 2, % positive, % positive, HER2 negative disease  The other biopsy from the same breast showed invasive lobular carcinoma, grade 2, ER 90% positive, NC 45% positive, HER2 negative disease  She underwent genetic testing which showed negative for BRCA gene mutation  Because of the multiple lesions in the right breast, she underwent mastectomy and sentinel lymph node biopsy by Dr Ellyn Kocher in 2021  She had multifocal disease measuring 20 mm of invasive ductal carcinoma, grade 2 as well as 7 mm of invasive lobular carcinoma  Lymphovascular invasion was present  1 sentinel lymph node was positive for micrometastasis measuring 0 4 mm    She did not have reconstruction  She presents today with her sister to discuss the adjuvant treatment options  Her tumor was found to be low risk, based on MammaPrint  She has history of left breast biopsy which was benign approximately 2 years ago  Her recent MRI of the bilateral breast was not concerning in her left breast, BI-RADS 2  She feels well  She has no complaint of pain  She has no respiratory symptoms  She has hypertension as well as hypothyroidism, for which she takes respective medications  She is adopted  Therefore, she does not know biological family history  She is a lifetime never smoker  Her performance status is normal            Interval History:  A 41-year-old postmenopausal woman with stage II A right breast cancer, multifocal disease with combination of invasive ductal as well as invasive lobular carcinoma, grade 2  This was ER 90% positive, OH 45% positive, HER2 negative disease  Her tumor was low risk, based on MammaPrint  She underwent mastectomy and sentinel lymph node biopsy, resulting in TAMELA  She had 1 positive lymph node with micrometastasis measuring 0 4 mm  Since August 2021, she has been on adjuvant hormonal therapy with anastrozole  She presents today for routine follow-up  She has not noticed any side effects from anastrozole  She denied hot flashes or musculoskeletal symptoms  She has no complaint of pain  Her weight is stable  She has no respiratory symptoms  Her performance status is normal            Objective:      Primary Diagnosis:     1  Right breast cancer, stage II A ( pT1c, pN1 (mi ), M0) grade 2, ER 90% positive, OH 45% positive, HER2 negative disease  MammaPrint low risk  Diagnosed in June 2021      2    BRCA gene mutation negative      Cancer Staging:  Cancer Staging  Malignant neoplasm of overlapping sites of right breast in female, estrogen receptor positive ([facility] [address] )  Staging form: Breast, AJCC 8th Edition  - Pathologic: Stage IA (pT1c, pN1mi(sn), cM0, G2, ER+, VA+, HER2-) - Unsigned  Method of lymph node assessment: Adah lymph node biopsy  Histologic grading system: 3 grade system           Previous Hematologic/ Oncologic Treatment:            Current Hematologic/ Oncologic Treatment:         Adjuvant hormonal therapy with anastrozole since August 2021      Disease Status:        TAMELA status post mastectomy and sentinel lymph node biopsy      Test Results:     Pathology:      multifocal invasive mammary carcinoma, grade 2  20 mm of invasive ductal carcinoma as well as 7 mm of invasive lobular carcinoma  Lymphovascular invasion was present  1 sentinel lymph node had micrometastasis measuring 0 4 mm   1 lesion was % positive, % positive, HER2 negative disease  The other lesion was ER 90% positive, VA 45% positive, HER2 negative disease  MammaPrint low risk  Stage II A ( pT1c, pN1 (mi ), M0)     Radiology:      chest x-ray was negative for pulmonary disease      Laboratory:       See below      Physical Exam:        General Appearance:    Alert, oriented          Eyes:    PERRL   Ears:    Normal external ear canals, both ears   Nose:   Nares normal, septum midline   Throat:   Mucosa moist  Pharynx without injection  Neck:   Supple         Lungs:     Clear to auscultation bilaterally   Chest Wall:    No tenderness or deformity    Heart:    Regular rate and rhythm         Abdomen:     Soft, non-tender, bowel sounds +, no organomegaly               Extremities:   Extremities no cyanosis or edema         Skin:   no rash or icterus  Lymph nodes:   Cervical, supraclavicular, and axillary nodes normal   Neurologic:   CNII-XII intact, normal strength, sensation and reflexes     Throughout             Breast exam:     Status post right mastectomy without reconstruction  No palpable abnormality in her right chest wall  Left breast was quite lumpy  ROS: Review of Systems   All other systems reviewed and are negative            Imaging: No results found  Labs:   Lab Results   Component Value Date    WBC 5 2 02/04/2022    HGB 12 3 02/04/2022    HCT 36 0 02/04/2022    MCV 91 8 02/04/2022     02/04/2022     Lab Results   Component Value Date    K 3 6 02/04/2022     02/04/2022    CO2 28 02/04/2022    BUN 7 02/04/2022    CREATININE 0 81 02/04/2022    CALCIUM 9 2 02/04/2022    AST 21 02/04/2022    ALT 23 02/04/2022    ALKPHOS 53 02/04/2022    EGFR 70 05/12/2021         Current Medications: Reviewed  Allergies: Reviewed  PMH/FH/SH:  Reviewed      Vital Sign:    Body surface area is 1 42 meters squared      Wt Readings from Last 3 Encounters:   04/05/22 42 4 kg (93 lb 8 oz)   03/14/22 41 7 kg (92 lb)   01/10/22 40 2 kg (88 lb 9 6 oz)        Temp Readings from Last 3 Encounters:   04/05/22 (!) 97 2 °F (36 2 °C)   01/10/22 (!) 96 5 °F (35 8 °C)   10/07/21 97 6 °F (36 4 °C) (Tympanic)        BP Readings from Last 3 Encounters:   04/05/22 118/70   03/14/22 126/78   01/10/22 118/66         Pulse Readings from Last 3 Encounters:   04/05/22 81   01/10/22 105   10/07/21 81     @LASTSAO2(3)@

## 2022-04-07 ENCOUNTER — TELEPHONE (OUTPATIENT)
Dept: SURGICAL ONCOLOGY | Facility: CLINIC | Age: 57
End: 2022-04-07

## 2022-04-07 NOTE — TELEPHONE ENCOUNTER
Called patient again to discuss her overdue mammogram and the need for it to be performed prior to her appointment with ELZA Nunez on 4/20  Patient states that she is having transportation issues and can no longer rely on her neighbor to bring her to doctor's appointments  Patient needs transportation arranged for her follow up in addition to her imaging  Patient is aware she will receive a call later on after this has been arranged

## 2022-04-07 NOTE — TELEPHONE ENCOUNTER
Patient does not have transportation to get to her mammo appt  On 4/13/22 or her appt  With Johanne Party on 4/20/22  I checked with [facility] to see if she could get transportation to these appointments  [facility] responded that patient does not have the qualifier/paperwork completed  I checked back with [facility] to see if she could get LYFT for these appointments without the paperwork and they said, "yes"  Johanne Dias approved of this but I would need a second approval from [employer]  If this would be approved, I would then need a cost center number and we can make the arrangements  Tried calling patient to let them know that Dairl Blood will allow patient to go over paperwork verbally and can sign it when she comes in  Spoke to [facility] again confirming it would be alright for patient to sign paperwork after her appt  W/ Johanne Dias, they said, "yes"  However, transportation will only be provided if she has a confirmed cancer diagnosis prior to her mammogram     **Called patient to go over each of the Terms of Service for [employer]  She gave me the verbal "ok" to each term  I emailed the unsigned document to [facility] [employer] and Johanne Dias to print out and sign at her appointment on 4/20/22  I also emailed the Scope of Service to Maude Bansal as well to give to the patient

## 2022-04-08 DIAGNOSIS — E55.9 VITAMIN D DEFICIENCY: ICD-10-CM

## 2022-04-08 RX ORDER — ACETAMINOPHEN 160 MG
TABLET,DISINTEGRATING ORAL
Qty: 30 CAPSULE | Refills: 0 | Status: SHIPPED | OUTPATIENT
Start: 2022-04-08 | End: 2022-05-10

## 2022-04-11 ENCOUNTER — TELEPHONE (OUTPATIENT)
Dept: HEMATOLOGY ONCOLOGY | Facility: CLINIC | Age: 57
End: 2022-04-11

## 2022-04-11 NOTE — TELEPHONE ENCOUNTER
Patient is calling to confirm transportation for her visit on 4/13   I contacted [facility] [employer] and per Marti Saavedra they confirmed the patient will be picked up on 4/13 for her appointment/mammogram

## 2022-04-13 ENCOUNTER — HOSPITAL ENCOUNTER (OUTPATIENT)
Dept: MAMMOGRAPHY | Facility: CLINIC | Age: 57
Discharge: HOME | End: 2022-04-13
Payer: COMMERCIAL

## 2022-04-13 DIAGNOSIS — C50.811 MALIGNANT NEOPLASM OF OVERLAPPING SITES OF RIGHT BREAST IN FEMALE, ESTROGEN RECEPTOR POSITIVE (HCC): ICD-10-CM

## 2022-04-13 DIAGNOSIS — Z17.0 MALIGNANT NEOPLASM OF OVERLAPPING SITES OF RIGHT BREAST IN FEMALE, ESTROGEN RECEPTOR POSITIVE (HCC): ICD-10-CM

## 2022-04-13 PROCEDURE — 77065 DX MAMMO INCL CAD UNI: CPT

## 2022-04-13 PROCEDURE — G0279 TOMOSYNTHESIS, MAMMO: HCPCS

## 2022-04-20 ENCOUNTER — OFFICE VISIT (OUTPATIENT)
Dept: SURGICAL ONCOLOGY | Facility: CLINIC | Age: 57
End: 2022-04-20
Payer: COMMERCIAL

## 2022-04-20 VITALS
HEIGHT: 64 IN | TEMPERATURE: 97.7 F | SYSTOLIC BLOOD PRESSURE: 100 MMHG | WEIGHT: 94.5 LBS | OXYGEN SATURATION: 97 % | RESPIRATION RATE: 16 BRPM | HEART RATE: 80 BPM | BODY MASS INDEX: 16.13 KG/M2 | DIASTOLIC BLOOD PRESSURE: 70 MMHG

## 2022-04-20 DIAGNOSIS — Z17.0 MALIGNANT NEOPLASM OF OVERLAPPING SITES OF RIGHT BREAST IN FEMALE, ESTROGEN RECEPTOR POSITIVE (HCC): Primary | ICD-10-CM

## 2022-04-20 DIAGNOSIS — Z79.811 USE OF ANASTROZOLE (ARIMIDEX): ICD-10-CM

## 2022-04-20 DIAGNOSIS — C50.811 MALIGNANT NEOPLASM OF OVERLAPPING SITES OF RIGHT BREAST IN FEMALE, ESTROGEN RECEPTOR POSITIVE (HCC): Primary | ICD-10-CM

## 2022-04-20 PROCEDURE — 99214 OFFICE O/P EST MOD 30 MIN: CPT | Performed by: NURSE PRACTITIONER

## 2022-04-20 NOTE — PROGRESS NOTES
Surgical Oncology Follow Up       [address] [place], [place] [address] [facility]  CANCER CARE [facility] SURGICAL ONCOLOGY [place]  [address] [address]    Digna Juárez  1965  [id number]  [address] [place] [address]  CANCER CARE [facility] SURGICAL ONCOLOGY [place] [address] [address] [address]    Chief Complaint   Patient presents with    Other     office visit       Assessment/Plan:  1  Malignant neoplasm of overlapping sites of right breast in female, estrogen receptor positive ([facility] [address] )  -six-month follow-up    2  Use of anastrozole (Arimidex)  -continue use per Medical Oncology     Discussion/Summary:  Patient is a 58-year-old female presenting today for six-month follow-up for right breast cancer diagnosed in April of 2021  Pathology revealed multifocal right breast cancer ER/MN positive, HER2 negative  She underwent genetic testing which was negative  She underwent a right breast ANJALI  directed mastectomy with sentinel node biopsy with Dr Luis Antonio Brigsg  1/1 lymph nodes were positive for micrometastasis  She was low risk on mammaprint  She denied post-mastectomy RT  She is currently on anastrozole  She had a diagnostic mammogram of the left breast on 4/13/2022 which was BI-RADS 2 category 4 density  She has a benign fibroadenoma on her left breast that has been biopsied in the past and remains benign  There are no concerns on her breast exam  I will see her back in 6 months or sooner should the need arise  She was instructed to call with questions or concerns prior to that time  All questions were answered today      History of Present Illness:     Oncology History   Malignant neoplasm of overlapping sites of right breast in female, estrogen receptor positive ([facility] [address] )   4/16/2021 Biopsy    Right breast US guided biopsy:  A  11 o'clock 7 cm from the nipple  Invasive lobular carcinoma  Grade 1  ER 90, MN 45, HER2 1+  Lymphovascular invasion: not identified    B  10 o'clock 7 cm from the nipple  Invasive mammary carcinoma of no special type  Grade 2  , , HER2 1+  Lymphovascular invasion: not identified    Concordant  Malignancy appears multifocal  MRI recommended  4/30/2021 Observation    Bilateral breast MRI  Multifocal right breast cancer; 10:00 mass measures up to 1 8 cm and abuts pectoral muscle, without evidence of invasion  Two areas of carcinoma have a total extent of disease of 5 cm  Left breast clear  Axilla negative  6/10/2021 Genetic Testing    The following genes were evaluated: BATSHEVA, BRCA1, BRCA2, CDH1, CHEK2, PALB2, PTEN, STK11, TP53  Additional genes analyzed for a total of 20  Negative result  No pathogenic sequence variants or deletions/dupllications identified  [employer]     6/25/2021 Surgery    Right breast ANJALI  directed mastectomy with sentinel lymph node biopsy  Invasive carcinoma of no special type (ductal)  Grade 2  2 cm (2 foci)  Margins negative  1/1 Lymph node with micrometastases (0 4 mm)  Anatomic Stage IB  Prognostic Stage IA     7/7/2021 Genomic Testing    MammaPrint for FLEX trial  Low risk (Luminal A)     7/28/2021 -  Hormone Therapy    Anastrozole 1 mg daily  Dr Hung Muñiz     7/29/2021 - 7/29/2021 Radiation    Consult with Dr Lowell Burton  Patient declined post-mastectomy RT          -Interval History: Patient is a 62-year-old female presenting today for six-month follow-up for right breast cancer diagnosed in April of 2021  She is currently on anastrozole  She had a diagnostic mammogram of the left breast on 4/13/2022 which was BI-RADS 2 category 4 density  She denies changes on her breast exam  She denies persistent headache, bone pain, back pain, sob, or abd pain  Review of Systems:  Review of Systems   Constitutional: Negative for activity change, appetite change, fatigue and unexpected weight change  Respiratory: Negative for cough and shortness of breath  Cardiovascular: Negative for chest pain     Gastrointestinal: Negative for abdominal pain, diarrhea, nausea and vomiting  Endocrine: Negative for heat intolerance  Musculoskeletal: Negative for arthralgias, back pain and myalgias  Skin: Negative for rash  Neurological: Negative for weakness and headaches  Hematological: Negative for adenopathy  Patient Active Problem List   Diagnosis    GERD without esophagitis    Hypothyroidism    Vitamin D deficiency    Hypertension    Deficiency of multiple vitamins    Annual physical exam    Malignant neoplasm of overlapping sites of right breast in female, estrogen receptor positive [facility] )    Anorexia nervosa    Use of anastrozole (Arimidex)    Excessive cerumen in ear canal, bilateral     Past Medical History:   Diagnosis Date    Abnormal weight loss     Anorexia nervosa     Breast cancer ([facility] [address] )     Disease of thyroid gland     Elevated blood sugar     Occasional     Fibroadenoma of both breasts     GERD (gastroesophageal reflux disease)     Hypertension     Hyperthyroidism     Osteopenia      Past Surgical History:   Procedure Laterality Date    BREAST BIOPSY Right 04/16/2021    BREAST CYST EXCISION Left 2011    CHOLECYSTECTOMY      MASTECTOMY W/ SENTINEL NODE BIOPSY Right 6/25/2021    Procedure: BREAST MASTECTOMY WITH BIOPSY LYMPH NODE SENTINEL; ANJALI  GUIDED, LYMPHATIC MAPPING WITH BLUE DYE AND RADIOACTIVE DYE (INJECT AT 1130 BY DR WALSH IN THE OR);   Surgeon: Luther Huggins MD;  Location: [facility];  Service: Surgical Oncology    US BREAST NEEDLE LOC RIGHT EACH ADDITIONAL Right 6/14/2021    US BREAST ANJALI  NEEDLE LOC RIGHT Right 6/14/2021    US GUIDANCE BREAST BIOPSY RIGHT EACH ADDITIONAL Right 4/16/2021    US GUIDED BREAST BIOPSY RIGHT COMPLETE Right 4/16/2021     Family History   Adopted: Yes     Social History     Socioeconomic History    Marital status: Single     Spouse name: Not on file    Number of children: 0    Years of education: 12    Highest education level: Not on file   Occupational History    Occupation: never worked    Tobacco Use    Smoking status: Never Smoker    Smokeless tobacco: Never Used   Vaping Use    Vaping Use: Never used   Substance and Sexual Activity    Alcohol use: Yes     Alcohol/week: 2 0 standard drinks     Types: 2 Cans of beer per week    Drug use: Never     Comment: Illicit drugs:   none - As per [employer] Sexual activity: Not Currently     Comment: Sexually active:   No - As per [employer]    Other Topics Concern    Not on file   Social History Narrative    · Most recent tobacco use screenin2019      · Do you currently or have you served in the [employer]:   No      · Were you activated, into active duty, as a member of the [employer] or as a Reservist:   No      · Caffeine intake:   None decaf coffee only     · Sexual orientation:   Heterosexual      · General stress level:   Low      · Diet:   Regular      · Single or multi-level home/work:   single level home      · Live alone or with others:   alone      · Exercise level: Moderate outside walk only     · Overweight:   No      · Obese:   No      · Guns present in home:   No      · Seat belts used routinely:   Yes      · Advance directive: Yes      · Sunscreen used routinely:   Yes      · Smoke alarm in home:    Yes      · Performs monthly self-breast exam:   Yes      · Legally blind in one or both eyes:   No      · Hard of hearing or deaf in one or both ears:   No      · Presence of domestic violence:   No      · Are there stairs in your home:   No      · Pets:   No      Social Determinants of Health     Financial Resource Strain: Not on file   Food Insecurity: Not on file   Transportation Needs: Not on file   Physical Activity: Not on file   Stress: Not on file   Social Connections: Not on file   Intimate Partner Violence: Not on file   Housing Stability: Not on file       Current Outpatient Medications:     anastrozole (ARIMIDEX) 1 mg tablet, Take 1 tablet (1 mg total) by mouth daily, Disp: 90 tablet, Rfl: 1    atenolol (TENORMIN) 100 mg tablet, Take 1 tablet (100 mg total) by mouth every other day Take 100 mg by mouth every other morning , Disp: 90 tablet, Rfl: 1    carbamide peroxide (DEBROX) 6 5 % otic solution, Administer 5 drops into both ears 2 (two) times a day, Disp: 15 mL, Rfl: 2    Cholecalciferol (Vitamin D3) 50 MCG (2000 UT) capsule, Take 1 capsule (2,000 Units total) by mouth daily Take 1 capsule every day by oral route for 30 days  , Disp: 30 capsule, Rfl: 0    levothyroxine 50 mcg tablet, Take 1 tablet (50 mcg total) by mouth daily in the early morning, Disp: 90 tablet, Rfl: 3    Multiple Vitamins-Minerals (Womens 50+ Multi Vitamin/Min) TABS, Take 1 tablet by mouth daily, Disp: 100 tablet, Rfl: 1    multivitamin (THERAGRAN) TABS, , Disp: , Rfl:     OLANZapine (ZyPREXA) 7 5 mg tablet, Take 1 tablet (7 5 mg total) by mouth daily at bedtime, Disp: 90 tablet, Rfl: 3    pantoprazole (PROTONIX) 40 mg tablet, Take 1 tablet (40 mg total) by mouth daily, Disp: 90 tablet, Rfl: 3    oxyCODONE-acetaminophen (PERCOCET) 5-325 mg per tablet, Take 1 tablet by mouth every 6 (six) hours as needed for moderate painMax Daily Amount: 4 tablets (Patient not taking: Reported on 7/29/2021), Disp: 15 tablet, Rfl: 0  No Known Allergies  Vitals:    04/20/22 0908   Resp: 16       Physical Exam  Constitutional:       General: She is not in acute distress  Appearance: Normal appearance  Cardiovascular:      Rate and Rhythm: Normal rate and regular rhythm  Pulses: Normal pulses  Heart sounds: Normal heart sounds  Pulmonary:      Effort: Pulmonary effort is normal       Breath sounds: Normal breath sounds  Chest:      Chest wall: No mass  Breasts:      Right: No swelling, mass, skin change, tenderness, axillary adenopathy or supraclavicular adenopathy        Left: No swelling, bleeding, inverted nipple, mass, nipple discharge, skin change, tenderness, axillary adenopathy or supraclavicular adenopathy  Comments: Right breast mastectomy scar present  No masses, nodularity, skin changes, or adenopathy  Left breast positive for a benign mass in the upper-outer quadrant  It is negative for nodularity, skin changes, nipple changes or discharge, or adenopathy  Abdominal:      General: Abdomen is flat  Palpations: Abdomen is soft  Lymphadenopathy:      Upper Body:      Right upper body: No supraclavicular, axillary or pectoral adenopathy  Left upper body: No supraclavicular, axillary or pectoral adenopathy  Skin:     General: Skin is warm  Neurological:      General: No focal deficit present  Mental Status: She is alert and oriented to person, place, and time  Psychiatric:         Mood and Affect: Mood normal          Behavior: Behavior normal            Results:    Imaging  Mammo diagnostic left w 3d & cad    Result Date: 4/13/2022  Narrative: DIAGNOSIS: Malignant neoplasm of overlapping sites of right breast in female, estrogen receptor positive ([facility] [address] ) TECHNIQUE: Digital diagnostic mammography was performed  Computer Aided Detection (CAD) analyzed all applicable images  Computer Aided Detection (CAD) analyzed all applicable images  COMPARISONS: Prior breast imaging dated: 06/14/2021, 06/14/2021, 06/14/2021, 04/30/2021, 04/16/2021, 04/16/2021, 04/16/2021, 04/16/2021, 03/23/2021, 03/23/2021, 03/17/2020, 05/21/2019, 03/06/2019, 02/28/2018, 03/22/2016, and 04/21/2015 RELEVANT HISTORY: Family Breast Cancer History: No known family history of breast cancer  Family Medical History: No known relevant family medical history  Personal History: No known relevant hormone history  Surgical history includes breast biopsy and breast excisional biopsy  Medical history includes breast cancer  TISSUE DENSITY: The breasts are extremely dense, which lowers the sensitivity of mammography  INDICATION: Ric Scott is a 61 y o  female presenting for annual mammography  History right mastectomy  FINDINGS:  Previously biopsied left breast upper outer quadrant mass is unchanged  Left breast postsurgical scarring and distortion are unchanged  There are no suspicious masses, grouped microcalcifications or areas of unexplained architectural distortion  The skin and nipple areolar complex are unremarkable  Impression:  Benign findings  ASSESSMENT/BI-RADS CATEGORY: Left: 2 - Benign Overall: 2 - Benign RECOMMENDATION:      - Diagnostic mammogram in 1 year for the left breast  Workstation ID: [id number]      I reviewed the above imaging data  Advance Care Planning/Advance Directives:  Discussed disease status, cancer treatment plans and/or cancer treatment goals with the patient

## 2022-05-10 DIAGNOSIS — E55.9 VITAMIN D DEFICIENCY: ICD-10-CM

## 2022-05-10 RX ORDER — ACETAMINOPHEN 160 MG
TABLET,DISINTEGRATING ORAL
Qty: 30 CAPSULE | Refills: 0 | Status: SHIPPED | OUTPATIENT
Start: 2022-05-10 | End: 2022-06-08

## 2022-06-08 DIAGNOSIS — E55.9 VITAMIN D DEFICIENCY: ICD-10-CM

## 2022-06-08 RX ORDER — ACETAMINOPHEN 160 MG
TABLET,DISINTEGRATING ORAL
Qty: 30 CAPSULE | Refills: 0 | Status: SHIPPED | OUTPATIENT
Start: 2022-06-08 | End: 2022-07-11

## 2022-06-08 NOTE — TELEPHONE ENCOUNTER
"Subjective:       Patient ID: July S Sacha is a 24 y.o. female.    Vitals:  height is 5' 6" (1.676 m) and weight is 73.5 kg (162 lb). Her oral temperature is 98.3 °F (36.8 °C). Her blood pressure is 116/76 and her pulse is 66. Her respiration is 19 and oxygen saturation is 97%.     Chief Complaint: Otalgia (right) and Sore Throat    Otalgia   There is pain in the right ear. This is a new problem. The current episode started in the past 7 days (2 days ago). The problem occurs constantly. The problem has been unchanged. There has been no fever. The pain is at a severity of 7/10. The pain is moderate. Associated symptoms include neck pain and a sore throat. Pertinent negatives include no abdominal pain, coughing, diarrhea, ear discharge, headaches, hearing loss, rash or vomiting. She has tried NSAIDs for the symptoms. The treatment provided moderate relief. There is no history of a chronic ear infection or a tympanostomy tube.   Sore Throat   This is a new problem. The current episode started in the past 7 days (2 days ago). The problem has been unchanged. There has been no fever. The pain is at a severity of 1/10. The pain is mild. Associated symptoms include ear pain, a plugged ear sensation and neck pain. Pertinent negatives include no abdominal pain, congestion, coughing, diarrhea, drooling, ear discharge, headaches, shortness of breath, trouble swallowing or vomiting. She has had no exposure to strep. She has tried NSAIDs for the symptoms. The treatment provided mild relief.       Constitution: Negative for activity change, appetite change, chills, sweating, fatigue and generalized weakness.   HENT: Positive for ear pain and sore throat. Negative for ear discharge, hearing loss, drooling, tongue pain, congestion and trouble swallowing.         R ear pain for 2 days. Reports sore throat for a day but has resolved.    Neck: L side of neck pain Positive for neck pain. Negative for neck stiffness, neck swelling, " Called patient to follow up regarding her mammogram that she no showed to earlier this month  Explained that her mammogram needs to be completed before her appointment with ELZA Colón on 4/20  Phone number for central scheduling was provided to the patient so she could coordinate the study  Patient verbalized understanding of instructions and was appreciative of the phone call  degenerative disc disease and bulging disc disease.   Cardiovascular: Negative for chest pain, leg swelling, palpitations, sob on exertion and passing out.   Respiratory: Negative for cough and shortness of breath.    Gastrointestinal: Negative for abdominal pain, vomiting and diarrhea.   Musculoskeletal: Negative for pain and back pain.   Skin: Negative for rash and hives.   Allergic/Immunologic: Negative for hives.   Neurological: Negative for headaches, disorientation and altered mental status.   Psychiatric/Behavioral: Negative for altered mental status, disorientation and confusion.       Objective:      Physical Exam   Constitutional: She is oriented to person, place, and time. She appears well-developed. She is cooperative.  Non-toxic appearance. She does not appear ill. No distress.   HENT:   Head: Normocephalic and atraumatic.   Ears:   Right Ear: Hearing, tympanic membrane and external ear normal. There is swelling (ear canal red ) and tenderness (ear canal painful when using otoscope.). No drainage or cerumen not present. No foreign bodies. No mastoid tenderness. Tympanic membrane is not injected, not scarred, not perforated, not erythematous, not retracted and not bulging. No middle ear effusion. No hemotympanum. No decreased hearing is noted.   Left Ear: Hearing, tympanic membrane, external ear and ear canal normal. No drainage, swelling or tenderness. No foreign bodies. No mastoid tenderness. Tympanic membrane is not injected, not scarred, not perforated, not erythematous, not retracted and not bulging.  No middle ear effusion. No decreased hearing is noted.   Nose: Nose normal. No mucosal edema, rhinorrhea or nasal deformity. No epistaxis. Right sinus exhibits no maxillary sinus tenderness and no frontal sinus tenderness. Left sinus exhibits no maxillary sinus tenderness and no frontal sinus tenderness.   Mouth/Throat: Uvula is midline, oropharynx is clear and moist and mucous membranes are normal. No  trismus in the jaw. Normal dentition. No uvula swelling. No posterior oropharyngeal erythema.   Eyes: Conjunctivae and lids are normal. Right eye exhibits no discharge. Left eye exhibits no discharge. No scleral icterus.   Neck: Trachea normal and phonation normal. Neck supple.   Cardiovascular: Normal rate, regular rhythm, S1 normal, S2 normal, normal heart sounds and normal pulses.   Pulmonary/Chest: Effort normal and breath sounds normal. No accessory muscle usage or stridor. No apnea, no tachypnea and no bradypnea. No respiratory distress. She has no decreased breath sounds. She has no wheezes. She has no rhonchi. She has no rales.   Abdominal: Normal appearance and bowel sounds are normal. She exhibits no distension and no mass. Soft. There is no abdominal tenderness.   Musculoskeletal: Normal range of motion.         General: No deformity. Normal range of motion.   Neurological: She is alert and oriented to person, place, and time. She exhibits normal muscle tone. Coordination normal.   Skin: Skin is warm, dry, intact, not diaphoretic and not pale.   Psychiatric: Her speech is normal and behavior is normal. Judgment and thought content normal.   Nursing note and vitals reviewed.        Results for orders placed or performed in visit on 06/08/22   POCT Strep A, Molecular   Result Value Ref Range    Molecular Strep A, POC Negative Negative     Acceptable Yes      Assessment:       1. Otitis externa of right ear, unspecified chronicity, unspecified type    2. Sore throat          Plan:       Discussed with this is  otitis externa. Discussed strep results and to take chloraseptic as direct over the counter if sore throat comes back. Also reviewed to warm salt water gargles. Discussed to to use swimmers ear drops over the counter as directed by label as PT reports symptoms starting after swimming.  Discussed with PT to take full course of Floxin as prescribed, warm compresses to affected ear, elevate  head on a pillow at night. Discussed to use Tylenol or Motrin every 4 - 6 hours as needed for fever or ear pain. Reviewed to FU with your PCP in 1 week of initiating antibiotics or sooner for no improvement in symptoms. Discussed to FU in the ER for any worsening of symptoms such as new fever, increasing ear pain, neck stiffness, shortness of breath, etc. PT agrees with plan and treatment and verbalizes understandig. PT ambulatory out of clinic NAD.  Otitis externa of right ear, unspecified chronicity, unspecified type    Sore throat  -     POCT Strep A, Molecular    Other orders  -     ofloxacin (FLOXIN) 0.3 % otic solution; Place 5 drops into both ears 2 (two) times daily. for 7 days  Dispense: 4.67 mL; Refill: 0

## 2022-06-21 ENCOUNTER — TELEPHONE (OUTPATIENT)
Dept: HEMATOLOGY ONCOLOGY | Facility: CLINIC | Age: 57
End: 2022-06-21

## 2022-06-21 NOTE — TELEPHONE ENCOUNTER
Patient calling to confirm transportation  with [facility] for her appt 6/27/22 @ 10am   She has a DEXA  Scan appt    I confirmed with [facility]

## 2022-06-27 ENCOUNTER — HOSPITAL ENCOUNTER (OUTPATIENT)
Dept: RADIOLOGY | Age: 57
Discharge: HOME | End: 2022-06-27
Payer: COMMERCIAL

## 2022-06-27 DIAGNOSIS — M85.80 OSTEOPENIA, UNSPECIFIED LOCATION: ICD-10-CM

## 2022-06-27 PROCEDURE — 77080 DXA BONE DENSITY AXIAL: CPT

## 2022-07-09 DIAGNOSIS — E55.9 VITAMIN D DEFICIENCY: ICD-10-CM

## 2022-07-11 RX ORDER — ACETAMINOPHEN 160 MG
TABLET,DISINTEGRATING ORAL
Qty: 30 CAPSULE | Refills: 0 | Status: SHIPPED | OUTPATIENT
Start: 2022-07-11 | End: 2022-08-10

## 2022-07-12 NOTE — PROGRESS NOTES
BMI Counseling: Body mass index is 16 41 kg/m²  The BMI is below normal  Patient advised to gain weight and dietary education for weight gain was provided  Rationale for BMI follow-up plan is due to patient being underweight  Depression Screening and Follow-up Plan: Clincally patient does not have depression  No treatment is required  Assessment/Plan:         Problem List Items Addressed This Visit        Digestive    GERD without esophagitis     Patient currently taking Protonix 40 mg p o  Daily  To maintaining GERD diet  Endocrine    Hypothyroidism     Patient takes levothyroxine 50 mcg p o  Daily  TSH 1 32  Cardiovascular and Mediastinum    Hypertension     Patient has history of hypertension  Currently on atenolol 100 mg p o  Every other day  /80  Patient to maintain low-sodium diet  Currently patient is walking daily for 60 minutes  Recheck BP next office visit  Other    Vitamin D deficiency     Last vitamin-D level 86  Patient encouraged to get sunlight as well as continue on vitamin D3 50 mcg(2000 UT) p o  Daily  Follow-up exam - Primary            Subjective:      Patient ID: Shantel Cavanaugh is a 61 y o  female  Patient is a 61year old female present for routine follow-up office appoinment  The following portions of the patient's history were reviewed and updated as appropriate:   Past Medical History:  She has a past medical history of Abnormal weight loss, Anorexia nervosa, Breast cancer ([address] ), Disease of thyroid gland, Elevated blood sugar, Fibroadenoma of both breasts, GERD (gastroesophageal reflux disease), Hypertension, Hyperthyroidism, and Osteopenia  ,  _______________________________________________________________________  Medical Problems:  does not have any pertinent problems on file ,  _______________________________________________________________________  Past Surgical History:   has a past surgical history that includes Cholecystectomy; Breast cyst excision (Left, 2011); Breast biopsy (Right, 04/16/2021); US guided breast biopsy right complete (Right, 4/16/2021); US guidance breast biopsy right each additional (Right, 4/16/2021); US breast medardo  right (Right, 6/14/2021); US breast needle loc right each additional (Right, 6/14/2021); and Mastectomy w/ sentinel node biopsy (Right, 6/25/2021)  ,  _______________________________________________________________________  Family History:  family history is not on file  She was adopted  ,  _______________________________________________________________________  Social History:   reports that she has never smoked  She has never used smokeless tobacco  She reports current alcohol use of about 2 0 standard drinks of alcohol per week  She reports that she does not use drugs  ,  _______________________________________________________________________  Allergies:  has No Known Allergies     _______________________________________________________________________  Current Outpatient Medications   Medication Sig Dispense Refill    anastrozole (ARIMIDEX) 1 mg tablet Take 1 tablet (1 mg total) by mouth daily 90 tablet 1    atenolol (TENORMIN) 100 mg tablet Take 1 tablet (100 mg total) by mouth every other day Take 100 mg by mouth every other morning  90 tablet 1    Cholecalciferol (Vitamin D3) 50 MCG (2000 UT) capsule Take 1 capsule (2,000 Units total) by mouth daily Take 1 capsule every day by oral route for 30 days   30 capsule 0    levothyroxine 50 mcg tablet Take 1 tablet (50 mcg total) by mouth daily in the early morning 90 tablet 3    Multiple Vitamins-Minerals (Womens 50+ Multi Vitamin/Min) TABS Take 1 tablet by mouth daily 100 tablet 1    OLANZapine (ZyPREXA) 7 5 mg tablet Take 1 tablet (7 5 mg total) by mouth daily at bedtime 90 tablet 3    pantoprazole (PROTONIX) 40 mg tablet Take 1 tablet (40 mg total) by mouth daily 90 tablet 3    carbamide peroxide (DEBROX) 6 5 % otic solution Administer 5 drops into both ears 2 (two) times a day (Patient not taking: Reported on 7/14/2022) 15 mL 2    multivitamin (THERAGRAN) TABS       oxyCODONE-acetaminophen (PERCOCET) 5-325 mg per tablet Take 1 tablet by mouth every 6 (six) hours as needed for moderate painMax Daily Amount: 4 tablets (Patient not taking: Reported on 7/29/2021) 15 tablet 0     No current facility-administered medications for this visit      _______________________________________________________________________  Review of Systems   Constitutional: Negative for activity change, appetite change, chills, fatigue, fever and unexpected weight change  HENT: Negative for congestion, ear discharge, ear pain, nosebleeds, postnasal drip, rhinorrhea, sinus pressure, sinus pain, sneezing, sore throat and voice change  Eyes: Negative for pain, redness and visual disturbance  Respiratory: Negative for cough, chest tightness, shortness of breath and wheezing  Cardiovascular: Negative for chest pain and palpitations  Gastrointestinal: Negative for abdominal distention, abdominal pain, constipation, diarrhea, nausea and vomiting  Endocrine: Negative  Genitourinary: Negative for difficulty urinating, dysuria, flank pain, frequency, hematuria and urgency  Musculoskeletal: Negative for arthralgias and myalgias  Skin: Negative  Allergic/Immunologic: Negative  Neurological: Negative  Hematological: Negative  Psychiatric/Behavioral: Negative  Objective:  Vitals:    07/14/22 1008   BP: 130/80   BP Location: Left arm   Patient Position: Sitting   Cuff Size: Standard   Pulse: 70   Resp: 16   Temp: 97 6 °F (36 4 °C)   TempSrc: Temporal   SpO2: 97%   Weight: 43 4 kg (95 lb 9 6 oz)   Height: 5' 4" (1 626 m)     Body mass index is 16 41 kg/m²  Physical Exam  Vitals and nursing note reviewed  Constitutional:       Appearance: Normal appearance  She is well-developed  Comments: Slightly under weight  HENT:      Head: Normocephalic and atraumatic  Right Ear: Tympanic membrane, ear canal and external ear normal       Left Ear: Tympanic membrane, ear canal and external ear normal       Ears:      Comments: Cerumen bilateral ears  Nose: Nose normal  No congestion or rhinorrhea  Mouth/Throat:      Mouth: Mucous membranes are moist       Pharynx: No oropharyngeal exudate or posterior oropharyngeal erythema  Eyes:      Extraocular Movements: Extraocular movements intact  Conjunctiva/sclera: Conjunctivae normal       Pupils: Pupils are equal, round, and reactive to light  Cardiovascular:      Rate and Rhythm: Normal rate and regular rhythm  Pulses: Normal pulses  Heart sounds: Normal heart sounds  No murmur heard  Pulmonary:      Effort: Pulmonary effort is normal       Breath sounds: Normal breath sounds  Abdominal:      General: Bowel sounds are normal       Palpations: Abdomen is soft  Musculoskeletal:         General: Normal range of motion  Cervical back: Normal range of motion  Skin:     General: Skin is warm  Neurological:      General: No focal deficit present  Mental Status: She is alert and oriented to person, place, and time     Psychiatric:         Mood and Affect: Mood normal          Behavior: Behavior normal

## 2022-07-12 NOTE — PATIENT INSTRUCTIONS
gastroesophageal reflux disease   Gastroesophageal reflux diet discussed  Calcium and Osteoporosis   WHAT YOU NEED TO KNOW:   Why is calcium important for osteoporosis? Calcium is important for osteoporosis because calcium helps build bone mass  Osteoporosis is a long-term medical condition that causes your body to break down more bone than it makes  Your bones become weak, brittle, and more likely to fracture  How much calcium do I need? Women:      19 to 50 years: 1,000 mg    Over 50: 1,200 mg    Pregnant or breastfeeding, 19 to 50 years: 1,000 mg    Men:      19 to 70: 1,000 mg    Over 70: 1,200 mg    Which foods are high in calcium? The following list shows the number of calcium milligrams (mg) per serving  Your healthcare provider or dietitian can help you create a balanced meal plan for your calcium needs  Dairy:      1 cup of low-fat plain yogurt (415 mg) or low-fat fruit yogurt (245 to 384 mg)    1½ ounces of shredded cheddar cheese (306 mg) or part skim mozzarella cheese (275 mg)    1 cup of skim, 2%, or whole milk (300 mg)    1 cup of cottage cheese made with 2% milk fat (138 mg)    ½ cup of frozen yogurt (103 mg)    Other foods:      1 cup of calcium-fortified orange juice (300 mg)    ½ cup of cooked kalani greens (220 mg)    4 canned sardines, with bones (242 mg)    ½ cup of tofu (with added calcium) (204 mg)       How can I get extra calcium? Add powdered milk to puddings, cocoa, custard, or hot cereal     Sift powdered milk into flour when you make cakes, cookies, or breads  Use low-fat or fat-free milk instead of water in pancake mix, mashed potatoes, pudding, or hot breakfast cereal     Add low-fat or fat-free cheese to salad, soup, or pasta  Add tofu (with added calcium) to vegetable stir-rudd  Take calcium supplements if you cannot get enough calcium from the foods you eat  Your body can absorb the most calcium from supplements when you take 500 mg or less at one time   Do not take more than 2,500 mg of calcium supplements each day  CARE AGREEMENT:   You have the right to help plan your care  Discuss treatment options with your healthcare provider to decide what care you want to receive  You always have the right to refuse treatment  The above information is an  only  It is not intended as medical advice for individual conditions or treatments  Talk to your doctor, nurse or pharmacist before following any medical regimen to see if it is safe and effective for you  © Copyright [employer] 2022 Information is for End User's use only and may not be sold, redistributed or otherwise used for commercial purposes  All illustrations and images included in CareNotes® are the copyrighted property of A D A M , Inc  or [employer] [address]  Bone Density Test   WHAT YOU NEED TO KNOW:   What is bone density test?  A bone density test, or densitometry, is a scan that measures bone density  It is also called a dual energy x-ray absorptiometry (DXA) scan  The test uses x-rays to show if your bones have lost minerals, such as calcium  The loss can cause your bones to become weak and increase your risk for osteoporosis  A bone density test is usually done on your hip, spine, or forearm  It can also be done of your entire body  You may need the test to diagnose osteoporosis, or to check your risk of bone fractures  Your healthcare provider can also monitor changes in your bone density  A bone density test is recommended for healthy women 72 years or older, and healthy men 79 years or older  The test is also recommended for anyone who is at high risk for bone loss  How do I prepare for a bone density test?  Your healthcare provider will tell you how to prepare for the test  You may be told not to take calcium supplements the day of your test  You will be told what clothing to wear  Remove any metal that is near the body area being scanned   This includes jewelry, clothing with zippers, coins, body piercings, or an underwire bra  If you are a woman, tell your healthcare provider if you are or think you might be pregnant  There is a risk to your unborn baby from the radiation used during an x-ray  Your healthcare provider will talk to you about this risk  What happens during a bone density test?  You will lie on a table  A scanner will pass over the area and take pictures  You will need to stay still during your scan so the pictures of your bones are clear  The scan lasts between 10 and 30 minutes, depending on the area being scanned  What are the risks of a bone density test?  The scan may not show bone loss, and you may not get needed treatment  The scan may show abnormal bone density when you have had no bone loss  If this occurs, you may get treatment you do not need  You will be exposed to a small amount of radiation from the x-ray machine  CARE AGREEMENT:   You have the right to help plan your care  Learn about your health condition and how it may be treated  Discuss treatment options with your healthcare providers to decide what care you want to receive  You always have the right to refuse treatment  The above information is an  only  It is not intended as medical advice for individual conditions or treatments  Talk to your doctor, nurse or pharmacist before following any medical regimen to see if it is safe and effective for you  © Copyright [employer] 2022 Information is for End User's use only and may not be sold, redistributed or otherwise used for commercial purposes  All illustrations and images included in CareNotes® are the copyrighted property of [employer]  or [facility] [address]   Osteoporosis   WHAT YOU NEED TO KNOW:   What is osteoporosis? Osteoporosis is a long-term medical condition that causes your bones to become weak, brittle, and more likely to fracture  Osteoporosis occurs when your body absorbs more bone than it makes   It is also caused by a lack of calcium and estrogen (female hormone)  What increases my risk for osteoporosis? Age older than 28    Low estrogen levels    Female gender    Alcohol, tobacco, or caffeine    Lack of calcium and vitamin D in your foods    Lack of exercise    Some illnesses, such as thyroid diseases, bone cancer, and long-term lung diseases    Certain medicines, such as steroids, anticonvulsants, and blood-thinners    What are the signs and symptoms of osteoporosis? You may not have any signs or symptoms  You may break a bone after a muscle strain, bump, or fall  A break usually occurs in the hip, spine, or wrist  A collapsed vertebra (bone in your spine) may cause severe back pain or loss of height from bent posture  How is osteoporosis diagnosed? Blood and urine tests  measure your calcium, vitamin D, and estrogen levels  An x-ray or CT  may show thinned bones or a fracture  You may be given contrast liquid to help the bones show up better in the pictures  Tell the healthcare provider if you have ever had an allergic reaction to contrast liquid  Do not enter the MRI room with anything metal  Metal can cause serious injury  Tell the healthcare provider if you have any metal in or on your body  A bone density test  compares your bone thickness with what is expected for someone of your age, gender, and ethnicity  How is osteoporosis treated? Medicines may be given to prevent bone loss, build new bone, and increase estrogen  These medicines help prevent fractures and may be given as a pill or injection  Ask your healthcare provider for more information on these medicines  How can I help prevent bone loss? Eat healthy foods that are high in calcium  This helps keep your bones strong  Good sources of calcium are milk, cheese, broccoli, tofu, almonds, and canned salmon and sardines  Increase your vitamin D intake  Vitamin D is in fish oils, some vegetables, and fortified milk, cereal, and bread  Vitamin D is also formed in the skin when it is exposed to the sun  Ask your healthcare provider how much sunlight is safe for you  Drink liquids as directed  Ask your healthcare provider how much liquid to drink each day and which liquids are best for you  Do not have alcohol or caffeine  They decrease bone mineral density, which can weaken your bones  Exercise regularly  Ask your healthcare provider about the best exercise plan for you  Weight bearing exercise for 30 minutes, 3 times a week can help build and strengthen bone  Do not smoke  Nicotine and other chemicals in cigarettes and cigars can cause lung damage  Ask your healthcare provider for information if you currently smoke and need help to quit  E-cigarettes or smokeless tobacco still contain nicotine  Talk to your healthcare provider before you use these products  Go to physical therapy as directed  A physical therapist teaches you exercises to help improve movement and muscle strength  When should I call my doctor? You have severe pain  You have increasing pain after a fall  You have pain when you do your daily activities  You have questions or concerns about your condition or care  CARE AGREEMENT:   You have the right to help plan your care  Learn about your health condition and how it may be treated  Discuss treatment options with your healthcare providers to decide what care you want to receive  You always have the right to refuse treatment  The above information is an  only  It is not intended as medical advice for individual conditions or treatments  Talk to your doctor, nurse or pharmacist before following any medical regimen to see if it is safe and effective for you  © Copyright [employer] 2022 Information is for End User's use only and may not be sold, redistributed or otherwise used for commercial purposes   All illustrations and images included in CareNotes® are the copyrighted property of A D A M , Inc  or [address]     Hypertension   WHAT YOU NEED TO KNOW:   What is hypertension? Hypertension is high blood pressure  Your blood pressure is the force of your blood moving against the walls of your arteries  Hypertension causes your blood pressure to get so high that your heart has to work much harder than normal  This can damage your heart  The cause of hypertension may not be known  This is called essential or primary hypertension  Hypertension caused by another medical condition, such as kidney disease, is called secondary hypertension  What do I need to know about the stages of hypertension? Normal blood pressure is 119/79 or lower   Your healthcare provider may only check your blood pressure each year if it stays at a normal level  Elevated blood pressure is 120/79 to 129/79   This is sometimes called prehypertension  Your healthcare provider may suggest lifestyle changes to help lower your blood pressure to a normal level  He or she may then check it again in 3 to 6 months  Stage 1 hypertension is 130/80  to 139/89   Your provider may recommend lifestyle changes, medication, and checks every 3 to 6 months until your blood pressure is controlled  Stage 2 hypertension is 140/90 or higher   Your provider will recommend lifestyle changes and have you take 2 kinds of hypertension medicines  You will also need to have your blood pressure checked monthly until it is controlled  What increases my risk for hypertension? Age older than 54 years (men) or 72 (women)    Stress, or a family history of hypertension or heart disease    Obesity, lack of exercise, or too many high-sodium foods    Use of tobacco, alcohol, or illegal drugs    A medical condition, such as diabetes, kidney disease, thyroid disease, or adrenal gland disorder    Certain medicines, such as steroids or birth control pills    What are the signs and symptoms of hypertension?   You may have no signs or symptoms, or you may have any of the following:  Headache    Blurred vision    Chest pain    Dizziness or weakness    Trouble breathing    Nosebleeds    How is hypertension diagnosed? Your healthcare provider will take your blood pressure at several visits  You may also need to check your blood pressure at home  The provider will examine you and ask what medicines you take  He or she will also ask if you have a family history of high blood pressure and about any health conditions you have  He or she will also check your blood pressure and weight and examine your heart, lungs, and eyes  You may need any of the following tests: An ambulatory blood pressure monitor (ABPM)  is a device that you wear  ABPM measures your blood pressure while you do your regular daily activities  It records your blood pressure every 15 to 30 minutes during the day  It also records your blood pressure every 15 minutes to 1 hour at night  The recorded blood pressures help your healthcare provider know if you have hypertension not seen at your appointment  Blood tests  may help healthcare providers find the cause of your hypertension  Blood tests can also help find other health problems caused by hypertension  Urine tests  will be done to check your kidney function  Kidney problems can increase your risk for hypertension  Which medicines are used to treat hypertension? Antihypertensives  may be used to help lower your blood pressure  Several kinds of medicines are available  Your healthcare provider will choose medicines based on the kind of hypertension you have  You may need more than one type of medicine  Take the medicine exactly as directed  Diuretics  help decrease extra fluid that collects in your body  This will help lower your blood pressure  You may urinate more often while you take this medicine  Cholesterol medicine  helps lower your cholesterol level   A low cholesterol level helps prevent heart disease and makes it easier to control your blood pressure  What can I do to manage hypertension? Check your blood pressure at home  Avoid smoking, caffeine, and exercise at least 30 minutes before checking your blood pressure  Sit and rest for 5 minutes before you take your blood pressure  Extend your arm and support it on a flat surface  Your arm should be at the same level as your heart  Follow the directions that came with your blood pressure monitor  Check your blood pressure 2 times, 1 minute apart, before you take your medicine in the morning  Also check your blood pressure before your evening meal  Keep a record of your readings and bring it to your follow-up visits  Ask your healthcare provider what your blood pressure should be  Manage any other health conditions you have  Health conditions such as diabetes can increase your risk for hypertension  Follow your healthcare provider's instructions and take all your medicines as directed  Ask about all medicines  Certain medicines can increase your blood pressure  Examples include oral birth control pills, decongestants, herbal supplements, and NSAIDs, such as ibuprofen  Your healthcare provider can tell you which medicines are safe for you to take  This includes prescription and over-the-counter medicines  What lifestyle changes can I make to manage hypertension? Your healthcare provider may recommend you work with a team to manage hypertension  The team may include medical experts such as a dietitian, an exercise or physical therapist, and a behavior therapist  Your family members may be included in helping you create lifestyle changes  Limit sodium (salt) as directed  Too much sodium can affect your fluid balance  Check labels to find low-sodium or no-salt-added foods  Some low-sodium foods use potassium salts for flavor  Too much potassium can also cause health problems   Your healthcare provider will tell you how much sodium and potassium are safe for you to have in a day  He or she may recommend that you limit sodium to 2,300 mg a day  Follow the meal plan recommended by your healthcare provider  A dietitian or your provider can give you more information on low-sodium plans or the DASH (Dietary Approaches to Stop Hypertension) eating plan  The DASH plan is low in sodium, processed sugar, unhealthy fats, and total fat  It is high in potassium, calcium, and fiber  These can be found in vegetables, fruit, and whole-grain foods  Be physically active throughout the day  Physical activity, such as exercise, can help control your blood pressure and your weight  Be physically active for at least 30 minutes per day, on most days of the week  Include aerobic activity, such as walking or riding a bicycle  Also include strength training at least 2 times each week  Your healthcare providers can help you create a physical activity plan  Decrease stress  This may help lower your blood pressure  Learn ways to relax, such as deep breathing or listening to music  Limit alcohol as directed  Alcohol can increase your blood pressure  A drink of alcohol is 12 ounces of beer, 5 ounces of wine, or 1½ ounces of liquor  Do not smoke  Nicotine and other chemicals in cigarettes and cigars can increase your blood pressure and also cause lung damage  Ask your healthcare provider for information if you currently smoke and need help to quit  E-cigarettes or smokeless tobacco still contain nicotine  Talk to your healthcare provider before you use these products  Call your local emergency number [phone] in the [facility] [facility]) or have someone call if:   You have chest pain      You have any of the following signs of a heart attack:      Squeezing, pressure, or pain in your chest    You may  also have any of the following:     Discomfort or pain in your back, neck, jaw, stomach, or arm    Shortness of breath    Nausea or vomiting    Lightheadedness or a sudden cold sweat    You become confused or have trouble speaking  You suddenly feel lightheaded or have trouble breathing  When should I seek immediate care? You have a severe headache or vision loss  You have weakness in an arm or leg  When should I call my doctor? You feel faint, dizzy, confused, or drowsy  You have been taking your blood pressure medicine but your pressure is higher than your provider says it should be  You have questions or concerns about your condition or care  CARE AGREEMENT:   You have the right to help plan your care  Learn about your health condition and how it may be treated  Discuss treatment options with your healthcare providers to decide what care you want to receive  You always have the right to refuse treatment  The above information is an  only  It is not intended as medical advice for individual conditions or treatments  Talk to your doctor, nurse or pharmacist before following any medical regimen to see if it is safe and effective for you  © Copyright [employer] 2022 Information is for End User's use only and may not be sold, redistributed or otherwise used for commercial purposes  All illustrations and images included in CareNotes® are the copyrighted property of [employer]  or [facility] [employer]   Vitamin D (By mouth)   Vitamin D (VYE-ta-min D)  Maintains calcium and phosphorus in your body and makes your bones strong  This medicine is a vitamin  Brand Name(s): Baby Vitamin D, Basic's Natural Vitamin D-3, Calcidol, Penaloza Baby Ddrops, Marine Pall, [place] [employer], Decara, Delta D3, Dialyvite Vitamin D3 Max, Drisdol, Enfamil D-Vi-Sol, Infants Aqueous Vitamin D, Shankar Natural , Nature's Blend Super Strength Vitamin D3, Thera-D 2000   There may be other brand names for this medicine  When This Medicine Should Not Be Used: You should not use this medicine if you have had an allergic reaction to vitamin D    How to Use This Medicine:   Tablet, Liquid, Liquid Filled Capsule  Your doctor will tell you how much medicine to use  Do not use more than directed  Follow the instructions on the medicine label if you are using this medicine without a prescription  It is best to take this medicine with food or milk  Carefully follow your doctor's instructions about any special diet  If a dose is missed: Take a dose as soon as you remember  If it is almost time for your next dose, wait until then and take a regular dose  Do not take extra medicine to make up for a missed dose  How to Store and Dispose of This Medicine:   Store the medicine in a closed container at room temperature, away from heat, moisture, and direct light  Ask your pharmacist, doctor, or health caregiver about the best way to dispose of any outdated medicine or medicine no longer needed  Keep all medicine out of the reach of children  Never share your medicine with anyone  Drugs and Foods to Avoid:   Ask your doctor or pharmacist before using any other medicine, including over-the-counter medicines, vitamins, and herbal products  Make sure your doctor knows about all other medicines you are using  Warnings While Using This Medicine:   Make sure your doctor knows if you are pregnant or breast feeding  Make sure your doctor knows if you have kidney stones, sarcoidosis, or overactive parathyroid gland  You should not use this medicine if you are under 25years of age  Possible Side Effects While Using This Medicine:   Call your doctor right away if you notice any of these side effects: Allergic reaction: Itching or hives, swelling in your face or hands, swelling or tingling in your mouth or throat, chest tightness, trouble breathing  Change in how much or how often you urinate  If you notice these less serious side effects, talk with your doctor:   Diarrhea  Headache  Weight loss    If you notice other side effects that you think are caused by this medicine, tell your doctor  Call your doctor for medical advice about side effects  You may report side effects to FDA at 4-898-FDA-1174    © Copyright [employer] 2022 Information is for End User's use only and may not be sold, redistributed or otherwise used for commercial purposes  The above information is an  only  It is not intended as medical advice for individual conditions or treatments  Talk to your doctor, nurse or pharmacist before following any medical regimen to see if it is safe and effective for you  Hypothyroidism   WHAT YOU NEED TO KNOW:   What is hypothyroidism? Hypothyroidism is a condition that develops when the thyroid gland does not make enough thyroid hormone  Thyroid hormones help control body temperature, heart rate, growth, and weight  What causes or increases my risk for hypothyroidism? A family history of hypothyroidism    Age 61 years or older or being female    Autoimmune disease, such as inflammation of your thyroid, or Hashimoto disease    Thyroid surgery, head or neck radiation therapy, or medicines such as lithium, sedatives, or narcotics    Thyroid cancer, a goiter, or a viral infection    Low iodine level    Down syndrome or Farah syndrome    What are the signs and symptoms of hypothyroidism? The signs and symptoms may develop slowly, sometimes over several years  Exhaustion, depression, or irritability    Sensitivity to cold    Muscle aches, headaches, weakness, or trouble concentrating    Dry, flaky skin, brittle nails, or thinning hair    Recent weight gain without overeating, or constipation    Heavy or irregular monthly periods    Puffiness around your eyes or swelling in your hands and feet    Low heart rate, changes in your blood pressure    How is hypothyroidism diagnosed? Your healthcare provider will ask about your symptoms and what medicines you take   He or she will ask about your medical history and if anyone in your family has hypothyroidism  A blood test will show your thyroid hormone level  How is hypothyroidism treated? Thyroid hormone replacement medicine may bring your thyroid hormone level back to normal  You will need to take this medicine for the rest of your life to control hypothyroidism  It is important to take the medicine every day as directed  You will be given instructions for what to do if you miss a dose  Ask your healthcare provider for more information on other medicines you may need  How can I manage hypothyroidism? Get more iodine  The thyroid gland uses iodine to work correctly and to make thyroid hormones  Your healthcare provider may tell you to eat foods that are rich in iodine  He or she will tell you how much of these foods to eat  Milk and seafood are good sources of iodine  You may also need iodine supplements  Keep track of your blood pressure and weight:      Check your blood pressure  and write it down as often as directed  It is important to measure your blood pressure on the same arm and in the same position each time  Keep track of your blood pressure readings, along with the date and time you took them  Take this record with you to your followup visits  Weigh yourself daily  before breakfast after you urinate  Weight gain may be a sign of extra fluid in your body  Keep track of your daily weights and take the record with you to your followup visits  Call your local emergency number [phone] in the [address] [facility] or have someone call if:   You have sudden chest pain or shortness of breath  You have a seizure  You feel like you are going to faint  When should I seek immediate care? You have diarrhea, tremors, or trouble sleeping  Your legs, ankles, or feet are swollen  When should I call my doctor? You have a fever  You have chills, a cough, or feel weak and achy  You have pain and swelling in your muscles and joints      Your skin is itchy, swollen, or you have a rash     Your signs and symptoms return or get worse, even after treatment  You have questions or concerns about your condition or care  CARE AGREEMENT:   You have the right to help plan your care  Learn about your health condition and how it may be treated  Discuss treatment options with your healthcare providers to decide what care you want to receive  You always have the right to refuse treatment  The above information is an  only  It is not intended as medical advice for individual conditions or treatments  Talk to your doctor, nurse or pharmacist before following any medical regimen to see if it is safe and effective for you  © Copyright [employer] 2022 Information is for End User's use only and may not be sold, redistributed or otherwise used for commercial purposes   All illustrations and images included in CareNotes® are the copyrighted property of [employer]  or [address] [place] [address]

## 2022-07-14 ENCOUNTER — OFFICE VISIT (OUTPATIENT)
Dept: FAMILY MEDICINE CLINIC | Facility: CLINIC | Age: 57
End: 2022-07-14
Payer: COMMERCIAL

## 2022-07-14 VITALS
TEMPERATURE: 97.6 F | RESPIRATION RATE: 16 BRPM | DIASTOLIC BLOOD PRESSURE: 80 MMHG | SYSTOLIC BLOOD PRESSURE: 130 MMHG | BODY MASS INDEX: 16.32 KG/M2 | WEIGHT: 95.6 LBS | OXYGEN SATURATION: 97 % | HEIGHT: 64 IN | HEART RATE: 70 BPM

## 2022-07-14 DIAGNOSIS — Z09 FOLLOW-UP EXAM: Primary | ICD-10-CM

## 2022-07-14 DIAGNOSIS — E55.9 VITAMIN D DEFICIENCY: ICD-10-CM

## 2022-07-14 DIAGNOSIS — I10 PRIMARY HYPERTENSION: ICD-10-CM

## 2022-07-14 DIAGNOSIS — E03.8 OTHER SPECIFIED HYPOTHYROIDISM: ICD-10-CM

## 2022-07-14 DIAGNOSIS — K21.9 GERD WITHOUT ESOPHAGITIS: ICD-10-CM

## 2022-07-14 PROCEDURE — 99215 OFFICE O/P EST HI 40 MIN: CPT | Performed by: NURSE PRACTITIONER

## 2022-07-14 NOTE — ASSESSMENT & PLAN NOTE
Last vitamin-D level 86  Patient encouraged to get sunlight as well as continue on vitamin D3 50 mcg(2000 UT) p o  Daily

## 2022-07-14 NOTE — ASSESSMENT & PLAN NOTE
Patient has history of hypertension  Currently on atenolol 100 mg p o  Every other day  /80  Patient to maintain low-sodium diet  Currently patient is walking daily for 60 minutes  Recheck BP next office visit

## 2022-08-10 ENCOUNTER — OFFICE VISIT (OUTPATIENT)
Dept: OBGYN CLINIC | Facility: CLINIC | Age: 57
End: 2022-08-10
Payer: COMMERCIAL

## 2022-08-10 VITALS
SYSTOLIC BLOOD PRESSURE: 122 MMHG | BODY MASS INDEX: 16.32 KG/M2 | DIASTOLIC BLOOD PRESSURE: 68 MMHG | WEIGHT: 95.6 LBS | HEIGHT: 64 IN

## 2022-08-10 DIAGNOSIS — E55.9 VITAMIN D DEFICIENCY: ICD-10-CM

## 2022-08-10 DIAGNOSIS — M81.0 AGE-RELATED OSTEOPOROSIS WITHOUT CURRENT PATHOLOGICAL FRACTURE: Primary | ICD-10-CM

## 2022-08-10 PROCEDURE — 99214 OFFICE O/P EST MOD 30 MIN: CPT | Performed by: OBSTETRICS & GYNECOLOGY

## 2022-08-10 RX ORDER — ACETAMINOPHEN 160 MG
TABLET,DISINTEGRATING ORAL
Qty: 30 CAPSULE | Refills: 0 | Status: SHIPPED | OUTPATIENT
Start: 2022-08-10 | End: 2022-09-08

## 2022-08-11 NOTE — PROGRESS NOTES
Assessment/Plan:     Diagnoses and all orders for this visit:    Age-related osteoporosis without current pathological fracture      65-year-old female   Nulliparous  Low BMI   Post menopause  Had breast cancer with right mastectomy   Osteoporosis T-score-3   Plan   Management option for osteoporosis explained and discussed with patient I would recommend patient to take her daily calcium and vitamin-D  Weight-bearing exercise   Weight gain highly recommended   Patient also to come back for evenity based on her T scare and based on her severity of her osteoporosis patient also has bad acid reflux is not candidate for oral bisphosphonate plan explained and discussed with patient all patient questions answered and patient was satisfied    Subjective:      Patient ID: Sapphire Doe is a 61 y o  female      HPI  65-year-old female presents to the office today to discuss results of DEXA scan was done secondary to history of osteopenia DEXA scan results review and discussed with patient finding of severe osteoporosis discussed with patient management option for osteoporosis discussed with patient important of taking her daily calcium and vitamin-D important of doing weight-bearing exercise important of weight gain as patient is currently has low BMI discussed with patient as well as medical treatment for osteoporosis discussed patient mention she has bad acid reflux is not candidate for oral bisphosphonate based on her T-score she will be candidate for Evenity   Medication explained and discussed with patient with risk benefit side effect all patient questions answered and patient was satisfied to return to office for Evenity monthly for 1 year then Prolia every 6 months will repeat DEXA scan  The following portions of the patient's history were reviewed and updated as appropriate: allergies, current medications, past family history, past medical history, past social history, past surgical history and problem list     Review of Systems      Objective:      /68 (BP Location: Left arm, Patient Position: Sitting, Cuff Size: Adult)   Ht 5' 4" (1 626 m)   Wt 43 4 kg (95 lb 9 6 oz)   BMI 16 41 kg/m²        RESULTS:      LUMBAR SPINE L1-L4 :   BMD  0 651  gm/cm2   T-score -3 6         LEFT  TOTAL HIP:   BMD:  0 542  gm/cm2   T-score:  -3 3     LEFT  FEMORAL NECK:   BMD:  0 485  gm/cm2   T score: -3 3         IMPRESSION:     1  Osteoporosis      2  The 10 year risk of hip fracture is 3 8% with the 10 year risk of major osteoporotic fracture being 12% as calculated by the [facility]/WHO fracture risk assessment tool (FRAX)     3  The current NOF guidelines recommend treating patients with a T-score of -2 5 or less in the lumbar spine or hips, or in post-menopausal women and men over the age of 48 with low bone mass (osteopenia) and a FRAX 10 year risk score of >3% for hip   fracture and/or >20% for major osteoporotic fracture      4  The NOF recommends follow-up DXA in 1-2 years after initiating therapy for osteoporosis and every 2 years thereafter  More frequent evaluation is appropriate for patients with conditions associated with rapid bone loss, such as glucocorticoid   therapy  The interval between DXA screenings may be longer for individuals without major risk factors and initial T-score in the normal or upper low bone mass range            Physical Exam  Constitutional:       Appearance: Normal appearance  Neurological:      General: No focal deficit present  Mental Status: She is alert and oriented to person, place, and time     Psychiatric:         Mood and Affect: Mood normal          Behavior: Behavior normal

## 2022-09-08 DIAGNOSIS — E55.9 VITAMIN D DEFICIENCY: ICD-10-CM

## 2022-09-08 RX ORDER — ACETAMINOPHEN 160 MG
TABLET,DISINTEGRATING ORAL
Qty: 30 CAPSULE | Refills: 0 | Status: SHIPPED | OUTPATIENT
Start: 2022-09-08 | End: 2022-10-10

## 2022-09-13 DIAGNOSIS — E56.9 VITAMIN DEFICIENCY: Primary | ICD-10-CM

## 2022-09-13 RX ORDER — DIPHENOXYLATE HYDROCHLORIDE AND ATROPINE SULFATE 2.5; .025 MG/1; MG/1
TABLET ORAL
Qty: 30 TABLET | Refills: 0 | Status: SHIPPED | OUTPATIENT
Start: 2022-09-13

## 2022-10-10 DIAGNOSIS — E55.9 VITAMIN D DEFICIENCY: ICD-10-CM

## 2022-10-10 RX ORDER — ACETAMINOPHEN 160 MG
TABLET,DISINTEGRATING ORAL
Qty: 30 CAPSULE | Refills: 0 | Status: SHIPPED | OUTPATIENT
Start: 2022-10-10

## 2022-10-12 PROBLEM — H61.23 EXCESSIVE CERUMEN IN EAR CANAL, BILATERAL: Status: RESOLVED | Noted: 2022-01-10 | Resolved: 2022-10-12

## 2022-10-25 ENCOUNTER — TELEPHONE (OUTPATIENT)
Dept: HEMATOLOGY ONCOLOGY | Facility: CLINIC | Age: 57
End: 2022-10-25

## 2022-10-25 NOTE — TELEPHONE ENCOUNTER
Appointment Cancellation Or Reschedule     Person calling In self   If other than patient calling, are they listed on the communication consent form? self   Provider [id number] [place]   Office Visit Date and Time 10/25 10:30Am   Office Visit Location [facility]   Did patient want to reschedule their office appointment? If so, when was it scheduled to? Yes, 11/23 10:30AM   Did you have STAR scheduled for this appointment? YES   Do you need STAR set up for your new appointment? If yes, please send to "PATIENT RIDESHARE" pool for STAR rescheduling YES   If you are cancelling appointment, can we notify STAR to cancel ride? If yes, please send to "PATIENT RIDESHARE" pool for STAR to cancel service YES   Is this patient calling to reschedule an infusion appointment? no   When is their next infusion appointment? none   Is this patient a Chemo patient? no   Reason for Cancellation or Reschedule Missed appmt     If the patient is a treatment patient, please route this to the office nurse  If the patient is not on treatment, please route to the office MA  If the patient is a surgical oncology patient, please route to surg/onc clinical pool

## 2022-11-02 ENCOUNTER — VBI (OUTPATIENT)
Dept: ADMINISTRATIVE | Facility: OTHER | Age: 57
End: 2022-11-02

## 2022-11-09 DIAGNOSIS — E55.9 VITAMIN D DEFICIENCY: ICD-10-CM

## 2022-11-09 RX ORDER — ACETAMINOPHEN 160 MG
TABLET,DISINTEGRATING ORAL
Qty: 30 CAPSULE | Refills: 0 | Status: SHIPPED | OUTPATIENT
Start: 2022-11-09

## 2022-11-14 DIAGNOSIS — E56.9 VITAMIN DEFICIENCY: ICD-10-CM

## 2022-11-14 RX ORDER — DIPHENOXYLATE HYDROCHLORIDE AND ATROPINE SULFATE 2.5; .025 MG/1; MG/1
TABLET ORAL
Qty: 30 TABLET | Refills: 0 | Status: SHIPPED | OUTPATIENT
Start: 2022-11-14

## 2022-11-16 ENCOUNTER — TELEPHONE (OUTPATIENT)
Dept: SURGICAL ONCOLOGY | Facility: CLINIC | Age: 57
End: 2022-11-16

## 2022-12-09 DIAGNOSIS — E55.9 VITAMIN D DEFICIENCY: ICD-10-CM

## 2022-12-12 RX ORDER — ACETAMINOPHEN 160 MG
TABLET,DISINTEGRATING ORAL
Qty: 30 CAPSULE | Refills: 0 | Status: SHIPPED | OUTPATIENT
Start: 2022-12-12

## 2023-01-09 DIAGNOSIS — E56.9 VITAMIN DEFICIENCY: ICD-10-CM

## 2023-01-09 DIAGNOSIS — E55.9 VITAMIN D DEFICIENCY: ICD-10-CM

## 2023-01-09 RX ORDER — ACETAMINOPHEN 160 MG
TABLET,DISINTEGRATING ORAL
Qty: 30 CAPSULE | Refills: 0 | Status: SHIPPED | OUTPATIENT
Start: 2023-01-09 | End: 2023-01-18 | Stop reason: SDUPTHER

## 2023-01-09 RX ORDER — DIPHENOXYLATE HYDROCHLORIDE AND ATROPINE SULFATE 2.5; .025 MG/1; MG/1
TABLET ORAL
Qty: 30 TABLET | Refills: 0 | Status: SHIPPED | OUTPATIENT
Start: 2023-01-09

## 2023-01-10 PROBLEM — Z00.8 EXAM FOR POPULATION SURVEY: Status: ACTIVE | Noted: 2023-01-10

## 2023-01-13 DIAGNOSIS — I10 HYPERTENSION, UNSPECIFIED TYPE: ICD-10-CM

## 2023-01-16 DIAGNOSIS — I10 HYPERTENSION, UNSPECIFIED TYPE: ICD-10-CM

## 2023-01-16 RX ORDER — OLANZAPINE 7.5 MG/1
TABLET ORAL
Qty: 30 TABLET | Refills: 0 | Status: SHIPPED | OUTPATIENT
Start: 2023-01-16

## 2023-01-16 RX ORDER — ATENOLOL 100 MG/1
100 TABLET ORAL EVERY OTHER DAY
Qty: 45 TABLET | Refills: 0 | OUTPATIENT
Start: 2023-01-16

## 2023-01-18 ENCOUNTER — OFFICE VISIT (OUTPATIENT)
Dept: FAMILY MEDICINE CLINIC | Facility: CLINIC | Age: 58
End: 2023-01-18

## 2023-01-18 VITALS
HEIGHT: 64 IN | RESPIRATION RATE: 16 BRPM | BODY MASS INDEX: 18.13 KG/M2 | WEIGHT: 106.2 LBS | TEMPERATURE: 98.3 F | DIASTOLIC BLOOD PRESSURE: 66 MMHG | SYSTOLIC BLOOD PRESSURE: 110 MMHG | OXYGEN SATURATION: 98 % | HEART RATE: 88 BPM

## 2023-01-18 DIAGNOSIS — Z00.8 EXAM FOR POPULATION SURVEY: ICD-10-CM

## 2023-01-18 DIAGNOSIS — I10 HYPERTENSION, UNSPECIFIED TYPE: ICD-10-CM

## 2023-01-18 DIAGNOSIS — Z13.220 SCREENING, LIPID: ICD-10-CM

## 2023-01-18 DIAGNOSIS — E55.9 VITAMIN D DEFICIENCY: ICD-10-CM

## 2023-01-18 DIAGNOSIS — Z12.11 COLON CANCER SCREENING: ICD-10-CM

## 2023-01-18 DIAGNOSIS — Z13.89 SCREENING FOR BLOOD OR PROTEIN IN URINE: ICD-10-CM

## 2023-01-18 DIAGNOSIS — K21.9 GERD WITHOUT ESOPHAGITIS: ICD-10-CM

## 2023-01-18 DIAGNOSIS — I10 PRIMARY HYPERTENSION: Primary | ICD-10-CM

## 2023-01-18 DIAGNOSIS — E03.8 OTHER SPECIFIED HYPOTHYROIDISM: ICD-10-CM

## 2023-01-18 DIAGNOSIS — J30.2 SEASONAL ALLERGIES: ICD-10-CM

## 2023-01-18 RX ORDER — ACETAMINOPHEN 160 MG
TABLET,DISINTEGRATING ORAL
Qty: 90 CAPSULE | Refills: 1 | Status: SHIPPED | OUTPATIENT
Start: 2023-01-18

## 2023-01-18 RX ORDER — ATENOLOL 100 MG/1
100 TABLET ORAL EVERY OTHER DAY
Qty: 90 TABLET | Refills: 1 | Status: SHIPPED | OUTPATIENT
Start: 2023-01-18

## 2023-01-18 NOTE — ASSESSMENT & PLAN NOTE
Instructed to take over-the-counter allergy medications routinely during change of season  Also may use over-the-counter Flonase as directed

## 2023-01-18 NOTE — PROGRESS NOTES
BMI Counseling: Body mass index is 18 23 kg/m²  The BMI is below normal  Patient advised to gain weight and dietary education for weight gain was provided  Rationale for BMI follow-up plan is due to patient being underweight  Depression Screening and Follow-up Plan: Patient was screened for depression during today's encounter  They screened negative with a PHQ-2 score of 0  Assessment/Plan:         Problem List Items Addressed This Visit        Digestive    GERD without esophagitis     Patient to maintain a GERD diet  He maintained on Protonix 40 mg daily  Endocrine    Hypothyroidism     Patient has history of hypothyroidism  Maintained on levothyroxine 50 mg p o  daily  Patient needs TSH, third generation with free T4 reflex lab work done  Relevant Medications    atenolol (TENORMIN) 100 mg tablet    Other Relevant Orders    TSH, 3rd generation with Free T4 reflex       Cardiovascular and Mediastinum    Hypertension - Primary     Patient has history of hypertension  /66  Currently maintained on atenolol 100 mg every other day  To maintain a low-sodium diet  Exercise encouraged 3-5 times per week for at least 75 minutes of cardiovascular activity  Relevant Medications    atenolol (TENORMIN) 100 mg tablet       Other    Vitamin D deficiency     Patient have repeat vitamin D level for evaluation  Currently maintained on cholecalciferol vitamin D3 50 mcg 2000 unit capsule daily  Patient encouraged to eat vitamin D rich foods  Relevant Medications    Cholecalciferol (Vitamin D3) 50 MCG (2000 UT) capsule    Screening, lipid     Patient had repeat lipid panel at this time  Instructed to maintain a low-cholesterol low-fat diet  Relevant Orders    Lipid panel    Exam for population survey     CBC with differential and CMP ordered at this time           Relevant Orders    CBC and differential    Comprehensive metabolic panel    Seasonal allergies Instructed to take over-the-counter allergy medications routinely during change of season  Also may use over-the-counter Flonase as directed  Subjective:      Patient ID: Redd Moss is a 61 y o  female  Patient is a 61year old female here for refill of medication  Past medical history of GERD, Hypertension, Hypothyroidism, Vitamin D, Malignant neoplasm of right breast, Anorexia nervosa  Patient has not had lab work since last office visit  He will need to have lab work and follow-up in March for annual physical exam       The following portions of the patient's history were reviewed and updated as appropriate:   Past Medical History:  She has a past medical history of Abnormal weight loss, Anorexia nervosa, Breast cancer ([address] ), Disease of thyroid gland, Elevated blood sugar, Fibroadenoma of both breasts, GERD (gastroesophageal reflux disease), Hypertension, Hyperthyroidism, and Osteopenia  ,  _______________________________________________________________________  Medical Problems:  does not have any pertinent problems on file ,  _______________________________________________________________________  Past Surgical History:   has a past surgical history that includes Cholecystectomy; Breast cyst excision (Left, 2011); Breast biopsy (Right, 04/16/2021); US guided breast biopsy right complete (Right, 4/16/2021); US guidance breast biopsy right each additional (Right, 4/16/2021); US breast medardo  right (Right, 6/14/2021); US breast needle loc right each additional (Right, 6/14/2021); and Mastectomy w/ sentinel node biopsy (Right, 6/25/2021)  ,  _______________________________________________________________________  Family History:  family history is not on file  She was adopted  ,  _______________________________________________________________________  Social History:   reports that she has never smoked   She has never used smokeless tobacco  She reports current alcohol use of about 2 0 standard drinks per week  She reports that she does not use drugs  ,  _______________________________________________________________________  Allergies:  has No Known Allergies     _______________________________________________________________________  Current Outpatient Medications   Medication Sig Dispense Refill   • anastrozole (ARIMIDEX) 1 mg tablet Take 1 tablet (1 mg total) by mouth daily 90 tablet 2   • atenolol (TENORMIN) 100 mg tablet Take 1 tablet (100 mg total) by mouth every other day Take 100 mg by mouth every other morning  90 tablet 1   • Cholecalciferol (Vitamin D3) 50 MCG (2000 UT) capsule Take 1 capsule (2,000 Units total) by mouth daily  Take 1 capsule every day by oral route for 30 days  90 capsule 1   • levothyroxine 50 mcg tablet Take 1 tablet (50 mcg total) by mouth daily in the early morning 90 tablet 3   • Multiple Vitamins-Minerals (Womens 50+ Multi Vitamin/Min) TABS Take 1 tablet by mouth daily 100 tablet 1   • multivitamin (THERAGRAN) TABS TAKE ONE TABLET BY MOUTH EVERY DAY 30 tablet 0   • OLANZapine (ZyPREXA) 7 5 mg tablet TAKE ONE TABLET BY MOUTH AT BEDTIME 30 tablet 0   • pantoprazole (PROTONIX) 40 mg tablet Take 1 tablet (40 mg total) by mouth daily 90 tablet 3   • carbamide peroxide (DEBROX) 6 5 % otic solution Administer 5 drops into both ears 2 (two) times a day (Patient not taking: Reported on 1/18/2023) 15 mL 2   • oxyCODONE-acetaminophen (PERCOCET) 5-325 mg per tablet Take 1 tablet by mouth every 6 (six) hours as needed for moderate painMax Daily Amount: 4 tablets (Patient not taking: Reported on 1/18/2023) 15 tablet 0     No current facility-administered medications for this visit      _______________________________________________________________________  Review of Systems   Constitutional: Negative for activity change, appetite change, chills, fatigue, fever and unexpected weight change  HENT: Positive for rhinorrhea   Negative for congestion, ear discharge, ear pain, nosebleeds, postnasal drip, sinus pressure, sinus pain, sneezing, sore throat and voice change  Eyes: Negative for pain, redness and visual disturbance  Respiratory: Negative for cough, chest tightness, shortness of breath and wheezing  Cardiovascular: Negative for chest pain and palpitations  Gastrointestinal: Negative for abdominal distention, abdominal pain, constipation, diarrhea, nausea and vomiting  Endocrine: Negative  Genitourinary: Negative for difficulty urinating, dysuria, flank pain, frequency, hematuria and urgency  Musculoskeletal: Negative for arthralgias and myalgias  Skin: Negative  Allergic/Immunologic: Negative  Neurological: Negative  Hematological: Negative  Psychiatric/Behavioral: Negative  Objective:  Vitals:    01/18/23 1308   BP: 110/66   BP Location: Left arm   Patient Position: Sitting   Cuff Size: Standard   Pulse: 88   Resp: 16   Temp: 98 3 °F (36 8 °C)   TempSrc: Temporal   SpO2: 98%   Weight: 48 2 kg (106 lb 3 2 oz)   Height: 5' 4" (1 626 m)     Body mass index is 18 23 kg/m²  Physical Exam  Vitals and nursing note reviewed  Constitutional:       Appearance: Normal appearance  She is well-developed  Comments: Underweight  HENT:      Head: Normocephalic and atraumatic  Right Ear: Tympanic membrane, ear canal and external ear normal       Left Ear: Tympanic membrane, ear canal and external ear normal       Nose: Nose normal  No congestion or rhinorrhea  Mouth/Throat:      Mouth: Mucous membranes are moist       Pharynx: No oropharyngeal exudate or posterior oropharyngeal erythema  Eyes:      Extraocular Movements: Extraocular movements intact  Conjunctiva/sclera: Conjunctivae normal       Pupils: Pupils are equal, round, and reactive to light  Cardiovascular:      Rate and Rhythm: Normal rate and regular rhythm  Pulses: Normal pulses  Heart sounds: Normal heart sounds  No murmur heard    Pulmonary: Effort: Pulmonary effort is normal       Breath sounds: Normal breath sounds  Abdominal:      General: Bowel sounds are normal       Palpations: Abdomen is soft  Musculoskeletal:         General: Normal range of motion  Cervical back: Normal range of motion  Skin:     General: Skin is warm  Capillary Refill: Capillary refill takes less than 2 seconds  Neurological:      General: No focal deficit present  Mental Status: She is alert and oriented to person, place, and time     Psychiatric:         Mood and Affect: Mood normal          Behavior: Behavior normal

## 2023-01-18 NOTE — PATIENT INSTRUCTIONS
Hypertension   WHAT YOU NEED TO KNOW:   What is hypertension? Hypertension is high blood pressure  Your blood pressure is the force of your blood moving against the walls of your arteries  Hypertension causes your blood pressure to get so high that your heart has to work much harder than normal  This can damage your heart  The cause of hypertension may not be known  This is called essential or primary hypertension  Hypertension caused by another medical condition, such as kidney disease, is called secondary hypertension  What do I need to know about the stages of hypertension? Normal blood pressure is 119/79 or lower   Your healthcare provider may only check your blood pressure each year if it stays at a normal level  Elevated blood pressure is 120/79 to 129/79   This is sometimes called prehypertension  Your healthcare provider may suggest lifestyle changes to help lower your blood pressure to a normal level  He or she may then check it again in 3 to 6 months  Stage 1 hypertension is 130/80  to 139/89   Your provider may recommend lifestyle changes, medication, and checks every 3 to 6 months until your blood pressure is controlled  Stage 2 hypertension is 140/90 or higher   Your provider will recommend lifestyle changes and have you take 2 kinds of hypertension medicines  You will also need to have your blood pressure checked monthly until it is controlled  What increases my risk for hypertension? Age older than 54 years (men) or 72 (women)    Stress, or a family history of hypertension or heart disease    Obesity, lack of exercise, or too many high-sodium foods    Use of tobacco, alcohol, or illegal drugs    A medical condition, such as diabetes, kidney disease, thyroid disease, or adrenal gland disorder    Certain medicines, such as steroids or birth control pills    What are the signs and symptoms of hypertension?   You may have no signs or symptoms, or you may have any of the following:  Headache    Blurred vision    Chest pain    Dizziness or weakness    Trouble breathing    Nosebleeds    How is hypertension diagnosed? Your healthcare provider will take your blood pressure at several visits  You may also need to check your blood pressure at home  The provider will examine you and ask what medicines you take  He or she will also ask if you have a family history of high blood pressure and about any health conditions you have  He or she will also check your blood pressure and weight and examine your heart, lungs, and eyes  You may need any of the following tests: An ambulatory blood pressure monitor (ABPM)  is a device that you wear  ABPM measures your blood pressure while you do your regular daily activities  It records your blood pressure every 15 to 30 minutes during the day  It also records your blood pressure every 15 minutes to 1 hour at night  The recorded blood pressures help your healthcare provider know if you have hypertension not seen at your appointment  Blood tests  may help healthcare providers find the cause of your hypertension  Blood tests can also help find other health problems caused by hypertension  Urine tests  will be done to check your kidney function  Kidney problems can increase your risk for hypertension  Which medicines are used to treat hypertension? Antihypertensives  may be used to help lower your blood pressure  Several kinds of medicines are available  Your healthcare provider will choose medicines based on the kind of hypertension you have  You may need more than one type of medicine  Take the medicine exactly as directed  Diuretics  help decrease extra fluid that collects in your body  This will help lower your blood pressure  You may urinate more often while you take this medicine  Cholesterol medicine  helps lower your cholesterol level   A low cholesterol level helps prevent heart disease and makes it easier to control your blood pressure  What can I do to manage hypertension? Check your blood pressure at home  Avoid smoking, caffeine, and exercise at least 30 minutes before checking your blood pressure  Sit and rest for 5 minutes before you take your blood pressure  Extend your arm and support it on a flat surface  Your arm should be at the same level as your heart  Follow the directions that came with your blood pressure monitor  Check your blood pressure 2 times, 1 minute apart, before you take your medicine in the morning  Also check your blood pressure before your evening meal  Keep a record of your readings and bring it to your follow-up visits  Ask your healthcare provider what your blood pressure should be  Manage any other health conditions you have  Health conditions such as diabetes can increase your risk for hypertension  Follow your healthcare provider's instructions and take all your medicines as directed  Ask about all medicines  Certain medicines can increase your blood pressure  Examples include oral birth control pills, decongestants, herbal supplements, and NSAIDs, such as ibuprofen  Your healthcare provider can tell you which medicines are safe for you to take  This includes prescription and over-the-counter medicines  What lifestyle changes can I make to manage hypertension? Your healthcare provider may recommend you work with a team to manage hypertension  The team may include medical experts such as a dietitian, an exercise or physical therapist, and a behavior therapist  Your family members may be included in helping you create lifestyle changes  Limit sodium (salt) as directed  Too much sodium can affect your fluid balance  Check labels to find low-sodium or no-salt-added foods  Some low-sodium foods use potassium salts for flavor  Too much potassium can also cause health problems  Your healthcare provider will tell you how much sodium and potassium are safe for you to have in a day   He or she may recommend that you limit sodium to 2,300 mg a day  Follow the meal plan recommended by your healthcare provider  A dietitian or your provider can give you more information on low-sodium plans or the DASH (Dietary Approaches to Stop Hypertension) eating plan  The DASH plan is low in sodium, processed sugar, unhealthy fats, and total fat  It is high in potassium, calcium, and fiber  These can be found in vegetables, fruit, and whole-grain foods  Be physically active throughout the day  Physical activity, such as exercise, can help control your blood pressure and your weight  Be physically active for at least 30 minutes per day, on most days of the week  Include aerobic activity, such as walking or riding a bicycle  Also include strength training at least 2 times each week  Your healthcare providers can help you create a physical activity plan  Decrease stress  This may help lower your blood pressure  Learn ways to relax, such as deep breathing or listening to music  Limit alcohol as directed  Alcohol can increase your blood pressure  A drink of alcohol is 12 ounces of beer, 5 ounces of wine, or 1½ ounces of liquor  Do not smoke  Nicotine and other chemicals in cigarettes and cigars can increase your blood pressure and also cause lung damage  Ask your healthcare provider for information if you currently smoke and need help to quit  E-cigarettes or smokeless tobacco still contain nicotine  Talk to your healthcare provider before you use these products  Call your local emergency number [phone] in the [address] [facility]) or have someone call if:   You have chest pain      You have any of the following signs of a heart attack:      Squeezing, pressure, or pain in your chest    You may  also have any of the following:     Discomfort or pain in your back, neck, jaw, stomach, or arm    Shortness of breath    Nausea or vomiting    Lightheadedness or a sudden cold sweat    You become confused or have trouble speaking  You suddenly feel lightheaded or have trouble breathing  When should I seek immediate care? You have a severe headache or vision loss  You have weakness in an arm or leg  When should I call my doctor? You feel faint, dizzy, confused, or drowsy  You have been taking your blood pressure medicine but your pressure is higher than your provider says it should be  You have questions or concerns about your condition or care  CARE AGREEMENT:   You have the right to help plan your care  Learn about your health condition and how it may be treated  Discuss treatment options with your healthcare providers to decide what care you want to receive  You always have the right to refuse treatment  The above information is an  only  It is not intended as medical advice for individual conditions or treatments  Talk to your doctor, nurse or pharmacist before following any medical regimen to see if it is safe and effective for you  © Copyright [employer] 2022 Information is for End User's use only and may not be sold, redistributed or otherwise used for commercial purposes  All illustrations and images included in CareNotes® are the copyrighted property of [employer]  or [facility]  Cholesterol and [address] [facility]:   What is cholesterol? Cholesterol is a waxy, fat-like substance  Your body uses cholesterol to make hormones and new cells, and to protect nerves  Cholesterol is made by your body  It also comes from certain foods you eat, such as meat and dairy products  Your healthcare provider can help you set goals for your cholesterol levels  He or she can help you create a plan to meet your goals  What are cholesterol level goals? Your cholesterol level goals depend on your risk for heart disease, your age, and your other health conditions  The following are general guidelines:   Total cholesterol  includes low-density lipoprotein (LDL), high-density lipoprotein (HDL), and triglyceride levels  The total cholesterol level should be lower than 200 mg/dL and is best at about 150 mg/dL  LDL cholesterol  is called bad cholesterol  because it forms plaque in your arteries  As plaque builds up, your arteries become narrow, and less blood flows through  When plaque decreases blood flow to your heart, you may have chest pain  If plaque completely blocks an artery that brings blood to your heart, you may have a heart attack  Plaque can break off and form blood clots  Blood clots may block arteries in your brain and cause a stroke  The level should be less than 130 mg/dL and is best at about 100 mg/dL  HDL cholesterol  is called good cholesterol  because it helps remove LDL cholesterol from your arteries  It does this by attaching to LDL cholesterol and carrying it to your liver  Your liver breaks down LDL cholesterol so your body can get rid of it  High levels of HDL cholesterol can help prevent a heart attack and stroke  Low levels of HDL cholesterol can increase your risk for heart disease, heart attack, and stroke  The level should be 60 mg/dL or higher  Triglycerides  are a type of fat that store energy from foods you eat  High levels of triglycerides also cause plaque buildup  This can increase your risk for a heart attack or stroke  If your triglyceride level is high, your LDL cholesterol level may also be high  The level should be less than 150 mg/dL  What increases my risk for high cholesterol? Smoking cigarettes    Being overweight or obese, or not getting enough exercise    Drinking large amounts of alcohol    A medical condition such as hypertension (high blood pressure) or diabetes    Certain genes passed from your parents to you    Age older than 72 years    What do I need to know about having my cholesterol levels checked? Adults 21to 39years of age should have their cholesterol levels checked every 4 to 6 years   Adults 45 years or older should have their cholesterol checked every 1 to 2 years  You may need your cholesterol checked more often, or at a younger age, if you have risk factors for heart disease  You may also need to have your cholesterol checked more often if you have other health conditions, such as diabetes  Blood tests are used to check cholesterol levels  Blood tests measure your levels of triglycerides, LDL cholesterol, and HDL cholesterol  How do healthy fats affect my cholesterol levels? Healthy fats, also called unsaturated fats, help lower LDL cholesterol and triglyceride levels  Healthy fats include the following:  Monounsaturated fats  are found in foods such as olive oil, canola oil, avocado, nuts, and olives  Polyunsaturated fats,  such as omega 3 fats, are found in fish, such as salmon, trout, and tuna  They can also be found in plant foods such as flaxseed, walnuts, and soybeans  How do unhealthy fats affect my cholesterol levels? Unhealthy fats increase LDL cholesterol and triglyceride levels  They are found in foods high in cholesterol, saturated fat, and trans fat:  Cholesterol  is found in eggs, dairy, and meat  Saturated fat  is found in butter, cheese, ice cream, whole milk, and coconut oil  Saturated fat is also found in meat, such as sausage, hot dogs, and bologna  Trans fat  is found in liquid oils and is used in fried and baked foods  Foods that contain trans fats include chips, crackers, muffins, sweet rolls, microwave popcorn, and cookies  How is high cholesterol treated? Treatment for high cholesterol will also decrease your risk of heart disease, heart attack, and stroke  Treatment may include any of the following:  Lifestyle changes  may include food, exercise, weight loss, and quitting smoking  You may also need to decrease the amount of alcohol you drink  Your healthcare provider will want you to start with lifestyle changes   Other treatment may be added if lifestyle changes are not enough  Your healthcare provider may recommend you work with a team to manage hyperlipidemia  The team may include medical experts such as a dietitian, an exercise or physical therapist, and a behavior therapist  Your family members may be included in helping you create lifestyle changes  Medicines  may be given to lower your LDL cholesterol, triglyceride levels, or total cholesterol level  You may need medicines to lower your cholesterol if any of the following is true:    You have a history of stroke, TIA, unstable angina, or a heart attack  Your LDL cholesterol level is 190 mg/dL or higher  You are age 36 to 76 years, have diabetes or heart disease risk factors, and your LDL cholesterol is 70 mg/dL or higher  Supplements  include fish oil, red yeast rice, and garlic  Fish oil may help lower your triglyceride and LDL cholesterol levels  It may also increase your HDL cholesterol level  Red yeast rice may help decrease your total cholesterol level and LDL cholesterol level  Garlic may help lower your total cholesterol level  Do not take any supplements without talking to your healthcare provider  What food changes can I make to lower my cholesterol levels? A dietitian can help you create a healthy eating plan  He or she can show you how to read food labels and choose foods low in saturated fat, trans fats, and cholesterol  Decrease the total amount of fat you eat  Choose lean meats, fat-free or 1% fat milk, and low-fat dairy products, such as yogurt and cheese  Try to limit or avoid red meats  Limit or do not eat fried foods or baked goods, such as cookies  Replace unhealthy fats with healthy fats  Cook foods in olive oil or canola oil  Choose soft margarines that are low in saturated fat and trans fat  Seeds, nuts, and avocados are other examples of healthy fats  Eat foods with omega-3 fats  Examples include salmon, tuna, mackerel, walnuts, and flaxseed   Eat fish 2 times per week  Pregnant women should not eat fish that have high levels of mercury, such as shark, swordfish, and yuki mackerel  Increase the amount of high-fiber foods you eat  High-fiber foods can help lower your LDL cholesterol  Aim to get between 20 and 30 grams of fiber each day  Fruits and vegetables are high in fiber  Eat at least 5 servings each day  Other high-fiber foods are whole-grain or whole-wheat breads, pastas, or cereals, and brown rice  Eat 3 ounces of whole-grain foods each day  Increase fiber slowly  You may have abdominal discomfort, bloating, and gas if you add fiber to your diet too quickly  Eat healthy protein foods  Examples include low-fat dairy products, skinless chicken and turkey, fish, and nuts  Limit foods and drinks that are high in sugar  Your dietitian or healthcare provider can help you create daily limits for high-sugar foods and drinks  The limit may be lower if you have diabetes or another health condition  Limits can also help you lose weight if needed  What lifestyle changes can I make to lower my cholesterol levels? Maintain a healthy weight  Ask your healthcare provider what a healthy weight is for you  Ask him or her to help you create a weight loss plan if needed  Weight loss can decrease your total cholesterol and triglyceride levels  Weight loss may also help keep your blood pressure at a healthy level  Be physically active throughout the day  Physical activity, such as exercise, can help lower your total cholesterol level and maintain a healthy weight  Physical activity can also help increase your HDL cholesterol level  Work with your healthcare provider to create an program that is right for you  Get at least 30 to 40 minutes of moderate physical activity most days of the week  Examples of exercise include brisk walking, swimming, or biking  Also include strength training at least 2 times each week   Your healthcare providers can help you create a physical activity plan  Do not smoke  Nicotine and other chemicals in cigarettes and cigars can raise your cholesterol levels  Ask your healthcare provider for information if you currently smoke and need help to quit  E-cigarettes or smokeless tobacco still contain nicotine  Talk to your healthcare provider before you use these products  Limit or do not drink alcohol  Alcohol can increase your triglyceride levels  Ask your healthcare provider before you drink alcohol  Ask how much is okay for you to drink in 24 hours or 1 week  CARE AGREEMENT:   You have the right to help plan your care  Discuss treatment options with your healthcare provider to decide what care you want to receive  You always have the right to refuse treatment  The above information is an  only  It is not intended as medical advice for individual conditions or treatments  Talk to your doctor, nurse or pharmacist before following any medical regimen to see if it is safe and effective for you  © Copyright [employer] 2022 Information is for End User's use only and may not be sold, redistributed or otherwise used for commercial purposes   All illustrations and images included in CareNotes® are the copyrighted property of [employer]  or [address] [place] [address]

## 2023-01-18 NOTE — ASSESSMENT & PLAN NOTE
Patient has history of hypothyroidism  Maintained on levothyroxine 50 mg p o  daily  Patient needs TSH, third generation with free T4 reflex lab work done

## 2023-01-18 NOTE — ASSESSMENT & PLAN NOTE
Patient has history of hypertension  /65  Currently maintained on atenolol 100 mg every other day  To maintain a low-sodium diet  Exercise encouraged 3-5 times per week for at least 75 minutes of cardiovascular activity

## 2023-01-18 NOTE — ASSESSMENT & PLAN NOTE
Patient have repeat vitamin D level for evaluation  Currently maintained on cholecalciferol vitamin D3 50 mcg 2000 unit capsule daily  Patient encouraged to eat vitamin D rich foods

## 2023-01-19 DIAGNOSIS — K21.9 GERD WITHOUT ESOPHAGITIS: ICD-10-CM

## 2023-01-20 RX ORDER — PANTOPRAZOLE SODIUM 40 MG/1
TABLET, DELAYED RELEASE ORAL
Qty: 30 TABLET | Refills: 0 | Status: SHIPPED | OUTPATIENT
Start: 2023-01-20

## 2023-02-02 ENCOUNTER — APPOINTMENT (OUTPATIENT)
Dept: LAB | Facility: CLINIC | Age: 58
End: 2023-02-02

## 2023-02-02 DIAGNOSIS — Z00.8 EXAM FOR POPULATION SURVEY: ICD-10-CM

## 2023-02-02 DIAGNOSIS — E03.8 OTHER SPECIFIED HYPOTHYROIDISM: ICD-10-CM

## 2023-02-02 DIAGNOSIS — Z13.220 SCREENING, LIPID: ICD-10-CM

## 2023-02-02 LAB
ALBUMIN SERPL BCP-MCNC: 4.2 G/DL (ref 3.5–5)
ALP SERPL-CCNC: 84 U/L (ref 46–116)
ALT SERPL W P-5'-P-CCNC: 56 U/L (ref 12–78)
ANION GAP SERPL CALCULATED.3IONS-SCNC: 9 MMOL/L (ref 4–13)
AST SERPL W P-5'-P-CCNC: 38 U/L (ref 5–45)
BASOPHILS # BLD AUTO: 0.09 THOUSANDS/ÂΜL (ref 0–0.1)
BASOPHILS NFR BLD AUTO: 1 % (ref 0–1)
BILIRUB SERPL-MCNC: 0.55 MG/DL (ref 0.2–1)
BILIRUB UR QL STRIP: NEGATIVE
BUN SERPL-MCNC: 12 MG/DL (ref 5–25)
CALCIUM SERPL-MCNC: 9.6 MG/DL (ref 8.3–10.1)
CHLORIDE SERPL-SCNC: 104 MMOL/L (ref 96–108)
CHOLEST SERPL-MCNC: 244 MG/DL
CLARITY UR: CLEAR
CO2 SERPL-SCNC: 24 MMOL/L (ref 21–32)
COLOR UR: COLORLESS
CREAT SERPL-MCNC: 0.8 MG/DL (ref 0.6–1.3)
EOSINOPHIL # BLD AUTO: 0.12 THOUSAND/ÂΜL (ref 0–0.61)
EOSINOPHIL NFR BLD AUTO: 2 % (ref 0–6)
ERYTHROCYTE [DISTWIDTH] IN BLOOD BY AUTOMATED COUNT: 13 % (ref 11.6–15.1)
GFR SERPL CREATININE-BSD FRML MDRD: 81 ML/MIN/1.73SQ M
GLUCOSE P FAST SERPL-MCNC: 107 MG/DL (ref 65–99)
GLUCOSE UR STRIP-MCNC: NEGATIVE MG/DL
HCT VFR BLD AUTO: 42.6 % (ref 34.8–46.1)
HDLC SERPL-MCNC: 101 MG/DL
HGB BLD-MCNC: 13.3 G/DL (ref 11.5–15.4)
HGB UR QL STRIP.AUTO: NEGATIVE
IMM GRANULOCYTES # BLD AUTO: 0.01 THOUSAND/UL (ref 0–0.2)
IMM GRANULOCYTES NFR BLD AUTO: 0 % (ref 0–2)
KETONES UR STRIP-MCNC: NEGATIVE MG/DL
LDLC SERPL CALC-MCNC: 131 MG/DL (ref 0–100)
LEUKOCYTE ESTERASE UR QL STRIP: NEGATIVE
LYMPHOCYTES # BLD AUTO: 2.3 THOUSANDS/ÂΜL (ref 0.6–4.47)
LYMPHOCYTES NFR BLD AUTO: 37 % (ref 14–44)
MCH RBC QN AUTO: 30.1 PG (ref 26.8–34.3)
MCHC RBC AUTO-ENTMCNC: 31.2 G/DL (ref 31.4–37.4)
MCV RBC AUTO: 96 FL (ref 82–98)
MONOCYTES # BLD AUTO: 0.41 THOUSAND/ÂΜL (ref 0.17–1.22)
MONOCYTES NFR BLD AUTO: 7 % (ref 4–12)
NEUTROPHILS # BLD AUTO: 3.28 THOUSANDS/ÂΜL (ref 1.85–7.62)
NEUTS SEG NFR BLD AUTO: 53 % (ref 43–75)
NITRITE UR QL STRIP: NEGATIVE
NONHDLC SERPL-MCNC: 143 MG/DL
NRBC BLD AUTO-RTO: 0 /100 WBCS
PH UR STRIP.AUTO: 6 [PH]
PLATELET # BLD AUTO: 294 THOUSANDS/UL (ref 149–390)
PMV BLD AUTO: 11.4 FL (ref 8.9–12.7)
POTASSIUM SERPL-SCNC: 3.6 MMOL/L (ref 3.5–5.3)
PROT SERPL-MCNC: 7.5 G/DL (ref 6.4–8.4)
PROT UR STRIP-MCNC: NEGATIVE MG/DL
RBC # BLD AUTO: 4.42 MILLION/UL (ref 3.81–5.12)
SODIUM SERPL-SCNC: 137 MMOL/L (ref 135–147)
SP GR UR STRIP.AUTO: 1.01 (ref 1–1.03)
TRIGL SERPL-MCNC: 61 MG/DL
TSH SERPL DL<=0.05 MIU/L-ACNC: 3.74 UIU/ML (ref 0.45–4.5)
UROBILINOGEN UR STRIP-ACNC: <2 MG/DL
WBC # BLD AUTO: 6.21 THOUSAND/UL (ref 4.31–10.16)

## 2023-02-03 ENCOUNTER — TELEPHONE (OUTPATIENT)
Dept: FAMILY MEDICINE CLINIC | Facility: CLINIC | Age: 58
End: 2023-02-03

## 2023-02-03 NOTE — TELEPHONE ENCOUNTER
----- Message from Enrique Cassidy sent at 2/2/2023  4:07 PM EST -----    ----- Message -----  From: ELZA Malhotra  Sent: 2/2/2023   3:40 PM EST  To: [facility]    Attempted to contact patient to review her lab work  Unable to reach her on her phone in service  His cholesterol level is 243, HDL cholesterol 100,   Fasting glucose 107  Patient to maintain a low-cholesterol low-fat diet  Patient to maintain a low-carb low starch low sugar diet as well

## 2023-02-07 ENCOUNTER — TELEPHONE (OUTPATIENT)
Dept: FAMILY MEDICINE CLINIC | Facility: CLINIC | Age: 58
End: 2023-02-07

## 2023-02-07 DIAGNOSIS — Z12.11 COLON CANCER SCREENING: Primary | ICD-10-CM

## 2023-02-07 NOTE — TELEPHONE ENCOUNTER
Patient called she wants to see dr Loren Escobedo   ---[address]   [place]   She wants to see him instead  Of the other gastroenterologist and they told her she  Needs  a new referral

## 2023-02-08 ENCOUNTER — TELEPHONE (OUTPATIENT)
Dept: FAMILY MEDICINE CLINIC | Facility: CLINIC | Age: 58
End: 2023-02-08

## 2023-02-15 ENCOUNTER — TELEPHONE (OUTPATIENT)
Dept: HEMATOLOGY ONCOLOGY | Facility: CLINIC | Age: 58
End: 2023-02-15

## 2023-02-15 NOTE — TELEPHONE ENCOUNTER
Confirmed with patient appt  On 4/5/23 with Harvey Palacios at the [facility] which has been rescheduled from 3/6/23  STAR has been confirmed

## 2023-02-16 ENCOUNTER — OFFICE VISIT (OUTPATIENT)
Dept: FAMILY MEDICINE CLINIC | Facility: CLINIC | Age: 58
End: 2023-02-16

## 2023-02-16 VITALS
BODY MASS INDEX: 18.27 KG/M2 | HEART RATE: 105 BPM | DIASTOLIC BLOOD PRESSURE: 94 MMHG | TEMPERATURE: 98.1 F | OXYGEN SATURATION: 99 % | HEIGHT: 64 IN | WEIGHT: 107 LBS | SYSTOLIC BLOOD PRESSURE: 154 MMHG

## 2023-02-16 DIAGNOSIS — E03.8 OTHER SPECIFIED HYPOTHYROIDISM: ICD-10-CM

## 2023-02-16 DIAGNOSIS — K21.9 GERD WITHOUT ESOPHAGITIS: ICD-10-CM

## 2023-02-16 DIAGNOSIS — J30.2 SEASONAL ALLERGIES: ICD-10-CM

## 2023-02-16 DIAGNOSIS — I10 HYPERTENSION, UNSPECIFIED TYPE: Primary | ICD-10-CM

## 2023-02-16 DIAGNOSIS — Z13.220 SCREENING, LIPID: ICD-10-CM

## 2023-02-16 RX ORDER — ATENOLOL 50 MG/1
50 TABLET ORAL DAILY
Qty: 90 TABLET | Refills: 3 | Status: SHIPPED | OUTPATIENT
Start: 2023-02-16 | End: 2023-05-17

## 2023-02-16 NOTE — PATIENT INSTRUCTIONS
Vitamin D (By mouth)   Vitamin D (VYE-ta-min D)  Maintains calcium and phosphorus in your body and makes your bones strong  This medicine is a vitamin  Brand Name(s): Baby Vitamin D, Basic's Natural Vitamin D-3, Calcidol, Penaloza Baby Ddrops, Mechele Pelt, [place] [employer], Decara, Delta D3, Dialyvite Vitamin D3 Max, Drisdol, Enfamil D-Vi-Sol, Infants Aqueous Vitamin D, Shankar Natural , Nature's Blend Super Strength Vitamin D3, Thera-D 2000   There may be other brand names for this medicine  When This Medicine Should Not Be Used: You should not use this medicine if you have had an allergic reaction to vitamin D  How to Use This Medicine:   Tablet, Liquid, Liquid Filled Capsule  Your doctor will tell you how much medicine to use  Do not use more than directed  Follow the instructions on the medicine label if you are using this medicine without a prescription  It is best to take this medicine with food or milk  Carefully follow your doctor's instructions about any special diet  If a dose is missed: Take a dose as soon as you remember  If it is almost time for your next dose, wait until then and take a regular dose  Do not take extra medicine to make up for a missed dose  How to Store and Dispose of This Medicine:   Store the medicine in a closed container at room temperature, away from heat, moisture, and direct light  Ask your pharmacist, doctor, or health caregiver about the best way to dispose of any outdated medicine or medicine no longer needed  Keep all medicine out of the reach of children  Never share your medicine with anyone  Drugs and Foods to Avoid:   Ask your doctor or pharmacist before using any other medicine, including over-the-counter medicines, vitamins, and herbal products  Make sure your doctor knows about all other medicines you are using  Warnings While Using This Medicine:   Make sure your doctor knows if you are pregnant or breast feeding    Make sure your doctor knows if you have kidney stones, sarcoidosis, or overactive parathyroid gland  You should not use this medicine if you are under 25years of age  Possible Side Effects While Using This Medicine:   Call your doctor right away if you notice any of these side effects: Allergic reaction: Itching or hives, swelling in your face or hands, swelling or tingling in your mouth or throat, chest tightness, trouble breathing  Change in how much or how often you urinate  If you notice these less serious side effects, talk with your doctor:   Diarrhea  Headache  Weight loss  If you notice other side effects that you think are caused by this medicine, tell your doctor  Call your doctor for medical advice about side effects  You may report side effects to FDA at 7-907-FDA-7750    © Copyright [employer] 2022 Information is for End User's use only and may not be sold, redistributed or otherwise used for commercial purposes  The above information is an  only  It is not intended as medical advice for individual conditions or treatments  Talk to your doctor, nurse or pharmacist before following any medical regimen to see if it is safe and effective for you  Hypertension   WHAT YOU NEED TO KNOW:   What is hypertension? Hypertension is high blood pressure  Your blood pressure is the force of your blood moving against the walls of your arteries  Hypertension causes your blood pressure to get so high that your heart has to work much harder than normal  This can damage your heart  The cause of hypertension may not be known  This is called essential or primary hypertension  Hypertension caused by another medical condition, such as kidney disease, is called secondary hypertension  What do I need to know about the stages of hypertension? Normal blood pressure is 119/79 or lower   Your healthcare provider may only check your blood pressure each year if it stays at a normal level      Elevated blood pressure is 120/79 to 129/79   This is sometimes called prehypertension  Your healthcare provider may suggest lifestyle changes to help lower your blood pressure to a normal level  He or she may then check it again in 3 to 6 months  Stage 1 hypertension is 130/80  to 139/89   Your provider may recommend lifestyle changes, medication, and checks every 3 to 6 months until your blood pressure is controlled  Stage 2 hypertension is 140/90 or higher   Your provider will recommend lifestyle changes and have you take 2 kinds of hypertension medicines  You will also need to have your blood pressure checked monthly until it is controlled  What increases my risk for hypertension? Age older than 54 years (men) or 72 (women)    Stress, or a family history of hypertension or heart disease    Obesity, lack of exercise, or too many high-sodium foods    Use of tobacco, alcohol, or illegal drugs    A medical condition, such as diabetes, kidney disease, thyroid disease, or adrenal gland disorder    Certain medicines, such as steroids or birth control pills    What are the signs and symptoms of hypertension? You may have no signs or symptoms, or you may have any of the following:  Headache    Blurred vision    Chest pain    Dizziness or weakness    Trouble breathing    Nosebleeds    How is hypertension diagnosed? Your healthcare provider will take your blood pressure at several visits  You may also need to check your blood pressure at home  The provider will examine you and ask what medicines you take  He or she will also ask if you have a family history of high blood pressure and about any health conditions you have  He or she will also check your blood pressure and weight and examine your heart, lungs, and eyes  You may need any of the following tests: An ambulatory blood pressure monitor (ABPM)  is a device that you wear  ABPM measures your blood pressure while you do your regular daily activities   It records your blood pressure every 15 to 30 minutes during the day  It also records your blood pressure every 15 minutes to 1 hour at night  The recorded blood pressures help your healthcare provider know if you have hypertension not seen at your appointment  Blood tests  may help healthcare providers find the cause of your hypertension  Blood tests can also help find other health problems caused by hypertension  Urine tests  will be done to check your kidney function  Kidney problems can increase your risk for hypertension  Which medicines are used to treat hypertension? Antihypertensives  may be used to help lower your blood pressure  Several kinds of medicines are available  Your healthcare provider will choose medicines based on the kind of hypertension you have  You may need more than one type of medicine  Take the medicine exactly as directed  Diuretics  help decrease extra fluid that collects in your body  This will help lower your blood pressure  You may urinate more often while you take this medicine  Cholesterol medicine  helps lower your cholesterol level  A low cholesterol level helps prevent heart disease and makes it easier to control your blood pressure  What can I do to manage hypertension? Check your blood pressure at home  Avoid smoking, caffeine, and exercise at least 30 minutes before checking your blood pressure  Sit and rest for 5 minutes before you take your blood pressure  Extend your arm and support it on a flat surface  Your arm should be at the same level as your heart  Follow the directions that came with your blood pressure monitor  Check your blood pressure 2 times, 1 minute apart, before you take your medicine in the morning  Also check your blood pressure before your evening meal  Keep a record of your readings and bring it to your follow-up visits  Ask your healthcare provider what your blood pressure should be  Manage any other health conditions you have    Health conditions such as diabetes can increase your risk for hypertension  Follow your healthcare provider's instructions and take all your medicines as directed  Ask about all medicines  Certain medicines can increase your blood pressure  Examples include oral birth control pills, decongestants, herbal supplements, and NSAIDs, such as ibuprofen  Your healthcare provider can tell you which medicines are safe for you to take  This includes prescription and over-the-counter medicines  What lifestyle changes can I make to manage hypertension? Your healthcare provider may recommend you work with a team to manage hypertension  The team may include medical experts such as a dietitian, an exercise or physical therapist, and a behavior therapist  Your family members may be included in helping you create lifestyle changes  Limit sodium (salt) as directed  Too much sodium can affect your fluid balance  Check labels to find low-sodium or no-salt-added foods  Some low-sodium foods use potassium salts for flavor  Too much potassium can also cause health problems  Your healthcare provider will tell you how much sodium and potassium are safe for you to have in a day  He or she may recommend that you limit sodium to 2,300 mg a day  Follow the meal plan recommended by your healthcare provider  A dietitian or your provider can give you more information on low-sodium plans or the DASH (Dietary Approaches to Stop Hypertension) eating plan  The DASH plan is low in sodium, processed sugar, unhealthy fats, and total fat  It is high in potassium, calcium, and fiber  These can be found in vegetables, fruit, and whole-grain foods  Be physically active throughout the day  Physical activity, such as exercise, can help control your blood pressure and your weight  Be physically active for at least 30 minutes per day, on most days of the week  Include aerobic activity, such as walking or riding a bicycle   Also include strength training at least 2 times each week  Your healthcare providers can help you create a physical activity plan  Decrease stress  This may help lower your blood pressure  Learn ways to relax, such as deep breathing or listening to music  Limit alcohol as directed  Alcohol can increase your blood pressure  A drink of alcohol is 12 ounces of beer, 5 ounces of wine, or 1½ ounces of liquor  Do not smoke  Nicotine and other chemicals in cigarettes and cigars can increase your blood pressure and also cause lung damage  Ask your healthcare provider for information if you currently smoke and need help to quit  E-cigarettes or smokeless tobacco still contain nicotine  Talk to your healthcare provider before you use these products  Call your local emergency number [phone] in the [address] [facility]) or have someone call if:   You have chest pain  You have any of the following signs of a heart attack:      Squeezing, pressure, or pain in your chest    You may  also have any of the following:     Discomfort or pain in your back, neck, jaw, stomach, or arm    Shortness of breath    Nausea or vomiting    Lightheadedness or a sudden cold sweat    You become confused or have trouble speaking  You suddenly feel lightheaded or have trouble breathing  When should I seek immediate care? You have a severe headache or vision loss  You have weakness in an arm or leg  When should I call my doctor? You feel faint, dizzy, confused, or drowsy  You have been taking your blood pressure medicine but your pressure is higher than your provider says it should be  You have questions or concerns about your condition or care  CARE AGREEMENT:   You have the right to help plan your care  Learn about your health condition and how it may be treated  Discuss treatment options with your healthcare providers to decide what care you want to receive  You always have the right to refuse treatment   The above information is an  only  It is not intended as medical advice for individual conditions or treatments  Talk to your doctor, nurse or pharmacist before following any medical regimen to see if it is safe and effective for you  © Copyright [employer] 2022 Information is for End User's use only and may not be sold, redistributed or otherwise used for commercial purposes  All illustrations and images included in CareNotes® are the copyrighted property of A D A M , Inc  or [address]  Cholesterol and [address] [facility]:   What is cholesterol? Cholesterol is a waxy, fat-like substance  Your body uses cholesterol to make hormones and new cells, and to protect nerves  Cholesterol is made by your body  It also comes from certain foods you eat, such as meat and dairy products  Your healthcare provider can help you set goals for your cholesterol levels  He or she can help you create a plan to meet your goals  What are cholesterol level goals? Your cholesterol level goals depend on your risk for heart disease, your age, and your other health conditions  The following are general guidelines: Total cholesterol  includes low-density lipoprotein (LDL), high-density lipoprotein (HDL), and triglyceride levels  The total cholesterol level should be lower than 200 mg/dL and is best at about 150 mg/dL  LDL cholesterol  is called bad cholesterol  because it forms plaque in your arteries  As plaque builds up, your arteries become narrow, and less blood flows through  When plaque decreases blood flow to your heart, you may have chest pain  If plaque completely blocks an artery that brings blood to your heart, you may have a heart attack  Plaque can break off and form blood clots  Blood clots may block arteries in your brain and cause a stroke  The level should be less than 130 mg/dL and is best at about 100 mg/dL           HDL cholesterol  is called good cholesterol  because it helps remove LDL cholesterol from your arteries  It does this by attaching to LDL cholesterol and carrying it to your liver  Your liver breaks down LDL cholesterol so your body can get rid of it  High levels of HDL cholesterol can help prevent a heart attack and stroke  Low levels of HDL cholesterol can increase your risk for heart disease, heart attack, and stroke  The level should be 60 mg/dL or higher  Triglycerides  are a type of fat that store energy from foods you eat  High levels of triglycerides also cause plaque buildup  This can increase your risk for a heart attack or stroke  If your triglyceride level is high, your LDL cholesterol level may also be high  The level should be less than 150 mg/dL  What increases my risk for high cholesterol? Smoking cigarettes    Being overweight or obese, or not getting enough exercise    Drinking large amounts of alcohol    A medical condition such as hypertension (high blood pressure) or diabetes    Certain genes passed from your parents to you    Age older than 72 years    What do I need to know about having my cholesterol levels checked? Adults 21to 39years of age should have their cholesterol levels checked every 4 to 6 years  Adults 45 years or older should have their cholesterol checked every 1 to 2 years  You may need your cholesterol checked more often, or at a younger age, if you have risk factors for heart disease  You may also need to have your cholesterol checked more often if you have other health conditions, such as diabetes  Blood tests are used to check cholesterol levels  Blood tests measure your levels of triglycerides, LDL cholesterol, and HDL cholesterol  How do healthy fats affect my cholesterol levels? Healthy fats, also called unsaturated fats, help lower LDL cholesterol and triglyceride levels  Healthy fats include the following:  Monounsaturated fats  are found in foods such as olive oil, canola oil, avocado, nuts, and olives      Polyunsaturated fats, such as omega 3 fats, are found in fish, such as salmon, trout, and tuna  They can also be found in plant foods such as flaxseed, walnuts, and soybeans  How do unhealthy fats affect my cholesterol levels? Unhealthy fats increase LDL cholesterol and triglyceride levels  They are found in foods high in cholesterol, saturated fat, and trans fat:  Cholesterol  is found in eggs, dairy, and meat  Saturated fat  is found in butter, cheese, ice cream, whole milk, and coconut oil  Saturated fat is also found in meat, such as sausage, hot dogs, and bologna  Trans fat  is found in liquid oils and is used in fried and baked foods  Foods that contain trans fats include chips, crackers, muffins, sweet rolls, microwave popcorn, and cookies  How is high cholesterol treated? Treatment for high cholesterol will also decrease your risk of heart disease, heart attack, and stroke  Treatment may include any of the following:  Lifestyle changes  may include food, exercise, weight loss, and quitting smoking  You may also need to decrease the amount of alcohol you drink  Your healthcare provider will want you to start with lifestyle changes  Other treatment may be added if lifestyle changes are not enough  Your healthcare provider may recommend you work with a team to manage hyperlipidemia  The team may include medical experts such as a dietitian, an exercise or physical therapist, and a behavior therapist  Your family members may be included in helping you create lifestyle changes  Medicines  may be given to lower your LDL cholesterol, triglyceride levels, or total cholesterol level  You may need medicines to lower your cholesterol if any of the following is true:    You have a history of stroke, TIA, unstable angina, or a heart attack  Your LDL cholesterol level is 190 mg/dL or higher      You are age 36 to 76 years, have diabetes or heart disease risk factors, and your LDL cholesterol is 70 mg/dL or higher  Supplements  include fish oil, red yeast rice, and garlic  Fish oil may help lower your triglyceride and LDL cholesterol levels  It may also increase your HDL cholesterol level  Red yeast rice may help decrease your total cholesterol level and LDL cholesterol level  Garlic may help lower your total cholesterol level  Do not take any supplements without talking to your healthcare provider  What food changes can I make to lower my cholesterol levels? A dietitian can help you create a healthy eating plan  He or she can show you how to read food labels and choose foods low in saturated fat, trans fats, and cholesterol  Decrease the total amount of fat you eat  Choose lean meats, fat-free or 1% fat milk, and low-fat dairy products, such as yogurt and cheese  Try to limit or avoid red meats  Limit or do not eat fried foods or baked goods, such as cookies  Replace unhealthy fats with healthy fats  Cook foods in olive oil or canola oil  Choose soft margarines that are low in saturated fat and trans fat  Seeds, nuts, and avocados are other examples of healthy fats  Eat foods with omega-3 fats  Examples include salmon, tuna, mackerel, walnuts, and flaxseed  Eat fish 2 times per week  Pregnant women should not eat fish that have high levels of mercury, such as shark, swordfish, and yuki mackerel  Increase the amount of high-fiber foods you eat  High-fiber foods can help lower your LDL cholesterol  Aim to get between 20 and 30 grams of fiber each day  Fruits and vegetables are high in fiber  Eat at least 5 servings each day  Other high-fiber foods are whole-grain or whole-wheat breads, pastas, or cereals, and brown rice  Eat 3 ounces of whole-grain foods each day  Increase fiber slowly  You may have abdominal discomfort, bloating, and gas if you add fiber to your diet too quickly  Eat healthy protein foods    Examples include low-fat dairy products, skinless chicken and turkey, fish, and nuts  Limit foods and drinks that are high in sugar  Your dietitian or healthcare provider can help you create daily limits for high-sugar foods and drinks  The limit may be lower if you have diabetes or another health condition  Limits can also help you lose weight if needed  What lifestyle changes can I make to lower my cholesterol levels? Maintain a healthy weight  Ask your healthcare provider what a healthy weight is for you  Ask him or her to help you create a weight loss plan if needed  Weight loss can decrease your total cholesterol and triglyceride levels  Weight loss may also help keep your blood pressure at a healthy level  Be physically active throughout the day  Physical activity, such as exercise, can help lower your total cholesterol level and maintain a healthy weight  Physical activity can also help increase your HDL cholesterol level  Work with your healthcare provider to create an program that is right for you  Get at least 30 to 40 minutes of moderate physical activity most days of the week  Examples of exercise include brisk walking, swimming, or biking  Also include strength training at least 2 times each week  Your healthcare providers can help you create a physical activity plan  Do not smoke  Nicotine and other chemicals in cigarettes and cigars can raise your cholesterol levels  Ask your healthcare provider for information if you currently smoke and need help to quit  E-cigarettes or smokeless tobacco still contain nicotine  Talk to your healthcare provider before you use these products  Limit or do not drink alcohol  Alcohol can increase your triglyceride levels  Ask your healthcare provider before you drink alcohol  Ask how much is okay for you to drink in 24 hours or 1 week  CARE AGREEMENT:   You have the right to help plan your care  Discuss treatment options with your healthcare provider to decide what care you want to receive   You always have the right to refuse treatment  The above information is an  only  It is not intended as medical advice for individual conditions or treatments  Talk to your doctor, nurse or pharmacist before following any medical regimen to see if it is safe and effective for you  © Copyright [employer] 2022 Information is for End User's use only and may not be sold, redistributed or otherwise used for commercial purposes  All illustrations and images included in CareNotes® are the copyrighted property of A D A M , Inc  or [facility]  Allergic Rhinitis   WHAT YOU NEED TO KNOW:   What is allergic rhinitis? Allergic rhinitis, or hay fever, is swelling of the inside of your nose  The swelling is a reaction to allergens in the air  An allergen can be anything that causes an allergic reaction  Allergies to weeds, grass, trees, or mold often cause seasonal allergic rhinitis  Indoor dust mites, cockroaches, pet dander, or mold can also cause allergic rhinitis  What are the signs and symptoms of allergic rhinitis? Sneezing    Nasal congestion    Runny nose    Itchy nose, eyes, or mouth    Red, watery eyes    Postnasal drip (nasal drainage down the back of your throat)    Cough or frequent throat clearing    Feeling tired or lethargic    Dark circles under your eyes    How is allergic rhinitis diagnosed? Your healthcare provider will ask about your symptoms and examine you  He may ask if you know what makes your symptoms worse  Tell him if you have pets  You may need any of the following:  Skin testing  may show what you are allergic to  Your healthcare provider lightly pricks or scratches your skin with tiny amounts of a possible allergen  He watches to see how your skin reacts  If a bump appears within a few minutes, you are likely allergic to the allergen  A nasal swab  is used to test fluid from your nose for allergic disease      A rhinoscopy  is a procedure used to check for another cause of your symptoms, such as polyps or a foreign object  Your healthcare provider will use a thin tube with a camera on the end to look inside your nose  How is allergic rhinitis treated? Antihistamines  help reduce itching, sneezing, and a runny nose  Some antihistamines can make you sleepy  Nasal steroids  help decrease inflammation in your nose  Decongestants  help clear your stuffy nose  Immunotherapy  may be needed if your symptoms are severe or other treatments do not work  Immunotherapy is used to inject an allergen into your skin  At first, the therapy contains tiny amounts of the allergen  Your healthcare provider will slowly increase the amount of allergen  This may help your body be less sensitive to the allergen and stop reacting to it  You may need immunotherapy for weeks or longer  How can I manage allergic rhinitis? The best way to manage allergic rhinitis is to avoid allergens that can trigger your symptoms  Any of the following may help decrease your symptoms:  Rinse your nose and sinuses  with a salt water solution or use a salt water nasal spray  This will help thin the mucus in your nose and rinse away pollen and dirt  It will also help reduce swelling so you can breathe normally  Ask your healthcare provider how often to rinse your nose  Reduce exposure to dust mites  Wash sheets and towels in hot water every week  Cover your pillows and mattresses with allergen-free covers  Limit the number of stuffed animals and soft toys your child has  Wash your child's toys in hot water regularly  Vacuum weekly and use a vacuum  with an air filter  If possible, get rid of carpets and curtains  These collect dust and dust mites  Reduce exposure to pollen  Keep windows and doors closed in your house and car  Stay inside when air pollution or the pollen count is high  Run your air conditioner on recycle, and change air filters often   Shower and wash your hair before bed every night to rinse away pollen  Reduce exposure to pet dander  If possible, do not keep cats, dogs, birds, or other pets  If you do keep pets in your home, keep them out of bedrooms and carpeted rooms  Bathe them often  Reduce exposure to mold  Do not spend time in basements  Choose artificial plants instead of live plants  Keep your home's humidity at less than 45%  Do not have ponds or standing water in your home or yard  Do not smoke  Avoid others who smoke  Ask your healthcare provider for information if you currently smoke and need help to quit  Call 911 for the following: You have chest pain or shortness of breath  When should I seek immediate care? You have severe pain  You cough up blood  When should I contact my healthcare provider? You have a fever  You have ear or sinus pain, or a headache  Your symptoms get worse, even after treatment  You have yellow, green, brown, or bloody mucus coming from your nose  Your nose is bleeding or you have pain inside your nose  You have trouble sleeping because of your symptoms  You have questions or concerns about your condition or care  CARE AGREEMENT:   You have the right to help plan your care  Learn about your health condition and how it may be treated  Discuss treatment options with your healthcare providers to decide what care you want to receive  You always have the right to refuse treatment  The above information is an  only  It is not intended as medical advice for individual conditions or treatments  Talk to your doctor, nurse or pharmacist before following any medical regimen to see if it is safe and effective for you  © Copyright [employer] 2022 Information is for End User's use only and may not be sold, redistributed or otherwise used for commercial purposes   All illustrations and images included in CareNotes® are the copyrighted property of [employer] D A M [employer] Inc  or [employer]  Hypothyroidism   WHAT YOU NEED TO KNOW:   What is hypothyroidism? Hypothyroidism is a condition that develops when the thyroid gland does not make enough thyroid hormone  Thyroid hormones help control body temperature, heart rate, growth, and weight  What causes or increases my risk for hypothyroidism? A family history of hypothyroidism    Age 61 years or older or being female    Autoimmune disease, such as inflammation of your thyroid, or Hashimoto disease    Thyroid surgery, head or neck radiation therapy, or medicines such as lithium, sedatives, or narcotics    Thyroid cancer, a goiter, or a viral infection    Low iodine level    Down syndrome or Farah syndrome    What are the signs and symptoms of hypothyroidism? The signs and symptoms may develop slowly, sometimes over several years  Exhaustion, depression, or irritability    Sensitivity to cold    Muscle aches, headaches, weakness, or trouble concentrating    Dry, flaky skin, brittle nails, or thinning hair    Recent weight gain without overeating, or constipation    Heavy or irregular monthly periods    Puffiness around your eyes or swelling in your hands and feet    Low heart rate, changes in your blood pressure    How is hypothyroidism diagnosed? Your healthcare provider will ask about your symptoms and what medicines you take  He or she will ask about your medical history and if anyone in your family has hypothyroidism  A blood test will show your thyroid hormone level  How is hypothyroidism treated? Thyroid hormone replacement medicine may bring your thyroid hormone level back to normal  You will need to take this medicine for the rest of your life to control hypothyroidism  It is important to take the medicine every day as directed  You will be given instructions for what to do if you miss a dose  Ask your healthcare provider for more information on other medicines you may need  How can I manage hypothyroidism? Get more iodine  The thyroid gland uses iodine to work correctly and to make thyroid hormones  Your healthcare provider may tell you to eat foods that are rich in iodine  He or she will tell you how much of these foods to eat  Milk and seafood are good sources of iodine  You may also need iodine supplements  Keep track of your blood pressure and weight:      Check your blood pressure  and write it down as often as directed  It is important to measure your blood pressure on the same arm and in the same position each time  Keep track of your blood pressure readings, along with the date and time you took them  Take this record with you to your followup visits  Weigh yourself daily  before breakfast after you urinate  Weight gain may be a sign of extra fluid in your body  Keep track of your daily weights and take the record with you to your followup visits  Call your local emergency number [phone] in the [address]) or have someone call if:   You have sudden chest pain or shortness of breath  You have a seizure  You feel like you are going to faint  When should I seek immediate care? You have diarrhea, tremors, or trouble sleeping  Your legs, ankles, or feet are swollen  When should I call my doctor? You have a fever  You have chills, a cough, or feel weak and achy  You have pain and swelling in your muscles and joints  Your skin is itchy, swollen, or you have a rash  Your signs and symptoms return or get worse, even after treatment  You have questions or concerns about your condition or care  CARE AGREEMENT:   You have the right to help plan your care  Learn about your health condition and how it may be treated  Discuss treatment options with your healthcare providers to decide what care you want to receive  You always have the right to refuse treatment  The above information is an  only  It is not intended as medical advice for individual conditions or treatments   Talk to your doctor, nurse or pharmacist before following any medical regimen to see if it is safe and effective for you  © Copyright [employer] 2022 Information is for End User's use only and may not be sold, redistributed or otherwise used for commercial purposes  All illustrations and images included in CareNotes® are the copyrighted property of [employer]  or [facility] Kandis Shah   GERD (Gastroesophageal Reflux Disease)   WHAT YOU NEED TO KNOW:   What is gastroesophageal reflux disease (GERD)? GERD is reflux that happens more than 2 times a week for a few weeks  Reflux means acid and food in your stomach back up into your esophagus  GERD can cause other health problems over time if it is not treated  What causes GERD? GERD often happens because the lower muscle (sphincter) of the esophagus does not close properly  The sphincter normally opens to let food into the stomach  It then closes to keep food and stomach acid in the stomach  If the sphincter does not close properly, stomach acid and food back up (reflux) into the esophagus  The following may increase your risk for GERD:  Certain foods such as spicy foods, chocolate, foods that contain caffeine, peppermint, and fried foods    Hiatal hernia    Certain medicines such as calcium channel blockers (used to treat high blood pressure), allergy medicines, sedatives, or antidepressants    Pregnancy, obesity, or scleroderma    Lying down after a meal    Drinking alcohol or smoking cigarettes    What are the signs and symptoms of GERD? Heartburn (burning pain in your chest)    Pain after meals that spreads to your neck, jaw, or shoulder    Pain that gets better when you change positions    Bitter or acid taste in your mouth    A dry cough    Trouble swallowing or pain with swallowing    Hoarseness or a sore throat    Burping or hiccups    Feeling full soon after you start eating    How is GERD diagnosed?   Your healthcare provider will ask about your symptoms and when they started  Tell him or her about other medical conditions you have, your eating habits, and your activities  You may also need any of the following: The amount of acid  in your esophagus and stomach may be checked  A small probe is used to check the amount  An endoscopy  is a procedure used to look at the inside of your esophagus and stomach  An endoscope is a bendable tube with a light and camera on the end  Your healthcare provider may remove a small sample of tissue and send it to a lab for tests  X-ray  pictures may be taken of your stomach and intestines (bowel)  You may be given a chalky liquid to drink before the pictures are taken  This liquid helps your stomach and intestines show up better on the x-rays  Pressure and function  tests of your esophagus can help find problems such as a hiatal hernia  How is GERD treated? Medicines  are used to decrease stomach acid  Medicine may also be used to help your lower esophageal sphincter and stomach contract (tighten) more  Surgery  is done to wrap the upper part of the stomach around the esophageal sphincter  This will strengthen the sphincter and prevent reflux  How can I manage GERD? Do not have foods or drinks that may increase heartburn  These include chocolate, peppermint, fried or fatty foods, drinks that contain caffeine, or carbonated drinks (soda)  Other foods include spicy foods, onions, tomatoes, and tomato-based foods  Do not have foods or drinks that can irritate your esophagus, such as citrus fruits, juices, and alcohol  Do not eat large meals  When you eat a lot of food at one time, your stomach needs more acid to digest it  Eat 6 small meals each day instead of 3 large ones, and eat slowly  Do not eat meals 2 to 3 hours before bedtime  Elevate the head of your bed  Place 6-inch blocks under the head of your bed frame   You may also use more than one pillow under your head and shoulders while you sleep  Maintain a healthy weight  If you are overweight, weight loss may help relieve symptoms of GERD  Do not smoke  Smoking weakens the lower esophageal sphincter and increases the risk of GERD  Ask your healthcare provider for information if you currently smoke and need help to quit  E-cigarettes or smokeless tobacco still contain nicotine  Talk to your healthcare provider before you use these products  Do not put pressure on your abdomen  Pressure pushes acid up into your esophagus  Do not wear clothing that is tight around your waist  Do not bend over  Bend at the knees if you need to pick something up  Call your local emergency number ([phone] in the [address]) if:   You have severe chest pain and sudden trouble breathing  When should I seek immediate care? You have trouble breathing after you vomit  You have trouble swallowing, or pain with swallowing  Your bowel movements are black, bloody, or tarry-looking  Your vomit looks like coffee grounds or has blood in it  When should I call my doctor? You feel full and cannot burp or vomit  You vomit large amounts, or you vomit often  You are losing weight without trying  Your symptoms get worse or do not improve with treatment  You have questions or concerns about your condition or care  CARE AGREEMENT:   You have the right to help plan your care  Learn about your health condition and how it may be treated  Discuss treatment options with your healthcare providers to decide what care you want to receive  You always have the right to refuse treatment  The above information is an  only  It is not intended as medical advice for individual conditions or treatments  Talk to your doctor, nurse or pharmacist before following any medical regimen to see if it is safe and effective for you    © Copyright [employer] 2022 Information is for End User's use only and may not be sold, redistributed or otherwise used for commercial purposes   All illustrations and images included in CareNotes® are the copyrighted property of A D A M [employer] Inc  or [employer]

## 2023-02-16 NOTE — ASSESSMENT & PLAN NOTE
Patient is hypothyroid currently maintained levothyroxine 50 mcg p o  daily  Prior TSH level within normal limits  TSH, 3rd generation with Free T4 reflex  Order: [id number]   Status: Final result      Visible to patient: No (inaccessible in [address])      Next appt: 03/21/2023 at 10:00 AM in Obstetrics and Gynecology Deuce Kearns MD)      Dx: Other specified hypothyroidism      0 Result Notes  Component Ref Range & Units 2/2/23  8:06 AM    TSH 3RD GENERATON 0 450 - 4 500 uIU/mL 3 740    Comment: The recommended reference ranges for TSH during pregnancy are as follows:   First trimester 0 1 to 2 5 uIU/mL   Second trimester  0 2 to 3 0 uIU/mL   Third trimester 0 3 to 3 0 uIU/m     Note: Normal ranges may not apply to patients who are transgender, non-binary, or whose legal sex, sex at birth, and gender identity differ  Adult TSH (3rd generation) reference range follows the recommended guidelines of the American Thyroid Association, January, 2020  Narrative    Patients undergoing fluorescein dye angiography may retain small amounts of fluorescein in the body for 48-72 hours post procedure  Samples containing fluorescein can produce falsely depressed TSH values  If the patient had this procedure,a specimen should be resubmitted post fluorescein clearance         Specimen Collected: 02/02/23  8:06 AM Last Resulted: 02/02/23  1:31 PM

## 2023-02-16 NOTE — ASSESSMENT & PLAN NOTE
Blood pressure currently 154/94  Patient has not taken her medications today because, she takes atenolol 100 mg every other day  Patient changed to atenolol 50 mg p o  daily  Patient maintain a low-sodium diet

## 2023-02-16 NOTE — ASSESSMENT & PLAN NOTE
Screening for lipids  To maintain a low-cholesterol low-fat diet  To maintain a low-cholesterol low-fat diet  Repeat lipid panel  Lipid panel  Order: [id number]   Status: Final result      Visible to patient: No (inaccessible in [place])      Next appt: 03/21/2023 at 10:00 AM in Obstetrics and Gynecology Lisette Sharp MD)      Dx: Screening, lipid      0 Result Notes  Component Ref Range & Units 2/2/23  8:06 AM 2/4/22  9:43 AM 7/13/21  8:10 AM   Cholesterol See Comment mg/dL 244 High   175 R  156 R    Comment: Cholesterol:         Pediatric <18 Years         Desirable          <170 mg/dL       Borderline High    170-199 mg/dL       High               >=200 mg/dL         Adult >=18 Years             Desirable         <200 mg/dL       Borderline High   200-239 mg/dL       High             >239 mg/dL    Triglycerides See Comment mg/dL 61  85 R  79 R    Comment: Triglyceride:      0-9Y            <75mg/dL      10Y-17Y         <90 mg/dL        >=18Y      Normal          <150 mg/dL      Borderline High 150-199 mg/dL      High            200-499 mg/dL        Very High       >499 mg/dL     Specimen collection should occur prior to N-Acetylcysteine or Metamizole administration due to the potential for falsely depressed results  HDL, Direct >=50 mg/dL 101  83 R  76 R    Comment: Specimen collection should occur prior to Metamizole administration due to the potential for falsley depressed results  LDL Calculated 0 - 100 mg/dL 131 High   75 R, CM  64 R, CM    Comment: LDL Cholesterol:     Optimal           <100 mg/dl     Near Optimal      100-129 mg/dl     Above Optimal       Borderline High 130-159 mg/dl       High            160-189 mg/dl       Very High       >189 mg/dl           This screening LDL is a calculated result  It does not have the accuracy of the Direct Measured LDL in the monitoring of patients with hyperlipidemia and/or statin therapy     Direct Measure LDL ([id number]) must be ordered separately in these patients     Non-HDL-Chol (CHOL-HDL) mg/dl 143  92 R, CM  80 R, CM               Specimen Collected: 02/02/23  8:06 AM Last Resulted: 02/02/23  1:20 PM

## 2023-02-16 NOTE — PROGRESS NOTES
Assessment/Plan:         Problem List Items Addressed This Visit        Digestive    GERD without esophagitis     To avoid spicy foods  GERD Diet to be maintained  Currently on Protonix 40 mg p o  daily  Endocrine    Hypothyroidism     Patient is hypothyroid currently maintained levothyroxine 50 mcg p o  daily  Prior TSH level within normal limits  TSH, 3rd generation with Free T4 reflex  Order: [id number]   Status: Final result      Visible to patient: No (inaccessible in [address])      Next appt: 03/21/2023 at 10:00 AM in Obstetrics and Gynecology Yoko Lobo MD)      Dx: Other specified hypothyroidism      0 Result Notes  Component Ref Range & Units 2/2/23  8:06 AM    TSH 3RD GENERATON 0 450 - 4 500 uIU/mL 3 740    Comment: The recommended reference ranges for TSH during pregnancy are as follows:   First trimester 0 1 to 2 5 uIU/mL   Second trimester  0 2 to 3 0 uIU/mL   Third trimester 0 3 to 3 0 uIU/m     Note: Normal ranges may not apply to patients who are transgender, non-binary, or whose legal sex, sex at birth, and gender identity differ  Adult TSH (3rd generation) reference range follows the recommended guidelines of the American Thyroid Association, January, 2020  Narrative    Patients undergoing fluorescein dye angiography may retain small amounts of fluorescein in the body for 48-72 hours post procedure  Samples containing fluorescein can produce falsely depressed TSH values  If the patient had this procedure,a specimen should be resubmitted post fluorescein clearance  Specimen Collected: 02/02/23  8:06 AM Last Resulted: 02/02/23  1:31 PM                       Cardiovascular and Mediastinum    Hypertension - Primary     Blood pressure currently 154/94  Patient has not taken her medications today because, she takes atenolol 100 mg every other day  Patient changed to atenolol 50 mg p o  daily  Patient maintain a low-sodium diet              Other Screening for colon cancer     Screening for lipids  To maintain a low-cholesterol low-fat diet  To maintain a low-cholesterol low-fat diet  Repeat lipid panel  Lipid panel  Order: [id number]   Status: Final result      Visible to patient: No (inaccessible in [place])      Next appt: 03/21/2023 at 10:00 AM in Obstetrics and Gynecology Yoko Lobo MD)      Dx: Screening, lipid      0 Result Notes  Component Ref Range & Units 2/2/23  8:06 AM 2/4/22  9:43 AM 7/13/21  8:10 AM   Cholesterol See Comment mg/dL 244 High   175 R  156 R    Comment: Cholesterol:         Pediatric <18 Years         Desirable          <170 mg/dL       Borderline High    170-199 mg/dL       High               >=200 mg/dL         Adult >=18 Years             Desirable         <200 mg/dL       Borderline High   200-239 mg/dL       High             >239 mg/dL    Triglycerides See Comment mg/dL 61  85 R  79 R    Comment: Triglyceride:      0-9Y            <75mg/dL      10Y-17Y         <90 mg/dL        >=18Y      Normal          <150 mg/dL      Borderline High 150-199 mg/dL      High            200-499 mg/dL        Very High       >499 mg/dL     Specimen collection should occur prior to N-Acetylcysteine or Metamizole administration due to the potential for falsely depressed results  HDL, Direct >=50 mg/dL 101  83 R  76 R    Comment: Specimen collection should occur prior to Metamizole administration due to the potential for falsley depressed results  LDL Calculated 0 - 100 mg/dL 131 High   75 R, CM  64 R, CM    Comment: LDL Cholesterol:     Optimal           <100 mg/dl     Near Optimal      100-129 mg/dl     Above Optimal       Borderline High 130-159 mg/dl       High            160-189 mg/dl       Very High       >189 mg/dl           This screening LDL is a calculated result  It does not have the accuracy of the Direct Measured LDL in the monitoring of patients with hyperlipidemia and/or statin therapy     Direct Measure LDL (DZO357) must be ordered separately in these patients  Non-HDL-Chol (CHOL-HDL) mg/dl 143  92 R, CM  80 R, CM               Specimen Collected: 02/02/23  8:06 AM Last Resulted: 02/02/23  1:20 PM                    Seasonal allergies     No allergy issues at this time  She is currently not taking any allergy medication  Subjective:      Patient ID: Ronaldo Harding is a 61 y o  female  Patient is on social security insurance and requires it annually for evaluation  Patient's account was over $2000 00, she  is not able to have an income above $2000 00 dollars if over SSI  Only way it can be appealed is if a Physician indicates she needs a payee to manager her finances and use monies allocated, so her bank account can be managed to pay her bills  The following portions of the patient's history were reviewed and updated as appropriate:   Past Medical History:  She has a past medical history of Abnormal weight loss, Anorexia nervosa, Breast cancer ([address] ), Disease of thyroid gland, Elevated blood sugar, Fibroadenoma of both breasts, GERD (gastroesophageal reflux disease), Hypertension, Hyperthyroidism, and Osteopenia  ,  _______________________________________________________________________  Medical Problems:  does not have any pertinent problems on file ,  _______________________________________________________________________  Past Surgical History:   has a past surgical history that includes Cholecystectomy; Breast cyst excision (Left, 2011); Breast biopsy (Right, 04/16/2021); US guided breast biopsy right complete (Right, 4/16/2021); US guidance breast biopsy right each additional (Right, 4/16/2021); US breast medardo  right (Right, 6/14/2021); US breast needle loc right each additional (Right, 6/14/2021); and Mastectomy w/ sentinel node biopsy (Right, 6/25/2021)  ,  _______________________________________________________________________  Family History:  family history is not on file   She was adopted  ,  _______________________________________________________________________  Social History:   reports that she has never smoked  She has never used smokeless tobacco  She reports current alcohol use of about 2 0 standard drinks per week  She reports that she does not use drugs  ,  _______________________________________________________________________  Allergies:  has No Known Allergies     _______________________________________________________________________  Current Outpatient Medications   Medication Sig Dispense Refill   • anastrozole (ARIMIDEX) 1 mg tablet Take 1 tablet (1 mg total) by mouth daily 90 tablet 2   • atenolol (TENORMIN) 100 mg tablet Take 1 tablet (100 mg total) by mouth every other day Take 100 mg by mouth every other morning  90 tablet 1   • Cholecalciferol (Vitamin D3) 50 MCG (2000 UT) capsule Take 1 capsule (2,000 Units total) by mouth daily  Take 1 capsule every day by oral route for 30 days  90 capsule 1   • levothyroxine 50 mcg tablet Take 1 tablet (50 mcg total) by mouth daily in the early morning 90 tablet 3   • Multiple Vitamins-Minerals (Womens 50+ Multi Vitamin/Min) TABS Take 1 tablet by mouth daily 100 tablet 1   • OLANZapine (ZyPREXA) 7 5 mg tablet TAKE ONE TABLET BY MOUTH AT BEDTIME 30 tablet 0   • pantoprazole (PROTONIX) 40 mg tablet TAKE ONE TABLET BY MOUTH EVERY DAY 30 tablet 0   • carbamide peroxide (DEBROX) 6 5 % otic solution Administer 5 drops into both ears 2 (two) times a day (Patient not taking: Reported on 2/16/2023) 15 mL 2   • multivitamin (THERAGRAN) TABS TAKE ONE TABLET BY MOUTH EVERY DAY (Patient not taking: Reported on 2/16/2023) 30 tablet 0   • oxyCODONE-acetaminophen (PERCOCET) 5-325 mg per tablet Take 1 tablet by mouth every 6 (six) hours as needed for moderate painMax Daily Amount: 4 tablets (Patient not taking: Reported on 1/18/2023) 15 tablet 0     No current facility-administered medications for this visit  _______________________________________________________________________  Review of Systems   Constitutional: Negative for activity change, appetite change, chills, fatigue, fever and unexpected weight change  HENT: Negative for congestion, ear discharge, ear pain, nosebleeds, postnasal drip, rhinorrhea, sinus pressure, sinus pain, sneezing, sore throat and voice change  Eyes: Negative for pain, redness and visual disturbance  Respiratory: Negative for cough, chest tightness, shortness of breath and wheezing  Cardiovascular: Negative for chest pain and palpitations  Gastrointestinal: Negative for abdominal distention, abdominal pain, constipation, diarrhea, nausea and vomiting  Endocrine: Negative  Genitourinary: Negative for difficulty urinating, dysuria, flank pain, frequency, hematuria and urgency  Musculoskeletal: Negative for arthralgias and myalgias  Skin: Negative  Allergic/Immunologic: Negative  Neurological: Negative  Hematological: Negative  Psychiatric/Behavioral: Negative  Objective:  Vitals:    02/16/23 1442   BP: 154/94   BP Location: Left arm   Patient Position: Sitting   Cuff Size: Standard   Pulse: 105   Temp: 98 1 °F (36 7 °C)   TempSrc: Temporal   SpO2: 99%   Weight: 48 5 kg (107 lb)   Height: 5' 4" (1 626 m)     Body mass index is 18 37 kg/m²  Physical Exam  Vitals and nursing note reviewed  Constitutional:       Appearance: Normal appearance  She is well-developed  Comments: Below average  HENT:      Head: Normocephalic and atraumatic  Right Ear: Tympanic membrane, ear canal and external ear normal       Left Ear: Tympanic membrane, ear canal and external ear normal       Nose: Nose normal  No congestion or rhinorrhea  Mouth/Throat:      Mouth: Mucous membranes are moist       Pharynx: No oropharyngeal exudate or posterior oropharyngeal erythema  Eyes:      Extraocular Movements: Extraocular movements intact  Conjunctiva/sclera: Conjunctivae normal       Pupils: Pupils are equal, round, and reactive to light  Cardiovascular:      Rate and Rhythm: Normal rate and regular rhythm  Pulses: Normal pulses  Heart sounds: Normal heart sounds  No murmur heard  Pulmonary:      Effort: Pulmonary effort is normal       Breath sounds: Normal breath sounds  Abdominal:      General: Bowel sounds are normal       Palpations: Abdomen is soft  Musculoskeletal:         General: Normal range of motion  Cervical back: Normal range of motion  Skin:     General: Skin is warm  Capillary Refill: Capillary refill takes less than 2 seconds  Neurological:      General: No focal deficit present  Mental Status: She is alert and oriented to person, place, and time     Psychiatric:         Mood and Affect: Mood normal          Behavior: Behavior normal

## 2023-02-23 DIAGNOSIS — K21.9 GERD WITHOUT ESOPHAGITIS: ICD-10-CM

## 2023-02-23 RX ORDER — PANTOPRAZOLE SODIUM 40 MG/1
TABLET, DELAYED RELEASE ORAL
Qty: 30 TABLET | Refills: 0 | Status: SHIPPED | OUTPATIENT
Start: 2023-02-23

## 2023-02-27 ENCOUNTER — TELEPHONE (OUTPATIENT)
Dept: FAMILY MEDICINE CLINIC | Facility: CLINIC | Age: 58
End: 2023-02-27

## 2023-02-27 DIAGNOSIS — E56.9 VITAMIN DEFICIENCY: ICD-10-CM

## 2023-02-27 RX ORDER — DIPHENOXYLATE HYDROCHLORIDE AND ATROPINE SULFATE 2.5; .025 MG/1; MG/1
TABLET ORAL
Qty: 30 TABLET | Refills: 0 | Status: SHIPPED | OUTPATIENT
Start: 2023-02-27

## 2023-02-27 NOTE — TELEPHONE ENCOUNTER
Patient called she got a lab slip for a lipid   When does she need to get this done she has an upcoming appointment on    3/23/23

## 2023-02-27 NOTE — TELEPHONE ENCOUNTER
Called pt and advised to do lipid panel 1-2 wks before appt to ensure that it is done and that it is accurate -- pt acknowledged

## 2023-03-01 ENCOUNTER — VBI (OUTPATIENT)
Dept: ADMINISTRATIVE | Facility: OTHER | Age: 58
End: 2023-03-01

## 2023-03-03 ENCOUNTER — TELEPHONE (OUTPATIENT)
Dept: HEMATOLOGY ONCOLOGY | Facility: CLINIC | Age: 58
End: 2023-03-03

## 2023-03-03 NOTE — TELEPHONE ENCOUNTER
Called and spoke with patient to reschedule her 4/5 appointment with Cate Fabian   Mentioned that he will not be in the office this day- patient was agreeable to 5/17 at 2:00pm

## 2023-03-06 ENCOUNTER — VBI (OUTPATIENT)
Dept: ADMINISTRATIVE | Facility: OTHER | Age: 58
End: 2023-03-06

## 2023-03-11 PROBLEM — Z00.8 EXAM FOR POPULATION SURVEY: Status: RESOLVED | Noted: 2023-01-10 | Resolved: 2023-03-11

## 2023-03-13 DIAGNOSIS — I10 HYPERTENSION, UNSPECIFIED TYPE: ICD-10-CM

## 2023-03-13 DIAGNOSIS — E03.9 HYPOTHYROIDISM, UNSPECIFIED TYPE: ICD-10-CM

## 2023-03-13 RX ORDER — LEVOTHYROXINE SODIUM 0.05 MG/1
TABLET ORAL
Qty: 90 TABLET | Refills: 0 | Status: SHIPPED | OUTPATIENT
Start: 2023-03-13

## 2023-03-14 RX ORDER — OLANZAPINE 7.5 MG/1
TABLET ORAL
Qty: 30 TABLET | Refills: 0 | Status: SHIPPED | OUTPATIENT
Start: 2023-03-14

## 2023-03-17 ENCOUNTER — APPOINTMENT (OUTPATIENT)
Dept: LAB | Facility: CLINIC | Age: 58
End: 2023-03-17

## 2023-03-17 DIAGNOSIS — Z13.220 SCREENING, LIPID: ICD-10-CM

## 2023-03-17 LAB
CHOLEST SERPL-MCNC: 161 MG/DL
HDLC SERPL-MCNC: 65 MG/DL
LDLC SERPL CALC-MCNC: 68 MG/DL (ref 0–100)
NONHDLC SERPL-MCNC: 96 MG/DL
TRIGL SERPL-MCNC: 142 MG/DL

## 2023-03-21 PROBLEM — Z12.31 ENCOUNTER FOR SCREENING MAMMOGRAM FOR BREAST CANCER: Status: ACTIVE | Noted: 2020-11-06

## 2023-03-22 ENCOUNTER — TELEPHONE (OUTPATIENT)
Dept: FAMILY MEDICINE CLINIC | Facility: CLINIC | Age: 58
End: 2023-03-22

## 2023-03-22 DIAGNOSIS — E56.9 DEFICIENCY OF MULTIPLE VITAMINS: ICD-10-CM

## 2023-03-22 DIAGNOSIS — K21.9 GERD WITHOUT ESOPHAGITIS: ICD-10-CM

## 2023-03-22 RX ORDER — PANTOPRAZOLE SODIUM 40 MG/1
40 TABLET, DELAYED RELEASE ORAL DAILY
Qty: 30 TABLET | Refills: 0 | Status: SHIPPED | OUTPATIENT
Start: 2023-03-22

## 2023-04-05 ENCOUNTER — OFFICE VISIT (OUTPATIENT)
Dept: SURGICAL ONCOLOGY | Facility: CLINIC | Age: 58
End: 2023-04-05

## 2023-04-05 VITALS
SYSTOLIC BLOOD PRESSURE: 142 MMHG | WEIGHT: 107 LBS | HEIGHT: 64 IN | DIASTOLIC BLOOD PRESSURE: 90 MMHG | HEART RATE: 88 BPM | OXYGEN SATURATION: 99 % | RESPIRATION RATE: 18 BRPM | BODY MASS INDEX: 18.27 KG/M2

## 2023-04-05 DIAGNOSIS — Z17.0 MALIGNANT NEOPLASM OF OVERLAPPING SITES OF RIGHT BREAST IN FEMALE, ESTROGEN RECEPTOR POSITIVE (HCC): Primary | ICD-10-CM

## 2023-04-05 DIAGNOSIS — R59.1 LYMPHADENOPATHY: ICD-10-CM

## 2023-04-05 DIAGNOSIS — C50.811 MALIGNANT NEOPLASM OF OVERLAPPING SITES OF RIGHT BREAST IN FEMALE, ESTROGEN RECEPTOR POSITIVE (HCC): Primary | ICD-10-CM

## 2023-04-05 DIAGNOSIS — Z79.811 USE OF ANASTROZOLE (ARIMIDEX): ICD-10-CM

## 2023-04-05 NOTE — PROGRESS NOTES
Surgical Oncology Follow Up       [address]  [facility]  [address] [place]  1965  [id number]  [address] [facility] [facility]  [facility]  [address] [address]    Chief Complaint   Patient presents with   • Breast Cancer       Assessment/Plan:  1  Malignant neoplasm of overlapping sites of right breast in female, estrogen receptor positive ([address] )  - 6 month follow up  - Mammo diagnostic left w 3d & cad; Future    2  Use of anastrozole (Arimidex)  - continue use per medical oncology    3  Lymphadenopathy  - US breast axilla right; Future    Discussion/Summary: Patient is a 41-year-old female presenting today for six-month follow-up for right breast cancer diagnosed in April of 2021  Pathology revealed multifocal right breast cancer ER/TX positive, HER2 negative  She underwent genetic testing which was negative  She underwent a right breast mastectomy with sentinel node biopsy with Dr Tu Vaca  She denied post-mastectomy RT  She is currently on anastrozole  She is due for a mammogram now  On clinical exam I noted lymphadenopathy in the right axilla  She is very thin so this is most likely a normal, palpable node but as this is her cancer side I would like to order an u/s to confirm  Patient was agreeable  There were no further concerns on her exam  I will see the patient back in 6 months or sooner should the need arise  She was instructed to call with any questions or concerns prior to this time  All questions were answered today      History of Present Illness:     Oncology History   Malignant neoplasm of overlapping sites of right breast in female, estrogen receptor positive ([facility] [address] )   4/16/2021 Biopsy    Right breast US guided biopsy:  A  11 o'clock 7 cm from the nipple  Invasive lobular carcinoma  Grade 1  ER 90, TX 45, HER2 1+  Lymphovascular invasion: not identified    B  10 o'clock 7 cm from the nipple  Invasive mammary carcinoma of no special type  Grade 2  , , HER2 1+  Lymphovascular invasion: not identified    Concordant  Malignancy appears multifocal  MRI recommended  4/30/2021 Observation    Bilateral breast MRI  Multifocal right breast cancer; 10:00 mass measures up to 1 8 cm and abuts pectoral muscle, without evidence of invasion  Two areas of carcinoma have a total extent of disease of 5 cm  Left breast clear  Axilla negative  6/10/2021 Genetic Testing    The following genes were evaluated: BATSHEVA, BRCA1, BRCA2, CDH1, CHEK2, PALB2, PTEN, STK11, TP53  Additional genes analyzed for a total of 20  Negative result  No pathogenic sequence variants or deletions/dupllications identified  [facility]     6/25/2021 Surgery    Right breast ANJALI  directed mastectomy with sentinel lymph node biopsy  Invasive carcinoma of no special type (ductal)  Grade 2  2 cm (2 foci)  Margins negative  1/1 Lymph node with micrometastases (0 4 mm)  Anatomic Stage IB  Prognostic Stage IA     7/7/2021 Genomic Testing    MammaPrint for FLEX trial  Low risk (Luminal A)     7/28/2021 -  Hormone Therapy    Anastrozole 1 mg daily  Dr Maria L Ruggiero     7/29/2021 - 7/29/2021 Radiation    Consult with Dr Larisa Frazier  Patient declined post-mastectomy RT          -Interval History:  Patient is a 65-year-old female presenting today for six-month follow-up for right breast cancer diagnosed in April of 2021  She is currently on anastrozole  Patient denies changes on her breast exam  She denies persistent headaches, bone pain, back pain, shortness of breath, or abdominal pain  Review of Systems:  Review of Systems   Constitutional: Negative for activity change, appetite change, fatigue and unexpected weight change  Respiratory: Negative for cough and shortness of breath  Cardiovascular: Negative for chest pain     Gastrointestinal: Negative for abdominal pain, diarrhea, nausea and vomiting  Endocrine: Negative for heat intolerance  Musculoskeletal: Negative for arthralgias, back pain and myalgias  Skin: Negative for rash  Neurological: Negative for weakness and headaches  Hematological: Negative for adenopathy  Patient Active Problem List   Diagnosis   • GERD without esophagitis   • Hypothyroidism   • Vitamin D deficiency   • Hypertension   • Deficiency of multiple vitamins   • Encounter for screening mammogram for breast cancer   • Malignant neoplasm of overlapping sites of right breast in female, estrogen receptor positive ([email or web address] )   • Anorexia nervosa   • Use of anastrozole (Arimidex)   • Follow-up exam   • Seasonal allergies   • Body mass index (BMI) of 19 9 or less in adult     Past Medical History:   Diagnosis Date   • Abnormal weight loss    • Anorexia nervosa    • Breast cancer ([email or web address] )    • Disease of thyroid gland    • Elevated blood sugar     Occasional    • Fibroadenoma of both breasts    • GERD (gastroesophageal reflux disease)    • Hypertension    • Hyperthyroidism    • Osteopenia      Past Surgical History:   Procedure Laterality Date   • BREAST BIOPSY Right 04/16/2021   • BREAST CYST EXCISION Left 2011   • CHOLECYSTECTOMY     • MASTECTOMY W/ SENTINEL NODE BIOPSY Right 6/25/2021    Procedure: BREAST MASTECTOMY WITH BIOPSY LYMPH NODE SENTINEL; ANJALI  GUIDED, LYMPHATIC MAPPING WITH BLUE DYE AND RADIOACTIVE DYE (INJECT AT 1130 BY DR WALSH IN THE OR);   Surgeon: Enrique Benavides MD;  Location: [facility];  Service: Surgical Oncology   • US BREAST NEEDLE LOC RIGHT EACH ADDITIONAL Right 6/14/2021   • US BREAST ANJALI  NEEDLE LOC RIGHT Right 6/14/2021   • US GUIDANCE BREAST BIOPSY RIGHT EACH ADDITIONAL Right 4/16/2021   • US GUIDED BREAST BIOPSY RIGHT COMPLETE Right 4/16/2021     Family History   Adopted: Yes     Social History     Socioeconomic History   • Marital status: Single     Spouse name: Not on file   • Number of children: 0   • Years of education: 12   • Highest education level: Not on file   Occupational History   • Occupation: never worked    Tobacco Use   • Smoking status: Never   • Smokeless tobacco: Never   Vaping Use   • Vaping Use: Never used   Substance and Sexual Activity   • Alcohol use: Yes     Alcohol/week: 2 0 standard drinks     Types: 2 Cans of beer per week   • Drug use: Never     Comment: Illicit drugs:   none - As per [place]    • Sexual activity: Not Currently     Comment: Sexually active:   No - As per [place]    Other Topics Concern   • Not on file   Social History Narrative    · Most recent tobacco use screenin2019      · Do you currently or have you served in the [employer]:   No      · Were you activated, into active duty, as a member of the [employer] or as a Reservist:   No      · Caffeine intake:   None decaf coffee only     · Sexual orientation:   Heterosexual      · General stress level:   Low      · Diet:   Regular      · Single or multi-level home/work:   single level home      · Live alone or with others:   alone      · Exercise level: Moderate outside walk only     · Overweight:   No      · Obese:   No      · Guns present in home:   No      · Seat belts used routinely:   Yes      · Advance directive: Yes      · Sunscreen used routinely:   Yes      · Smoke alarm in home:    Yes      · Performs monthly self-breast exam:   Yes      · Legally blind in one or both eyes:   No      · Hard of hearing or deaf in one or both ears:   No      · Presence of domestic violence:   No      · Are there stairs in your home:   No      · Pets:   No      Social Determinants of Health     Financial Resource Strain: Not on file   Food Insecurity: Not on file   Transportation Needs: Not on file   Physical Activity: Not on file   Stress: Not on file   Social Connections: Not on file   Intimate Partner Violence: Not on file   Housing Stability: Not on file       Current Outpatient Medications:   •  anastrozole (ARIMIDEX) 1 mg tablet, Take 1 tablet (1 mg total) by mouth daily, Disp: 90 tablet, Rfl: 2  •  atenolol (TENORMIN) 50 mg tablet, Take 1 tablet (50 mg total) by mouth daily Take 100 mg by mouth every other morning , Disp: 90 tablet, Rfl: 3  •  carbamide peroxide (DEBROX) 6 5 % otic solution, Administer 5 drops into both ears 2 (two) times a day (Patient not taking: Reported on 2/16/2023), Disp: 15 mL, Rfl: 2  •  Cholecalciferol (Vitamin D3) 50 MCG (2000 UT) capsule, Take 1 capsule (2,000 Units total) by mouth daily  Take 1 capsule every day by oral route for 30 days  , Disp: 90 capsule, Rfl: 1  •  levothyroxine 50 mcg tablet, TAKE ONE TABLET BY MOUTH EVERY DAY IN THE EARLY MORNING, Disp: 90 tablet, Rfl: 0  •  Multiple Vitamins-Minerals (Womens 50+ Multi Vitamin/Min) TABS, Take 1 tablet by mouth daily, Disp: 100 tablet, Rfl: 1  •  multivitamin (THERAGRAN) TABS, TAKE ONE TABLET BY MOUTH EVERY DAY, Disp: 30 tablet, Rfl: 0  •  OLANZapine (ZyPREXA) 7 5 mg tablet, TAKE ONE TABLET BY MOUTH AT BEDTIME, Disp: 30 tablet, Rfl: 0  •  oxyCODONE-acetaminophen (PERCOCET) 5-325 mg per tablet, Take 1 tablet by mouth every 6 (six) hours as needed for moderate painMax Daily Amount: 4 tablets (Patient not taking: Reported on 1/18/2023), Disp: 15 tablet, Rfl: 0  •  pantoprazole (PROTONIX) 40 mg tablet, Take 1 tablet (40 mg total) by mouth daily, Disp: 30 tablet, Rfl: 0  No Known Allergies  Vitals:    04/05/23 1305   Pulse: 88   Resp: 18   SpO2: 99%       Physical Exam  Constitutional:       General: She is not in acute distress  Appearance: Normal appearance  Cardiovascular:      Rate and Rhythm: Normal rate and regular rhythm  Pulses: Normal pulses  Heart sounds: Normal heart sounds  Pulmonary:      Effort: Pulmonary effort is normal       Breath sounds: Normal breath sounds  Chest:      Chest wall: No mass  Breasts:     Right: No swelling, bleeding, mass, skin change or tenderness        Left: No swelling, bleeding, inverted nipple, mass, nipple discharge, skin change or tenderness  Comments: Right axillary lymphadenopathy  Small palpable lymph node in right axilla  Right mastectomy scar  Abdominal:      General: Abdomen is flat  Palpations: Abdomen is soft  Lymphadenopathy:      Upper Body:      Right upper body: Axillary adenopathy present  No supraclavicular or pectoral adenopathy  Left upper body: No supraclavicular, axillary or pectoral adenopathy  Skin:     General: Skin is warm  Neurological:      General: No focal deficit present  Mental Status: She is alert and oriented to person, place, and time  Psychiatric:         Mood and Affect: Mood normal          Behavior: Behavior normal            Results:    Imaging  No results found  I reviewed the above imaging data  Advance Care Planning/Advance Directives:  Discussed disease status, cancer treatment plans and/or cancer treatment goals with the patient

## 2023-04-06 ENCOUNTER — TELEPHONE (OUTPATIENT)
Dept: HEMATOLOGY ONCOLOGY | Facility: CLINIC | Age: 58
End: 2023-04-06

## 2023-04-06 NOTE — TELEPHONE ENCOUNTER
Patient Call Form   Reason for patient call? Patient calling to schedule [facility]  for her mammo at [facility]  Her appointment is 5/18/23 @ 1:15pm    Patient's primary oncologist? [facility]    Name of person the patient was calling for? [facility]   Does the patient issue require a call back? Yes [phone]   If call back required, inform patient that the message will be forwarded to the team and someone will get back to them as soon as possible    Did you relay this information to the patient?  I spoke with Russel and they scheduled her

## 2023-05-01 DIAGNOSIS — K21.9 GERD WITHOUT ESOPHAGITIS: ICD-10-CM

## 2023-05-01 RX ORDER — PANTOPRAZOLE SODIUM 40 MG/1
TABLET, DELAYED RELEASE ORAL
Qty: 30 TABLET | Refills: 0 | Status: SHIPPED | OUTPATIENT
Start: 2023-05-01

## 2023-05-03 ENCOUNTER — TELEPHONE (OUTPATIENT)
Dept: HEMATOLOGY ONCOLOGY | Facility: CLINIC | Age: 58
End: 2023-05-03
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
## 2023-05-13 DIAGNOSIS — I10 HYPERTENSION, UNSPECIFIED TYPE: ICD-10-CM

## 2023-05-16 RX ORDER — OLANZAPINE 7.5 MG/1
TABLET ORAL
Qty: 30 TABLET | Refills: 0 | Status: SHIPPED | OUTPATIENT
Start: 2023-05-16

## 2023-05-17 ENCOUNTER — TELEPHONE (OUTPATIENT)
Dept: HEMATOLOGY ONCOLOGY | Facility: CLINIC | Age: 58
End: 2023-05-17

## 2023-05-17 NOTE — TELEPHONE ENCOUNTER
Appointment Schedule   Who are you speaking with? Patient   If it is not the patient, are they listed on an active communication consent form? N/A   Which provider is the appointment scheduled with? Dr Coreen Montalvo   At which location is the appointment scheduled for? [facility]   When is the appointment scheduled? Please list date and time 06/21at 2:40pm   What is the reason for this appointment? Follow up    Did patient voice understanding of the details of this appointment? Yes   Was the no show policy reviewed with patient?  Yes

## 2023-05-17 NOTE — TELEPHONE ENCOUNTER
Patient is calling in requesting to know if she was scheduled with [facility], I called [facility] and confirmed that she was not scheduled with them but a LYFT has been scheduled for her   I have informed patient of this information and she has voiced understanding

## 2023-05-18 ENCOUNTER — HOSPITAL ENCOUNTER (OUTPATIENT)
Dept: ULTRASOUND IMAGING | Facility: CLINIC | Age: 58
Discharge: HOME | End: 2023-05-18

## 2023-05-18 ENCOUNTER — HOSPITAL ENCOUNTER (OUTPATIENT)
Dept: MAMMOGRAPHY | Facility: CLINIC | Age: 58
Discharge: HOME | End: 2023-05-18

## 2023-05-18 VITALS — WEIGHT: 105.82 LBS | BODY MASS INDEX: 18.07 KG/M2 | HEIGHT: 64 IN

## 2023-05-18 DIAGNOSIS — R59.1 LYMPHADENOPATHY: ICD-10-CM

## 2023-05-18 DIAGNOSIS — Z17.0 MALIGNANT NEOPLASM OF OVERLAPPING SITES OF RIGHT BREAST IN FEMALE, ESTROGEN RECEPTOR POSITIVE (HCC): ICD-10-CM

## 2023-05-18 DIAGNOSIS — C50.811 MALIGNANT NEOPLASM OF OVERLAPPING SITES OF RIGHT BREAST IN FEMALE, ESTROGEN RECEPTOR POSITIVE (HCC): ICD-10-CM

## 2023-06-12 DIAGNOSIS — E03.9 HYPOTHYROIDISM, UNSPECIFIED TYPE: ICD-10-CM

## 2023-06-12 DIAGNOSIS — I10 HYPERTENSION, UNSPECIFIED TYPE: ICD-10-CM

## 2023-06-12 RX ORDER — OLANZAPINE 7.5 MG/1
TABLET ORAL
Qty: 30 TABLET | Refills: 0 | Status: SHIPPED | OUTPATIENT
Start: 2023-06-12

## 2023-06-12 RX ORDER — LEVOTHYROXINE SODIUM 0.05 MG/1
TABLET ORAL
Qty: 90 TABLET | Refills: 0 | Status: SHIPPED | OUTPATIENT
Start: 2023-06-12

## 2023-06-20 ENCOUNTER — TELEPHONE (OUTPATIENT)
Dept: HEMATOLOGY ONCOLOGY | Facility: CLINIC | Age: 58
End: 2023-06-20

## 2023-06-20 NOTE — TELEPHONE ENCOUNTER
Appointment Confirmation   Who are you speaking with? Patient   If it is not the patient, are they listed on an active communication consent form? N/A   Which provider is the appointment scheduled with? Dr Elana Avery   When is the appointment scheduled? Please list date and time 6/21/23 2:40PM   At which location is the appointment scheduled to take place? [facility]   Did caller verbalize understanding of appointment details?  Yes     Confirmed [facility] for the patient

## 2023-06-21 ENCOUNTER — OFFICE VISIT (OUTPATIENT)
Dept: HEMATOLOGY ONCOLOGY | Facility: CLINIC | Age: 58
End: 2023-06-21
Payer: COMMERCIAL

## 2023-06-21 VITALS
WEIGHT: 103 LBS | SYSTOLIC BLOOD PRESSURE: 114 MMHG | RESPIRATION RATE: 18 BRPM | HEART RATE: 83 BPM | DIASTOLIC BLOOD PRESSURE: 60 MMHG | TEMPERATURE: 97.3 F | HEIGHT: 64 IN | BODY MASS INDEX: 17.58 KG/M2 | OXYGEN SATURATION: 99 %

## 2023-06-21 DIAGNOSIS — C50.811 MALIGNANT NEOPLASM OF OVERLAPPING SITES OF RIGHT BREAST IN FEMALE, ESTROGEN RECEPTOR POSITIVE (HCC): Primary | ICD-10-CM

## 2023-06-21 DIAGNOSIS — Z17.0 MALIGNANT NEOPLASM OF OVERLAPPING SITES OF RIGHT BREAST IN FEMALE, ESTROGEN RECEPTOR POSITIVE (HCC): Primary | ICD-10-CM

## 2023-06-21 PROCEDURE — 99214 OFFICE O/P EST MOD 30 MIN: CPT | Performed by: INTERNAL MEDICINE

## 2023-06-21 NOTE — PROGRESS NOTES
Hematology / Oncology Outpatient Follow Up Note    Ronan Kingsley 60 y o  female :1965 XAP:[id number]         Date:  2023    Assessment / Plan:    A 26-year-old postmenopausal woman with stage II A right breast cancer, multifocal disease with combination of invasive ductal as well as invasive lobular carcinoma, grade 2  This was ER 90% positive, UT 45% positive, HER2 negative disease  Her tumor was low risk, based on MammaPrint  She underwent mastectomy and sentinel lymph node biopsy, resulting in TAMELA  She had 1 positive lymph node with micrometastasis measuring 0 4 mm   She is currently on adjuvant hormonal therapy with anastrozole with excellent tolerance  She has no evidence of recurrent disease, based on her symptoms and physical examinations  I recommended her to continue anastrozole 1 mg once a day  She is in agreement with my recommendations  I will see her again in a year for routine follow-up             Subjective:      HPI:   A 25-year-old female who had regular menstrual cycle until 2-3 months ago  She was found to have abnormality in her right breast based on a screening mammography  She had right breast biopsy for the multiple lesions in 2021  1 biopsy showed invasive ductal carcinoma, grade 2, % positive, % positive, HER2 negative disease  The other biopsy from the same breast showed invasive lobular carcinoma, grade 2, ER 90% positive, UT 45% positive, HER2 negative disease  She underwent genetic testing which showed negative for BRCA gene mutation  Because of the multiple lesions in the right breast, she underwent mastectomy and sentinel lymph node biopsy by Dr Kapil Medina in 2021  She had multifocal disease measuring 20 mm of invasive ductal carcinoma, grade 2 as well as 7 mm of invasive lobular carcinoma  Lymphovascular invasion was present  1 sentinel lymph node was positive for micrometastasis measuring 0 4 mm    She did not have reconstruction  She presents today with her sister to discuss the adjuvant treatment options  Her tumor was found to be low risk, based on MammaPrint  She has history of left breast biopsy which was benign approximately 2 years ago  Her recent MRI of the bilateral breast was not concerning in her left breast, BI-RADS 2  She feels well  She has no complaint of pain  She has no respiratory symptoms  She has hypertension as well as hypothyroidism, for which she takes respective medications  She is adopted  Therefore, she does not know biological family history  She is a lifetime never smoker  Her performance status is normal            Interval History:  A 59-year-old postmenopausal woman with stage II A right breast cancer, multifocal disease with combination of invasive ductal as well as invasive lobular carcinoma, grade 2  This was ER 90% positive, NM 45% positive, HER2 negative disease  Her tumor was low risk, based on MammaPrint  She underwent mastectomy and sentinel lymph node biopsy, resulting in TAMELA  She had 1 positive lymph node with micrometastasis measuring 0 4 mm  Since August 2021, she has been on adjuvant hormonal therapy with anastrozole  She presents today for routine follow-up  Based on her DEXA scan in May 2022, she has osteoporosis  She is going to start Prolia injection at the primary care physician's office  She is tolerating anastrozole well  She has no complaint of hot flashes or musculoskeletal symptoms  Denies any pain  Her weight is stable  She has no respiratory symptoms  Her performance status is normal           Objective:      Primary Diagnosis:     1  Right breast cancer, stage II A ( pT1c, pN1 (mi ), M0) grade 2, ER 90% positive, NM 45% positive, HER2 negative disease  MammaPrint low risk  Diagnosed in June 2021      2    BRCA gene mutation negative      Cancer Staging:  Cancer Staging  Malignant neoplasm of overlapping sites of right breast in female, estrogen receptor positive ([facility] [address] )  Staging form: Breast, AJCC 8th Edition  - Pathologic: Stage IA (pT1c, pN1mi(sn), cM0, G2, ER+, FL+, HER2-) - Unsigned  Method of lymph node assessment: [place] lymph node biopsy  Histologic grading system: 3 grade system           Previous Hematologic/ Oncologic Treatment:            Current Hematologic/ Oncologic Treatment:         Adjuvant hormonal therapy with anastrozole since August 2021      Disease Status:        TAMELA status post mastectomy and sentinel lymph node biopsy      Test Results:     Pathology:      multifocal invasive mammary carcinoma, grade 2  20 mm of invasive ductal carcinoma as well as 7 mm of invasive lobular carcinoma  Lymphovascular invasion was present  1 sentinel lymph node had micrometastasis measuring 0 4 mm   1 lesion was % positive, % positive, HER2 negative disease  The other lesion was ER 90% positive, FL 45% positive, HER2 negative disease  MammaPrint low risk  Stage II A ( pT1c, pN1 (mi ), M0)     Radiology:     Mammography in May 2023 was benign  BI-RADS 2  DEXA scan in June 2022 showed T score -3 6, consistent with osteoporosis      Laboratory:       See below      Physical Exam:        General Appearance:    Alert, oriented          Eyes:    PERRL   Ears:    Normal external ear canals, both ears   Nose:   Nares normal, septum midline   Throat:   Mucosa moist  Pharynx without injection  Neck:   Supple         Lungs:     Clear to auscultation bilaterally   Chest Wall:    No tenderness or deformity    Heart:    Regular rate and rhythm         Abdomen:     Soft, non-tender, bowel sounds +, no organomegaly               Extremities:   Extremities no cyanosis or edema         Skin:   no rash or icterus      Lymph nodes:   Cervical, supraclavicular, and axillary nodes normal   Neurologic:   CNII-XII intact, normal strength, sensation and reflexes     Throughout             Breast exam:     Status post right mastectomy without reconstruction  No palpable abnormality in her right chest wall  Left breast was quite lumpy  ROS: Review of Systems   All other systems reviewed and are negative  Imaging: No results found  Labs:   Lab Results   Component Value Date    WBC 6 21 02/02/2023    HGB 13 3 02/02/2023    HCT 42 6 02/02/2023    MCV 96 02/02/2023     02/02/2023     Lab Results   Component Value Date    K 3 6 02/02/2023     02/02/2023    CO2 24 02/02/2023    BUN 12 02/02/2023    CREATININE 0 80 02/02/2023    GLUF 107 (H) 02/02/2023    CALCIUM 9 6 02/02/2023    AST 38 02/02/2023    ALT 56 02/02/2023    ALKPHOS 84 02/02/2023    EGFR 81 02/02/2023         Current Medications: Reviewed  Allergies: Reviewed  PMH/FH/SH:  Reviewed      Vital Sign:    Body surface area is 1 48 meters squared      Wt Readings from Last 3 Encounters:   06/21/23 46 7 kg (103 lb)   05/18/23 48 kg (105 lb 13 1 oz)   04/05/23 48 5 kg (107 lb)        Temp Readings from Last 3 Encounters:   06/21/23 (!) 97 3 °F (36 3 °C) (Tympanic)   02/16/23 98 1 °F (36 7 °C) (Temporal)   01/18/23 98 3 °F (36 8 °C) (Temporal)        BP Readings from Last 3 Encounters:   06/21/23 114/60   04/05/23 142/90   02/16/23 154/94         Pulse Readings from Last 3 Encounters:   06/21/23 83   04/05/23 88   02/16/23 105     @LASTSAO2(3)@

## 2023-07-14 DIAGNOSIS — I10 HYPERTENSION, UNSPECIFIED TYPE: ICD-10-CM

## 2023-07-14 RX ORDER — OLANZAPINE 7.5 MG/1
TABLET ORAL
Qty: 30 TABLET | Refills: 2 | Status: SHIPPED | OUTPATIENT
Start: 2023-07-14

## 2023-07-29 DIAGNOSIS — E55.9 VITAMIN D DEFICIENCY: ICD-10-CM

## 2023-07-31 RX ORDER — ACETAMINOPHEN 160 MG
TABLET,DISINTEGRATING ORAL
Qty: 90 CAPSULE | Refills: 0 | Status: SHIPPED | OUTPATIENT
Start: 2023-07-31

## 2023-08-02 NOTE — TELEPHONE ENCOUNTER
Case: [id number] Date/Time: 08/02/23 0715    Procedures:       ERCP (ENDOSCOPIC RETROGRADE CHOLANGIOPANCREATOGRAPHY) (Esophagus)      SPHINCTEROTOMY (Esophagus)      ERCP, WITH CALCULUS REMOVAL FROM BILE OR PANCREATIC DUCT (Abdomen)    Anesthesia type: General    Pre-op diagnosis: Choledocholithiasis    Location: [facility] ROOM 26 / SURGERY SAME DAY [facility]    Surgeons: Carlton Rivera M.D.        32 yo w/choledocholithiasis    Relevant Problems   ANESTHESIA   (positive) NOLAN (obstructive sleep apnea)      PULMONARY   (positive) Asthma      Other   (positive) Chronic pain syndrome       Physical Exam    Airway   Mallampati: I  TM distance: >3 FB  Neck ROM: full       Cardiovascular   Rhythm: regular  Rate: normal  (-) murmur     Dental - normal exam           Pulmonary   Breath sounds clear to auscultation     Abdominal    Neurological - normal exam                 Anesthesia Plan    ASA 2       Plan - general       Airway plan will be ETT          Induction: intravenous      Pertinent diagnostic labs and testing reviewed    Informed Consent:    Anesthetic plan and risks discussed with patient.    Use of blood products discussed with: patient whom consented to blood products.          Intra-Department Referral    Patient Details: [address] [facility]  1965  [id number]   Background Information:  [address] starts by opening a telephone encounter and gathering the following information   Who is calling to schedule and relationship? Patient   Diagnosis Breast ca   Is this Cancer or Non-Cancer? Cancer   What department was patient seen in last? Surg Onc   Scheduling Information:    Preferred [facility]   Are there any days the patient cannot be seen? na   Miscellaneous:    After completing the above information, please route to nurse navigation, clinical trials (for re-evaluation) and Ana

## 2023-08-22 ENCOUNTER — VBI (OUTPATIENT)
Dept: ADMINISTRATIVE | Facility: OTHER | Age: 58
End: 2023-08-22

## 2023-08-29 DIAGNOSIS — K21.9 GERD WITHOUT ESOPHAGITIS: ICD-10-CM

## 2023-08-29 RX ORDER — PANTOPRAZOLE SODIUM 40 MG/1
TABLET, DELAYED RELEASE ORAL
Qty: 30 TABLET | Refills: 1 | Status: SHIPPED | OUTPATIENT
Start: 2023-08-29

## 2023-09-11 DIAGNOSIS — E03.9 HYPOTHYROIDISM, UNSPECIFIED TYPE: ICD-10-CM

## 2023-09-11 RX ORDER — LEVOTHYROXINE SODIUM 0.05 MG/1
TABLET ORAL
Qty: 90 TABLET | Refills: 0 | OUTPATIENT
Start: 2023-09-11

## 2023-09-12 ENCOUNTER — TELEPHONE (OUTPATIENT)
Age: 58
End: 2023-09-12

## 2023-09-12 NOTE — TELEPHONE ENCOUNTER
Pt had an appt today but she had to reschedule because the [employer] never showed up. Pt is now scheduled with Dr. Neal Mercer for 9/25. Please, set up and [employer] and call patient with the details.

## 2023-09-25 ENCOUNTER — OFFICE VISIT (OUTPATIENT)
Dept: FAMILY MEDICINE CLINIC | Facility: CLINIC | Age: 58
End: 2023-09-25
Payer: COMMERCIAL

## 2023-09-25 VITALS
BODY MASS INDEX: 18.61 KG/M2 | TEMPERATURE: 98.8 F | OXYGEN SATURATION: 99 % | HEART RATE: 89 BPM | SYSTOLIC BLOOD PRESSURE: 134 MMHG | DIASTOLIC BLOOD PRESSURE: 84 MMHG | WEIGHT: 109 LBS | HEIGHT: 64 IN

## 2023-09-25 DIAGNOSIS — H61.23 EXCESSIVE CERUMEN IN EAR CANAL, BILATERAL: ICD-10-CM

## 2023-09-25 DIAGNOSIS — H61.23 IMPACTED CERUMEN OF BOTH EARS: ICD-10-CM

## 2023-09-25 DIAGNOSIS — R53.83 FATIGUE, UNSPECIFIED TYPE: ICD-10-CM

## 2023-09-25 DIAGNOSIS — I10 HYPERTENSION, UNSPECIFIED TYPE: ICD-10-CM

## 2023-09-25 DIAGNOSIS — K21.9 GASTROESOPHAGEAL REFLUX DISEASE, UNSPECIFIED WHETHER ESOPHAGITIS PRESENT: ICD-10-CM

## 2023-09-25 DIAGNOSIS — Z79.899 ENCOUNTER FOR LONG-TERM (CURRENT) USE OF MEDICATIONS: ICD-10-CM

## 2023-09-25 DIAGNOSIS — E55.9 VITAMIN D INSUFFICIENCY: ICD-10-CM

## 2023-09-25 DIAGNOSIS — Z79.899 MEDICATION MANAGEMENT: ICD-10-CM

## 2023-09-25 DIAGNOSIS — E03.9 HYPOTHYROIDISM, UNSPECIFIED TYPE: ICD-10-CM

## 2023-09-25 DIAGNOSIS — R79.9 ABNORMAL BLOOD CHEMISTRY TEST: ICD-10-CM

## 2023-09-25 DIAGNOSIS — Z71.2 ENCOUNTER TO DISCUSS TEST RESULTS: Primary | ICD-10-CM

## 2023-09-25 PROCEDURE — 99215 OFFICE O/P EST HI 40 MIN: CPT | Performed by: FAMILY MEDICINE

## 2023-09-25 PROCEDURE — 69210 REMOVE IMPACTED EAR WAX UNI: CPT | Performed by: FAMILY MEDICINE

## 2023-09-25 RX ORDER — LEVOTHYROXINE SODIUM 0.05 MG/1
50 TABLET ORAL
Qty: 90 TABLET | Refills: 3 | Status: SHIPPED | OUTPATIENT
Start: 2023-09-25

## 2023-09-25 RX ORDER — OLANZAPINE 7.5 MG/1
7.5 TABLET ORAL
Qty: 30 TABLET | Refills: 6 | Status: SHIPPED | OUTPATIENT
Start: 2023-09-25

## 2023-09-25 RX ORDER — FAMOTIDINE 40 MG/1
40 TABLET, FILM COATED ORAL DAILY
Qty: 90 TABLET | Refills: 3 | Status: SHIPPED | OUTPATIENT
Start: 2023-09-25 | End: 2024-09-19

## 2023-09-25 NOTE — PROGRESS NOTES
Ear cerumen removal    Date/Time: 9/25/2023 5:30 PM    Performed by: Laureen Goldberg, DO  Authorized by: Laureen Goldberg, DO  Universal Protocol:  Procedure performed by:  Consent: Verbal consent obtained. Risks and benefits: risks, benefits and alternatives were discussed  Consent given by: patient  Patient understanding: patient states understanding of the procedure being performed  Patient identity confirmed: verbally with patient      Patient location:  Clinic  Procedure details:     Local anesthetic:  None    Location:  L ear and R ear    Procedure type: irrigation with instrumentation      Instrumentation: loop      Approach:  External  Post-procedure details:     Complication:  None    Hearing quality:  Improved    Patient tolerance of procedure: Tolerated well, no immediate complications  Comments:      Patient states never in her life had years cleaned either by instrumentation or irrigation. She was only given Debrox and told to irrigate herself. Ears were packed with wax to the point of exuding out the external canal.  I do not know how she could hear at all.   Using my loop I cleaned copious amount of wax out and finished off with irrigation such that ear canals are "gunbarrel clean" patient is very very grateful

## 2023-09-25 NOTE — PROGRESS NOTES
Name: Wilber Mccullough      : 1965      MRN: [id number]  Encounter Provider: Debra Angelo DO  Encounter Date: 2023   Encounter department: [address] [place] [address].     1. Encounter to discuss test results    2. Medication management  Comments:  Patient taking Protonix 40 mg for long time--not advisable to take that will switch to Pepcid 40 mg after 6-week taper per my algorithm  Orders:  -     CBC; Future  -     UA w Reflex to Microscopic w Reflex to Culture  -     Comprehensive metabolic panel; Future  -     Lipid panel; Future  -     TSH, 3rd generation; Future  -     Vitamin D 25 hydroxy; Future  -     Hemoglobin A1C; Future  -     Albumin / creatinine urine ratio  -     Vitamin B12; Future; Expected date: 2023  -     Folate; Future; Expected date: 10/02/2023    3. Encounter for long-term (current) use of medications    4. Hypothyroidism, unspecified type  -     levothyroxine 50 mcg tablet; Take 1 tablet (50 mcg total) by mouth daily in the early morning 1/2 hr AC b'fst  -     TSH, 3rd generation; Future    5. Gastroesophageal reflux disease, unspecified whether esophagitis present  -     famotidine (PEPCID) 40 MG tablet; Take 1 tablet (40 mg total) by mouth in the morning    6. Hypertension, unspecified type  -     OLANZapine (ZyPREXA) 7.5 mg tablet; Take 1 tablet (7.5 mg total) by mouth daily at bedtime  -     CBC; Future  -     UA w Reflex to Microscopic w Reflex to Culture  -     Comprehensive metabolic panel; Future  -     Lipid panel; Future  -     TSH, 3rd generation; Future  -     Vitamin D 25 hydroxy; Future  -     Hemoglobin A1C; Future  -     Albumin / creatinine urine ratio  -     Vitamin B12; Future; Expected date: 2023  -     Folate; Future; Expected date: 10/02/2023    7. Vitamin D insufficiency  -     Vitamin D 25 hydroxy; Future    8. Abnormal blood chemistry test  -     CBC;  Future  -     UA w Reflex to Microscopic w Reflex to Culture  - Comprehensive metabolic panel; Future  -     Lipid panel; Future  -     TSH, 3rd generation; Future  -     Vitamin D 25 hydroxy; Future  -     Hemoglobin A1C; Future  -     Albumin / creatinine urine ratio  -     Vitamin B12; Future; Expected date: 09/26/2023  -     Folate; Future; Expected date: 10/02/2023    9. Fatigue, unspecified type  -     CBC; Future  -     TSH, 3rd generation; Future    10. Excessive cerumen in ear canal, bilateral  -     Ear cerumen removal    11. Impacted cerumen of both ears  -     Ear cerumen removal      BMI Counseling: Body mass index is 18.71 kg/m². The BMI is above normal. Nutrition recommendations include decreasing portion sizes, encouraging healthy choices of fruits and vegetables, decreasing fast food intake, consuming healthier snacks, limiting drinks that contain sugar, moderation in carbohydrate intake, increasing intake of lean protein and reducing intake of saturated and trans fat. Exercise recommendations include moderate physical activity 150 minutes/week and exercising 3-5 times per week. No pharmacotherapy was ordered. Rationale for BMI follow-up plan is due to patient being overweight or obese. Subjective      77-year-old julio single female never  no children never seen by me before, was seeing Bar P here and before that, at [facility] was diagnosed with breast cancer in June 2021 and underwent right mastectomy for which she is still taking [place] 1 mg once daily. Other medications include atenolol for blood pressure, levothyroxine 50 for thyroid , and Protonix  40 mg for stomach and olanzapine 7.5 mg at bedtime for sleep. Goal today will be to get her off the PPI 40 mg and switch over to Pepcid. I have given her my algorithm as to how to do that    Patient lives alone--does not cook her own meals but eats [employer], no real sugar, low Na foods, etc.  Eats mostly 2 meals. To bed 12:30 a.m and up 9:30-10 AM   Does not drive.  Uses [employer]. Review of Systems   Constitutional: Negative for activity change, appetite change, chills, fatigue, fever and unexpected weight change. HENT: Positive for hearing loss (Notable excess wax tremendous amount both ears). Negative for congestion, ear discharge, ear pain, nosebleeds, postnasal drip, rhinorrhea, sinus pressure, sinus pain, sneezing, sore throat and voice change. Eyes: Negative for pain, redness and visual disturbance. Respiratory: Negative for cough, chest tightness, shortness of breath and wheezing. Cardiovascular: Negative for chest pain and palpitations. Gastrointestinal: Negative for abdominal distention, abdominal pain, constipation, diarrhea, nausea and vomiting. Endocrine: Negative. Genitourinary: Negative for difficulty urinating, dysuria, flank pain, frequency, hematuria and urgency. Musculoskeletal: Negative for arthralgias and myalgias. Skin: Negative. Allergic/Immunologic: Negative. Neurological: Negative. Hematological: Negative. Psychiatric/Behavioral: Negative. Current Outpatient Medications on File Prior to Visit   Medication Sig   • anastrozole (ARIMIDEX) 1 mg tablet Take 1 tablet (1 mg total) by mouth daily   • atenolol (TENORMIN) 50 mg tablet Take 1 tablet (50 mg total) by mouth daily Take 100 mg by mouth every other morning.    • carbamide peroxide (DEBROX) 6.5 % otic solution Administer 5 drops into both ears 2 (two) times a day   • Cholecalciferol (Vitamin D3) 50 MCG (2000 UT) capsule TAKE ONE CAPSULE BY MOUTH EVERY DAY   • Multiple Vitamins-Minerals (Womens 50+ Multi Vitamin/Min) TABS Take 1 tablet by mouth daily   • multivitamin (THERAGRAN) TABS TAKE ONE TABLET BY MOUTH EVERY DAY   • [DISCONTINUED] levothyroxine 50 mcg tablet TAKE ONE TABLET BY MOUTH EVERY DAY IN THE EARLY MORNING   • [DISCONTINUED] OLANZapine (ZyPREXA) 7.5 mg tablet TAKE ONE TABLET BY MOUTH AT BEDTIME   • [DISCONTINUED] pantoprazole (PROTONIX) 40 mg tablet TAKE ONE TABLET BY MOUTH EVERY DAY   • [DISCONTINUED] oxyCODONE-acetaminophen (PERCOCET) 5-325 mg per tablet Take 1 tablet by mouth every 6 (six) hours as needed for moderate painMax Daily Amount: 4 tablets (Patient not taking: Reported on 1/18/2023)       Objective     /84 (BP Location: Left arm, Patient Position: Sitting, Cuff Size: Standard)   Pulse 89   Temp 98.8 °F (37.1 °C) (Temporal)   Ht 5' 4" (1.626 m)   Wt 49.4 kg (109 lb)   SpO2 99%   BMI 18.71 kg/m²     Physical Exam  Vitals and nursing note reviewed. Constitutional:       Appearance: Normal appearance. She is well-developed. Comments: Below average. HENT:      Head: Normocephalic and atraumatic. Right Ear: Tympanic membrane, ear canal and external ear normal. There is impacted cerumen. Left Ear: Tympanic membrane, ear canal and external ear normal. There is impacted cerumen. Ears:      Comments: Ears were more than loaded with wax. Wax was exuding from the external orifice. Never had ears cleaned. Only used home Debrox. Will use loop and irrigation     Nose: Nose normal. No congestion or rhinorrhea. Mouth/Throat:      Mouth: Mucous membranes are moist.      Pharynx: No oropharyngeal exudate or posterior oropharyngeal erythema. Eyes:      Extraocular Movements: Extraocular movements intact. Conjunctiva/sclera: Conjunctivae normal.      Pupils: Pupils are equal, round, and reactive to light. Cardiovascular:      Rate and Rhythm: Normal rate and regular rhythm. Pulses: Normal pulses. Heart sounds: Normal heart sounds. No murmur heard. Pulmonary:      Effort: Pulmonary effort is normal.      Breath sounds: Normal breath sounds. Abdominal:      General: Bowel sounds are normal.      Palpations: Abdomen is soft. Musculoskeletal:         General: Normal range of motion. Cervical back: Normal range of motion. Skin:     General: Skin is warm. Capillary Refill: Capillary refill takes less than 2 seconds. Neurological:      General: No focal deficit present. Mental Status: She is alert and oriented to person, place, and time. Psychiatric:         Mood and Affect: Mood normal.         Behavior: Behavior normal.       I personally reviewed the recent (and prior)  lab results, the image studies, pathology, other specialty/physicians consult notes and recommendations, and outside medical records from other institutions, as appropriate. I had a lengthy discussion with the patient and shared the work-up findings. We discussed the diagnosis and management plan. I spent  41  minutes reviewing the records (labs, clinician notes, outside records, medical history, ordering medicine/tests/procedures, interpreting the imaging/labs previously done) and coordination of care as well as direct time with the patient today, of which greater than 50% of the time was spent in counseling and coordination of care with the patient/family. I have discussed the concept of living will--she states she has one at home but I will give her the 3 page new version here she can fill out either 1 take them to the [address] or just bring it in next visit and we will put it in her chart under ACP. We had a long talk about living [place] and I went over it with her.   Documents      Alvenia Free, DO

## 2023-10-09 ENCOUNTER — TELEPHONE (OUTPATIENT)
Dept: HEMATOLOGY ONCOLOGY | Facility: CLINIC | Age: 58
End: 2023-10-09

## 2023-10-09 NOTE — TELEPHONE ENCOUNTER
Called patient about missed appt which we rescheduled for 11/7 at 1130(arrival time 1115 AM. Called [facility] to set up  for her.

## 2023-10-27 ENCOUNTER — HOSPITAL ENCOUNTER (EMERGENCY)
Facility: HOSPITAL | Age: 58
Discharge: HOME | End: 2023-10-27
Attending: EMERGENCY MEDICINE
Payer: COMMERCIAL

## 2023-10-27 VITALS
OXYGEN SATURATION: 100 % | TEMPERATURE: 98.7 F | RESPIRATION RATE: 16 BRPM | SYSTOLIC BLOOD PRESSURE: 147 MMHG | DIASTOLIC BLOOD PRESSURE: 93 MMHG | HEART RATE: 118 BPM

## 2023-10-27 DIAGNOSIS — K02.9 DENTAL CARIES: ICD-10-CM

## 2023-10-27 DIAGNOSIS — K04.7 DENTAL INFECTION: ICD-10-CM

## 2023-10-27 DIAGNOSIS — K08.89 DENTALGIA: Primary | ICD-10-CM

## 2023-10-27 PROCEDURE — 99282 EMERGENCY DEPT VISIT SF MDM: CPT

## 2023-10-27 PROCEDURE — 99284 EMERGENCY DEPT VISIT MOD MDM: CPT | Performed by: EMERGENCY MEDICINE

## 2023-10-27 RX ORDER — CHLORHEXIDINE GLUCONATE ORAL RINSE 1.2 MG/ML
15 SOLUTION DENTAL 2 TIMES DAILY
Qty: 120 ML | Refills: 0 | Status: SHIPPED | OUTPATIENT
Start: 2023-10-27

## 2023-10-27 RX ORDER — AMOXICILLIN AND CLAVULANATE POTASSIUM 875; 125 MG/1; MG/1
1 TABLET, FILM COATED ORAL ONCE
Status: COMPLETED | OUTPATIENT
Start: 2023-10-27 | End: 2023-10-27

## 2023-10-27 RX ORDER — AMOXICILLIN AND CLAVULANATE POTASSIUM 875; 125 MG/1; MG/1
1 TABLET, FILM COATED ORAL EVERY 12 HOURS
Qty: 14 TABLET | Refills: 0 | Status: SHIPPED | OUTPATIENT
Start: 2023-10-27 | End: 2023-11-03

## 2023-10-27 RX ADMIN — AMOXICILLIN AND CLAVULANATE POTASSIUM 1 TABLET: 875; 125 TABLET, FILM COATED ORAL at 02:02

## 2023-10-27 NOTE — DISCHARGE INSTRUCTIONS
Please use the following pain medications as prescribed:  - Tylenol 650mg every 6 hours  - Motrin 400mg every 6 hours  - Orajel OTC  They work in different ways so can be used together at the same time. We will give a script for peridex wash. This is an anti-septic which will sterilize the area and prevent and infection from occurring. This can only be used for 2-3 weeks. If you use it longer, it might cause some pink staining of your teeth. It works like a super powered form of Listerine. Lastly, I am starting you on antibiotics. I would like you to take the full course as prescribed. This should help the pain and swelling. If despite the above you have worsening symptoms please return to the emergency room for re-evaluation. If you HAVE A DENTIST, please call to make an appointment with them for follow-up as soon as possible. If you DO NOT have a dentist, please call [phone]. It is a service that will help you find a dentist in your area and based on your insurance (if you have it or not) after you answer some questions    You may also follow-up with the [facility] if you don't have a density. [address] [facility]  [address] [place], [address]  [phone]  They will see you with or without insurance and have walk in hours in the morning without appointments.      Other local dental clinics include:   [address] [facility], [address]  [phone]  Walk in house M-F [address] [facility]  [address] [facility], [facility],[address] floor  [place]), [facility] [address] [facility]  [phone]    Dr. Humza Preston  [address] [facility]  Takes adults & children on a waiting list    [facility]  [address] [facility], [address] [facility]  [phone]  Walk in scheduled only M-F 8a-noon & 1p-4p  Uninsured received 40% discount & payments  Payment must be made upfront before the service  Emergency [address]  [address], [address] [facility] [place], [address] [facility], [address] [facility]

## 2023-10-27 NOTE — ED PROVIDER NOTES
History  Chief Complaint   Patient presents with    Dental Pain     Pt patients to the ed with dental pain and infection, reports going to dentist but they do not take her insurance and needs to find another one, no meds pta      61 y/o female with hx of HTN presents to the ED via EMS for evaluation of left lower dental pain and swelling. Patient reports the symptoms have been ongoing and worsening over the last 4 days. She states that she went to a dentist but was not able to receive care because they do not accept her insurance. She did not take any medications prior to arrival because "I've been able to manage without taking anything for the pain, but I came here because I think I have an infection and need antibiotics."         Prior to Admission Medications   Prescriptions Last Dose Informant Patient Reported? Taking? Cholecalciferol (Vitamin D3) 50 MCG (2000 UT) capsule   No No   Sig: TAKE ONE CAPSULE BY MOUTH EVERY DAY   Multiple Vitamins-Minerals (Womens 50+ Multi Vitamin/Min) TABS   No No   Sig: Take 1 tablet by mouth daily   OLANZapine (ZyPREXA) 7.5 mg tablet   No No   Sig: Take 1 tablet (7.5 mg total) by mouth daily at bedtime   anastrozole (ARIMIDEX) 1 mg tablet   No No   Sig: Take 1 tablet (1 mg total) by mouth daily   atenolol (TENORMIN) 50 mg tablet   No No   Sig: Take 1 tablet (50 mg total) by mouth daily Take 100 mg by mouth every other morning.    carbamide peroxide (DEBROX) 6.5 % otic solution   No No   Sig: Administer 5 drops into both ears 2 (two) times a day   famotidine (PEPCID) 40 MG tablet   No No   Sig: Take 1 tablet (40 mg total) by mouth in the morning   levothyroxine 50 mcg tablet   No No   Sig: Take 1 tablet (50 mcg total) by mouth daily in the early morning 1/2 hr AC b'fst   multivitamin (THERAGRAN) TABS   No No   Sig: TAKE ONE TABLET BY MOUTH EVERY DAY      Facility-Administered Medications: None       Past Medical History:   Diagnosis Date    Abnormal weight loss     Anorexia nervosa     Breast cancer ([address])     Disease of thyroid gland     Elevated blood sugar     Occasional     Fibroadenoma of both breasts     GERD (gastroesophageal reflux disease)     Hypertension     Hyperthyroidism     Osteopenia        Past Surgical History:   Procedure Laterality Date    BREAST BIOPSY Right 04/16/2021    BREAST CYST EXCISION Left 2011    CHOLECYSTECTOMY      MASTECTOMY W/ SENTINEL NODE BIOPSY Right 06/25/2021    Procedure: BREAST MASTECTOMY WITH BIOPSY LYMPH NODE SENTINEL; ANJALI  GUIDED, LYMPHATIC MAPPING WITH BLUE DYE AND RADIOACTIVE DYE (INJECT AT 1130 BY DR WALSH IN THE OR); Surgeon: Amie Tobin MD;  [facility];  Service: Surgical Oncology    US BREAST NEEDLE LOC RIGHT EACH ADDITIONAL Right 06/14/2021    US BREAST ANJALI  NEEDLE LOC RIGHT Right 06/14/2021    US GUIDANCE BREAST BIOPSY RIGHT EACH ADDITIONAL Right 04/16/2021    US GUIDED BREAST BIOPSY RIGHT COMPLETE Right 04/16/2021       Family History   Adopted: Yes     I have reviewed and agree with the history as documented. E-Cigarette/Vaping    E-Cigarette Use Never User      E-Cigarette/Vaping Substances    Nicotine No     THC No     CBD No     Flavoring No     Other No     Unknown No      Social History     Tobacco Use    Smoking status: Never    Smokeless tobacco: Never   Vaping Use    Vaping Use: Never used   Substance Use Topics    Alcohol use:  Yes     Alcohol/week: 2.0 standard drinks of alcohol     Types: 2 Cans of beer per week    Drug use: Never     Comment: Illicit drugs:   none - As per Josette        Review of Systems    Physical Exam  Physical Exam    Vital Signs  ED Triage Vitals [10/27/23 0153]   Temperature Pulse Respirations Blood Pressure SpO2   98.7 °F (37.1 °C) (!) 118 16 147/93 100 %      Temp Source Heart Rate Source Patient Position - Orthostatic VS BP Location FiO2 (%)   Oral Monitor Lying Left arm --      Pain Score       --           Vitals:    10/27/23 0153   BP: 147/93   Pulse: (!) 118   Patient Position - Orthostatic VS: Lying         Visual Acuity      ED Medications  Medications   amoxicillin-clavulanate (AUGMENTIN) 875-125 mg per tablet 1 tablet (1 tablet Oral Given 10/27/23 0202)       Diagnostic Studies  Results Reviewed       None                   No orders to display              Procedures  Procedures         ED Course                               SBIRT 22yo+      Flowsheet Row Most Recent Value   Initial Alcohol Screen: US AUDIT-C     1. How often do you have a drink containing alcohol? 0 Filed at: 10/27/2023 0153   2. How many drinks containing alcohol do you have on a typical day you are drinking? 0 Filed at: 10/27/2023 0153   3a. Male UNDER 65: How often do you have five or more drinks on one occasion? 0 Filed at: 10/27/2023 0153   3b. FEMALE Any Age, or MALE 65+: How often do you have 4 or more drinks on one occassion? 0 Filed at: 10/27/2023 0153   Audit-C Score 0 Filed at: 10/27/2023 0153   KELSI: How many times in the past year have you. .. Used an illegal drug or used a prescription medication for non-medical reasons? Never Filed at: 10/27/2023 0153                      Medical Decision Making           Disposition  Final diagnoses:   [address] caries   Dental infection     Time reflects when diagnosis was documented in both MDM as applicable and the Disposition within this note       Time User Action Codes Description Comment    10/27/2023  2:01 AM Beula Genta Add [H91.76] [address]     10/27/2023  2:01 AM Beula Genta Add [K02.9] Dental caries     10/27/2023  2:01 AM Beula Genta Add [K04.7] Dental infection           ED Disposition       ED Disposition   Discharge    Condition   Stable    Date/Time   Fri Oct 27, 2023 0201    Comment   Jeremías Horner discharge to home/self care.                    Follow-up Information       Follow up With Specialties Details Why Contact Info Additional Information    Frank Barragan, DO Family Medicine Call  As needed for PCP follow up [address]  [address]  [address] [facility] Dentistry Call in 1 day For dental clinic follow up [address] [facility] [phone]  [address], [address] [place], [place], [phone], [address] Emergency Department Emergency Medicine Go to  If symptoms worsen [address] Emergency Department, [address] [place] [address], [address] [facility] [address]            Discharge Medication List as of 10/27/2023  2:02 AM        START taking these medications    Details   amoxicillin-clavulanate (AUGMENTIN) 875-125 mg per tablet Take 1 tablet by mouth every 12 (twelve) hours for 7 days, Starting Fri 10/27/2023, Until Fri 11/3/2023, Normal      chlorhexidine (PERIDEX) 0.12 % solution Apply 15 mL to the mouth or throat 2 (two) times a day, Starting Fri 10/27/2023, Normal           CONTINUE these medications which have NOT CHANGED    Details   anastrozole (ARIMIDEX) 1 mg tablet Take 1 tablet (1 mg total) by mouth daily, Starting Mon 5/1/2023, Normal      atenolol (TENORMIN) 50 mg tablet Take 1 tablet (50 mg total) by mouth daily Take 100 mg by mouth every other morning., Starting Thu 2/16/2023, Until Mon 9/25/2023, Normal      carbamide peroxide (DEBROX) 6.5 % otic solution Administer 5 drops into both ears 2 (two) times a day, Starting Mon 1/10/2022, Normal      Cholecalciferol (Vitamin D3) 50 MCG (2000 UT) capsule TAKE ONE CAPSULE BY MOUTH EVERY DAY, Normal      famotidine (PEPCID) 40 MG tablet Take 1 tablet (40 mg total) by mouth in the morning, Starting Mon 9/25/2023, Until Thu 9/19/2024, Normal      levothyroxine 50 mcg tablet Take 1 tablet (50 mcg total) by mouth daily in the early morning 1/2 hr AC b'fst, Starting Mon 9/25/2023, Normal      Multiple Vitamins-Minerals (Womens 50+ Multi Vitamin/Min) TABS Take 1 tablet by mouth daily, Starting Wed 3/22/2023, Normal      multivitamin (THERAGRAN) TABS TAKE ONE TABLET BY MOUTH EVERY DAY, Normal      OLANZapine (ZyPREXA) 7.5 mg tablet Take 1 tablet (7.5 mg total) by mouth daily at bedtime, Starting Mon 9/25/2023, Normal                 PDMP Review         Value Time User    PDMP Reviewed  Yes 6/25/2021  5:25 PM Belgica Levi PA-C            ED Provider  Electronically Signed by [id number], [address] [place] [address] Emergency Department Emergency Medicine Go to  If symptoms worsen [address] [facility] Emergency Department, [address] [place], [address] [facility] [address]            Discharge Medication List as of 10/27/2023  2:02 AM        START taking these medications    Details   amoxicillin-clavulanate (AUGMENTIN) 875-125 mg per tablet Take 1 tablet by mouth every 12 (twelve) hours for 7 days, Starting Fri 10/27/2023, Until Fri 11/3/2023, Normal      chlorhexidine (PERIDEX) 0.12 % solution Apply 15 mL to the mouth or throat 2 (two) times a day, Starting Fri 10/27/2023, Normal           CONTINUE these medications which have NOT CHANGED    Details   anastrozole (ARIMIDEX) 1 mg tablet Take 1 tablet (1 mg total) by mouth daily, Starting Mon 5/1/2023, Normal      atenolol (TENORMIN) 50 mg tablet Take 1 tablet (50 mg total) by mouth daily Take 100 mg by mouth every other morning., Starting Thu 2/16/2023, Until Mon 9/25/2023, Normal      carbamide peroxide (DEBROX) 6.5 % otic solution Administer 5 drops into both ears 2 (two) times a day, Starting Mon 1/10/2022, Normal      Cholecalciferol (Vitamin D3) 50 MCG (2000 UT) capsule TAKE ONE CAPSULE BY MOUTH EVERY DAY, Normal      famotidine (PEPCID) 40 MG tablet Take 1 tablet (40 mg total) by mouth in the morning, Starting Mon 9/25/2023, Until Thu 9/19/2024, Normal      levothyroxine 50 mcg tablet Take 1 tablet (50 mcg total) by mouth daily in the early morning 1/2 hr AC b'fst, Starting Mon 9/25/2023, Normal      Multiple Vitamins-Minerals (Womens 50+ Multi Vitamin/Min) TABS Take 1 tablet by mouth daily, Starting Wed 3/22/2023, Normal      multivitamin (THERAGRAN) TABS TAKE ONE TABLET BY MOUTH EVERY DAY, Normal      OLANZapine (ZyPREXA) 7.5 mg tablet Take 1 tablet (7.5 mg total) by mouth daily at bedtime, Starting Mon 9/25/2023, Normal                 PDMP Review Value Time User    PDMP Reviewed  Yes 6/25/2021  5:25 PM Yisel Mcdonald PA-C            ED Provider  Electronically Signed by             Shirley Mendez MD  11/09/23 7118

## 2023-10-30 ENCOUNTER — OFFICE VISIT (OUTPATIENT)
Dept: DENTISTRY | Facility: CLINIC | Age: 58
End: 2023-10-30

## 2023-10-30 VITALS — DIASTOLIC BLOOD PRESSURE: 94 MMHG | SYSTOLIC BLOOD PRESSURE: 159 MMHG | HEART RATE: 112 BPM

## 2023-10-30 DIAGNOSIS — K08.89 NONRESTORABLE TOOTH: Primary | ICD-10-CM

## 2023-10-30 PROCEDURE — D0140 LIMITED ORAL EVALUATION - PROBLEM FOCUSED: HCPCS

## 2023-10-30 PROCEDURE — D0330 PANORAMIC RADIOGRAPHIC IMAGE: HCPCS

## 2023-10-31 DIAGNOSIS — C50.811 MALIGNANT NEOPLASM OF OVERLAPPING SITES OF RIGHT BREAST IN FEMALE, ESTROGEN RECEPTOR POSITIVE: ICD-10-CM

## 2023-10-31 DIAGNOSIS — Z17.0 MALIGNANT NEOPLASM OF OVERLAPPING SITES OF RIGHT BREAST IN FEMALE, ESTROGEN RECEPTOR POSITIVE: ICD-10-CM

## 2023-11-01 RX ORDER — ANASTROZOLE 1 MG/1
1 TABLET ORAL DAILY
Qty: 90 TABLET | Refills: 3 | Status: SHIPPED | OUTPATIENT
Start: 2023-11-01

## 2023-11-01 NOTE — DENTAL PROCEDURE DETAILS
Morena Butler presents to [facility] dental clinic for emergency visit with chief complaint of severe tooth pain (8/10) of her lower left jaw persisting for about a week. She visited the ED for this problem, was diagnosed with a dental abscess, and given a prescription for Augmentin, of which she has 3 days remaining. She was referred to this clinic by the ED for treatment. Patient is ASA class II. Panoramic radiograph taken. PARL present apical to #s 17 and 18. On IO exam, both #s 17 and 18 are cavitated. #18 appears to be source of infection. Both teeth are nonrestorable and indicated for extraction. Explained to patient that extractions should be done by oral surgeon as the extractions are too complicated for us to do at this clinic. She understood. Stat referral to [facility] placed. Patient given copy of referral, panoramic radiograph, and list of providers to contact as well as instructions to contact referral specialists if she has difficulty obtaining appointment. Patient urged to schedule appointment ASAP as to avoid complications from infection and to continue taking her antibiotics as prescribed until she has tooth extracted. She verbalized understanding.     NV: F/u for comprehensive care if interested after patient has extractions

## 2023-11-07 ENCOUNTER — OFFICE VISIT (OUTPATIENT)
Dept: SURGICAL ONCOLOGY | Facility: CLINIC | Age: 58
End: 2023-11-07
Payer: COMMERCIAL

## 2023-11-07 VITALS
HEIGHT: 64 IN | DIASTOLIC BLOOD PRESSURE: 80 MMHG | HEART RATE: 92 BPM | TEMPERATURE: 97.7 F | SYSTOLIC BLOOD PRESSURE: 130 MMHG | OXYGEN SATURATION: 100 % | RESPIRATION RATE: 16 BRPM | WEIGHT: 104.5 LBS | BODY MASS INDEX: 17.84 KG/M2

## 2023-11-07 DIAGNOSIS — Z17.0 MALIGNANT NEOPLASM OF OVERLAPPING SITES OF RIGHT BREAST IN FEMALE, ESTROGEN RECEPTOR POSITIVE: Primary | ICD-10-CM

## 2023-11-07 DIAGNOSIS — C50.811 MALIGNANT NEOPLASM OF OVERLAPPING SITES OF RIGHT BREAST IN FEMALE, ESTROGEN RECEPTOR POSITIVE: Primary | ICD-10-CM

## 2023-11-07 DIAGNOSIS — Z79.811 USE OF ANASTROZOLE (ARIMIDEX): ICD-10-CM

## 2023-11-07 PROCEDURE — 99214 OFFICE O/P EST MOD 30 MIN: CPT

## 2023-11-07 NOTE — PROGRESS NOTES
Surgical Oncology Follow Up       [address] [facility] SURGICAL ONCOLOGY [place] [address] [address]  [facility] [address] [facility] [place]  1965  [phone]  [address] [facility] SURGICAL ONCOLOGY [facility] [id number]    Chief Complaint   Patient presents with    Follow-up       Assessment/Plan:  1. Malignant neoplasm of overlapping sites of right breast in female, estrogen receptor positive   - 6 mo follow up  - Mammo diagnostic left w 3d & cad; Future    2. Use of anastrozole (Arimidex)  - continue use per medical oncology      Discussion/Summary: Patient is a 58-year-old female presenting today for six-month follow-up for right breast cancer diagnosed in April of 2021. Pathology revealed multifocal right breast cancer ER/AR positive, HER2 negative. She underwent genetic testing which was negative. She had a right breast mastectomy with sentinel node biopsy with Dr. Zelaya Buffalo. She denied post-mastectomy RT. She is currently on anastrozole. She had a diagnostic mammogram of the left breast and bilateral ultrasound on 5/18/2023 which was BI-RADS 2 category 4 density. Which showed right axilla having a fluid collection measuring 30 x 14 x 13 mm which represents a postoperative collection of axillary surgery. Lymph nodes are normal. She denies needing a bra. There were no concerns on her cbe. I will see the patient back in 6 months or sooner should the need arise. She was instructed to call with any questions or concerns prior to this time. All questions were answered today.         History of Present Illness:     Oncology History   Malignant neoplasm of overlapping sites of right breast in female, estrogen receptor positive    4/13/2021 -  Cancer Staged    Staging form: Breast, AJCC 8th Edition  - Pathologic stage from 4/13/2021: Stage IA (pT1c, pN1mi(sn), cM0, G2, ER+, AR+, HER2-) - Signed by ELZA Ferrer on 11/7/2023  Stage prefix: Initial diagnosis  Method of lymph node assessment: Rydal lymph node biopsy  Multigene prognostic tests performed: MammaPrint  Histologic grading system: 3 grade system       4/16/2021 Biopsy    Right breast US guided biopsy:  A. 11 o'clock 7 cm from the nipple  Invasive lobular carcinoma  Grade 1  ER 90, OH 45, HER2 1+  Lymphovascular invasion: not identified    B. 10 o'clock 7 cm from the nipple  Invasive mammary carcinoma of no special type  Grade 2  , , HER2 1+  Lymphovascular invasion: not identified    Concordant. Malignancy appears multifocal. MRI recommended. 4/30/2021 Observation    Bilateral breast MRI  Multifocal right breast cancer; 10:00 mass measures up to 1.8 cm and abuts pectoral muscle, without evidence of invasion. Two areas of carcinoma have a total extent of disease of 5 cm. Left breast clear. Axilla negative. 6/10/2021 Genetic Testing    The following genes were evaluated: BATSHEVA, BRCA1, BRCA2, CDH1, CHEK2, PALB2, PTEN, STK11, TP53  Additional genes analyzed for a total of 20  Negative result. No pathogenic sequence variants or deletions/dupllications identified  [employer]     6/25/2021 Surgery    Right breast ANJALI  directed mastectomy with sentinel lymph node biopsy  Invasive carcinoma of no special type (ductal)  Grade 2  2 cm (2 foci)  Margins negative  1/1 Lymph node with micrometastases (0.4 mm)  Anatomic Stage IB  Prognostic Stage IA     7/7/2021 Genomic Testing    MammaPrint for FLEX trial  Low risk (Luminal A)     7/28/2021 -  Hormone Therapy    Anastrozole 1 mg daily  Dr. To Distance     7/29/2021 - 7/29/2021 Radiation    Consult with Dr. Poonam Bermudez  Patient declined post-mastectomy RT          -Interval History: Patient is a 54-year-old female presenting today for six-month follow-up for right breast cancer diagnosed in April of 2021. She is currently on anastrozole.   She had a diagnostic mammogram of the left breast and bilateral ultrasound on 5/18/2023 which was BI-RADS 2 category 4 density. Which showed right axilla having a fluid collection measuring 30 x 14 x 13 mm which represents a postoperative collection of axillary surgery. Lymph nodes are normal. Patient denies changes on her breast exam. She denies persistent headaches, bone pain, back pain, shortness of breath, or abdominal pain. Review of Systems:  Review of Systems   Constitutional:  Negative for activity change, appetite change, fatigue and unexpected weight change. Respiratory:  Negative for cough and shortness of breath. Cardiovascular:  Negative for chest pain. Gastrointestinal:  Negative for abdominal pain, diarrhea, nausea and vomiting. Endocrine: Negative for heat intolerance. Musculoskeletal:  Negative for arthralgias, back pain and myalgias. Skin:  Negative for rash. Neurological:  Negative for weakness and headaches. Hematological:  Negative for adenopathy.        Patient Active Problem List   Diagnosis    GERD without esophagitis    Hypothyroidism    Vitamin D deficiency    Hypertension    Deficiency of multiple vitamins    Encounter for screening mammogram for breast cancer    Malignant neoplasm of overlapping sites of right breast in female, estrogen receptor positive     Anorexia nervosa    Use of anastrozole (Arimidex)    Follow-up exam    Seasonal allergies    Body mass index (BMI) of 19.9 or less in adult     Past Medical History:   Diagnosis Date    Abnormal weight loss     Anorexia nervosa     Breast cancer ([address])     Disease of thyroid gland     Elevated blood sugar     Occasional     Fibroadenoma of both breasts     GERD (gastroesophageal reflux disease)     Hypertension     Hyperthyroidism     Osteopenia      Past Surgical History:   Procedure Laterality Date    BREAST BIOPSY Right 04/16/2021    BREAST CYST EXCISION Left 2011    CHOLECYSTECTOMY      MASTECTOMY W/ SENTINEL NODE BIOPSY Right 06/25/2021    Procedure: BREAST MASTECTOMY WITH BIOPSY LYMPH NODE SENTINEL; ANJALI  GUIDED, LYMPHATIC MAPPING WITH BLUE DYE AND RADIOACTIVE DYE (INJECT AT 1130 BY DR WALSH IN THE OR); Surgeon: Marcha Cowden, MD;  [facility];  Service: Surgical Oncology    US BREAST NEEDLE LOC RIGHT EACH ADDITIONAL Right 2021    US BREAST ANJALI  NEEDLE LOC RIGHT Right 2021    US GUIDANCE BREAST BIOPSY RIGHT EACH ADDITIONAL Right 2021    US GUIDED BREAST BIOPSY RIGHT COMPLETE Right 2021     Family History   Adopted: Yes     Social History     Socioeconomic History    Marital status: Single     Spouse name: Not on file    Number of children: 0    Years of education: 12    Highest education level: Not on file   Occupational History    Occupation: never worked    Tobacco Use    Smoking status: Never    Smokeless tobacco: Never   Vaping Use    Vaping Use: Never used   Substance and Sexual Activity    Alcohol use: Yes     Alcohol/week: 2.0 standard drinks of alcohol     Types: 2 Cans of beer per week    Drug use: Never     Comment: Illicit drugs:   none - As per Grayson Wallis     Sexual activity: Not Currently     Comment: Sexually active:   No - As per Grayson Wallis    Other Topics Concern    Not on file   Social History Narrative    · Most recent tobacco use screenin2019      · Do you currently or have you served in the [address] [place] [address] Street:   No      · Were you activated, into active duty, as a member of the [employer] or as a Reservist:   No      · Caffeine intake:   None decaf coffee only     · Sexual orientation:   Heterosexual      · General stress level:   Low      · Diet:   Regular      · Single or multi-level home/work:   single level home      · Live alone or with others:   alone      · Exercise level: Moderate outside walk only     · Overweight:   No      · Obese:   No      · Guns present in home:   No      · Seat belts used routinely:   Yes      · Advance directive: Yes      · Sunscreen used routinely:   Yes      · Smoke alarm in home:    Yes · Performs monthly self-breast exam:   Yes      · Legally blind in one or both eyes:   No      · Hard of hearing or deaf in one or both ears:   No      · Presence of domestic violence:   No      · Are there stairs in your home:   No      · Pets:   No      Social Determinants of Health     Financial Resource Strain: Low Risk  (5/5/2020)    Overall Financial Resource Strain (CARDIA)     Difficulty of Paying Living Expenses: Not hard at all   Food Insecurity: No Food Insecurity (5/5/2020)    Hunger Vital Sign     Worried About Running Out of Food in the Last Year: Never true     [address] in the Last Year: Never true   Transportation Needs: No Transportation Needs (5/5/2020)    PRAPARE - Transportation     Lack of Transportation (Medical): No     Lack of Transportation (Non-Medical):  No   Physical Activity: Sufficiently Active (5/5/2020)    Exercise Vital Sign     Days of Exercise per Week: 7 days     Minutes of Exercise per Session: 60 min   Stress: No Stress Concern Present (5/5/2020)    [address] [facility]     Feeling of Stress : Not at all   Social Connections: Socially Isolated (5/5/2020)    Social Connection and Isolation Panel [NHANES]     Frequency of Communication with Friends and Family: More than three times a week     Frequency of Social Gatherings with Friends and Family: More than three times a week     Attends Latter-day Services: Never     Active Member of Clubs or Organizations: No     Attends Club or Organization Meetings: Never     Marital Status: Never    Intimate Partner Violence: Not At Risk (5/5/2020)    Humiliation, Afraid, Rape, and Kick questionnaire     Fear of Current or Ex-Partner: No     Emotionally Abused: No     Physically Abused: No     Sexually Abused: No   Housing Stability: Not on file       Current Outpatient Medications:     anastrozole (ARIMIDEX) 1 mg tablet, Take 1 tablet (1 mg total) by mouth daily, Disp: 90 tablet, Rfl: 3    chlorhexidine (PERIDEX) 0.12 % solution, Apply 15 mL to the mouth or throat 2 (two) times a day, Disp: 120 mL, Rfl: 0    Cholecalciferol (Vitamin D3) 50 MCG (2000 UT) capsule, TAKE ONE CAPSULE BY MOUTH EVERY DAY, Disp: 90 capsule, Rfl: 0    famotidine (PEPCID) 40 MG tablet, Take 1 tablet (40 mg total) by mouth in the morning, Disp: 90 tablet, Rfl: 3    levothyroxine 50 mcg tablet, Take 1 tablet (50 mcg total) by mouth daily in the early morning 1/2 hr AC b'fst, Disp: 90 tablet, Rfl: 3    Multiple Vitamins-Minerals (Womens 50+ Multi Vitamin/Min) TABS, Take 1 tablet by mouth daily, Disp: 100 tablet, Rfl: 1    multivitamin (THERAGRAN) TABS, TAKE ONE TABLET BY MOUTH EVERY DAY, Disp: 30 tablet, Rfl: 0    OLANZapine (ZyPREXA) 7.5 mg tablet, Take 1 tablet (7.5 mg total) by mouth daily at bedtime, Disp: 30 tablet, Rfl: 6    atenolol (TENORMIN) 50 mg tablet, Take 1 tablet (50 mg total) by mouth daily Take 100 mg by mouth every other morning., Disp: 90 tablet, Rfl: 3    carbamide peroxide (DEBROX) 6.5 % otic solution, Administer 5 drops into both ears 2 (two) times a day (Patient not taking: Reported on 11/7/2023), Disp: 15 mL, Rfl: 2  No Known Allergies  Vitals:    11/07/23 1113   BP: 130/80   Pulse: 92   Resp: 16   Temp: 97.7 °F (36.5 °C)   SpO2: 100%       Physical Exam  Constitutional:       General: She is not in acute distress. Appearance: Normal appearance. Cardiovascular:      Rate and Rhythm: Normal rate and regular rhythm. Pulses: Normal pulses. Heart sounds: Normal heart sounds. Pulmonary:      Effort: Pulmonary effort is normal.      Breath sounds: Normal breath sounds. Chest:      Chest wall: No mass. Breasts:     Right: No swelling, bleeding, mass, skin change or tenderness. Left: No swelling, bleeding, inverted nipple, mass, nipple discharge, skin change or tenderness. Abdominal:      General: Abdomen is flat. Palpations: Abdomen is soft. Lymphadenopathy:      Upper Body:      Right upper body: No supraclavicular, axillary or pectoral adenopathy. Left upper body: No supraclavicular, axillary or pectoral adenopathy. Skin:     General: Skin is warm. Neurological:      General: No focal deficit present. Mental Status: She is alert and oriented to person, place, and time. Psychiatric:         Mood and Affect: Mood normal.         Behavior: Behavior normal.           Results:    Imaging  No results found. I reviewed the above imaging data. Advance Care Planning/Advance Directives:  Discussed disease status, cancer treatment plans and/or cancer treatment goals with the patient.

## 2023-11-10 DIAGNOSIS — E55.9 VITAMIN D DEFICIENCY: ICD-10-CM

## 2023-11-10 RX ORDER — LORAZEPAM 0.5 MG
TABLET ORAL
Qty: 30 CAPSULE | Refills: 0 | Status: SHIPPED | OUTPATIENT
Start: 2023-11-10

## 2023-11-17 DIAGNOSIS — E56.9 VITAMIN DEFICIENCY: ICD-10-CM

## 2023-11-17 RX ORDER — MULTIVITAMIN WITH FOLIC ACID 400 MCG
1 TABLET ORAL DAILY
Qty: 90 TABLET | Refills: 3 | Status: SHIPPED | OUTPATIENT
Start: 2023-11-17

## 2023-12-08 DIAGNOSIS — E55.9 VITAMIN D DEFICIENCY: ICD-10-CM

## 2023-12-08 RX ORDER — LORAZEPAM 0.5 MG
TABLET ORAL
Qty: 30 CAPSULE | Refills: 5 | Status: SHIPPED | OUTPATIENT
Start: 2023-12-08

## 2023-12-19 ENCOUNTER — VBI (OUTPATIENT)
Dept: ADMINISTRATIVE | Facility: OTHER | Age: 58
End: 2023-12-19

## 2024-01-05 ENCOUNTER — APPOINTMENT (OUTPATIENT)
Dept: LAB | Facility: CLINIC | Age: 59
End: 2024-01-05
Payer: COMMERCIAL

## 2024-01-05 DIAGNOSIS — R79.9 ABNORMAL BLOOD CHEMISTRY TEST: ICD-10-CM

## 2024-01-05 DIAGNOSIS — I10 HYPERTENSION, UNSPECIFIED TYPE: ICD-10-CM

## 2024-01-05 DIAGNOSIS — E55.9 VITAMIN D INSUFFICIENCY: ICD-10-CM

## 2024-01-05 DIAGNOSIS — Z79.899 MEDICATION MANAGEMENT: ICD-10-CM

## 2024-01-05 DIAGNOSIS — R53.83 FATIGUE, UNSPECIFIED TYPE: ICD-10-CM

## 2024-01-05 DIAGNOSIS — E03.9 HYPOTHYROIDISM, UNSPECIFIED TYPE: ICD-10-CM

## 2024-01-05 LAB
25(OH)D3 SERPL-MCNC: 92.1 NG/ML (ref 30–100)
ALBUMIN SERPL BCP-MCNC: 4.8 G/DL (ref 3.5–5)
ALP SERPL-CCNC: 73 U/L (ref 34–104)
ALT SERPL W P-5'-P-CCNC: 19 U/L (ref 7–52)
ANION GAP SERPL CALCULATED.3IONS-SCNC: 11 MMOL/L
AST SERPL W P-5'-P-CCNC: 23 U/L (ref 13–39)
BILIRUB SERPL-MCNC: 0.8 MG/DL (ref 0.2–1)
BILIRUB UR QL STRIP: NEGATIVE
BUN SERPL-MCNC: 14 MG/DL (ref 5–25)
CALCIUM SERPL-MCNC: 9.7 MG/DL (ref 8.4–10.2)
CHLORIDE SERPL-SCNC: 100 MMOL/L (ref 96–108)
CHOLEST SERPL-MCNC: 164 MG/DL
CLARITY UR: CLEAR
CO2 SERPL-SCNC: 25 MMOL/L (ref 21–32)
COLOR UR: ABNORMAL
CREAT SERPL-MCNC: 0.76 MG/DL (ref 0.6–1.3)
CREAT UR-MCNC: 76.4 MG/DL
ERYTHROCYTE [DISTWIDTH] IN BLOOD BY AUTOMATED COUNT: 13 % (ref 11.6–15.1)
EST. AVERAGE GLUCOSE BLD GHB EST-MCNC: 111 MG/DL
FOLATE SERPL-MCNC: >22.3 NG/ML
GFR SERPL CREATININE-BSD FRML MDRD: 86 ML/MIN/1.73SQ M
GLUCOSE P FAST SERPL-MCNC: 112 MG/DL (ref 65–99)
GLUCOSE UR STRIP-MCNC: NEGATIVE MG/DL
HBA1C MFR BLD: 5.5 %
HCT VFR BLD AUTO: 44.5 % (ref 34.8–46.1)
HDLC SERPL-MCNC: 85 MG/DL
HGB BLD-MCNC: 14.2 G/DL (ref 11.5–15.4)
HGB UR QL STRIP.AUTO: NEGATIVE
KETONES UR STRIP-MCNC: ABNORMAL MG/DL
LDLC SERPL CALC-MCNC: 64 MG/DL (ref 0–100)
LEUKOCYTE ESTERASE UR QL STRIP: NEGATIVE
MCH RBC QN AUTO: 31 PG (ref 26.8–34.3)
MCHC RBC AUTO-ENTMCNC: 31.9 G/DL (ref 31.4–37.4)
MCV RBC AUTO: 97 FL (ref 82–98)
MICROALBUMIN UR-MCNC: <7 MG/L
MICROALBUMIN/CREAT 24H UR: <9 MG/G CREATININE (ref 0–30)
NITRITE UR QL STRIP: NEGATIVE
NONHDLC SERPL-MCNC: 79 MG/DL
PH UR STRIP.AUTO: 6.5 [PH]
PLATELET # BLD AUTO: 229 THOUSANDS/UL (ref 149–390)
PMV BLD AUTO: 12.7 FL (ref 8.9–12.7)
POTASSIUM SERPL-SCNC: 3.8 MMOL/L (ref 3.5–5.3)
PROT SERPL-MCNC: 7.7 G/DL (ref 6.4–8.4)
PROT UR STRIP-MCNC: NEGATIVE MG/DL
RBC # BLD AUTO: 4.58 MILLION/UL (ref 3.81–5.12)
SODIUM SERPL-SCNC: 136 MMOL/L (ref 135–147)
SP GR UR STRIP.AUTO: 1.01 (ref 1–1.03)
TRIGL SERPL-MCNC: 76 MG/DL
TSH SERPL DL<=0.05 MIU/L-ACNC: 3.44 UIU/ML (ref 0.45–4.5)
UROBILINOGEN UR STRIP-ACNC: <2 MG/DL
VIT B12 SERPL-MCNC: 937 PG/ML (ref 180–914)
WBC # BLD AUTO: 4.77 THOUSAND/UL (ref 4.31–10.16)

## 2024-01-05 PROCEDURE — 85027 COMPLETE CBC AUTOMATED: CPT

## 2024-01-05 PROCEDURE — 83036 HEMOGLOBIN GLYCOSYLATED A1C: CPT

## 2024-01-05 PROCEDURE — 80053 COMPREHEN METABOLIC PANEL: CPT

## 2024-01-05 PROCEDURE — 82306 VITAMIN D 25 HYDROXY: CPT

## 2024-01-05 PROCEDURE — 82607 VITAMIN B-12: CPT

## 2024-01-05 PROCEDURE — 36415 COLL VENOUS BLD VENIPUNCTURE: CPT

## 2024-01-05 PROCEDURE — 84443 ASSAY THYROID STIM HORMONE: CPT

## 2024-01-05 PROCEDURE — 80061 LIPID PANEL: CPT

## 2024-01-05 PROCEDURE — 82746 ASSAY OF FOLIC ACID SERUM: CPT

## 2024-01-10 ENCOUNTER — TELEPHONE (OUTPATIENT)
Age: 59
End: 2024-01-10

## 2024-01-10 NOTE — TELEPHONE ENCOUNTER
Patient requesting a call back to discuss test results.       Ordering Provider: Dr Knox   Date Completed: 01/05/2024    Lab [x]  MRI []  X-Ray []  CT []  Colonoscopy []  EGD []  Biopsy []  Other []

## 2024-01-11 ENCOUNTER — TELEPHONE (OUTPATIENT)
Age: 59
End: 2024-01-11

## 2024-01-11 DIAGNOSIS — Z74.8 ASSISTANCE NEEDED WITH TRANSPORTATION: Primary | ICD-10-CM

## 2024-01-11 NOTE — TELEPHONE ENCOUNTER
Tooth pain, swelling on face.  Has an apt on Monday for a f/u to labwork but would like an antibiotic called into pharmacy for her tooth.  Is this something Dr. Knox can do?  She tried getting an apt earlier she stated but no apts were available.      Please call patient back.  Thank you

## 2024-01-12 ENCOUNTER — HOSPITAL ENCOUNTER (EMERGENCY)
Facility: HOSPITAL | Age: 59
Discharge: HOME | End: 2024-01-13
Attending: EMERGENCY MEDICINE
Payer: COMMERCIAL

## 2024-01-12 ENCOUNTER — TELEPHONE (OUTPATIENT)
Dept: FAMILY MEDICINE CLINIC | Facility: CLINIC | Age: 59
End: 2024-01-12

## 2024-01-12 VITALS
DIASTOLIC BLOOD PRESSURE: 100 MMHG | WEIGHT: 110 LBS | HEIGHT: 64 IN | TEMPERATURE: 98.7 F | HEART RATE: 121 BPM | BODY MASS INDEX: 18.78 KG/M2 | RESPIRATION RATE: 18 BRPM | SYSTOLIC BLOOD PRESSURE: 160 MMHG | OXYGEN SATURATION: 98 %

## 2024-01-12 DIAGNOSIS — K08.89 DENTALGIA: ICD-10-CM

## 2024-01-12 DIAGNOSIS — K04.7 DENTAL ABSCESS: Primary | ICD-10-CM

## 2024-01-12 DIAGNOSIS — Z78.9 NEED FOR FOLLOW-UP BY SOCIAL WORKER: Primary | ICD-10-CM

## 2024-01-12 DIAGNOSIS — K02.9 DENTAL CARIES: ICD-10-CM

## 2024-01-12 DIAGNOSIS — K04.7 DENTAL INFECTION: ICD-10-CM

## 2024-01-12 PROCEDURE — 99282 EMERGENCY DEPT VISIT SF MDM: CPT

## 2024-01-12 PROCEDURE — 99284 EMERGENCY DEPT VISIT MOD MDM: CPT | Performed by: EMERGENCY MEDICINE

## 2024-01-12 RX ORDER — ACETAMINOPHEN 325 MG/1
650 TABLET ORAL ONCE
Status: COMPLETED | OUTPATIENT
Start: 2024-01-12 | End: 2024-01-12

## 2024-01-12 RX ORDER — AMOXICILLIN AND CLAVULANATE POTASSIUM 875; 125 MG/1; MG/1
1 TABLET, FILM COATED ORAL ONCE
Status: COMPLETED | OUTPATIENT
Start: 2024-01-12 | End: 2024-01-12

## 2024-01-12 RX ORDER — CHLORHEXIDINE GLUCONATE ORAL RINSE 1.2 MG/ML
15 SOLUTION DENTAL 2 TIMES DAILY
Qty: 120 ML | Refills: 0 | Status: SHIPPED | OUTPATIENT
Start: 2024-01-12

## 2024-01-12 RX ORDER — AMOXICILLIN AND CLAVULANATE POTASSIUM 875; 125 MG/1; MG/1
1 TABLET, FILM COATED ORAL EVERY 12 HOURS
Qty: 14 TABLET | Refills: 0 | Status: SHIPPED | OUTPATIENT
Start: 2024-01-12 | End: 2024-01-19

## 2024-01-12 RX ADMIN — AMOXICILLIN AND CLAVULANATE POTASSIUM 1 TABLET: 875; 125 TABLET, FILM COATED ORAL at 23:48

## 2024-01-12 RX ADMIN — ACETAMINOPHEN 650 MG: 325 TABLET, FILM COATED ORAL at 23:48

## 2024-01-12 NOTE — TELEPHONE ENCOUNTER
Called and spoke w pt -- asked if she had seen dentist re tooth pain and she stated she has but they were unable to extract the two teeth that needed to be extracted as they were only a clinic. Pt was given a ref to schedule w [facility] to have both teeth extracted but she states they do not offer a ride share service like our office does and she has no mode of transportation. Dental visit 10/30/23 states tooth #17 and 18 are cavitated and indicated for extraction. She was also advised at that time to have extractions for both teeth done ASAP due to present infection.       Placed a ref at this time for complex care management to assist pt w transportation needs regarding need for extraction.

## 2024-01-12 NOTE — TELEPHONE ENCOUNTER
Pt called to set up a ride share to her oral surgery appointment on 1/24. I spoke with Swati I believe her name was at the oral surgery through  [employer] and they do not participate with the ride share program.     I returned pt phone call and informed her they do not participate with the ride share so we can set up for her to be taken to her appointment but she will need to find a way home. Pt verbally understood.     Can we please schedule a ride share for pt oral surgery appointment on 1/24 @ 3:50 PM

## 2024-01-13 NOTE — ED PROVIDER NOTES
History  Chief Complaint   Patient presents with    Dental Pain     Patient arrived via EMS from home with complaint of L lower dental pain that began a few days ago.      58-year-old female evaluation of dental pain.  She has a long history of dental problems.  Patient states she was seen here in October for similar pain was placed on antibiotics.  States she has an appointment with the dental clinic on January 24 but states over the last 2 days the pain has gotten much worse and think she needs antibiotics again.  Patient denies any fever      History provided by:  Patient   used: No    Dental Pain  Location:  Lower  Lower teeth location:  19/LL 1st molar and 20/LL 2nd bicuspid  Quality:  Constant  Severity:  Moderate  Onset quality:  Gradual  Duration:  2 days  Timing:  Constant  Progression:  Worsening  Context: abscess, dental caries and poor dentition    Previous work-up:  Dental exam  Ineffective treatments:  None tried  Associated symptoms: facial pain    Associated symptoms: no congestion, no facial swelling, no fever and no headaches    Risk factors: periodontal disease    Risk factors: no alcohol problem and no immunosuppression        Prior to Admission Medications   Prescriptions Last Dose Informant Patient Reported? Taking?   Cholecalciferol ( Ultra Strength) 50 MCG (2000 UT) CAPS   No No   Sig: TAKE ONE CAPSULE BY MOUTH EVERY DAY   Multiple Vitamin (Daily-Alanis) TABS   No No   Sig: TAKE ONE TABLET BY MOUTH EVERY DAY   Multiple Vitamins-Minerals (Womens 50+ Multi Vitamin/Min) TABS  Self No No   Sig: Take 1 tablet by mouth daily   OLANZapine (ZyPREXA) 7.5 mg tablet  Self No No   Sig: Take 1 tablet (7.5 mg total) by mouth daily at bedtime   anastrozole (ARIMIDEX) 1 mg tablet  Self No No   Sig: Take 1 tablet (1 mg total) by mouth daily   atenolol (TENORMIN) 50 mg tablet   No No   Sig: Take 1 tablet (50 mg total) by mouth daily Take 100 mg by mouth every other morning.   carbamide  peroxide (DEBROX) 6.5 % otic solution  Self No No   Sig: Administer 5 drops into both ears 2 (two) times a day   Patient not taking: Reported on 11/7/2023   chlorhexidine (PERIDEX) 0.12 % solution  Self No No   Sig: Apply 15 mL to the mouth or throat 2 (two) times a day   chlorhexidine (PERIDEX) 0.12 % solution   No Yes   Sig: Apply 15 mL to the mouth or throat 2 (two) times a day   famotidine (PEPCID) 40 MG tablet  Self No No   Sig: Take 1 tablet (40 mg total) by mouth in the morning   levothyroxine 50 mcg tablet  Self No No   Sig: Take 1 tablet (50 mcg total) by mouth daily in the early morning 1/2 hr [facility]      Facility-Administered Medications: None       Past Medical History:   Diagnosis Date    Abnormal weight loss     Anorexia nervosa     Breast cancer (HCC)     Disease of thyroid gland     Elevated blood sugar     Occasional     Fibroadenoma of both breasts     GERD (gastroesophageal reflux disease)     Hypertension     Hyperthyroidism     Osteopenia        Past Surgical History:   Procedure Laterality Date    BREAST BIOPSY Right 04/16/2021    BREAST CYST EXCISION Left 2011    CHOLECYSTECTOMY      MASTECTOMY W/ SENTINEL NODE BIOPSY Right 06/25/2021    Procedure: BREAST MASTECTOMY WITH BIOPSY LYMPH NODE SENTINEL; ANJALI  GUIDED, LYMPHATIC MAPPING WITH BLUE DYE AND RADIOACTIVE DYE (INJECT AT 1130 BY DR LEMUS IN THE OR);  Surgeon: Jim Lemus MD;  Location: [facility];  Service: Surgical Oncology    US BREAST NEEDLE LOC RIGHT EACH ADDITIONAL Right 06/14/2021    US BREAST ANJALI  NEEDLE LOC RIGHT Right 06/14/2021    US GUIDANCE BREAST BIOPSY RIGHT EACH ADDITIONAL Right 04/16/2021    US GUIDED BREAST BIOPSY RIGHT COMPLETE Right 04/16/2021       Family History   Adopted: Yes     I have reviewed and agree with the history as documented.    E-Cigarette/Vaping    E-Cigarette Use Never User      E-Cigarette/Vaping Substances    Nicotine No     THC No     CBD No     Flavoring No     Other No     Unknown No       Social History     Tobacco Use    Smoking status: Never    Smokeless tobacco: Never   Vaping Use    Vaping status: Never Used   Substance Use Topics    Alcohol use: Yes     Alcohol/week: 2.0 standard drinks of alcohol     Types: 2 Cans of beer per week    Drug use: Never     Comment: Illicit drugs:   none - As per [employer]        Review of Systems   Constitutional:  Negative for fatigue and fever.   HENT:  Negative for congestion, ear pain and facial swelling.    Eyes:  Negative for discharge and redness.   Respiratory:  Negative for apnea, cough, shortness of breath and wheezing.    Cardiovascular:  Negative for chest pain.   Gastrointestinal:  Negative for abdominal pain and diarrhea.   Endocrine: Negative for cold intolerance and polydipsia.   Genitourinary:  Negative for difficulty urinating and hematuria.   Musculoskeletal:  Negative for arthralgias and back pain.   Skin:  Negative for color change and rash.   Allergic/Immunologic: Negative for environmental allergies and immunocompromised state.   Neurological:  Negative for numbness and headaches.   Hematological:  Negative for adenopathy. Does not bruise/bleed easily.   Psychiatric/Behavioral:  Negative for agitation and behavioral problems.        Physical Exam  Physical Exam  Vitals and nursing note reviewed.   Constitutional:       Appearance: Normal appearance. She is well-developed. She is not toxic-appearing.   HENT:      Head: Normocephalic and atraumatic.      Right Ear: Tympanic membrane and external ear normal.      Left Ear: Tympanic membrane and external ear normal.      Nose: Nose normal. No nasal deformity or rhinorrhea.      Mouth/Throat:      Dentition: Dental tenderness, gingival swelling and dental caries present.      Pharynx: Uvula midline.   Eyes:      General: Lids are normal.         Right eye: No discharge.         Left eye: No discharge.      Conjunctiva/sclera: Conjunctivae normal.      Pupils: Pupils are equal, round, and  reactive to light.   Neck:      Vascular: No carotid bruit or JVD.      Trachea: Trachea normal.   Cardiovascular:      Rate and Rhythm: Normal rate and regular rhythm. No extrasystoles are present.     Chest Wall: PMI is not displaced.      Pulses: Normal pulses.   Pulmonary:      Effort: Pulmonary effort is normal. No accessory muscle usage or respiratory distress.      Breath sounds: Normal breath sounds. No wheezing, rhonchi or rales.   Abdominal:      General: Bowel sounds are normal.      Palpations: Abdomen is soft. Abdomen is not rigid. There is no mass.      Tenderness: There is no abdominal tenderness. There is no guarding or rebound.   Musculoskeletal:      Right shoulder: No swelling, deformity or bony tenderness. Normal range of motion.      Cervical back: Normal range of motion and neck supple. No deformity, tenderness or bony tenderness.   Lymphadenopathy:      Cervical: No cervical adenopathy.   Skin:     General: Skin is warm and dry.      Findings: No rash.   Neurological:      Mental Status: She is alert and oriented to person, place, and time.      GCS: GCS eye subscore is 4. GCS verbal subscore is 5. GCS motor subscore is 6.      Cranial Nerves: No cranial nerve deficit.      Sensory: No sensory deficit.      Deep Tendon Reflexes: Reflexes are normal and symmetric.   Psychiatric:         Speech: Speech normal.         Behavior: Behavior normal.         Vital Signs  ED Triage Vitals [01/12/24 2344]   Temperature Pulse Respirations Blood Pressure SpO2   98.7 °F (37.1 °C) (!) 121 18 160/100 98 %      Temp Source Heart Rate Source Patient Position - Orthostatic VS BP Location FiO2 (%)   Oral Monitor Sitting Left arm --      Pain Score       7           Vitals:    01/12/24 2344   BP: 160/100   Pulse: (!) 121   Patient Position - Orthostatic VS: Sitting         Visual Acuity      ED Medications  Medications   amoxicillin-clavulanate (AUGMENTIN) 875-125 mg per tablet 1 tablet (1 tablet Oral Given  1/12/24 2348)   acetaminophen (TYLENOL) tablet 650 mg (650 mg Oral Given 1/12/24 2348)       Diagnostic Studies  Results Reviewed       None                   No orders to display              Procedures  Procedures         ED Course                                             Medical Decision Making  Risk  OTC drugs.  Prescription drug management.             Disposition  Final diagnoses:   Dental abscess     Time reflects when diagnosis was documented in both MDM as applicable and the Disposition within this note       Time User Action Codes Description Comment    1/12/2024 11:40 PM Nhung Mojica [K04.7] Dental abscess     1/12/2024 11:46 PM Nhung Mojica Add [K08.89] Dentalgia     1/12/2024 11:46 PM Nhung Mojica Add [K02.9] Dental caries     1/12/2024 11:46 PM Nhung Mojica [K04.7] Dental infection           ED Disposition       ED Disposition   Discharge    Condition   Stable    Date/Time   Fri Jan 12, 2024 11:47 PM    Comment   Digna Archer Amstephen discharge to home/self care.                   Follow-up Information       Follow up With Specialties Details Why Contact Info Additional Information    [facility]  Keep appointment for January 24 [address] [place] [phone]  [phone] [facility], [address] [place] [address] [place], [phone], [phone]            Patient's Medications   Discharge Prescriptions    AMOXICILLIN-CLAVULANATE (AUGMENTIN) 875-125 MG PER TABLET    Take 1 tablet by mouth every 12 (twelve) hours for 7 days       Start Date: 1/12/2024 End Date: 1/19/2024       Order Dose: 1 tablet       Quantity: 14 tablet    Refills: 0       No discharge procedures on file.    PDMP Review         Value Time User    PDMP Reviewed  Yes 6/25/2021  5:25 PM Khushi Pritchett PA-C            ED Provider  Electronically Signed by             Nhung Mojica DO  01/12/24 8285

## 2024-01-17 ENCOUNTER — PATIENT OUTREACH (OUTPATIENT)
Dept: FAMILY MEDICINE CLINIC | Facility: CLINIC | Age: 59
End: 2024-01-17

## 2024-01-17 NOTE — PROGRESS NOTES
"ROLANDOCM received referral to assist patient with transportation needs.  Patient is reported to have an appointment for dental surgery on 1/24/24 and PCP office will provide patient with a Lyft ride to the dental office.  PCP cannot schedule a return trip.    SW placed phone call to patient.  Patient explained her transportation needs.  Patient reports using Lyft to her PCP office and receives transportation through [facility] for oncology treatments.      Patient reports a sister who lives within walking distance from patient's home.  Sister is patient's POA and manages patient's finances.  Patient reports that she receives $50 a week from her sister as an allowance.      Patient receives SNAP benefits of $126 a month.     Patient also has a brother who purchased the home that patient resides in.  Brother is patient's landlord.    Patient has [employer] insurance with medical assistance.     SW educated patient on [employer] Services.  Patient is agreeable to application being placed.      SW advised that this services may not be set up prior to the 1/24/24 appointment.  Patient states that she \"does not bother with her siblings\" to ask for assistance with a ride home from the procedure.     SW educated patient on [employer] and [employer] services.  Patient states she would not be able to afford these services.      Patient reports she is fully independent with ADLs, has no assistive devices, and no handicaps.      SW placed referral to [employer] through [employer] and recorded same in [email or web address].      [place] [employer]  Program  Medical Assistance Transportation Program  Application Number  [id number]    ROLANDO also checked the [facility] for transportation funds.  Breast Cancer fund is closed at the present time.        "

## 2024-01-22 ENCOUNTER — OFFICE VISIT (OUTPATIENT)
Dept: FAMILY MEDICINE CLINIC | Facility: CLINIC | Age: 59
End: 2024-01-22
Payer: COMMERCIAL

## 2024-01-22 VITALS
DIASTOLIC BLOOD PRESSURE: 86 MMHG | SYSTOLIC BLOOD PRESSURE: 140 MMHG | TEMPERATURE: 97.2 F | OXYGEN SATURATION: 99 % | BODY MASS INDEX: 18.03 KG/M2 | HEIGHT: 64 IN | WEIGHT: 105.6 LBS | HEART RATE: 92 BPM

## 2024-01-22 DIAGNOSIS — Z79.899 ENCOUNTER FOR LONG-TERM (CURRENT) USE OF MEDICATIONS: ICD-10-CM

## 2024-01-22 DIAGNOSIS — I10 PRIMARY HYPERTENSION: ICD-10-CM

## 2024-01-22 DIAGNOSIS — Z79.899 MEDICATION MANAGEMENT: Primary | ICD-10-CM

## 2024-01-22 DIAGNOSIS — K04.7 DENTAL ABSCESS: ICD-10-CM

## 2024-01-22 DIAGNOSIS — E03.9 HYPOTHYROIDISM, UNSPECIFIED TYPE: ICD-10-CM

## 2024-01-22 PROCEDURE — 99214 OFFICE O/P EST MOD 30 MIN: CPT | Performed by: FAMILY MEDICINE

## 2024-01-22 RX ORDER — LISINOPRIL 10 MG/1
10 TABLET ORAL DAILY
Qty: 90 TABLET | Refills: 3 | Status: SHIPPED | OUTPATIENT
Start: 2024-01-22

## 2024-01-22 NOTE — PROGRESS NOTES
Assessment/Plan:          1. Medication management    2. Hypothyroidism, unspecified type    3. Primary hypertension  Assessment & Plan:  Her blood pressure is elevated today. I will start her on lisinopril 10 mg once daily.    Orders:  -     lisinopril (ZESTRIL) 10 mg tablet; Take 1 tablet (10 mg total) by mouth daily    4. Encounter for long-term (current) use of medications    5. Dental abscess  Comments:  Needs 2 teeth extracted on the bottom left 17 and 18 yes has appointment for consultat 1/24 with oral surgeon  Assessment & Plan:  I reemphasized the importance of excellent gum hygiene and absence of infection to prevent endocarditis and open-heart surgery to replace valves should that occur. She understands the importance of dental hygiene and avoidance of abscesses.          The patient will follow up in 6 weeks.                  Subjective:       Patient ID: Digna Juárez is a 58 y.o. female who presents for a 4-month checkup and review labs.    She was in the hospital ER because of bad teeth and infection. She is due to see one of the surgeons on 01/24/2024 for a consultation. They are not going to do the surgery. She has 1 set of teeth left. She has severe pain and swelling in her teeth.    She is active and does her regular rounds. She feels really good. She takes a lift for transportation. She takes olanzapine at bedtime, which helps her sleep. She takes vitamin B12 and vitamin D. She takes anastrozole and atenolol. She is scheduled for mammogram on 05/20/2024.     She has never been . She has no children. She does not smoke or drink alcohol. She uses the lift as transportation.     She has not received her influenza or pneumonia vaccines this year. She has received 2 COVID-19 vaccines.    Labs were reviewed with the patient.B12 level: 900 +. Hemoglobin A1c: 5.5 percent. Vitamin D: 92 ng/ml. TSH: normal Hemoglobin: 14.2 g/dL.CMP: normalBlood glucose level: 112 mg/dL.GFR: 86 ml/minCholesterol:  "164 mg/dL.Triglycerides: 78 mg/dL.HDL: 85 mg/dL.LDL: 64 mg/dL.    The following portions of the patient's history were reviewed and updated as appropriate: allergies, current medications, past family history, past medical history, past social history, past surgical history, and problem list.  Interesting Data:  Work - never worked   Never   No children  Smoker - never  Etoh - beer  Marijuana - never  Habits- no other bad habits, only good   Spends time walking all day          Objective:       /86 (BP Location: Left arm, Patient Position: Sitting, Cuff Size: Standard)   Pulse 92   Temp (!) 97.2 °F (36.2 °C) (Temporal)   Ht 5' 4\" (1.626 m)   Wt 47.9 kg (105 lb 9.6 oz)   SpO2 99%   BMI 18.13 kg/m²          Physical Exam  Vitals and nursing note reviewed.   Constitutional:       General: He is not in acute distress.     Appearance: Normal appearance. He is well-developed.   HENT:      Head: Normocephalic and atraumatic.   Eyes:      General:         Right eye: No discharge.         Left eye: No discharge.   Neck:      Thyroid: No thyromegaly.   Cardiovascular:      Rate and Rhythm: Normal rate and regular rhythm.      Pulses: Normal pulses.      Heart sounds: Normal heart sounds. No murmur heard.  Pulmonary:      Effort: Pulmonary effort is normal.      Breath sounds: Normal breath sounds. No wheezing or rhonchi.   Musculoskeletal:      Cervical back: Neck supple.      Right lower leg: No edema.      Left lower leg: No edema.   Lymphadenopathy:      Cervical: No cervical adenopathy.   Skin:     General: Skin is warm.      Capillary Refill: Capillary refill takes less than 2 seconds.   Neurological:      General: No focal deficit present.      Mental Status: He is alert and oriented to person, place, and time.   Psychiatric:         Mood and Affect: Mood normal.         Behavior: Behavior normal.         Thought Content: Thought content normal.        I personally reviewed the recent (and prior)  lab " results, the image studies, pathology, other specialty/physicians consult notes and recommendations, and outside medical records from other institutions, as appropriate. I had a lengthy discussion with the patient and shared the work-up findings. We discussed the diagnosis and management plan.  I spent  35  minutes reviewing the records (labs, clinician notes, outside records, medical history, ordering medicine/tests/procedures, interpreting the imaging/labs previously done) and coordination of care as well as direct time with the patient today, of which greater than 50% of the time was spent in counseling and coordination of care with the patient/family.    Transcribed for NATALIE Knox DO, by Rafy Calixto on 01/28/24 at 6:37 PM. Powered by Dragon [employer].

## 2024-01-23 NOTE — ASSESSMENT & PLAN NOTE
I reemphasized the importance of excellent gum hygiene and absence of infection to prevent endocarditis and open-heart surgery to replace valves should that occur. She understands the importance of dental hygiene and avoidance of abscesses.

## 2024-01-24 NOTE — TELEPHONE ENCOUNTER
Patient called in stating Prakash took her to the wrong office and needs a new ride share set-up to take her to the correct office. Patient was warm transferred to [facility] for further assistance.

## 2024-02-01 ENCOUNTER — TELEPHONE (OUTPATIENT)
Age: 59
End: 2024-02-01

## 2024-02-01 NOTE — TELEPHONE ENCOUNTER
She would like a copy of her after visit summary from 01/22/2024 to be mailed to her home address on file.

## 2024-02-06 DIAGNOSIS — I10 HYPERTENSION, UNSPECIFIED TYPE: ICD-10-CM

## 2024-02-06 RX ORDER — ATENOLOL 50 MG/1
50 TABLET ORAL DAILY
Qty: 90 TABLET | Refills: 1 | Status: SHIPPED | OUTPATIENT
Start: 2024-02-06 | End: 2024-08-04

## 2024-02-08 ENCOUNTER — TELEPHONE (OUTPATIENT)
Age: 59
End: 2024-02-08

## 2024-02-09 DIAGNOSIS — K04.7 DENTAL INFECTION: Primary | ICD-10-CM

## 2024-02-09 DIAGNOSIS — K08.89 DENTALGIA: ICD-10-CM

## 2024-02-09 DIAGNOSIS — K02.9 DENTAL CARIES: ICD-10-CM

## 2024-02-09 RX ORDER — CHLORHEXIDINE GLUCONATE ORAL RINSE 1.2 MG/ML
15 SOLUTION DENTAL 2 TIMES DAILY
Qty: 120 ML | Refills: 0 | Status: SHIPPED | OUTPATIENT
Start: 2024-02-09

## 2024-02-09 RX ORDER — AMOXICILLIN AND CLAVULANATE POTASSIUM 875; 125 MG/1; MG/1
1 TABLET, FILM COATED ORAL EVERY 12 HOURS SCHEDULED
Qty: 20 TABLET | Refills: 0 | Status: SHIPPED | OUTPATIENT
Start: 2024-02-09 | End: 2024-02-19

## 2024-02-09 NOTE — TELEPHONE ENCOUNTER
As per PCP- ok to refill chlorhexidine mouth wash and to pend augmentin 875 mg to take 1 TAB BID with food.

## 2024-02-20 ENCOUNTER — PATIENT OUTREACH (OUTPATIENT)
Dept: FAMILY MEDICINE CLINIC | Facility: CLINIC | Age: 59
End: 2024-02-20

## 2024-02-20 NOTE — PROGRESS NOTES
Phone call to Theodore, patient is not registered for services.  Pt will need to utilize Lyft services if not able to pay for transportation services in the community.

## 2024-03-04 ENCOUNTER — OFFICE VISIT (OUTPATIENT)
Dept: FAMILY MEDICINE CLINIC | Facility: CLINIC | Age: 59
End: 2024-03-04
Payer: COMMERCIAL

## 2024-03-04 VITALS
TEMPERATURE: 98.6 F | HEIGHT: 64 IN | HEART RATE: 103 BPM | DIASTOLIC BLOOD PRESSURE: 82 MMHG | BODY MASS INDEX: 17.65 KG/M2 | OXYGEN SATURATION: 99 % | WEIGHT: 103.4 LBS | SYSTOLIC BLOOD PRESSURE: 134 MMHG

## 2024-03-04 DIAGNOSIS — E55.9 VITAMIN D DEFICIENCY: ICD-10-CM

## 2024-03-04 DIAGNOSIS — E03.9 HYPOTHYROIDISM, UNSPECIFIED TYPE: ICD-10-CM

## 2024-03-04 DIAGNOSIS — H61.23 BILATERAL IMPACTED CERUMEN: ICD-10-CM

## 2024-03-04 DIAGNOSIS — Z71.2 ENCOUNTER TO DISCUSS TEST RESULTS: ICD-10-CM

## 2024-03-04 DIAGNOSIS — C50.911 MALIGNANT NEOPLASM OF RIGHT FEMALE BREAST, UNSPECIFIED ESTROGEN RECEPTOR STATUS, UNSPECIFIED SITE OF BREAST (HCC): ICD-10-CM

## 2024-03-04 DIAGNOSIS — K08.89 DENTALGIA: Primary | ICD-10-CM

## 2024-03-04 DIAGNOSIS — I10 PRIMARY HYPERTENSION: ICD-10-CM

## 2024-03-04 DIAGNOSIS — J30.2 SEASONAL ALLERGIES: ICD-10-CM

## 2024-03-04 DIAGNOSIS — R04.0 EPISTAXIS: ICD-10-CM

## 2024-03-04 PROCEDURE — 69210 REMOVE IMPACTED EAR WAX UNI: CPT | Performed by: FAMILY MEDICINE

## 2024-03-04 PROCEDURE — 99396 PREV VISIT EST AGE 40-64: CPT | Performed by: FAMILY MEDICINE

## 2024-03-04 PROCEDURE — 99214 OFFICE O/P EST MOD 30 MIN: CPT | Performed by: FAMILY MEDICINE

## 2024-03-04 NOTE — PROGRESS NOTES
Assessment/Plan:          1. Dentalgia  Comments:  Needs to have 3 teeth extracted NEXT wk  No pain now    2. Epistaxis  Comments:  Recent onset bilateral no trauma suspect dryness    3. Primary hypertension  Assessment & Plan:  It is stable and controlled.      4. Hypothyroidism, unspecified type  Assessment & Plan:  She is doing well on levothyroxine 50 mcg every morning, half an hour before breakfast.      5. Encounter to discuss test results    6. Vitamin D deficiency  Assessment & Plan:  It is now corrected with vitamin D level 92 ng/mL. We will reduce vitamin D3 2000 IU daily down to just 2000 IU Monday, Wednesday, and Friday.      7. Seasonal allergies    8. Malignant neoplasm of right female breast, unspecified estrogen receptor status, unspecified site of breast (HCC)  Assessment & Plan:  She is postmenopausal. She will continue anastrozole.      9. Bilateral impacted cerumen  -     Ear cerumen removal        Epistaxis.  Recent onset, bilateral. No trauma. Suspect dryness.    Fatigue.  She is doing well on famotidine 40 mg.    Health maintenance.  She is not anemic. She follows a low-sodium, low-calorie, and low-fat diet. She is doing well on famotidine 40 mg. She is doing well on olanzapine 7.5 mg 1 tablet by mouth at bedtime. Cerumen removal was performed today.    Follow-up  The patient will follow up with me in 3 to 4 months.    Immunizations and preventive care screenings were discussed with patient today. Appropriate education was printed on patient's after visit summary.                   Subjective:       Patient ID: Digna Juárez is a 59 y.o. female who presents for evaluation of multiple medical concerns and for a comprehensive physical exam.    She has been experiencing occasional epistaxis and is apprehensive about forceful nasal blowing due to fear of exacerbating the condition. She suspects the cause to be nasal dryness. This issue was presented a few years ago but has not occurred  recently. She has been using nasal drops for treatment. The condition affects both nostrils. She also has Neosporin available at home for use.    She has not yet visited the dentist. She is scheduled for a dental extraction procedure on 03/12/2024, where 3 teeth will be removed. She reports no recurrence of pain. She has a supply of antibiotics, specifically amoxicillin, at her residence under the name case, which she uses if symptoms flare up. She completes a course of 2 tablets over 2 days, which effectively alleviates the pain. She is under gynecological care and is postmenopausal. Her most recent colonoscopy was performed in 2018.      She is currently on the following medications: vitamin D 2000 international units daily, levothyroxine 50 mcg taken every morning 30 minutes prior to breakfast, famotidine 40 mg which she tolerates well, olanzapine 7.5 mg administered orally at bedtime, and anastrozole, atenolol, and Lisinopril. She reports no current gastrointestinal issues.      On 06/25/2021, She was diagnosed with right-sided breast cancer. A mastectomy of the right breast was performed, including lymph node involvement. The cancer was initially detected via a diagnostic mammogram. She is scheduled to visit [facility] [facility] for further diagnostic mammography and ultrasound.     Diet and Physical Activity   - Diet/Nutrition: The patient maintains a balanced diet, consuming between 3 and 5 servings of fruits and vegetables daily.   - Exercise: She engages in regular physical activity, including extensive walking and daily strength training exercises, which she performs for a duration of 1 to 2 hours.     General Health   - Sleep: Her sleep duration ranges from 8 to 9 hours nightly. She does not exhibit symptoms of snoring or daytime fatigue.   - Hearing: Her auditory functions are normal.   - Vision: Her visual functions are normal.   - Dental: Dental check-ups are conducted biannually, and she maintains oral  "hygiene by brushing her teeth 2 to 3 times daily and flossing occasionally.     Laboratory Studies  Labs from 01/05/2024 were reviewed with the patient.  B12: normal.  Hemoglobin A1c: 5.5%.  CMP: normal.   Blood glucose level: 112 mg/dL.  Cholesterol: 164 mg/dL.  Triglycerides: 76 mg/dL.  HDL: 85 mg/dL.  Vitamin D: 92 ng/mL.      The following portions of the patient's history were reviewed and updated as appropriate: allergies, current medications, past family history, past medical history, past social history, past surgical history, and problem list.              Objective:       /82 (BP Location: Left arm, Patient Position: Sitting, Cuff Size: Standard)   Pulse 103   Temp 98.6 °F (37 °C) (Temporal)   Ht 5' 4\" (1.626 m)   Wt 46.9 kg (103 lb 6.4 oz)   SpO2 99%   BMI 17.75 kg/m²          Vitals:  Blood pressure: normal.  Heart rate: Slightly elevated.  Physical Exam  Vitals and nursing note reviewed.   Constitutional:       General: She is not in acute distress.     Appearance: Normal appearance. He is well-developed.   HENT:      Head: Normocephalic and atraumatic.   Eyes:      General:         Right eye: No discharge.         Left eye: No discharge.   Neck:      Thyroid: No thyromegaly.   Ears:       General: Both ears completely occluded with soft cerumen.  Dental:     General: Dental carier were noted.   Cardiovascular:      Rate and Rhythm: Normal rate and regular rhythm.      Pulses: Normal pulses.      Heart sounds: Normal heart sounds. No murmur heard.  Pulmonary:      Effort: Pulmonary effort is normal.      Breath sounds: Normal breath sounds. No wheezing or rhonchi.   Musculoskeletal:      Cervical back: Neck supple.      Right lower leg: No edema.      Left lower leg: No edema.   Lymphadenopathy:      Cervical: No cervical adenopathy.   Skin:     General: Skin is warm.      Capillary Refill: Capillary refill takes less than 2 seconds.   Neurological:      General: No focal deficit present.    "   Mental Status: She is alert and oriented to person, place, and time.   Psychiatric:         Mood and Affect: Mood normal.         Behavior: Behavior normal.         Thought Content: Thought content normal.        Ear cerumen removal    Date/Time: 3/4/2024 4:30 PM    Performed by: NATALIE Knox DO  Authorized by: NATALIE Knox DO  Universal Protocol:  Consent: Verbal consent obtained.  Consent given by: patient  Patient understanding: patient states understanding of the procedure being performed  Patient identity confirmed: verbally with patient    Patient location:  Clinic  Procedure details:     Local anesthetic:  None    Location:  L ear and R ear    Procedure type: curette      Approach:  External  Post-procedure details:     Complication:  Bleeding    Hearing quality:  Improved    Patient tolerance of procedure:  Tolerated well, no immediate complications  Comments:      Both years completely occluded with soft cerumen.  Using my round loop was able to remove significant to all of the wax.  Minimal bleeding encountered in the right ear only.  Should not give her any trouble she is aware and should have no difficulty.      I personally reviewed the recent (and prior)  lab results, the image studies, pathology, other specialty/physicians consult notes and recommendations, and outside medical records from other institutions, as appropriate. I had a lengthy discussion with the patient and shared the work-up findings. We discussed the diagnosis and management plan.  I spent  40  minutes reviewing the records (labs, clinician notes, outside records, medical history, ordering medicine/tests/procedures, interpreting the imaging/labs previously done) and coordination of care as well as direct time with the patient today, of which greater than 50% of the time was spent in counseling and coordination of care with the patient/family.    Transcribed for NATALIE Knox DO, by Rafy Calixto on 03/10/24 at 3:35 PM.  Powered by [employer].

## 2024-03-04 NOTE — PROGRESS NOTES
ADULT ANNUAL PHYSICAL  [facility] [address] [facility]    NAME: Digna Juárez  AGE: 59 y.o. SEX: female  : 1965     DATE: 3/10/2024     Assessment and Plan:     Problem List Items Addressed This Visit        Endocrine    Hypothyroidism     She is doing well on levothyroxine 50 mcg every morning, half an hour before breakfast.            Cardiovascular and Mediastinum    Hypertension     It is stable and controlled.            Nervous and Auditory    Bilateral impacted cerumen    Relevant Orders    Ear cerumen removal (Completed)       Other    Vitamin D deficiency     It is now corrected with vitamin D level 92 ng/mL. We will reduce vitamin D3 2000 IU daily down to just 2000 IU Monday, Wednesday, and Friday.         Seasonal allergies    Dentalgia - Primary    Encounter to discuss test results    Malignant neoplasm of right female breast (HCC)     She is postmenopausal. She will continue anastrozole.        Other Visit Diagnoses     Epistaxis        Recent onset bilateral no trauma suspect dryness        Breast Cancer:  :  Pathology as follows:        1.  Ancillary Studies:  Performed on original biopsy material, [id number] (largest/ductal), and reported to be:     - ER:  100% / Positive     - CT:  100% / Positive     - HER2 (IHC):  1+ / Negative     - HER2 (FISH):  N/A     - Repeat HER2 testing (2013 ASCO/CAP Recommendations): Not Indicated      - Best representative tumor block: B4 (ductal).       -- Sufficient tumor present for          Agendia Mammaprint/Blueprint (1 cm2 of invasive tumor in aggregate): Yes.          MI Profile/Foundation One (at least 5 x 5 mm of tumor): Yes.      Ancillary Studies:  Performed on original biopsy material, [id number] (smaller/lobular), and reported to be:     - ER:  90-95% / Positive     - CT:  45-55% / Positive     - HER2 (IHC):  1+ / Negative     - HER2 (FISH):  N/A      - Repeat HER2 testing (2013 ASCO/CAP  Recommendations): Not Indicated      - Best representative tumor blocks: B15 (lobular)       -- Sufficient tumor present for          Agendia Mammaprint/Blueprint (1 cm2 of invasive tumor in aggregate): No.          MI Profile/Foundation One (at least 5 x 5 mm of tumor):  Yes.      2.  Pathologic Stage Classification (pTNM, AJCC 8th Edition):       8th ed, AJCC Anatomic Stage:  at least Stage IB- pT1c(m/2), pN1mi(sn), cM0, G2.  Immunizations and preventive care screenings were discussed with patient today. Appropriate education was printed on patient's after visit summary.    Counseling:  Alcohol/drug use: discussed moderation in alcohol intake, the recommendations for healthy alcohol use, and avoidance of illicit drug use.  Dental Health: discussed importance of regular tooth brushing, flossing, and dental visits.  Injury prevention: discussed safety/seat belts, safety helmets, smoke detectors, carbon dioxide detectors, and smoking near bedding or upholstery.  Sexual health: discussed sexually transmitted diseases, partner selection, use of condoms, avoidance of unintended pregnancy, and contraceptive alternatives.  Exercise: the importance of regular exercise/physical activity was discussed. Recommend exercise 3-5 times per week for at least 30 minutes.          Return in about 3 months (around 6/4/2024) for Recheck.     Chief Complaint:     Chief Complaint   Patient presents with   • Nose Bleed     A couple recently -- afraid to blow nose    • Physical Exam      History of Present Illness:     Adult Annual Physical   Patient here for a comprehensive physical exam. The patient reports no problems.    Diet and Physical Activity  Diet/Nutrition: well balanced diet, heart healthy (low sodium) diet, limited junk food, and consuming 3-5 servings of fruits/vegetables daily.   Exercise: walking, 5-7 times a week on average, and 1-2 hours on average.      Depression Screening  PHQ-2/9 Depression Screening         General  Health  Sleep: sleeps well and gets more than 8 hours of sleep on average.   Hearing: decreased - bilateral.  Vision: no vision problems.   Dental: regular dental visits, brushes teeth twice daily, and flosses teeth occasionally.       /GYN Health  Follows with gynecology? yes   Patient is: postmenopausal  Last menstrual period: 10 yrs ago   Contraceptive method: menopause.    Advanced Care Planning  Do you have an advanced directive? yes  Do you have a durable medical power of ? yes  ACP document given to the patient? no     Review of Systems:     Review of Systems   Past Medical History:     Past Medical History:   Diagnosis Date   • Abnormal weight loss    • Anorexia nervosa    • Breast cancer (HCC)    • Disease of thyroid gland    • Elevated blood sugar     Occasional    • Fibroadenoma of both breasts    • GERD (gastroesophageal reflux disease)    • Hypertension    • Hyperthyroidism    • Osteopenia       Past Surgical History:     Past Surgical History:   Procedure Laterality Date   • BREAST BIOPSY Right 04/16/2021   • BREAST CYST EXCISION Left 2011   • CHOLECYSTECTOMY     • MASTECTOMY W/ SENTINEL NODE BIOPSY Right 06/25/2021    Procedure: BREAST MASTECTOMY WITH BIOPSY LYMPH NODE SENTINEL; ANJALI  GUIDED, LYMPHATIC MAPPING WITH BLUE DYE AND RADIOACTIVE DYE (INJECT AT 1130 BY DR LEMUS IN THE OR);  Surgeon: Jim Lemus MD;  Location: [facility];  Service: Surgical Oncology   • US BREAST CLIP NEEDLE LOC RIGHT Right 06/14/2021   • US BREAST NEEDLE LOC RIGHT EACH ADDITIONAL Right 06/14/2021   • US GUIDANCE BREAST BIOPSY RIGHT EACH ADDITIONAL Right 04/16/2021   • US GUIDED BREAST BIOPSY RIGHT COMPLETE Right 04/16/2021      Social History:     Social History     Socioeconomic History   • Marital status: Single     Spouse name: None   • Number of children: 0   • Years of education: 12   • Highest education level: None   Occupational History   • Occupation: never worked    Tobacco Use   • Smoking status:  Never   • Smokeless tobacco: Never   Vaping Use   • Vaping status: Never Used   Substance and Sexual Activity   • Alcohol use: Yes     Alcohol/week: 2.0 standard drinks of alcohol     Types: 2 Cans of beer per week   • Drug use: Never     Comment: Illicit drugs:   none - As per [place]    • Sexual activity: Not Currently     Comment: Sexually active:   No - As per [place]    Other Topics Concern   • None   Social History Narrative    · Most recent tobacco use screenin2019      · Do you currently or have you served in the [employer]:   No      · Were you activated, into active duty, as a member of the [employer] or as a Reservist:   No      · Caffeine intake:   None decaf coffee only     · Sexual orientation:   Heterosexual      · General stress level:   Low      · Diet:   Regular      · Single or multi-level home/work:   single level home      · Live alone or with others:   alone      · Exercise level:   Moderate outside walk only     · Overweight:   No      · Obese:   No      · Guns present in home:   No      · Seat belts used routinely:   Yes      · Advance directive:   Yes      · Sunscreen used routinely:   Yes      · Smoke alarm in home:   Yes      · Performs monthly self-breast exam:   Yes      · Legally blind in one or both eyes:   No      · Hard of hearing or deaf in one or both ears:   No      · Presence of domestic violence:   No      · Are there stairs in your home:   No      · Pets:   No      Social Determinants of Health     Financial Resource Strain: Low Risk  (2020)    Overall Financial Resource Strain (CARDIA)    • Difficulty of Paying Living Expenses: Not hard at all   Food Insecurity: No Food Insecurity (2020)    Hunger Vital Sign    • Worried About Running Out of Food in the Last Year: Never true    • Ran Out of Food in the Last Year: Never true   Transportation Needs: No Transportation Needs (2020)    PRAPARE - Transportation    • Lack of Transportation (Medical): No     • Lack of Transportation (Non-Medical): No   Physical Activity: Sufficiently Active (5/5/2020)    Exercise Vital Sign    • Days of Exercise per Week: 7 days    • Minutes of Exercise per Session: 60 min   Stress: No Stress Concern Present (5/5/2020)    [employer] [place] of [employer]ational [employer] - Occupational Stress Questionnaire    • Feeling of Stress : Not at all   Social Connections: Socially Isolated (5/5/2020)    Social Connection and Isolation Panel [NHANES]    • Frequency of Communication with Friends and Family: More than three times a week    • Frequency of Social Gatherings with Friends and Family: More than three times a week    • Attends Rastafarian Services: Never    • Active Member of Clubs or Organizations: No    • Attends Club or Organization Meetings: Never    • Marital Status: Never    Intimate Partner Violence: Not At Risk (5/5/2020)    Humiliation, Afraid, Rape, and Kick questionnaire    • Fear of Current or Ex-Partner: No    • Emotionally Abused: No    • Physically Abused: No    • Sexually Abused: No   Housing Stability: Not on file      Family History:     Family History   Adopted: Yes      Current Medications:     Current Outpatient Medications   Medication Sig Dispense Refill   • anastrozole (ARIMIDEX) 1 mg tablet Take 1 tablet (1 mg total) by mouth daily 90 tablet 3   • atenolol (TENORMIN) 50 mg tablet Take 1 tablet (50 mg total) by mouth daily Take 100 mg by mouth every other morning. 90 tablet 1   • carbamide peroxide (DEBROX) 6.5 % otic solution Administer 5 drops into both ears 2 (two) times a day 15 mL 2   • chlorhexidine (PERIDEX) 0.12 % solution Apply 15 mL to the mouth or throat 2 (two) times a day 120 mL 0   • Cholecalciferol ( Ultra Strength) 50 MCG (2000 UT) CAPS TAKE ONE CAPSULE BY MOUTH EVERY DAY 30 capsule 5   • famotidine (PEPCID) 40 MG tablet Take 1 tablet (40 mg total) by mouth in the morning 90 tablet 3   • levothyroxine 50 mcg tablet Take 1 tablet (50 mcg total)  "by mouth daily in the early morning 1/2 hr AC b'fst 90 tablet 3   • lisinopril (ZESTRIL) 10 mg tablet Take 1 tablet (10 mg total) by mouth daily 90 tablet 3   • Multiple Vitamin (Daily-Alanis) TABS TAKE ONE TABLET BY MOUTH EVERY DAY 90 tablet 3   • Multiple Vitamins-Minerals (Womens 50+ Multi Vitamin/Min) TABS Take 1 tablet by mouth daily 100 tablet 1   • OLANZapine (ZyPREXA) 7.5 mg tablet Take 1 tablet (7.5 mg total) by mouth daily at bedtime 30 tablet 6     No current facility-administered medications for this visit.      Allergies:     No Known Allergies   Physical Exam:     /82 (BP Location: Left arm, Patient Position: Sitting, Cuff Size: Standard)   Pulse 103   Temp 98.6 °F (37 °C) (Temporal)   Ht 5' 4\" (1.626 m)   Wt 46.9 kg (103 lb 6.4 oz)   SpO2 99%   BMI 17.75 kg/m²     Physical Exam     NATALIE Knox DO  [facility] [place]    "

## 2024-03-05 NOTE — ASSESSMENT & PLAN NOTE
It is now corrected with vitamin D level 92 ng/mL. We will reduce vitamin D3 2000 IU daily down to just 2000 IU Monday, Wednesday, and Friday.

## 2024-03-08 ENCOUNTER — TELEPHONE (OUTPATIENT)
Age: 59
End: 2024-03-08

## 2024-03-08 NOTE — TELEPHONE ENCOUNTER
Spoke with patient and informed that she should be taking 1 capsule daily as ordered by PCP. No further questions at this time.

## 2024-03-08 NOTE — TELEPHONE ENCOUNTER
PT calling to inquire about instructions of taking Vit D. She said she has not taken any yesterday nor today, and will not take any until she hears from the office regarding the frequency.    Please call PT to advise.    Thank You

## 2024-04-30 ENCOUNTER — OFFICE VISIT (OUTPATIENT)
Dept: FAMILY MEDICINE CLINIC | Facility: CLINIC | Age: 59
End: 2024-04-30
Payer: COMMERCIAL

## 2024-04-30 VITALS
HEIGHT: 64 IN | DIASTOLIC BLOOD PRESSURE: 70 MMHG | TEMPERATURE: 98.2 F | BODY MASS INDEX: 17.04 KG/M2 | WEIGHT: 99.8 LBS | OXYGEN SATURATION: 99 % | HEART RATE: 84 BPM | SYSTOLIC BLOOD PRESSURE: 120 MMHG

## 2024-04-30 DIAGNOSIS — H60.501 ACUTE OTITIS EXTERNA OF RIGHT EAR, UNSPECIFIED TYPE: ICD-10-CM

## 2024-04-30 DIAGNOSIS — H61.23 BILATERAL IMPACTED CERUMEN: ICD-10-CM

## 2024-04-30 DIAGNOSIS — I10 HYPERTENSION, UNSPECIFIED TYPE: ICD-10-CM

## 2024-04-30 DIAGNOSIS — Z79.899 MEDICATION MANAGEMENT: Primary | ICD-10-CM

## 2024-04-30 PROCEDURE — 99213 OFFICE O/P EST LOW 20 MIN: CPT | Performed by: FAMILY MEDICINE

## 2024-04-30 PROCEDURE — 69210 REMOVE IMPACTED EAR WAX UNI: CPT | Performed by: FAMILY MEDICINE

## 2024-04-30 RX ORDER — OLANZAPINE 7.5 MG/1
7.5 TABLET, FILM COATED ORAL
Qty: 90 TABLET | Refills: 2 | Status: SHIPPED | OUTPATIENT
Start: 2024-04-30

## 2024-04-30 RX ORDER — NEOMYCIN SULFATE, POLYMYXIN B SULFATE AND HYDROCORTISONE 10; 3.5; 1 MG/ML; MG/ML; [USP'U]/ML
4 SUSPENSION/ DROPS AURICULAR (OTIC) 3 TIMES DAILY
Qty: 10 ML | Refills: 0 | Status: SHIPPED | OUTPATIENT
Start: 2024-04-30

## 2024-04-30 NOTE — PATIENT INSTRUCTIONS
Better healthcare is in your genes. Learn more about a community health research program at [email or web address]     Who is eligible to join?  You are eligible to participate if you are 18 years or older at the time of enrollment. You are not eligible to participate if you are a recipient of a donor bone marrow or a stem cell transplant.     Is there a cost to participate?  There is no cost to participate and health insurance is not required.    What can I learn about in this study?  You can learn about inherited risks for:                                              Common cancers: hereditary breast and ovarian cancer, and                                colorectal cancer related to Floyd syndrome                              Heart disease: hereditary high cholesterol (also known as                                familial hypercholesterolemia)                             * You also have the opportunity to learn about your ancestry and                                other traits like, caffeine sensitivity, sleep patterns and more    How can I sign up? Go to [email or web address] or scan the QR Code to sign up.

## 2024-04-30 NOTE — PROGRESS NOTES
Ear cerumen removal    Date/Time: 4/30/2024 3:00 PM    Performed by: NATALIE Knox DO  Authorized by: NATALIE Knox DO  Universal Protocol:  Consent: Verbal consent obtained.  Consent given by: patient  Patient understanding: patient states understanding of the procedure being performed    Patient location:  Clinic  Procedure details:     Local anesthetic:  None    Location:  L ear and R ear    Procedure type: irrigation with instrumentation      Instrumentation: loop      Approach:  External  Post-procedure details:     Complication:  None    Hearing quality:  Improved    Patient tolerance of procedure:  Tolerated well, no immediate complications

## 2024-04-30 NOTE — PROGRESS NOTES
"Name: Digna Juárez      : 1965      MRN: [id number]  Encounter Provider: NATALIE Knox DO  Encounter Date: 2024   Encounter department: [facility]    Assessment & Plan     Assessment & Plan  1. Right ear pain, distress, itching, and slight drainage.  Upon physical examination, it was observed that the patient's right ear was completely blocked. Both ears were successfully cleaned and irrigated. The left ear exhibited significant congestion and was occluded with wax, 100 percent, which was successfully resolved using instrumentation and my loop. The patient tolerated the procedure satisfactorily. The patient was informed about her condition. Regarding the infection in the right ear, a prescription for Cortisporin otic solution or suspension will be provided.    Follow-up  The patient is scheduled for a follow-up visit in 2 to 3 weeks.     No orders of the defined types were placed in this encounter.      Subjective      History of Present Illness  The patient presents for evaluation of right ear pain, distress, itching, slight drainage.    The patient suspects an ear infection, despite the absence of fever. She reports internal itching and a rough texture in her ear, akin to visual disturbances, for the past 4 days. Initially, she experienced mild pain behind the right earlobe and temple, but the pain has since subsided. She attempted to alleviate the discomfort with a pharmacist's ear drops, but found them ineffective as she perceived them to push the wax back into place with hot water. She describes her right ear as feeling blocked and itchy.  Review of Systems      Objective     /70 (BP Location: Left arm, Patient Position: Sitting, Cuff Size: Standard)   Pulse 84   Temp 98.2 °F (36.8 °C) (Temporal)   Ht 5' 4\" (1.626 m)   Wt 45.3 kg (99 lb 12.8 oz)   SpO2 99%   BMI 17.13 kg/m²     Physical Exam  External otitis in the right ear is completely blocked.  Physical " Exam

## 2024-05-01 PROBLEM — H61.23 BILATERAL IMPACTED CERUMEN: Status: RESOLVED | Noted: 2022-01-10 | Resolved: 2024-05-01

## 2024-05-13 ENCOUNTER — TELEPHONE (OUTPATIENT)
Age: 59
End: 2024-05-13

## 2024-05-13 NOTE — TELEPHONE ENCOUNTER
Patient is calling regarding her apt for tomorrow with Dr. Knox at 4pm.  She stated she needs LYFT.    Please call patient to confirm was it is set up.  Thank you.

## 2024-05-14 ENCOUNTER — OFFICE VISIT (OUTPATIENT)
Dept: FAMILY MEDICINE CLINIC | Facility: CLINIC | Age: 59
End: 2024-05-14
Payer: COMMERCIAL

## 2024-05-14 VITALS
TEMPERATURE: 99.3 F | DIASTOLIC BLOOD PRESSURE: 70 MMHG | HEART RATE: 75 BPM | BODY MASS INDEX: 17.96 KG/M2 | OXYGEN SATURATION: 99 % | SYSTOLIC BLOOD PRESSURE: 110 MMHG | HEIGHT: 64 IN | WEIGHT: 105.2 LBS

## 2024-05-14 DIAGNOSIS — Z12.11 SCREEN FOR COLON CANCER: ICD-10-CM

## 2024-05-14 DIAGNOSIS — H60.501 ACUTE OTITIS EXTERNA OF RIGHT EAR, UNSPECIFIED TYPE: Primary | ICD-10-CM

## 2024-05-14 PROCEDURE — 99213 OFFICE O/P EST LOW 20 MIN: CPT | Performed by: FAMILY MEDICINE

## 2024-05-14 NOTE — PROGRESS NOTES
"Name: Digna Juárez      : 1965      MRN: [id number]  Encounter Provider: NATALIE Knox DO  Encounter Date: 2024   Encounter department: [facility] [place]    Assessment & Plan     Assessment & Plan  The patient is a 59-year-old female who presents for ongoing reevaluation of right ear external otitis. This was treated 2 weeks ago with antibiotic eardrops and physical examination today looks very good.    1. Right ear external otitis.  Upon examination, both her ears appear normal, particularly the right. A residual effect of the drops was used and discontinued 1 week ago. Nothing acute was observed. Given the non-involved nature of the ear, no further treatment is required at this time. The patient has been advised to seek future care if the condition recurs.     No orders of the defined types were placed in this encounter.      Subjective      History of Present Illness  The patient is a 59-year-old female who is seen for follow-up of right ear pain.    The patient was previously diagnosed with acute otitis externa of the right ear, which was successfully treated with antibiotic eardrops for a duration of 1 week. Currently, she reports no symptoms and her auditory function is unimpaired in both ears.    Supplemental Information  She is not doing any colon screening today. She is scheduled for a diagnostic mammogram this coming Monday.  Review of Systems   HENT: Negative.  Negative for drooling, ear discharge, ear pain, postnasal drip and rhinorrhea.    Eyes: Negative.    Respiratory: Negative.     Cardiovascular: Negative.    Gastrointestinal: Negative.          Objective     /70 (BP Location: Left arm, Patient Position: Sitting, Cuff Size: Standard)   Pulse 75   Temp 99.3 °F (37.4 °C) (Temporal)   Ht 5' 4\" (1.626 m)   Wt 47.7 kg (105 lb 3.2 oz)   SpO2 99%   BMI 18.06 kg/m²     Physical Exam  Both ears look normal, especially the right. No inflammation, redness, soreness, " discharge, etc. She is happy. She can hear equally well out of both ears and the left ear.  Physical Exam  HENT:      Head: Normocephalic and atraumatic.      Right Ear: Tympanic membrane, ear canal and external ear normal. There is no impacted cerumen.      Left Ear: Tympanic membrane, ear canal and external ear normal. There is no impacted cerumen.      Nose: Nose normal.

## 2024-05-20 ENCOUNTER — HOSPITAL ENCOUNTER (OUTPATIENT)
Dept: ULTRASOUND IMAGING | Facility: CLINIC | Age: 59
Discharge: HOME | End: 2024-05-20
Payer: COMMERCIAL

## 2024-05-20 ENCOUNTER — HOSPITAL ENCOUNTER (OUTPATIENT)
Dept: MAMMOGRAPHY | Facility: CLINIC | Age: 59
Discharge: HOME | End: 2024-05-20
Payer: COMMERCIAL

## 2024-05-20 VITALS — HEIGHT: 55 IN | BODY MASS INDEX: 24.35 KG/M2 | WEIGHT: 105.2 LBS

## 2024-05-20 DIAGNOSIS — Z17.0 MALIGNANT NEOPLASM OF OVERLAPPING SITES OF RIGHT BREAST IN FEMALE, ESTROGEN RECEPTOR POSITIVE (HCC): ICD-10-CM

## 2024-05-20 DIAGNOSIS — C50.811 MALIGNANT NEOPLASM OF OVERLAPPING SITES OF RIGHT BREAST IN FEMALE, ESTROGEN RECEPTOR POSITIVE (HCC): ICD-10-CM

## 2024-05-20 PROCEDURE — G0279 TOMOSYNTHESIS, MAMMO: HCPCS

## 2024-05-20 PROCEDURE — 76642 ULTRASOUND BREAST LIMITED: CPT

## 2024-05-20 PROCEDURE — 77065 DX MAMMO INCL CAD UNI: CPT

## 2024-05-28 ENCOUNTER — TELEPHONE (OUTPATIENT)
Dept: HEMATOLOGY ONCOLOGY | Facility: CLINIC | Age: 59
End: 2024-05-28

## 2024-05-28 NOTE — TELEPHONE ENCOUNTER
Spoke to patient to let her know that [facility] does not transport after 2 PM. Rescheduled her appt. For 6/18/24 at 11:30 AM. [facility] has been notified.

## 2024-05-28 NOTE — TELEPHONE ENCOUNTER
Appointment Change  Cancel, Reschedule, Change to Virtual      Who are you speaking with? Patient   If it is not the patient, is the caller listed on the communication consent form? N/A   Which provider is the appointment scheduled with? ELZA Potter   When was the original appointment scheduled?    Please list date and time 5/28/24 @ 2   At which location is the appointment scheduled to take place? [place]   Was the appointment rescheduled?     Was the appointment changed from an in person visit to a virtual visit?    If so, please list the details of the change. 6/18/24 @ 230   What is the reason for the appointment change? Forgot about appt until [facility] called       Was [facility] transport scheduled? Yes   Does [facility] transport need to be scheduled for the new visit (if applicable) Yes   Does the patient need an infusion appointment rescheduled? No   Does the patient have an upcoming infusion appointment scheduled? If so, when? No   Is the patient undergoing chemotherapy? No   For appointments cancelled with less than 24 hours:  Was the no-show policy reviewed? Yes

## 2024-06-04 ENCOUNTER — OFFICE VISIT (OUTPATIENT)
Dept: FAMILY MEDICINE CLINIC | Facility: CLINIC | Age: 59
End: 2024-06-04
Payer: COMMERCIAL

## 2024-06-04 VITALS
WEIGHT: 100.6 LBS | TEMPERATURE: 98.7 F | BODY MASS INDEX: 17.17 KG/M2 | OXYGEN SATURATION: 99 % | DIASTOLIC BLOOD PRESSURE: 68 MMHG | SYSTOLIC BLOOD PRESSURE: 112 MMHG | HEART RATE: 83 BPM | HEIGHT: 64 IN

## 2024-06-04 DIAGNOSIS — K21.9 GERD WITHOUT ESOPHAGITIS: ICD-10-CM

## 2024-06-04 DIAGNOSIS — C50.811 MALIGNANT NEOPLASM OF OVERLAPPING SITES OF RIGHT BREAST IN FEMALE, ESTROGEN RECEPTOR POSITIVE (HCC): ICD-10-CM

## 2024-06-04 DIAGNOSIS — E55.9 VITAMIN D DEFICIENCY: ICD-10-CM

## 2024-06-04 DIAGNOSIS — I10 PRIMARY HYPERTENSION: Primary | ICD-10-CM

## 2024-06-04 DIAGNOSIS — E03.8 OTHER SPECIFIED HYPOTHYROIDISM: ICD-10-CM

## 2024-06-04 DIAGNOSIS — Z17.0 MALIGNANT NEOPLASM OF OVERLAPPING SITES OF RIGHT BREAST IN FEMALE, ESTROGEN RECEPTOR POSITIVE (HCC): ICD-10-CM

## 2024-06-04 PROCEDURE — 99213 OFFICE O/P EST LOW 20 MIN: CPT | Performed by: FAMILY MEDICINE

## 2024-06-04 RX ORDER — LORAZEPAM 0.5 MG
TABLET ORAL
Qty: 30 CAPSULE | Refills: 0 | OUTPATIENT
Start: 2024-06-04

## 2024-06-04 RX ORDER — LORAZEPAM 0.5 MG
TABLET ORAL
Qty: 90 CAPSULE | Refills: 3 | Status: SHIPPED | OUTPATIENT
Start: 2024-06-04

## 2024-06-04 NOTE — PROGRESS NOTES
Ambulatory Visit  Name: iDgna Juárez      : 1965      MRN: [id number]  Encounter Provider: NATALIE Knox DO  Encounter Date: 2024   Encounter department: [facility] [place]    Assessment & Plan  1. Hypertension.  The patient's blood pressure is well-regulated.    2. Hypercholesterolemia.    3. Prediabetes.  The patient was informed that her elevated blood glucose may be a side effect of her olanzapine medication. A blood test will be conducted in 2024. The patient was advised to avoid high-sugar foods.    Follow-up  The patient is scheduled for a follow-up visit in 4 months.  No orders of the defined types were placed in this encounter.       .1. Primary hypertension  2. Vitamin D deficiency  -     Cholecalciferol ( Ultra Strength) 50 MCG ( UT) CAPS; TAKE ONE CAPSULE BY MOUTH EVERY DAY  3. GERD without esophagitis  4. Other specified hypothyroidism  5. Malignant neoplasm of overlapping sites of right breast in female, estrogen receptor positive (HCC)      History of Present Illness     History of Present Illness  The patient is a 59-year-old female who presents for evaluation of multiple medical concerns.    The patient maintains an active lifestyle, engaging in daily mileage activities, even in inclement weather. Her sleep schedule is from 11:00 PM to 12:00 AM, with a sleep duration of 8 to 9 hours. She is not currently on any cholesterol-lowering medication. Her diet is predominantly sugar-free, and she maintains a diet of three meals per day. She denies experiencing any weight loss.    Supplemental Information  Her dental problems have been fixed. She had 3 teeth extracted on the left side by Dr. Smith Carpenter. She does not need dentures. She is on anastrozole 1 mg, atenolol 50 mg daily, famotidine 40 mg daily, Synthroid, lisinopril 10 mg, women's vitamins, and olanzapine 7.5 mg 1 tablet at bedtime. She has been on olanzapine since . It helps her sleep. She has no  "other side effects from it. She just had a diagnostic mammogram done. She had breast surgery in 2021 by Dr. Jim Pitts.   She drinks light beer.     Review of Systems   HENT: Negative.  Negative for drooling, ear discharge, ear pain, postnasal drip and rhinorrhea.    Eyes: Negative.    Respiratory: Negative.     Cardiovascular: Negative.    Gastrointestinal: Negative.      Objective     /68 (BP Location: Left arm, Patient Position: Sitting, Cuff Size: Standard)   Pulse 83   Temp 98.7 °F (37.1 °C) (Temporal)   Ht 5' 4\" (1.626 m)   Wt 45.6 kg (100 lb 9.6 oz)   SpO2 99%   BMI 17.27 kg/m²     Physical Exam  Glands and thyroid in the neck are normal.  There are a couple of small warts on her back, but no ticks.  Physical Exam  Vitals and nursing note reviewed.   Constitutional:       General: She is not in acute distress.     Appearance: She is well-developed.   HENT:      Head: Normocephalic and atraumatic.   Eyes:      Conjunctiva/sclera: Conjunctivae normal.   Cardiovascular:      Rate and Rhythm: Normal rate and regular rhythm.      Heart sounds: No murmur heard.  Pulmonary:      Effort: Pulmonary effort is normal. No respiratory distress.      Breath sounds: Normal breath sounds.   Abdominal:      Palpations: Abdomen is soft.      Tenderness: There is no abdominal tenderness.   Musculoskeletal:         General: No swelling.      Cervical back: Neck supple.   Skin:     General: Skin is warm and dry.   Neurological:      General: No focal deficit present.      Mental Status: She is alert and oriented to person, place, and time. Mental status is at baseline.   Psychiatric:         Mood and Affect: Mood normal.         Behavior: Behavior normal.         Thought Content: Thought content normal.         Judgment: Judgment normal.       Administrative Statements   I have spent a total time of 30 minutes on 06/04/24 In caring for this patient including .      "

## 2024-06-12 ENCOUNTER — TELEPHONE (OUTPATIENT)
Dept: SURGICAL ONCOLOGY | Facility: CLINIC | Age: 59
End: 2024-06-12

## 2024-06-12 NOTE — TELEPHONE ENCOUNTER
Spoke to patient and made her aware  was not seeing patients at [facility]. Patient requested to stay in [place]. Patient was rescheduled with  on 7/18/24 at 2PM arrival time 1:45PM. Patient stated she will need [facility], message sent to [facility].       Please cancel ride for 6/24/24 at [facility].     New appointment scheduled for: 7/18/24 at 2PM- [address], [place], [id number]        Thank you!

## 2024-07-09 DIAGNOSIS — E56.9 DEFICIENCY OF MULTIPLE VITAMINS: ICD-10-CM

## 2024-07-09 NOTE — TELEPHONE ENCOUNTER
The patient called [facility] pharmacy  told the patient her womens multiple vitamins will not be able to be refilled until 7/28/24  the patient only has two pills left    please look into this thank you

## 2024-08-05 DIAGNOSIS — I10 HYPERTENSION, UNSPECIFIED TYPE: ICD-10-CM

## 2024-08-05 RX ORDER — ATENOLOL 50 MG/1
50 TABLET ORAL DAILY
Qty: 90 TABLET | Refills: 1 | Status: SHIPPED | OUTPATIENT
Start: 2024-08-05 | End: 2025-02-01

## 2024-08-07 NOTE — TELEPHONE ENCOUNTER
Patient needs refills on  Pantoprazole   ---multi vitamin -----olanzapine   ----[facility] pharmacy       She would also needs a syringe to clean her ears out? Received fax from pharmacy requesting refill on pts medication(s). Pt was last seen in office on 2/5/2024  and has a follow up scheduled for Visit date not found. Will send request to  Arturo Montgomery  for authorization.     Requested Prescriptions     Pending Prescriptions Disp Refills    buPROPion (WELLBUTRIN XL) 150 MG extended release tablet [Pharmacy Med Name: buPROPion HCl ER (XL) 150 MG Oral Tablet Extended Release 24 Hour] 90 tablet 3     Sig: TAKE 1 TABLET BY MOUTH ONCE DAILY IN THE MORNING

## 2024-08-27 ENCOUNTER — OFFICE VISIT (OUTPATIENT)
Dept: FAMILY MEDICINE CLINIC | Facility: CLINIC | Age: 59
End: 2024-08-27
Payer: COMMERCIAL

## 2024-08-27 VITALS
HEIGHT: 64 IN | SYSTOLIC BLOOD PRESSURE: 124 MMHG | OXYGEN SATURATION: 98 % | BODY MASS INDEX: 18.2 KG/M2 | DIASTOLIC BLOOD PRESSURE: 82 MMHG | HEART RATE: 88 BPM | TEMPERATURE: 98.9 F | WEIGHT: 106.6 LBS

## 2024-08-27 DIAGNOSIS — L03.111 CELLULITIS OF AXILLA, RIGHT: Primary | ICD-10-CM

## 2024-08-27 DIAGNOSIS — C50.811 MALIGNANT NEOPLASM OF OVERLAPPING SITES OF RIGHT BREAST IN FEMALE, ESTROGEN RECEPTOR POSITIVE (HCC): ICD-10-CM

## 2024-08-27 DIAGNOSIS — Z17.0 MALIGNANT NEOPLASM OF OVERLAPPING SITES OF RIGHT BREAST IN FEMALE, ESTROGEN RECEPTOR POSITIVE (HCC): ICD-10-CM

## 2024-08-27 PROCEDURE — 99214 OFFICE O/P EST MOD 30 MIN: CPT | Performed by: FAMILY MEDICINE

## 2024-08-27 RX ORDER — CEPHALEXIN 500 MG/1
500 CAPSULE ORAL 3 TIMES DAILY
Qty: 30 CAPSULE | Refills: 0 | Status: SHIPPED | OUTPATIENT
Start: 2024-08-27 | End: 2024-09-06

## 2024-08-27 NOTE — PROGRESS NOTES
"Ambulatory Visit  Name: Digna Juárez      : 1965      MRN: [id number]  Encounter Provider: NATALIE Knox DO  Encounter Date: 2024   Encounter department: [facility] [place]    Assessment & Plan  Cellulitis of axilla, right    Orders:    cephalexin (KEFLEX) 500 mg capsule; Take 1 capsule (500 mg total) by mouth 3 (three) times a day for 10 days    US breast axilla right; Future    Ambulatory referral to General Surgery; Future    Malignant neoplasm of overlapping sites of right breast in female, estrogen receptor positive (HCC)              History of Present Illness     History of Present Illness  Patient presents for focused visit on about a \"sore lymph node under her right armpit\" she states this started yesterday        The patient is a 59-year-old female who presents for evaluation of a sore lymph node under her right armpit.    She reports a sudden onset of discomfort in a lymph node under her right armpit, which began yesterday. This is a new symptom for her, with no previous instances of similar pain. She has no history of lumps in the area or recent trauma. The pain is localized to the surgical site from her previous breast surgery.    Her medical history includes a mastectomy performed by Dr. Jim Lemus on 2021 for ER-positive right breast cancer. She continues to take Arimidex 1 mg as part of her ongoing treatment. She has a scheduled appointment with Dr. Tj Florence, a melanoma specialist, on 2024, but she does not know why.  I asked her whether she ever had melanoma and she does not know.  I think she is confused with the breast cancer and I think Dr. Florence will be following her breast malignancy and ianastrozole therapy.     Review of Systems  Objective     /82 (BP Location: Left arm, Patient Position: Sitting, Cuff Size: Standard)   Pulse 88   Temp 98.9 °F (37.2 °C) (Temporal)   Ht 5' 4\" (1.626 m)   Wt 48.4 kg (106 lb 9.6 oz)   SpO2 98%   BMI " 18.30 kg/m²     Physical Exam    Physical Exam  Vitals and nursing note reviewed.   Constitutional:       General: She is not in acute distress.     Appearance: She is well-developed.   HENT:      Head: Normocephalic and atraumatic.   Eyes:      Conjunctiva/sclera: Conjunctivae normal.   Cardiovascular:      Rate and Rhythm: Normal rate and regular rhythm.      Heart sounds: No murmur heard.  Pulmonary:      Effort: Pulmonary effort is normal. No respiratory distress.      Breath sounds: Normal breath sounds.   Abdominal:      Palpations: Abdomen is soft.      Tenderness: There is no abdominal tenderness.   Musculoskeletal:         General: Swelling and tenderness present.      Cervical back: Neck supple.   Skin:     General: Skin is warm and dry.      Capillary Refill: Capillary refill takes less than 2 seconds.      Findings: Erythema present.      Comments: There is erythema swelling and tender to painful what appears to be lymph node in the cephalad portion of the incision status post right breast mastectomy 2021.  This could be cellulitis or infected lymph node.  I rather doubt malignancy.  Will order ultrasound and refer to general surgery   Neurological:      General: No focal deficit present.      Mental Status: She is alert and oriented to person, place, and time. Mental status is at baseline.   Psychiatric:         Mood and Affect: Mood normal.         Behavior: Behavior normal.         Thought Content: Thought content normal.         Judgment: Judgment normal.       Administrative Statements   I have spent a total time of 25 minutes in caring for this patient on the day of the visit/encounter including Diagnostic results, Prognosis, Risks and benefits of tx options, Instructions for management, Patient and family education, Importance of tx compliance, Risk factor reductions, Impressions, Counseling / Coordination of care, Documenting in the medical record, Reviewing / ordering tests, medicine, procedures   , and Obtaining or reviewing history  .

## 2024-08-28 ENCOUNTER — TELEPHONE (OUTPATIENT)
Age: 59
End: 2024-08-28

## 2024-08-28 NOTE — TELEPHONE ENCOUNTER
Pt called stating she did call a surgeon to scheduled for : Cellulitis of axilla   She said the soonest they could schedule her was 9/11. She did take that appt.  She wanted PCP to know

## 2024-08-28 NOTE — TELEPHONE ENCOUNTER
Patient calling regarding her apt with general surgeon on 9/11/24 at 3 with Dr. Singleton.    She is asking for a LYFT.  Can we please call for patient.    Thank you

## 2024-08-30 ENCOUNTER — HOSPITAL ENCOUNTER (OUTPATIENT)
Dept: ULTRASOUND IMAGING | Facility: CLINIC | Age: 59
End: 2024-08-30
Payer: COMMERCIAL

## 2024-08-30 DIAGNOSIS — L03.111 CELLULITIS OF AXILLA, RIGHT: ICD-10-CM

## 2024-08-30 PROCEDURE — 76642 ULTRASOUND BREAST LIMITED: CPT

## 2024-08-30 NOTE — PROGRESS NOTES
Met with patient and Dr. Crabtree regarding recommendation for;    ___X__ RIGHT ______LEFT      ___X__Ultrasound guided  ______Stereotactic breast biopsy.      __X___Verbalized understanding.    Reviewed clip placement with patient, pt states understanding: Yes: ___X___ No: ______  Comments:    Blood thinners:  No: ___X__ Yes: ______ What:          Biopsy teaching sheet given:  Yes: ___X___ No: ________    Pt given contact information and adv to call with any questions/needs    Patient advised to arrive at 0900 for a 0930 appointment

## 2024-09-05 ENCOUNTER — TELEPHONE (OUTPATIENT)
Age: 59
End: 2024-09-05

## 2024-09-05 NOTE — TELEPHONE ENCOUNTER
Patient calling in and asking for Lyft ride to be set up for appointment on 9/11 with Dr. Singleton at 3pm

## 2024-09-09 ENCOUNTER — TELEPHONE (OUTPATIENT)
Age: 59
End: 2024-09-09

## 2024-09-09 NOTE — TELEPHONE ENCOUNTER
Patient called in regards to her breast biopsy that is scheduled for 9/12. She said she got a letter in the mail from [facility] about her breat imaging from August needed further imaging. However, she is already scheduled for her biopsy.   Digna states she was given a phone number for a breast specialist and I advised to reach out the the breast navigator just to insure they there was no additional imaging they were requiring her to get other than what she is already scheduled for

## 2024-09-10 ENCOUNTER — VBI (OUTPATIENT)
Dept: ADMINISTRATIVE | Facility: OTHER | Age: 59
End: 2024-09-10

## 2024-09-10 NOTE — TELEPHONE ENCOUNTER
09/10/24 6:57 AM     Chart reviewed for CRC: Colonoscopy ; nothing is submitted to the patient's insurance at this time.     Stella Tolentino   [facility]

## 2024-09-11 ENCOUNTER — CONSULT (OUTPATIENT)
Dept: SURGERY | Facility: CLINIC | Age: 59
End: 2024-09-11
Payer: COMMERCIAL

## 2024-09-11 VITALS
SYSTOLIC BLOOD PRESSURE: 120 MMHG | HEIGHT: 64 IN | WEIGHT: 105.8 LBS | HEART RATE: 99 BPM | OXYGEN SATURATION: 99 % | BODY MASS INDEX: 18.06 KG/M2 | TEMPERATURE: 97.5 F | DIASTOLIC BLOOD PRESSURE: 78 MMHG

## 2024-09-11 DIAGNOSIS — R59.0 AXILLARY LYMPHADENOPATHY: ICD-10-CM

## 2024-09-11 PROCEDURE — 99243 OFF/OP CNSLTJ NEW/EST LOW 30: CPT | Performed by: SURGERY

## 2024-09-12 ENCOUNTER — HOSPITAL ENCOUNTER (OUTPATIENT)
Dept: ULTRASOUND IMAGING | Facility: CLINIC | Age: 59
End: 2024-09-12
Payer: COMMERCIAL

## 2024-09-12 VITALS — HEART RATE: 95 BPM | DIASTOLIC BLOOD PRESSURE: 87 MMHG | SYSTOLIC BLOOD PRESSURE: 131 MMHG

## 2024-09-12 DIAGNOSIS — R93.89 ABNORMAL ULTRASOUND: ICD-10-CM

## 2024-09-12 PROBLEM — L03.111 CELLULITIS OF AXILLA, RIGHT: Status: ACTIVE | Noted: 2024-09-12

## 2024-09-12 PROCEDURE — 88342 IMHCHEM/IMCYTCHM 1ST ANTB: CPT | Performed by: PATHOLOGY

## 2024-09-12 PROCEDURE — 38505 NEEDLE BIOPSY LYMPH NODES: CPT

## 2024-09-12 PROCEDURE — 88377 M/PHMTRC ALYS ISHQUANT/SEMIQ: CPT | Performed by: PATHOLOGY

## 2024-09-12 PROCEDURE — 88305 TISSUE EXAM BY PATHOLOGIST: CPT | Performed by: PATHOLOGY

## 2024-09-12 PROCEDURE — 88365 INSITU HYBRIDIZATION (FISH): CPT | Performed by: PATHOLOGY

## 2024-09-12 PROCEDURE — 76942 ECHO GUIDE FOR BIOPSY: CPT

## 2024-09-12 PROCEDURE — 88341 IMHCHEM/IMCYTCHM EA ADD ANTB: CPT | Performed by: PATHOLOGY

## 2024-09-12 PROCEDURE — 88360 TUMOR IMMUNOHISTOCHEM/MANUAL: CPT | Performed by: PATHOLOGY

## 2024-09-12 RX ORDER — LIDOCAINE HYDROCHLORIDE 10 MG/ML
5 INJECTION, SOLUTION EPIDURAL; INFILTRATION; INTRACAUDAL; PERINEURAL ONCE
Status: COMPLETED | OUTPATIENT
Start: 2024-09-12 | End: 2024-09-12

## 2024-09-12 RX ADMIN — LIDOCAINE HYDROCHLORIDE 5 ML: 10 INJECTION, SOLUTION EPIDURAL; INFILTRATION; INTRACAUDAL; PERINEURAL at 10:24

## 2024-09-12 NOTE — PROGRESS NOTES
Ambulatory Visit  Name: Digna Juárez      : 1965      MRN: [id number]  Encounter Provider: Darrel Singleton MD  Encounter Date: 2024   Encounter department: [facility] [place]    Assessment & Plan  Axillary lymphadenopathy    Orders:    Ambulatory referral to General Surgery  Patient is status post right mastectomy for breast cancer.  She has a palpable lymph node in the right axilla.  This lymph node is already set to be biopsied by breast radiology.  She has a longstanding palpable mass in the left breast upper outer quadrant.  This was BI-RADS 2 on her last mammogram.  The mammogram and ultrasound was done in May 2024.Follow-up lymph node biopsy.  I also asked her to call her breast surgeon and make him and his team aware.    History of Present Illness     Digna Juárez is a 59 y.o. female who presents with complaints of some pain in the right axilla.  She says she received antibiotics prescribed by her primary care physician.  The pain has resolved.  She has history of mastectomy and axillary lymph node dissection on the right side.    History obtained from : patient  Review of Systems   Constitutional: Negative.    HENT: Negative.     Eyes: Negative.    Respiratory: Negative.     Cardiovascular: Negative.    Gastrointestinal: Negative.    Endocrine: Negative.    Genitourinary: Negative.    Musculoskeletal: Negative.    Skin: Negative.    Allergic/Immunologic: Negative.    Neurological: Negative.    Hematological: Negative.    Psychiatric/Behavioral: Negative.       Medical History Reviewed by provider this encounter:  Tobacco  Allergies  Meds  Problems  Med Hx  Surg Hx  Fam Hx       Past Medical History   Past Medical History:   Diagnosis Date    Abnormal weight loss     Anorexia nervosa     Breast cancer (HCC)     Right 2021    Disease of thyroid gland     Elevated blood sugar     Occasional     Fibroadenoma of both breasts     GERD (gastroesophageal reflux disease)      Hypertension     Hyperthyroidism     Osteopenia      Past Surgical History:   Procedure Laterality Date    BREAST BIOPSY Right 04/16/2021    BREAST CYST EXCISION Left 2011    CHOLECYSTECTOMY      MASTECTOMY Right 2021    MASTECTOMY W/ SENTINEL NODE BIOPSY Right 06/25/2021    Procedure: BREAST MASTECTOMY WITH BIOPSY LYMPH NODE SENTINEL; ANJALI  GUIDED, LYMPHATIC MAPPING WITH BLUE DYE AND RADIOACTIVE DYE (INJECT AT 1130 BY DR LEMUS IN THE OR);  Surgeon: Jim Lemus MD;  Location: [facility];  Service: Surgical Oncology    US BREAST CLIP NEEDLE LOC RIGHT Right 06/14/2021    US BREAST NEEDLE LOC RIGHT EACH ADDITIONAL Right 06/14/2021    US GUIDANCE BREAST BIOPSY RIGHT EACH ADDITIONAL Right 04/16/2021    US GUIDED BREAST BIOPSY RIGHT COMPLETE Right 04/16/2021    US GUIDED BREAST LYMPH NODE BIOPSY RIGHT Right 9/12/2024     Family History   Adopted: Yes     Current Outpatient Medications on File Prior to Visit   Medication Sig Dispense Refill    anastrozole (ARIMIDEX) 1 mg tablet Take 1 tablet (1 mg total) by mouth daily 90 tablet 3    atenolol (TENORMIN) 50 mg tablet Take 1 tablet (50 mg total) by mouth daily Take 100 mg by mouth every other morning. 90 tablet 1    carbamide peroxide (DEBROX) 6.5 % otic solution Administer 5 drops into both ears 2 (two) times a day 15 mL 2    chlorhexidine (PERIDEX) 0.12 % solution Apply 15 mL to the mouth or throat 2 (two) times a day 120 mL 0    Cholecalciferol ( Ultra Strength) 50 MCG (2000 UT) CAPS TAKE ONE CAPSULE BY MOUTH EVERY DAY 90 capsule 3    famotidine (PEPCID) 40 MG tablet Take 1 tablet (40 mg total) by mouth in the morning 90 tablet 3    levothyroxine 50 mcg tablet Take 1 tablet (50 mcg total) by mouth daily in the early morning 1/2 hr AC b'fst 90 tablet 3    lisinopril (ZESTRIL) 10 mg tablet Take 1 tablet (10 mg total) by mouth daily 90 tablet 3    Multiple Vitamin (Daily-Alanis) TABS TAKE ONE TABLET BY MOUTH EVERY DAY 90 tablet 3    Multiple Vitamins-Minerals  (Womens 50+ Multi Vitamin/Min) TABS Take 1 tablet by mouth daily 100 tablet 3    OLANZapine (ZyPREXA) 7.5 mg tablet Take 1 tablet (7.5 mg total) by mouth daily at bedtime 90 tablet 2    neomycin-polymyxin-hydrocortisone (CORTISPORIN) 0.35%-10,000 units/mL-1% otic suspension Administer 4 drops to the right ear 3 (three) times a day (Patient not taking: Reported on 9/11/2024) 10 mL 0     No current facility-administered medications on file prior to visit.   No Known Allergies   Current Outpatient Medications on File Prior to Visit   Medication Sig Dispense Refill    anastrozole (ARIMIDEX) 1 mg tablet Take 1 tablet (1 mg total) by mouth daily 90 tablet 3    atenolol (TENORMIN) 50 mg tablet Take 1 tablet (50 mg total) by mouth daily Take 100 mg by mouth every other morning. 90 tablet 1    carbamide peroxide (DEBROX) 6.5 % otic solution Administer 5 drops into both ears 2 (two) times a day 15 mL 2    chlorhexidine (PERIDEX) 0.12 % solution Apply 15 mL to the mouth or throat 2 (two) times a day 120 mL 0    Cholecalciferol ( Ultra Strength) 50 MCG (2000 UT) CAPS TAKE ONE CAPSULE BY MOUTH EVERY DAY 90 capsule 3    famotidine (PEPCID) 40 MG tablet Take 1 tablet (40 mg total) by mouth in the morning 90 tablet 3    levothyroxine 50 mcg tablet Take 1 tablet (50 mcg total) by mouth daily in the early morning 1/2 hr AC b'fst 90 tablet 3    lisinopril (ZESTRIL) 10 mg tablet Take 1 tablet (10 mg total) by mouth daily 90 tablet 3    Multiple Vitamin (Daily-Alanis) TABS TAKE ONE TABLET BY MOUTH EVERY DAY 90 tablet 3    Multiple Vitamins-Minerals (Womens 50+ Multi Vitamin/Min) TABS Take 1 tablet by mouth daily 100 tablet 3    OLANZapine (ZyPREXA) 7.5 mg tablet Take 1 tablet (7.5 mg total) by mouth daily at bedtime 90 tablet 2    neomycin-polymyxin-hydrocortisone (CORTISPORIN) 0.35%-10,000 units/mL-1% otic suspension Administer 4 drops to the right ear 3 (three) times a day (Patient not taking: Reported on 9/11/2024) 10 mL 0     No  "current facility-administered medications on file prior to visit.      Social History     Tobacco Use    Smoking status: Never    Smokeless tobacco: Never   Vaping Use    Vaping status: Never Used   Substance and Sexual Activity    Alcohol use: Yes     Alcohol/week: 2.0 standard drinks of alcohol     Types: 2 Cans of beer per week    Drug use: Never     Comment: Illicit drugs:   none - As per [facility]     Sexual activity: Not Currently     Comment: Sexually active:   No - As per [facility]          Objective     /78 (BP Location: Left arm, Patient Position: Sitting, Cuff Size: Standard)   Pulse 99   Temp 97.5 °F (36.4 °C) (Tympanic)   Ht 5' 4\" (1.626 m)   Wt 48 kg (105 lb 12.8 oz)   SpO2 99%   BMI 18.16 kg/m²     Physical Exam  Vitals reviewed.   Constitutional:       Appearance: Normal appearance.   HENT:      Head: Normocephalic.      Mouth/Throat:      Mouth: Mucous membranes are moist.   Eyes:      Pupils: Pupils are equal, round, and reactive to light.   Cardiovascular:      Rate and Rhythm: Normal rate.   Pulmonary:      Effort: Pulmonary effort is normal.   Chest:      Comments: Right mastectomy status.  Well-healed scar.  No palpable chest wall masses.  Palpable mass in the l outer upper quadrant of left breast.    Right axillary single lymph node palpable which is nontender and mobile.  Musculoskeletal:      Cervical back: Normal range of motion.   Neurological:      Mental Status: She is alert.       Administrative Statements   I have spent a total time of 20 minutes in caring for this patient on the day of the visit/encounter including Diagnostic results, Prognosis, Risks and benefits of tx options, Instructions for management, Patient and family education, Importance of tx compliance, Risk factor reductions, Impressions, Counseling / Coordination of care, Documenting in the medical record, Reviewing / ordering tests, medicine, procedures  , Obtaining or reviewing history  , and Communicating with " other healthcare professionals .

## 2024-09-12 NOTE — PROGRESS NOTES
Procedure type:    ___x__ultrasound guided _____stereotactic    Breast:    _____Left ___x__Right    Location: axilla    Needle: 16 gauge baljit     # of passes: 2    Clip: Open coil    Performed by: Dr. Edwin Crabtree     Pressure held for 5 minutes by: Megan Burnham Strips:    __x___yes _____no    Band aid:    ___x__yes_____no    Tape and guaze:    _____yes __x___no    Tolerated procedure:    ___x__yes _____no

## 2024-09-12 NOTE — ASSESSMENT & PLAN NOTE
Orders:    Ambulatory referral to General Surgery  Patient is status post right mastectomy for breast cancer.  She has a palpable lymph node in the right axilla.  This lymph node is already set to be biopsied by breast radiology.  She has a longstanding palpable mass in the left breast upper outer quadrant.  This was BI-RADS 2 on her last mammogram.  The mammogram and ultrasound was done in May 2024.Follow-up lymph node biopsy.  I also asked her to call her breast surgeon and make him and his team aware.

## 2024-09-12 NOTE — PROGRESS NOTES
Ice pack given:    __x___yes _____no    Discharge instructions reviewed & given to patient:    __x___yes _____no    Discharged via:    __x___ambulatory    _____wheelchair    _____stretcher    Stable on discharge:    ___x__yes ____no  Patient would like results over the phone.  Ok to leave a message.

## 2024-09-12 NOTE — TELEPHONE ENCOUNTER
Patient called in stating that her lyft never showed up for her appointment to the breast center and stated Cory had set-up the Lyft. Spoke with Johnna who stated she will speak with Cory. I also called the Breast center and spoke with Leticia who stated she will speak with Jesenia and they will reach out to the patient to see if they can get a new lyft set-up or to reschedule the appt.

## 2024-09-12 NOTE — PROGRESS NOTES
Patient arrived via:    ___x__ambulatory    _____wheelchair    _____stretcher      Domestic violence screen    ___x___negative______positive    Breast Implants:    _______yes ____x____no

## 2024-09-12 NOTE — TELEPHONE ENCOUNTER
I spoke to pt regarding lyft , I also spoke to samina at lyft pt is going to reach out to jackie again and reschedule and have jackie schedule lyft for her again

## 2024-09-13 NOTE — PROGRESS NOTES
Post procedure call completed    Bleeding: _____yes __X___no (pt denies)    Pain: _____yes ___X___no (pt denies, did not use ice, no need for OTC pain meds)    Redness/Swelling: ______yes ___X___no (pt denies)    Band aid removed: _____yes ___X__no (discussed removing when she showers)    Steri-Strips intact: ___X___yes _____no (discussed with patient to remove steri strips on Tuesday if they have not come off on their own)    Pt with no questions at this time, adv will call when results available, adv to call with any questions or concerns, has name/# for contact

## 2024-09-16 PROCEDURE — 88305 TISSUE EXAM BY PATHOLOGIST: CPT | Performed by: PATHOLOGY

## 2024-09-16 PROCEDURE — 88341 IMHCHEM/IMCYTCHM EA ADD ANTB: CPT | Performed by: PATHOLOGY

## 2024-09-16 PROCEDURE — 88365 INSITU HYBRIDIZATION (FISH): CPT | Performed by: PATHOLOGY

## 2024-09-16 PROCEDURE — 88342 IMHCHEM/IMCYTCHM 1ST ANTB: CPT | Performed by: PATHOLOGY

## 2024-09-16 PROCEDURE — 88360 TUMOR IMMUNOHISTOCHEM/MANUAL: CPT | Performed by: PATHOLOGY

## 2024-09-17 ENCOUNTER — TELEPHONE (OUTPATIENT)
Dept: HEMATOLOGY ONCOLOGY | Facility: CLINIC | Age: 59
End: 2024-09-17

## 2024-09-17 ENCOUNTER — APPOINTMENT (OUTPATIENT)
Dept: LAB | Facility: HOSPITAL | Age: 59
End: 2024-09-17
Payer: COMMERCIAL

## 2024-09-17 ENCOUNTER — TELEPHONE (OUTPATIENT)
Dept: SURGICAL ONCOLOGY | Facility: CLINIC | Age: 59
End: 2024-09-17

## 2024-09-17 ENCOUNTER — HOSPITAL ENCOUNTER (OUTPATIENT)
Dept: CT IMAGING | Facility: HOSPITAL | Age: 59
Discharge: HOME | End: 2024-09-17
Payer: COMMERCIAL

## 2024-09-17 DIAGNOSIS — C50.811 MALIGNANT NEOPLASM OF OVERLAPPING SITES OF RIGHT BREAST IN FEMALE, ESTROGEN RECEPTOR POSITIVE (HCC): ICD-10-CM

## 2024-09-17 DIAGNOSIS — C50.911 LOCAL RECURRENCE OF CANCER OF RIGHT BREAST (HCC): Primary | ICD-10-CM

## 2024-09-17 DIAGNOSIS — C50.911 RECURRENT BREAST CANCER, RIGHT (HCC): ICD-10-CM

## 2024-09-17 DIAGNOSIS — Z17.0 MALIGNANT NEOPLASM OF OVERLAPPING SITES OF RIGHT BREAST IN FEMALE, ESTROGEN RECEPTOR POSITIVE (HCC): ICD-10-CM

## 2024-09-17 LAB
BUN SERPL-MCNC: 22 MG/DL (ref 5–25)
CREAT SERPL-MCNC: 0.93 MG/DL (ref 0.6–1.3)
GFR SERPL CREATININE-BSD FRML MDRD: 67 ML/MIN/1.73SQ M

## 2024-09-17 PROCEDURE — 36415 COLL VENOUS BLD VENIPUNCTURE: CPT

## 2024-09-17 PROCEDURE — 84520 ASSAY OF UREA NITROGEN: CPT

## 2024-09-17 PROCEDURE — 71260 CT THORAX DX C+: CPT

## 2024-09-17 PROCEDURE — 74177 CT ABD & PELVIS W/CONTRAST: CPT

## 2024-09-17 PROCEDURE — 82565 ASSAY OF CREATININE: CPT

## 2024-09-17 RX ADMIN — IOHEXOL 50 ML: 350 INJECTION, SOLUTION INTRAVENOUS at 16:34

## 2024-09-17 NOTE — TELEPHONE ENCOUNTER
Called patient to review recent biopsy findings showing recurrent right breast cancer in axillary node. I am getting her scheduled for stat CT c/a/p and bone scan. We will get her scheduled with a breast surgeon. Patient was appreciative of the call.

## 2024-09-18 DIAGNOSIS — E04.2 MULTIPLE THYROID NODULES: ICD-10-CM

## 2024-09-18 DIAGNOSIS — R91.8 LUNG NODULES: Primary | ICD-10-CM

## 2024-09-18 DIAGNOSIS — R93.2 ABNORMAL FINDING ON IMAGING OF LIVER: ICD-10-CM

## 2024-09-19 DIAGNOSIS — C50.919 METASTASIS FROM BREAST CANCER (HCC): ICD-10-CM

## 2024-09-19 DIAGNOSIS — E04.2 MULTIPLE THYROID NODULES: Primary | ICD-10-CM

## 2024-09-19 DIAGNOSIS — R93.2 ABNORMAL FINDING ON IMAGING OF LIVER: Primary | ICD-10-CM

## 2024-09-19 DIAGNOSIS — C79.9 METASTASIS FROM BREAST CANCER (HCC): ICD-10-CM

## 2024-09-20 ENCOUNTER — TELEPHONE (OUTPATIENT)
Dept: SURGICAL ONCOLOGY | Facility: CLINIC | Age: 59
End: 2024-09-20

## 2024-09-20 NOTE — TELEPHONE ENCOUNTER
Called the patient to coordinate a consult for her with Dr. Cardarelli given her newly recurrent breast cancer diagnosis. The patient accepted the appointment that was offered on 10/03 at 11:00 and she verified appointment details. She requested that [facility] or [facility] be arranged for this visit. Informed the patient that if there were any issues with her insurance authorization, she would be notified prior to the appointment. The patient was appreciative of the call and she denied any questions at this time.

## 2024-09-20 NOTE — TELEPHONE ENCOUNTER
Called and spoke to patient. Patient requested having [facility] transport. It is set up for appointment 10/3/24 with Dr. Cardarelli.

## 2024-09-23 ENCOUNTER — TELEPHONE (OUTPATIENT)
Dept: SURGICAL ONCOLOGY | Facility: CLINIC | Age: 59
End: 2024-09-23

## 2024-09-23 ENCOUNTER — TELEPHONE (OUTPATIENT)
Age: 59
End: 2024-09-23

## 2024-09-23 ENCOUNTER — HOSPITAL ENCOUNTER (OUTPATIENT)
Dept: ULTRASOUND IMAGING | Facility: HOSPITAL | Age: 59
Discharge: HOME | End: 2024-09-23
Payer: COMMERCIAL

## 2024-09-23 ENCOUNTER — HOSPITAL ENCOUNTER (OUTPATIENT)
Dept: MRI IMAGING | Facility: HOSPITAL | Age: 59
Discharge: HOME | End: 2024-09-23
Payer: COMMERCIAL

## 2024-09-23 DIAGNOSIS — R93.2 ABNORMAL FINDING ON IMAGING OF LIVER: ICD-10-CM

## 2024-09-23 DIAGNOSIS — E04.2 MULTIPLE THYROID NODULES: ICD-10-CM

## 2024-09-23 DIAGNOSIS — C79.9 METASTASIS FROM BREAST CANCER (HCC): ICD-10-CM

## 2024-09-23 DIAGNOSIS — C50.919 METASTASIS FROM BREAST CANCER (HCC): ICD-10-CM

## 2024-09-23 PROCEDURE — A9585 GADOBUTROL INJECTION: HCPCS

## 2024-09-23 PROCEDURE — 76536 US EXAM OF HEAD AND NECK: CPT

## 2024-09-23 PROCEDURE — 74183 MRI ABD W/O CNTR FLWD CNTR: CPT

## 2024-09-23 RX ORDER — GADOBUTROL 604.72 MG/ML
4 INJECTION INTRAVENOUS
Status: COMPLETED | OUTPATIENT
Start: 2024-09-23 | End: 2024-09-23

## 2024-09-23 RX ADMIN — GADOBUTROL 4 ML: 604.72 INJECTION INTRAVENOUS at 12:08

## 2024-09-23 NOTE — TELEPHONE ENCOUNTER
Angelina from Radiology called with immediate findings on US thyroid. Results are updated on patients chart. Called CTS all assisting other patients, called Surg onc clinical no answer.       Thank you!

## 2024-09-23 NOTE — TELEPHONE ENCOUNTER
Haylee from reading room called to report immediate findings for pt on MRI that was completed today

## 2024-09-24 ENCOUNTER — HOSPITAL ENCOUNTER (OUTPATIENT)
Dept: RADIOLOGY | Facility: HOSPITAL | Age: 59
Discharge: HOME | End: 2024-09-24
Payer: COMMERCIAL

## 2024-09-24 DIAGNOSIS — C50.911 RECURRENT BREAST CANCER, RIGHT (HCC): ICD-10-CM

## 2024-09-24 DIAGNOSIS — E03.9 HYPOTHYROIDISM, UNSPECIFIED TYPE: ICD-10-CM

## 2024-09-24 PROCEDURE — A9503 TC99M MEDRONATE: HCPCS

## 2024-09-24 PROCEDURE — 78306 BONE IMAGING WHOLE BODY: CPT

## 2024-09-25 RX ORDER — LEVOTHYROXINE SODIUM 50 UG/1
TABLET ORAL
Qty: 90 TABLET | Refills: 1 | Status: SHIPPED | OUTPATIENT
Start: 2024-09-25

## 2024-09-26 ENCOUNTER — OFFICE VISIT (OUTPATIENT)
Dept: HEMATOLOGY ONCOLOGY | Facility: MEDICAL CENTER | Age: 59
End: 2024-09-26
Payer: COMMERCIAL

## 2024-09-26 ENCOUNTER — PATIENT OUTREACH (OUTPATIENT)
Dept: HEMATOLOGY ONCOLOGY | Facility: CLINIC | Age: 59
End: 2024-09-26

## 2024-09-26 VITALS
OXYGEN SATURATION: 99 % | DIASTOLIC BLOOD PRESSURE: 84 MMHG | BODY MASS INDEX: 18.1 KG/M2 | TEMPERATURE: 98.1 F | WEIGHT: 106 LBS | RESPIRATION RATE: 17 BRPM | HEIGHT: 64 IN | SYSTOLIC BLOOD PRESSURE: 136 MMHG | HEART RATE: 71 BPM

## 2024-09-26 DIAGNOSIS — E04.1 RIGHT THYROID NODULE: Primary | ICD-10-CM

## 2024-09-26 DIAGNOSIS — Z17.0 MALIGNANT NEOPLASM OF OVERLAPPING SITES OF RIGHT BREAST IN FEMALE, ESTROGEN RECEPTOR POSITIVE (HCC): Primary | ICD-10-CM

## 2024-09-26 DIAGNOSIS — C50.811 MALIGNANT NEOPLASM OF OVERLAPPING SITES OF RIGHT BREAST IN FEMALE, ESTROGEN RECEPTOR POSITIVE (HCC): Primary | ICD-10-CM

## 2024-09-26 PROCEDURE — 99213 OFFICE O/P EST LOW 20 MIN: CPT | Performed by: INTERNAL MEDICINE

## 2024-09-26 NOTE — PROGRESS NOTES
"Breast Oncology Nurse Navigator    Called patient for initial outreach from nurse navigator.  Introduced myself and my role as well as members of the navigation team.  Oncology Nurse Navigator will follow patient until they are seen by surgical oncology, after which the patient navigator initiate outreach and follow the patient to survivorship.      Referral received from: found on physician's schedule  Breast cancer diagnosis was made initially in April 2021.  Patient had a right mastectomy performed by Dr Lemus.  She declined genetic testing at that time.  She was prescribed anastrozole and continues to take it at this time.  Patient states she started with painful right axillary swelling in August.  Took antibiotics with good relief.  States the pain went away but further diagnostics were performed and biopsy of right axillary lymph node came back positive for breast cancer.    Patient states basic understanding/awareness of diagnosis.  Knows she has breast cancer.  Knows she will be meeting with Dr Penn this afternoon.  Also knows of her appointment with Dr Cardarelli next week.  Patient uses [facility] and [facility] transport for ride assistance.  Already set up..  Patient attends her visits by herself.    Patient lives alone.    Support system includes: Sister, as POA only.  Patient states sister assists her in paying bills.  Nephew Bennie and \"neighbor lady friends\"  Referral placed to oncology social worker: no    Cancer family history includes:none known  Referral to oncology genetics: no, completed in the past with negative results  Does patient have a prior cancer diagnosis?  H/o breast cancer  Any previous radiation treatment to the chest?  no  Is patient pre/post menopausal?  Post    Discussed the Cancer Support Community and some of the programs and services offered.  Discussed Breast Cancer Support group that meets monthly at [facility].  Patient states she is not interested at this  time.    Patient has my " contact information and knows she can reach out with questions.  Office hours provided.  Patient provided with the phone number for [phone].  Patient wrote down these numbers.   Provided examples on when to call the [facility].  General assessment completed.  Patient does not have MyChart set up.  Does not want to set up at this time.

## 2024-09-26 NOTE — PROGRESS NOTES
Hematology / Oncology Outpatient Follow Up Note    Digna Juárez 59 y.o. female :1965 MRN:[id number]         Date:  2024    Assessment / Plan:    A 58-year-old postmenopausal woman with stage II A right breast cancer, multifocal disease with combination of invasive ductal as well as invasive lobular carcinoma, grade 2. This was ER 90% positive, SD 45% positive, HER2 negative disease.  Her tumor was low risk, based on MammaPrint.  She underwent mastectomy and sentinel lymph node biopsy, resulting in TAMELA.  She had 1 positive lymph node with micrometastasis measuring 0.4 mm.  She is currently on adjuvant hormonal therapy with anastrozole with excellent tolerance.  She has no evidence of recurrent disease, based on her symptoms and physical examinations.  I recommended her to continue anastrozole 1 mg once a day.  She is in agreement with my recommendations.  I will see her again in a year for routine follow-up.            Subjective:      HPI:   A 56-year-old female who had regular menstrual cycle until 2-3 months ago.  She was found to have abnormality in her right breast based on a screening mammography.  She had right breast biopsy for the multiple lesions in 2021. 1 biopsy showed invasive ductal carcinoma, grade 2, % positive, % positive, HER2 negative disease.  The other biopsy from the same breast showed invasive lobular carcinoma, grade 2, ER 90% positive, SD 45% positive, HER2 negative disease.  She underwent genetic testing which showed negative for BRCA gene mutation.  Because of the multiple lesions in the right breast, she underwent mastectomy and sentinel lymph node biopsy by Dr. Lemus in 2021. She had multifocal disease measuring 20 mm of invasive ductal carcinoma, grade 2 as well as 7 mm of invasive lobular carcinoma.  Lymphovascular invasion was present.  1 sentinel lymph node was positive for micrometastasis measuring 0.4 mm.  She did not have  reconstruction.  She presents today with her sister to discuss the adjuvant treatment options.  Her tumor was found to be low risk, based on MammaPrint.  She has history of left breast biopsy which was benign approximately 2 years ago.  Her recent MRI of the bilateral breast was not concerning in her left breast, BI-RADS 2.  She feels well.  She has no complaint of pain.  She has no respiratory symptoms.  She has hypertension as well as hypothyroidism, for which she takes respective medications.  She is adopted.  Therefore, she does not know biological family history.  She is a lifetime never smoker.  Her performance status is normal.           Interval History:  A 58-year-old postmenopausal woman with stage II A right breast cancer, multifocal disease with combination of invasive ductal as well as invasive lobular carcinoma, grade 2. This was ER 90% positive, NV 45% positive, HER2 negative disease.  Her tumor was low risk, based on MammaPrint.  She underwent mastectomy and sentinel lymph node biopsy, resulting in TAMELA.  She had 1 positive lymph node with micrometastasis measuring 0.4 mm.  Since August 2021, she has been on adjuvant hormonal therapy with anastrozole.  She presents today for routine follow-up.  Based on her DEXA scan in May 2022, she has osteoporosis.  She is on Prolia injection at the primary care physician's office.  She is tolerating anastrozole well.  She has no complaint of hot flashes or musculoskeletal symptoms.  Denies any pain.  Her weight is stable.  She has no respiratory symptoms.  Her performance status is normal.          Objective:      Primary Diagnosis:     1.  Right breast cancer, stage II A ( pT1c, pN1 (mi ), M0) grade 2, ER 90% positive, NV 45% positive, HER2 negative disease.  MammaPrint low risk.  Diagnosed in June 2021.     2.  BRCA gene mutation negative.     Cancer Staging:  Cancer Staging  Malignant neoplasm of overlapping sites of right breast in female, estrogen receptor  positive (HCC)  Staging form: Breast, AJCC 8th Edition  - Pathologic: Stage IA (pT1c, pN1mi(sn), cM0, G2, ER+, OH+, HER2-) - Unsigned  Method of lymph node assessment: [place] lymph node biopsy  Histologic grading system: 3 grade system           Previous Hematologic/ Oncologic Treatment:            Current Hematologic/ Oncologic Treatment:         Adjuvant hormonal therapy with anastrozole since August 2021.     Disease Status:        TAMELA status post mastectomy and sentinel lymph node biopsy.     Test Results:     Pathology:      multifocal invasive mammary carcinoma, grade 2. 20 mm of invasive ductal carcinoma as well as 7 mm of invasive lobular carcinoma.  Lymphovascular invasion was present.  1 sentinel lymph node had micrometastasis measuring 0.4 mm.  1 lesion was % positive, % positive, HER2 negative disease.  The other lesion was ER 90% positive, OH 45% positive, HER2 negative disease.  MammaPrint low risk.  Stage II A ( pT1c, pN1 (mi ), M0)     Radiology:     Mammography in May 2023 was benign.  BI-RADS 2.    DEXA scan in June 2022 showed T score -3.6, consistent with osteoporosis.     Laboratory:       See below.     Physical Exam:        General Appearance:    Alert, oriented          Eyes:    PERRL   Ears:    Normal external ear canals, both ears   Nose:   Nares normal, septum midline   Throat:   Mucosa moist. Pharynx without injection.    Neck:   Supple         Lungs:     Clear to auscultation bilaterally   Chest Wall:    No tenderness or deformity    Heart:    Regular rate and rhythm         Abdomen:     Soft, non-tender, bowel sounds +, no organomegaly               Extremities:   Extremities no cyanosis or edema         Skin:   no rash or icterus.    Lymph nodes:   Cervical, supraclavicular, and axillary nodes normal   Neurologic:   CNII-XII intact, normal strength, sensation and reflexes     Throughout             Breast exam:     Status post right mastectomy without reconstruction.  No  palpable abnormality in her right chest wall.  Left breast was quite lumpy.           ROS: Review of Systems   All other systems reviewed and are negative.          Imaging: No results found.      Labs:   Lab Results   Component Value Date    WBC 4.77 01/05/2024    HGB 14.2 01/05/2024    HCT 44.5 01/05/2024    MCV 97 01/05/2024     01/05/2024     Lab Results   Component Value Date    K 3.8 01/05/2024     01/05/2024    CO2 25 01/05/2024    BUN 22 09/17/2024    CREATININE 0.93 09/17/2024    GLUF 112 (H) 01/05/2024    CALCIUM 9.7 01/05/2024    AST 23 01/05/2024    ALT 19 01/05/2024    ALKPHOS 73 01/05/2024    EGFR 67 09/17/2024         Current Medications: Reviewed  Allergies: Reviewed  PMH/FH/SH:  Reviewed      Vital Sign:    Body surface area is 1.49 meters squared.    Wt Readings from Last 3 Encounters:   09/26/24 48.1 kg (106 lb)   09/11/24 48 kg (105 lb 12.8 oz)   08/27/24 48.4 kg (106 lb 9.6 oz)        Temp Readings from Last 3 Encounters:   09/26/24 98.1 °F (36.7 °C) (Temporal)   09/11/24 97.5 °F (36.4 °C) (Tympanic)   08/27/24 98.9 °F (37.2 °C) (Temporal)        BP Readings from Last 3 Encounters:   09/26/24 136/84   09/12/24 131/87   09/11/24 120/78         Pulse Readings from Last 3 Encounters:   09/26/24 71   09/12/24 95   09/11/24 99       7/4/2022 - DEXA osteoporosis    She had an excision of a palpable lymph node in the right axilla which is coming back as breast cancer recurrence but the pathology also describes follicular changes.  She is following up with surgical oncology and will have her come back a few days after that appointment.    A. Right axillary lymph node (core needle biopsy): - Invasive mammary carcinoma of no special type (ductal, 1.2mm) involving fibroadipose tissue - Brooks score: 4 (of 9), grade 1 - Tubule formation: score 1 (of 3) - Nuclear pleomorphism: score 2 (of 3) - Mitoses: Score 1 (of 3 - In situ follicular neoplasia (ISFN); see comment Comment: - The findings  are of in situ follicular neoplasia (ISFN) without overt involvement by follicular lymphoma on these small biopsies. Involvement by follicular lymphoma elsewhere in the lymph node or in other lymph nodes cannot be ruled out on this sample. Asubsetof ISFN will never progress to follicular lymphoma; however, a subset either already have, or will develop follicular lymphomaor otherB-cell lymphomas. Correlation with systemic imaging to rule out lymphadenopathy elsewhere is recommended, as clinically indicated. - The minute focus of invasive carcinoma is present outside of the lymph node, within the fibroadipose tissue. This focus mayrepresentmetastasis versus primary carcinoma. The focus of carcinoma is highlighted by CK AE1/3, GATA3 with absent myoepithelial cellsonSMHC and p63 stains. The lesional tissues are negative for SOX10 and HMB45. Suggest clinical correlation and appropriatefollow-up. [id number] Digna Juárez [id number] Page: 2 of 4 Printed: 9/25/2024 8:00 AM - Bcl6 and CD10 stain germinal centers which also show aberrant Bcl2 expression consistent with in situ follicular neoplasia. CD3/5stain T-cells while CD20 stains B-cells. Bcl1 is negative. Ki67 is approximately 2% within lymphoid tissue. Intradepartmental consultation concurs with the diagnosis of carcinoma and in situ follicular neoplasia      Impression    Early breast cancer, both invasive lobular and ductal, and anastrozole adjuvant therapy, and doing well.  She says that she never received Prolia as documented in the 6/1/22 note. We will repeat DEXA, return in 1 month to recommedn treatment.  The plan would be to continue Arimidex and can make a decision of 5 versus 10 years of adjuvant hormonal therapy in 2 years.    The axillary recurrence is of concern.  We will see her next week after her surgical oncology appointment.

## 2024-09-26 NOTE — PROGRESS NOTES
"Breast Oncology Nurse Navigator    Patient called in and left the following voicemail:    \"Hi, Carmen, this is Digna. Could you please give me a call [phone]? I would prefer if my executor doesn't know about it because I can handle my in my medical myself. I've been handling it with my myself without her knowing I can do this on my own. More so I would say please do not call my sister. I'll talk to you later. Thank you.\"    Returned patient's phone call at this time.  Made her aware that per the communication form she filled out 1/22/24, the only person we can provide with medical information is her nephew Bennie.  Patient agrees and said it is ok to give Bennie information.  She does not want her sister knowing about her recent breast cancer recurrence.  Advised that I would follow that document and not call her sister with any medical information.  She states her sister is her POA and \"handles my bills only.\"  Support offered.    Patient knows to reach out with further questions or concerns.  "

## 2024-09-30 ENCOUNTER — TELEPHONE (OUTPATIENT)
Dept: HEMATOLOGY ONCOLOGY | Facility: MEDICAL CENTER | Age: 59
End: 2024-09-30

## 2024-09-30 PROBLEM — C50.911 CARCINOMA OF RIGHT BREAST METASTATIC TO AXILLARY LYMPH NODE (HCC): Status: ACTIVE | Noted: 2024-09-30

## 2024-09-30 PROBLEM — L03.111 CELLULITIS OF AXILLA, RIGHT: Status: RESOLVED | Noted: 2024-09-12 | Resolved: 2024-09-30

## 2024-09-30 PROBLEM — Z12.31 ENCOUNTER FOR SCREENING MAMMOGRAM FOR BREAST CANCER: Status: RESOLVED | Noted: 2020-11-06 | Resolved: 2024-09-30

## 2024-09-30 PROBLEM — Z71.2 ENCOUNTER TO DISCUSS TEST RESULTS: Status: RESOLVED | Noted: 2024-03-04 | Resolved: 2024-09-30

## 2024-09-30 PROBLEM — C77.3 CARCINOMA OF RIGHT BREAST METASTATIC TO AXILLARY LYMPH NODE (HCC): Status: ACTIVE | Noted: 2024-09-30

## 2024-09-30 PROBLEM — Z09 FOLLOW-UP EXAM: Status: RESOLVED | Noted: 2022-07-14 | Resolved: 2024-09-30

## 2024-09-30 PROBLEM — C50.911 MALIGNANT NEOPLASM OF RIGHT FEMALE BREAST (HCC): Status: RESOLVED | Noted: 2024-03-04 | Resolved: 2024-09-30

## 2024-09-30 NOTE — TELEPHONE ENCOUNTER
Patient seen by Dr Fili MORALES would like to see patient back after surgical consult  Danat made and lyft arranged

## 2024-10-01 ENCOUNTER — TELEPHONE (OUTPATIENT)
Dept: SURGICAL ONCOLOGY | Facility: CLINIC | Age: 59
End: 2024-10-01

## 2024-10-01 ENCOUNTER — TELEPHONE (OUTPATIENT)
Age: 59
End: 2024-10-01

## 2024-10-01 NOTE — TELEPHONE ENCOUNTER
Called and spoke to patient, confirmed [facility] transport 10/10/24 with Dr. Doran at [facility]. [facility] is scheduled for patient.

## 2024-10-01 NOTE — TELEPHONE ENCOUNTER
Sent a request to [facility] after rescheduling [facility] for patient, Krystal called patient.Cancel letter regarding appt she did not need earlier appt. And she needed star set up for her appt.

## 2024-10-01 NOTE — TELEPHONE ENCOUNTER
Called the patient in regards to her upcoming consult with Dr. Cardarelli. Explained that we finally got an updated list of approved insurances for Dr. Cardarelli and, unfortunately, she did not accept the patient's insurance yet. The patient verbalized understanding of this and accepted a new appointment that was offered with Dr. Doran on 10/10 at 10:30. The patient was very appreciative of the phone call and denied any questions at this time. She verified her new appointment details.

## 2024-10-01 NOTE — TELEPHONE ENCOUNTER
Patient calling in to inform that Surg Onc has moved her appt from 10/7 to 10/10, she stated she would like a call back to see if she could move the appt with Dr Penn to the same day to minimize travel. Please call the patient back to let her know her options. [facility] is required for her appts.    [facility] # [phone]

## 2024-10-07 ENCOUNTER — TELEPHONE (OUTPATIENT)
Age: 59
End: 2024-10-07

## 2024-10-07 NOTE — TELEPHONE ENCOUNTER
Patient called in wanting to know when her lyft was scheduled to pick her up for tomorrow's appt. Spoke with Yessy who stated she will have lyft scheduled and for patient to be ready by 4:00. Patient was made aware. NFA needed at this time.

## 2024-10-08 ENCOUNTER — OFFICE VISIT (OUTPATIENT)
Dept: FAMILY MEDICINE CLINIC | Facility: CLINIC | Age: 59
End: 2024-10-08
Payer: COMMERCIAL

## 2024-10-08 VITALS
BODY MASS INDEX: 17.86 KG/M2 | HEART RATE: 72 BPM | DIASTOLIC BLOOD PRESSURE: 76 MMHG | HEIGHT: 64 IN | SYSTOLIC BLOOD PRESSURE: 124 MMHG | WEIGHT: 104.6 LBS | TEMPERATURE: 98.2 F | OXYGEN SATURATION: 96 %

## 2024-10-08 DIAGNOSIS — Z71.2 ENCOUNTER TO DISCUSS TEST RESULTS: ICD-10-CM

## 2024-10-08 DIAGNOSIS — Z79.899 MEDICATION MANAGEMENT: ICD-10-CM

## 2024-10-08 DIAGNOSIS — I10 PRIMARY HYPERTENSION: ICD-10-CM

## 2024-10-08 DIAGNOSIS — C50.911 CARCINOMA OF RIGHT BREAST METASTATIC TO AXILLARY LYMPH NODE (HCC): Primary | ICD-10-CM

## 2024-10-08 DIAGNOSIS — C77.3 CARCINOMA OF RIGHT BREAST METASTATIC TO AXILLARY LYMPH NODE (HCC): Primary | ICD-10-CM

## 2024-10-08 DIAGNOSIS — E04.1 THYROID NODULE GREATER THAN OR EQUAL TO 1 CM IN DIAMETER INCIDENTALLY NOTED ON IMAGING STUDY: ICD-10-CM

## 2024-10-08 DIAGNOSIS — Z23 NEED FOR VACCINATION: ICD-10-CM

## 2024-10-08 DIAGNOSIS — E03.8 OTHER SPECIFIED HYPOTHYROIDISM: ICD-10-CM

## 2024-10-08 PROCEDURE — 99214 OFFICE O/P EST MOD 30 MIN: CPT | Performed by: FAMILY MEDICINE

## 2024-10-08 NOTE — ASSESSMENT & PLAN NOTE
Note the NM bone scan of late Sept:  FINDINGS:     There is no scintigraphic evidence of osseous metastasis.     Mild asymmetric activity in the left mandible and left maxilla may be related to dental disease.     Focal activity in the left knee and right foot first MTP joint region. Additional foci of activity in the left foot along the dorsal forefoot and multiple MTP joints. These are likely degenerative.     Symmetric renal activity. Asymmetric curvilinear activity in the groin region (right greater than left), likely related to urine contamination.     IMPRESSION:     1.  No scintigraphic evidence of osseous metastasis. Incidental findings as above.

## 2024-10-08 NOTE — PROGRESS NOTES
Ambulatory Visit  Name: Digna Juárez      : 1965      MRN: [id number]  Encounter Provider: NATALIE Knox DO  Encounter Date: 10/8/2024   Encounter department: [facility] [place] [facility]    Assessment & Plan  Carcinoma of right breast metastatic to axillary lymph node (HCC)    Pt to see Dr. MALI Doran, breast surgeon on Oct 10 with note as follows:    Notes:   Established patient ([facility]an); new diagnosis. 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 Prior genetics negative. Staging studies final.          Other specified hypothyroidism  Patient has known thyroid nodule and will have biopsy on  at [facility] by IR. U/S of thyroid on 24:at [facility]:  IMPRESSION:     The following meet current ACR criteria for recommending ultrasound guided biopsy:     Isoechoic nodule in the midpole of the right thyroid lobe measuring up to 2.0 cm.  TIRADS Category TR4 -moderately suspicious       Primary hypertension  124/76 and very controlled taking atenolol, lisinopril.       Encounter to discuss test results  Note the NM bone scan of late Sept:  FINDINGS:     There is no scintigraphic evidence of osseous metastasis.     Mild asymmetric activity in the left mandible and left maxilla may be related to dental disease.     Focal activity in the left knee and right foot first MTP joint region. Additional foci of activity in the left foot along the dorsal forefoot and multiple MTP joints. These are likely degenerative.     Symmetric renal activity. Asymmetric curvilinear activity in the groin region (right greater than left), likely related to urine contamination.     IMPRESSION:     1.  No scintigraphic evidence of osseous metastasis. Incidental findings as above.       Medication management  All medications evaluated for safety efficacy and tolerance and discussed and reviewed       Thyroid nodule  greater than or equal to 1 cm in diameter incidentally noted on imaging study  This lesion is moderately suspicious and will be biopsied on October 24 by IR at [facility]       Need for vaccination  Immunizations reviewed and she refuses flu vaccine and any others at this moment in time.  Reason not clear            History of Present Illness     History of Present Illness  The patient is a 59-year-old female who presents for evaluation of multiple medical concerns.    She underwent surgery three years ago and had a biopsy of her thyroid on 09/24/2024. A CAT scan revealed a small nodule in her lung and liver. A nuclear medicine bone scan of her entire body was conducted on 09/30/2024. She is scheduled for another CT scan in 12/2024 to monitor the size of the nodules in her liver and lung. She has an appointment with her breast surgeon, Dr. Doran, on 10/10/2024 at the [facility].    Her blood pressure is well-managed with atenolol and lisinopril.     Review of Systems   Constitutional:  Negative for activity change, appetite change, chills, diaphoresis, fatigue and fever.   HENT:  Negative for congestion, ear discharge, ear pain, facial swelling, nosebleeds, sore throat, tinnitus, trouble swallowing and voice change.    Eyes:  Negative for photophobia, pain, discharge, redness, itching and visual disturbance.   Respiratory:  Negative for apnea, cough, choking, chest tightness and shortness of breath.    Cardiovascular:  Negative for chest pain, palpitations and leg swelling.   Gastrointestinal:  Negative for abdominal distention, abdominal pain, blood in stool, constipation, diarrhea, nausea and vomiting.   Endocrine: Negative for cold intolerance, heat intolerance, polydipsia, polyphagia and polyuria.   Genitourinary:  Negative for decreased urine volume, difficulty urinating, dysuria, enuresis, frequency, hematuria, pelvic pain, urgency and vaginal bleeding.   Musculoskeletal:  Negative for arthralgias,  "back pain, gait problem, joint swelling, neck pain and neck stiffness.   Skin:  Negative for color change, pallor and rash.   Allergic/Immunologic: Negative for immunocompromised state.   Neurological:  Negative for dizziness, seizures, facial asymmetry, light-headedness, numbness and headaches.   Hematological:  Negative for adenopathy.   Psychiatric/Behavioral:  Negative for agitation, behavioral problems, confusion, decreased concentration, dysphoric mood and hallucinations.      Objective     /76 (BP Location: Left arm, Patient Position: Sitting, Cuff Size: Standard)   Pulse 72   Temp 98.2 °F (36.8 °C) (Temporal)   Ht 5' 4\" (1.626 m)   Wt 47.4 kg (104 lb 9.6 oz)   SpO2 96%   BMI 17.95 kg/m²     Physical Exam  Vital Signs  Blood pressure reading is 124/76.  Physical Exam  Vitals and nursing note reviewed.   Constitutional:       General: She is not in acute distress.     Appearance: Normal appearance. She is well-developed and normal weight. She is not ill-appearing or diaphoretic.   HENT:      Head: Normocephalic and atraumatic.      Nose: Nose normal.      Mouth/Throat:      Mouth: Mucous membranes are moist.   Eyes:      Conjunctiva/sclera: Conjunctivae normal.   Cardiovascular:      Rate and Rhythm: Normal rate and regular rhythm.      Heart sounds: No murmur heard.  Pulmonary:      Effort: Pulmonary effort is normal. No respiratory distress.      Breath sounds: Normal breath sounds.   Abdominal:      Palpations: Abdomen is soft.      Tenderness: There is no abdominal tenderness.   Musculoskeletal:         General: No swelling.      Cervical back: Neck supple.      Right lower leg: No edema.      Left lower leg: No edema.   Skin:     General: Skin is warm and dry.      Capillary Refill: Capillary refill takes less than 2 seconds.      Coloration: Skin is pale.      Findings: No rash.   Neurological:      General: No focal deficit present.      Mental Status: She is alert and oriented to person, " place, and time. Mental status is at baseline.   Psychiatric:         Mood and Affect: Mood normal.         Behavior: Behavior normal.         Thought Content: Thought content normal.         Judgment: Judgment normal.       Administrative Statements   I have spent a total time of 30 minutes in caring for this patient on the day of the visit/encounter including Diagnostic results, Prognosis, Risks and benefits of tx options, Instructions for management, Patient and family education, Importance of tx compliance, Risk factor reductions, Impressions, Counseling / Coordination of care, Documenting in the medical record, Reviewing / ordering tests, medicine, procedures  , and Obtaining or reviewing history  .

## 2024-10-08 NOTE — ASSESSMENT & PLAN NOTE
Patient has known thyroid nodule and will have biopsy on October 24 at [facility] by IR. U/S of thyroid on 9/23/24:at [facility]:  IMPRESSION:     The following meet current ACR criteria for recommending ultrasound guided biopsy:     Isoechoic nodule in the midpole of the right thyroid lobe measuring up to 2.0 cm.  TIRADS Category TR4 -moderately suspicious

## 2024-10-08 NOTE — ASSESSMENT & PLAN NOTE
This lesion is moderately suspicious and will be biopsied on October 24 by IR at [facility]

## 2024-10-08 NOTE — ASSESSMENT & PLAN NOTE
Immunizations reviewed and she refuses flu vaccine and any others at this moment in time.  Reason not clear

## 2024-10-10 ENCOUNTER — TELEPHONE (OUTPATIENT)
Dept: HEMATOLOGY ONCOLOGY | Facility: MEDICAL CENTER | Age: 59
End: 2024-10-10

## 2024-10-10 ENCOUNTER — TELEPHONE (OUTPATIENT)
Dept: SURGICAL ONCOLOGY | Facility: CLINIC | Age: 59
End: 2024-10-10

## 2024-10-10 ENCOUNTER — OFFICE VISIT (OUTPATIENT)
Dept: SURGICAL ONCOLOGY | Facility: CLINIC | Age: 59
End: 2024-10-10
Payer: COMMERCIAL

## 2024-10-10 VITALS
DIASTOLIC BLOOD PRESSURE: 84 MMHG | TEMPERATURE: 98.4 F | OXYGEN SATURATION: 99 % | RESPIRATION RATE: 16 BRPM | HEIGHT: 64 IN | HEART RATE: 114 BPM | SYSTOLIC BLOOD PRESSURE: 138 MMHG | BODY MASS INDEX: 17.93 KG/M2 | WEIGHT: 105 LBS

## 2024-10-10 DIAGNOSIS — C50.919 METASTASIS FROM BREAST CANCER (HCC): Primary | ICD-10-CM

## 2024-10-10 DIAGNOSIS — Z79.811 USE OF ANASTROZOLE (ARIMIDEX): ICD-10-CM

## 2024-10-10 DIAGNOSIS — Z17.0 MALIGNANT NEOPLASM OF OVERLAPPING SITES OF RIGHT BREAST IN FEMALE, ESTROGEN RECEPTOR POSITIVE (HCC): ICD-10-CM

## 2024-10-10 DIAGNOSIS — C79.9 METASTASIS FROM BREAST CANCER (HCC): Primary | ICD-10-CM

## 2024-10-10 DIAGNOSIS — N83.202 LEFT OVARIAN CYST: ICD-10-CM

## 2024-10-10 DIAGNOSIS — C50.811 MALIGNANT NEOPLASM OF OVERLAPPING SITES OF RIGHT BREAST IN FEMALE, ESTROGEN RECEPTOR POSITIVE (HCC): ICD-10-CM

## 2024-10-10 DIAGNOSIS — C77.3 CARCINOMA OF RIGHT BREAST METASTATIC TO AXILLARY LYMPH NODE (HCC): Primary | ICD-10-CM

## 2024-10-10 DIAGNOSIS — C50.911 CARCINOMA OF RIGHT BREAST METASTATIC TO AXILLARY LYMPH NODE (HCC): Primary | ICD-10-CM

## 2024-10-10 PROCEDURE — 99215 OFFICE O/P EST HI 40 MIN: CPT | Performed by: SURGERY

## 2024-10-10 RX ORDER — TRAMADOL HYDROCHLORIDE 50 MG/1
50 TABLET ORAL EVERY 8 HOURS PRN
Qty: 9 TABLET | Refills: 0 | Status: SHIPPED | OUTPATIENT
Start: 2024-10-10

## 2024-10-10 RX ORDER — GABAPENTIN 100 MG/1
100 CAPSULE ORAL 3 TIMES DAILY
Qty: 30 CAPSULE | Refills: 0 | Status: SHIPPED | OUTPATIENT
Start: 2024-10-10

## 2024-10-10 NOTE — PROGRESS NOTES
Surgical Oncology Follow Up       [facility] SURGICAL ONCOLOGY [place]  [address]  [place] [address]    Digna Juárez  1965  [id number]  [facility] SURGICAL ONCOLOGY [place]  [address]  [place] [address]    Chief Complaint   Patient presents with    New Patient Visit       Assessment & Plan   Diagnoses and all orders for this visit:    Carcinoma of right breast metastatic to axillary lymph node (HCC)  -     Case request operating room: RIGHT AXILLARY DISSECTION; Standing  -     CBC and differential; Future  -     Comprehensive metabolic panel; Future  -     UA w Reflex to Microscopic w Reflex to Culture; Future  -     EKG 12 lead; Future  -     XR chest pa and lateral; Future  -     Case request operating room: RIGHT AXILLARY DISSECTION  -     traMADol (Ultram) 50 mg tablet; Take 1 tablet (50 mg total) by mouth every 8 (eight) hours as needed for severe pain  -     gabapentin (Neurontin) 100 mg capsule; Take 1 capsule (100 mg total) by mouth 3 (three) times a day  -     Ambulatory referral to PT/OT lymphedema therapy; Future    Malignant neoplasm of overlapping sites of right breast in female, estrogen receptor positive (HCC)  -     Case request operating room: RIGHT AXILLARY DISSECTION; Standing  -     CBC and differential; Future  -     Comprehensive metabolic panel; Future  -     UA w Reflex to Microscopic w Reflex to Culture; Future  -     EKG 12 lead; Future  -     XR chest pa and lateral; Future  -     Case request operating room: RIGHT AXILLARY DISSECTION    Use of anastrozole (Arimidex)    Left ovarian cyst  -     US pelvis complete non OB; Future    Other orders  -     Incentive spirometry; Standing  -     Insert and maintain IV line; Standing  -     Void On-Call to O.R.; Standing  -     Place sequential compression device; Standing  -     ceFAZolin (ANCEF) 1,000 mg in dextrose 5 % 100 mL  IVPB        Advance Care Planning/Advance Directives:  Discussed disease status, cancer treatment plans and/or cancer treatment goals with the patient.     Oncology History:    Oncology History   Malignant neoplasm of overlapping sites of right breast in female, estrogen receptor positive (HCC)   4/13/2021 -  Cancer Staged    Staging form: Breast, AJCC 8th Edition  - Pathologic stage from 4/13/2021: Stage IA (pT1c, pN1mi(sn), cM0, G2, ER+, LA+, HER2-) - Signed by ELZA Santos on 11/7/2023  Stage prefix: Initial diagnosis  Method of lymph node assessment: [place] lymph node biopsy  Multigene prognostic tests performed: MammaPrint  Histologic grading system: 3 grade system       4/16/2021 Biopsy    Right breast US guided biopsy:  A. 11 o'clock 7 cm from the nipple  Invasive lobular carcinoma  Grade 1  ER 90, LA 45, HER2 1+  Lymphovascular invasion: not identified    B. 10 o'clock 7 cm from the nipple  Invasive mammary carcinoma of no special type  Grade 2  , , HER2 1+  Lymphovascular invasion: not identified    Concordant. Malignancy appears multifocal. MRI recommended.     4/30/2021 Observation    Bilateral breast MRI  Multifocal right breast cancer; 10:00 mass measures up to 1.8 cm and abuts pectoral muscle, without evidence of invasion. Two areas of carcinoma have a total extent of disease of 5 cm. Left breast clear. Axilla negative.     6/10/2021 Genetic Testing    The following genes were evaluated: BATSHEVA, BRCA1, BRCA2, CDH1, CHEK2, PALB2, PTEN, STK11, TP53  Additional genes analyzed for a total of 20  Negative result. No pathogenic sequence variants or deletions/dupllications identified  [employer]     6/25/2021 Surgery    Right breast ANJALI  directed mastectomy with sentinel lymph node biopsy  Invasive carcinoma of no special type (ductal)  Grade 2  2 cm (2 foci)  Margins negative  1/1 Lymph node with micrometastases (0.4 mm)  Anatomic Stage IB  Prognostic Stage IA     7/7/2021  Genomic Testing    MammaPrint for FLEX trial  Low risk (Luminal A)     7/28/2021 -  Hormone Therapy    Anastrozole 1 mg daily  Dr. Cochran     7/29/2021 - 7/29/2021 Radiation    Consult with Dr. Florence  Patient declined post-mastectomy RT     Carcinoma of right breast metastatic to axillary lymph node (HCC)   9/12/2024 Biopsy    Right axillary lymph node ultrasound-guided biopsy (open coil)  Invasive mammary carcinoma of no special type (ductal) involving fibroadipose tissue  Grade 1  ER ; MI 1; HER2 0  In situ follicular neoplasia (involvement by follicular lymphoma elsewhere in the lymph node or in other lymph nodes cannot be ruled out; correlation with systemic imaging to rule out lymphadenopathy elsewhere is recommended as clinically indicated)    Concordant. Minute focus of invasive carcinoma is present outside of the lymph node, within the fibroadipose tissue. This focus may represent metastasis vs primary carcinoma. Single morphologically abnormal lymph node seen on US. Left breast clear on 5/2024 imaging.     9/12/2024 -  Cancer Staged    Staging form: Breast, AJCC 8th Edition  - Clinical stage from 9/12/2024: Stage IA (rcT0, cN1mi(f), cM0, G1, ER+, MI+, HER2-) - Signed by Ashleigh Doran MD on 10/10/2024  Stage prefix: Recurrence  Method of lymph node assessment: Core biopsy  Histologic grading system: 3 grade system       9/17/2024 Observation    CT chest, abdomen, pelvis IMPRESSION:     Bilateral pulmonary nodules measuring 3 to 5 mm as described.  Follow-up CT chest recommended in 3 months considering history of recurrent breast cancer.     Ill-defined hypodense right thyroid gland nodule measures up to 1.5 cm.  Thyroid ultrasound recommended for further characterization.     Lobulated low-density right axillary node measuring 2.2 x 1 cm, consistent with biopsy-proven malignancy.  1.5 cm rounded soft tissue nodule in the left breast with coarse calcification versus biopsy clip.  Correlation with  mammographic history recommended.     Ill-defined heterogeneously hypodense lesion within the subcapsular inferior right liver margin.  1.2 cm indeterminate left adrenal gland nodule.  MRI with contrast recommended for further evaluation of the above two lesions.     Punctate sclerotic lesion in the left sternum.  3 mm sclerotic lesion in the posterior/medial right fifth rib, potentially representing a bone island.     Prominent uterus considering age with probable partially exophytic fibroid.     Bladder wall thickening considering degree of distention.  Correlate with urinalysis for possible cystitis.     Note of no priors for comparison     9/23/2024 Observation    MRI abdomen IMPRESSION:     No suspicious focal hepatic lesion. The 1.3 cm observation on prior CT may represent volume averaging of the hepatic border with adjacent mesenteric fat or small amount of focal fat infiltration. However given history of recently diagnosed metastatic   right breast neoplasm, follow-up CT abdomen with contrast in 3 months is recommended to assess for stability of findings     Irregular thickening of the left adrenal gland without discrete nodule findings can represent hyperplasia versus early small metastatic nodule. No remote prior cross-sectional studies of the abdomen are available for direct comparison. Attention on   follow-up CT abdomen in 3 months is recommended to assess for stability.     A 2.5 cm incompletely characterized hypoenhancing uterine lesion, statistically likely representing fibroid. Left ovarian 1.2 cm cystic lesion, also incompletely characterized. Nonemergent pelvic ultrasound is recommended for complete characterization.     Small volume free pelvic fluid of uncertain etiology. Clinical correlation is recommended.     9/24/2024 Observation    NM Bone scan IMPRESSION:     1.  No scintigraphic evidence of osseous metastasis. Incidental findings as above.         History of Present Illness: Patient is  here today to discuss surgery for the recurrent carcinoma of the right axilla, she is status post right mastectomy with sentinel node biopsy.  She had a micro met on the node biopsy at that time and apparently postmastectomy radiation was recommended however the patient declined.  She continues on anastrozole.  She recently saw medical oncology on 9/26/2024.  -Interval History: Right axillary ultrasound with lymph node biopsy, multiple staging scans    Review of Systems:  Review of Systems   Constitutional: Negative.  Negative for appetite change, fever and unexpected weight change.   HENT:  Positive for dental problem. Negative for trouble swallowing.    Eyes: Negative.    Respiratory: Negative.  Negative for cough and shortness of breath.    Cardiovascular: Negative.  Negative for chest pain.   Gastrointestinal: Negative.  Negative for abdominal pain, nausea and vomiting.   Endocrine: Negative.    Genitourinary: Negative.  Negative for dysuria.   Musculoskeletal: Negative.  Negative for arthralgias and myalgias.   Skin: Negative.    Allergic/Immunologic: Negative.    Neurological: Negative.  Negative for headaches.   Hematological: Negative.  Negative for adenopathy. Does not bruise/bleed easily.   Psychiatric/Behavioral: Negative.         Patient Active Problem List   Diagnosis    GERD without esophagitis    Hypothyroidism    Vitamin D deficiency    Hypertension    Deficiency of multiple vitamins    Malignant neoplasm of overlapping sites of right breast in female, estrogen receptor positive (HCC)    Anorexia nervosa    Use of anastrozole (Arimidex)    Seasonal allergies    Body mass index (BMI) of 19.9 or less in adult    Medication management    Encounter for long-term (current) use of medications    Dental abscess    Dentalgia    Encounter to discuss test results    Carcinoma of right breast metastatic to axillary lymph node (HCC)    Need for vaccination    Thyroid nodule greater than or equal to 1 cm in  diameter incidentally noted on imaging study    Left ovarian cyst     Past Medical History:   Diagnosis Date    Abnormal weight loss     Anorexia nervosa     Disease of thyroid gland     Elevated blood sugar     Occasional     Fibroadenoma of both breasts     GERD (gastroesophageal reflux disease)     Hypertension     Hyperthyroidism     Osteopenia      Past Surgical History:   Procedure Laterality Date    BREAST BIOPSY Right 2021    BREAST CYST EXCISION Left     CHOLECYSTECTOMY      MASTECTOMY Right     MASTECTOMY W/ SENTINEL NODE BIOPSY Right 2021    Procedure: BREAST MASTECTOMY WITH BIOPSY LYMPH NODE SENTINEL; ANJALI  GUIDED, LYMPHATIC MAPPING WITH BLUE DYE AND RADIOACTIVE DYE (INJECT AT 1130 BY DR LEMUS IN THE OR);  Surgeon: Jim Lemus MD;  Location: [facility];  Service: Surgical Oncology    US BREAST CLIP NEEDLE LOC RIGHT Right 2021    US BREAST NEEDLE LOC RIGHT EACH ADDITIONAL Right 2021    US GUIDANCE BREAST BIOPSY RIGHT EACH ADDITIONAL Right 2021    US GUIDED BREAST BIOPSY RIGHT COMPLETE Right 2021    US GUIDED BREAST LYMPH NODE BIOPSY RIGHT Right 2024     Family History   Adopted: Yes     Social History     Socioeconomic History    Marital status: Single     Spouse name: Not on file    Number of children: 0    Years of education: 12    Highest education level: Not on file   Occupational History    Occupation: never worked    Tobacco Use    Smoking status: Never    Smokeless tobacco: Never   Vaping Use    Vaping status: Never Used   Substance and Sexual Activity    Alcohol use: Yes     Alcohol/week: 2.0 standard drinks of alcohol     Types: 2 Cans of beer per week    Drug use: Never     Comment: Illicit drugs:   none - As per [place]     Sexual activity: Not Currently     Comment: Sexually active:   No - As per [place]    Other Topics Concern    Not on file   Social History Narrative    · Most recent tobacco use screenin2019      · Do you  currently or have you served in the [employer]:   No      · Were you activated, into active duty, as a member of the [employer] or as a Reservist:   No      · Caffeine intake:   None decaf coffee only     · Sexual orientation:   Heterosexual      · General stress level:   Low      · Diet:   Regular      · Single or multi-level home/work:   single level home      · Live alone or with others:   alone      · Exercise level:   Moderate outside walk only     · Overweight:   No      · Obese:   No      · Guns present in home:   No      · Seat belts used routinely:   Yes      · Advance directive:   Yes      · Sunscreen used routinely:   Yes      · Smoke alarm in home:   Yes      · Performs monthly self-breast exam:   Yes      · Legally blind in one or both eyes:   No      · Hard of hearing or deaf in one or both ears:   No      · Presence of domestic violence:   No      · Are there stairs in your home:   No      · Pets:   No      Social Determinants of Health     Financial Resource Strain: Low Risk  (5/5/2020)    Overall Financial Resource Strain (CARDIA)     Difficulty of Paying Living Expenses: Not hard at all   Food Insecurity: No Food Insecurity (5/5/2020)    Hunger Vital Sign     Worried About Running Out of Food in the Last Year: Never true     Ran Out of Food in the Last Year: Never true   Transportation Needs: No Transportation Needs (5/5/2020)    PRAPARE - Transportation     Lack of Transportation (Medical): No     Lack of Transportation (Non-Medical): No   Physical Activity: Sufficiently Active (5/5/2020)    Exercise Vital Sign     Days of Exercise per Week: 7 days     Minutes of Exercise per Session: 60 min   Stress: No Stress Concern Present (5/5/2020)    [employer] - Occupational Stress Questionnaire     Feeling of Stress : Not at all   Social Connections: Socially Isolated (5/5/2020)    Social Connection and Isolation Panel [NHANES]     Frequency of Communication with  Friends and Family: More than three times a week     Frequency of Social Gatherings with Friends and Family: More than three times a week     Attends Confucianism Services: Never     Active Member of Clubs or Organizations: No     Attends Club or Organization Meetings: Never     Marital Status: Never    Intimate Partner Violence: Not At Risk (5/5/2020)    Humiliation, Afraid, Rape, and Kick questionnaire     Fear of Current or Ex-Partner: No     Emotionally Abused: No     Physically Abused: No     Sexually Abused: No   Housing Stability: Not on file       Current Outpatient Medications:     anastrozole (ARIMIDEX) 1 mg tablet, Take 1 tablet (1 mg total) by mouth daily, Disp: 90 tablet, Rfl: 3    atenolol (TENORMIN) 50 mg tablet, Take 1 tablet (50 mg total) by mouth daily Take 100 mg by mouth every other morning., Disp: 90 tablet, Rfl: 1    Cholecalciferol ( Ultra Strength) 50 MCG (2000 UT) CAPS, TAKE ONE CAPSULE BY MOUTH EVERY DAY, Disp: 90 capsule, Rfl: 3    famotidine (PEPCID) 40 MG tablet, Take 1 tablet (40 mg total) by mouth in the morning, Disp: 90 tablet, Rfl: 3    gabapentin (Neurontin) 100 mg capsule, Take 1 capsule (100 mg total) by mouth 3 (three) times a day, Disp: 30 capsule, Rfl: 0    levothyroxine 50 mcg tablet, TAKE ONE TABLET BY MOUTH EVERY DAY IN THE EARLY MORNING 1/2 HOUR BEFORE BREAKFAST, Disp: 90 tablet, Rfl: 1    lisinopril (ZESTRIL) 10 mg tablet, Take 1 tablet (10 mg total) by mouth daily, Disp: 90 tablet, Rfl: 3    Multiple Vitamin (Daily-Alanis) TABS, TAKE ONE TABLET BY MOUTH EVERY DAY, Disp: 90 tablet, Rfl: 3    OLANZapine (ZyPREXA) 7.5 mg tablet, Take 1 tablet (7.5 mg total) by mouth daily at bedtime, Disp: 90 tablet, Rfl: 2    traMADol (Ultram) 50 mg tablet, Take 1 tablet (50 mg total) by mouth every 8 (eight) hours as needed for severe pain, Disp: 9 tablet, Rfl: 0    carbamide peroxide (DEBROX) 6.5 % otic solution, Administer 5 drops into both ears 2 (two) times a day (Patient not  taking: Reported on 10/10/2024), Disp: 15 mL, Rfl: 2    chlorhexidine (PERIDEX) 0.12 % solution, Apply 15 mL to the mouth or throat 2 (two) times a day (Patient not taking: Reported on 10/10/2024), Disp: 120 mL, Rfl: 0    Multiple Vitamins-Minerals (Womens 50+ Multi Vitamin/Min) TABS, Take 1 tablet by mouth daily (Patient not taking: Reported on 10/10/2024), Disp: 100 tablet, Rfl: 3    neomycin-polymyxin-hydrocortisone (CORTISPORIN) 0.35%-10,000 units/mL-1% otic suspension, Administer 4 drops to the right ear 3 (three) times a day (Patient not taking: Reported on 9/11/2024), Disp: 10 mL, Rfl: 0  No Known Allergies    The following portions of the patient's history were reviewed and updated as appropriate: allergies, current medications, past family history, past medical history, past social history, past surgical history, and problem list.        Vitals:    10/10/24 1022   BP: 138/84   Pulse: (!) 114   Resp: 16   Temp: 98.4 °F (36.9 °C)   SpO2: 99%       Physical Exam  Constitutional:       General: She is not in acute distress.     Appearance: Normal appearance. She is well-developed.   HENT:      Head: Normocephalic and atraumatic.   Cardiovascular:      Heart sounds: Normal heart sounds.   Pulmonary:      Breath sounds: Normal breath sounds.   Chest:   Breasts:     Right: Skin change (mastectomy scar) present. No swelling, bleeding, mass or tenderness.      Left: No swelling, bleeding, inverted nipple, mass, nipple discharge, skin change or tenderness.   Abdominal:      Palpations: Abdomen is soft.   Musculoskeletal:      Right lower leg: No edema.      Left lower leg: No edema.   Lymphadenopathy:      Upper Body:      Right upper body: Axillary adenopathy present. No supraclavicular or pectoral adenopathy.      Left upper body: No supraclavicular, axillary or pectoral adenopathy.   Neurological:      Mental Status: She is alert and oriented to person, place, and time.   Psychiatric:         Mood and Affect: Mood  normal.           Results:  Labs:  9/12/2024 right axillary lymph node biopsy shows invasive mammary carcinoma of no special type i.e. ductal 1.2 mm in the fibroadipose tissue with low-grade along with in situ follicular neoplasia, estrogen is 100%, progesterone 1%, HER2 is 0    Imaging  5/20/2024 left 3D diagnostic mammogram and ultrasound was benign    8/3024 right axillary ultrasound  Shows an unchanged postop collection as well as an abnormal level 1 axillary node with cortical thickening    9/12/2024 core biopsy right axillary node results are concordant    9/17/2024 CAT scan of the chest abdomen and pelvis with bilateral pulmonary nodules for which short-term follow-up was recommended, ill-defined hypodensity in the right thyroid gland for which ultrasound was recommended, right axillary node measuring 2.2 cm consistent with a biopsy-proven malignancy, rounded density in the left breast with calcification versus biopsy clip, ill-defined hypodense lesion in the right liver, sclerotic lesion left sternum    9/23/2024 MRI of the abdomen shows no suspicious focal hepatic lesion however short-term follow-up was recommended, thickening of the left adrenal gland for which short-term follow-up is also recommended, 2.5 cm incompletely characterized uterine lesion likely representing a fibroid along with a left ovarian cyst for which ultrasound was recommended    9/23/2024 thyroid ultrasound recommended a biopsy of the right thyroid lobe with nodule measuring 2 cm, this is scheduled for 1024    9/24/24 bone scan was negative  I reviewed the above laboratory and imaging data.    Discussion/Summary: 59-year-old female who is status post right mastectomy with sentinel node biopsy in 2021.  All she has been on anastrozole and continues on this in spite of the recent recurrence.  She apparently was counseled on postmastectomy radiation given the micro met in the sentinel node but she declined.  She recently had an axillary  ultrasound secondary to a mass which revealed a stable seroma as well as an enlarged node.  Node biopsy reveals recurrent carcinoma as well as in situ follicular neoplasia.  Staging scans showed no evidence of lymphoma.  She does have a thyroid nodule and will be undergoing biopsy later this month.  There were additional nonspecific findings on the staging scans and 3-month follow-up CAT scan of the chest and abdomen are recommended.  She also needs a pelvic ultrasound.  I discussed axillary dissection with her.  I will refer her to the lymphedema therapist preoperatively.  I reached out to her current medical oncologist about the anastrozole.  I am also placing her on tumor board.  All of her questions were answered.  Consent was signed today in the office.  She would like to have surgery until she completes her additional workup including thyroid biopsy.

## 2024-10-10 NOTE — TELEPHONE ENCOUNTER
Attempted to call pt at this time. No answer. Unable to leave VM. I confirmed that DAVID and PETER will not take pt to and from surgery and she will need to find her own transportation. I also wanted to give her Shereen, surgical coordinator, information for when she is ready to schedule her surgery with Dr Doran. Her number is [phone] and she is in the office [address].

## 2024-10-10 NOTE — TELEPHONE ENCOUNTER
Patient seen today by Dr Doran  Patient is  scheduled  for an axillary dissection.  Anastrozole may be d/c'ed per Dr Penn  Attempted to call patient  No ability to leave voicemail  Will re attempt    Spoke with patient and confirmed plan as stated above

## 2024-10-11 ENCOUNTER — PATIENT OUTREACH (OUTPATIENT)
Dept: HEMATOLOGY ONCOLOGY | Facility: CLINIC | Age: 59
End: 2024-10-11

## 2024-10-11 ENCOUNTER — HOSPITAL ENCOUNTER (OUTPATIENT)
Dept: ULTRASOUND IMAGING | Facility: HOSPITAL | Age: 59
End: 2024-10-11
Payer: COMMERCIAL

## 2024-10-11 DIAGNOSIS — K21.9 GASTROESOPHAGEAL REFLUX DISEASE, UNSPECIFIED WHETHER ESOPHAGITIS PRESENT: ICD-10-CM

## 2024-10-11 DIAGNOSIS — N83.202 LEFT OVARIAN CYST: ICD-10-CM

## 2024-10-11 PROCEDURE — 76856 US EXAM PELVIC COMPLETE: CPT

## 2024-10-11 PROCEDURE — 76830 TRANSVAGINAL US NON-OB: CPT

## 2024-10-11 RX ORDER — FAMOTIDINE 40 MG/1
40 TABLET, FILM COATED ORAL DAILY
Qty: 90 TABLET | Refills: 1 | Status: SHIPPED | OUTPATIENT
Start: 2024-10-11

## 2024-10-11 NOTE — PROGRESS NOTES
I reached out and spoke with Digna now that consults have been completed with the oncology teams to review for any barriers to care and offer supportive services as needed. Pt did decline completing the Distress Thermometer.  I reviewed and updated the members assigned to the care team in [facility].   She knows the members of the care team as well as how and when to contact them with any needs.   She verbalizes managing the schedules well.   She is currently able to drive and denies any transportation needs.    She denies any uncontrolled symptoms. Discussed role of Palliative Care in symptom and side effect management. Declined referral at this time.  Patients states that she is eating and drinking as per usual with no unintentional weight loss.     Patient does not smoke.   Patient states she is well supported by family and friends.    Patient feels she has adequate insurance coverage and denies any financial concerns at this time.     Based on individual needs I will follow up in about 4-6 weeks.   I have provided my direct contact information and welcome them to contact me if their needs as discussed above change. They were appreciative for the call.

## 2024-10-15 ENCOUNTER — TELEPHONE (OUTPATIENT)
Age: 59
End: 2024-10-15

## 2024-10-15 ENCOUNTER — TELEPHONE (OUTPATIENT)
Dept: SURGICAL ONCOLOGY | Facility: CLINIC | Age: 59
End: 2024-10-15

## 2024-10-15 ENCOUNTER — DOCUMENTATION (OUTPATIENT)
Dept: HEMATOLOGY ONCOLOGY | Facility: CLINIC | Age: 59
End: 2024-10-15

## 2024-10-15 NOTE — TELEPHONE ENCOUNTER
Pt wants to know the side effects of having a thyroid removed.  Please contact pt and advise. Thank you.

## 2024-10-15 NOTE — PROGRESS NOTES
In-basket message received from  to add patient to the [facility] on 10/21/24. Chart reviewed and prep completed.

## 2024-10-15 NOTE — TELEPHONE ENCOUNTER
Patient calling to inquire on results of her pelvic ultrasound on 10/11/24. Do not see final results in Epic. Patient reports that cyst was found on CT scan and Dr. Doran ordered the u/s. Please advise. Patient waiting for return call with results.

## 2024-10-15 NOTE — TELEPHONE ENCOUNTER
Return call placed to patient notifying her that U/S results are not in yet but she will be receiving a call to review them once they are. Patient appreciative of the update.

## 2024-10-16 ENCOUNTER — TELEPHONE (OUTPATIENT)
Age: 59
End: 2024-10-16

## 2024-10-16 DIAGNOSIS — D25.9 UTERINE LEIOMYOMA, UNSPECIFIED LOCATION: Primary | ICD-10-CM

## 2024-10-16 NOTE — TELEPHONE ENCOUNTER
Received call from Sahil in  radiology to report significant findings of US pelvis complete w transvaginal performed on 10/11/2024:    IMPRESSION:     Fibroid uterus. A 3.0 cm echogenic lesion in the left uterine body/lower segment, statistically likely representing a submucosal fibroid. Suboptimal assessment of the endometrial stripe due to compression and deviation secondary to mass effect from this   lesion. It measures 4 mm in the fundal region. In absence of remote prior cross-sectional images of the uterus, pelvic ultrasound in 6 months is recommended to assess for stability.     The study was marked in EPIC for significant notification.

## 2024-10-16 NOTE — TELEPHONE ENCOUNTER
Pt called AC and call was transferred to me. Discussed w pt that she will be on thyroid supplement/prescription the rest of her life. Pt acknowledged, her main concern was that she would gain weight. She states she will have to keep a strict diet/exercise, and acknowledged she will most likely need a higher dose of synthroid. NFA needed.

## 2024-10-17 ENCOUNTER — TELEPHONE (OUTPATIENT)
Dept: SURGICAL ONCOLOGY | Facility: CLINIC | Age: 59
End: 2024-10-17

## 2024-10-17 NOTE — TELEPHONE ENCOUNTER
Spoke to patient and gave her Dr. Doran's recommendations that she should schedule a follow up US in 6 mo. Patient verbalized understanding and took the number for central scheduling.

## 2024-10-21 ENCOUNTER — DOCUMENTATION (OUTPATIENT)
Dept: HEMATOLOGY ONCOLOGY | Facility: CLINIC | Age: 59
End: 2024-10-21

## 2024-10-21 ENCOUNTER — TELEPHONE (OUTPATIENT)
Age: 59
End: 2024-10-21

## 2024-10-21 NOTE — PROGRESS NOTES
Patient was not presented today 10/21. In-basket message received from Dr. Doran to add patient to the breast [facility] on 11/4/2024. Chart reviewed and prep completed.

## 2024-10-21 NOTE — TELEPHONE ENCOUNTER
Called patient to further discuss concerns. She will be going for lab draw prior to  her visit with Dr Penn on 10/30 and wanted to know is she needs a referral or she can just walk in. Advised she can walk in and orders will be in the system.

## 2024-10-24 ENCOUNTER — HOSPITAL ENCOUNTER (OUTPATIENT)
Dept: ULTRASOUND IMAGING | Facility: HOSPITAL | Age: 59
Discharge: HOME | End: 2024-10-24
Payer: COMMERCIAL

## 2024-10-24 DIAGNOSIS — E04.1 RIGHT THYROID NODULE: ICD-10-CM

## 2024-10-24 PROCEDURE — 10005 FNA BX W/US GDN 1ST LES: CPT

## 2024-10-24 PROCEDURE — 88173 CYTOPATH EVAL FNA REPORT: CPT | Performed by: PATHOLOGY

## 2024-10-24 RX ORDER — LIDOCAINE WITH 8.4% SOD BICARB 0.9%(10ML)
10 SYRINGE (ML) INJECTION ONCE
Status: COMPLETED | OUTPATIENT
Start: 2024-10-24 | End: 2024-10-24

## 2024-10-24 RX ADMIN — Medication 10 ML: at 14:03

## 2024-10-25 PROCEDURE — 88173 CYTOPATH EVAL FNA REPORT: CPT | Performed by: PATHOLOGY

## 2024-10-30 ENCOUNTER — OFFICE VISIT (OUTPATIENT)
Dept: HEMATOLOGY ONCOLOGY | Facility: MEDICAL CENTER | Age: 59
End: 2024-10-30
Payer: COMMERCIAL

## 2024-10-30 VITALS
OXYGEN SATURATION: 99 % | TEMPERATURE: 98.3 F | WEIGHT: 107 LBS | SYSTOLIC BLOOD PRESSURE: 110 MMHG | HEIGHT: 64 IN | DIASTOLIC BLOOD PRESSURE: 70 MMHG | BODY MASS INDEX: 18.27 KG/M2 | HEART RATE: 103 BPM | RESPIRATION RATE: 17 BRPM

## 2024-10-30 DIAGNOSIS — C50.811 MALIGNANT NEOPLASM OF OVERLAPPING SITES OF RIGHT BREAST IN FEMALE, ESTROGEN RECEPTOR POSITIVE (HCC): Primary | ICD-10-CM

## 2024-10-30 DIAGNOSIS — Z17.0 MALIGNANT NEOPLASM OF OVERLAPPING SITES OF RIGHT BREAST IN FEMALE, ESTROGEN RECEPTOR POSITIVE (HCC): Primary | ICD-10-CM

## 2024-10-30 PROCEDURE — 99213 OFFICE O/P EST LOW 20 MIN: CPT | Performed by: INTERNAL MEDICINE

## 2024-10-30 NOTE — PROGRESS NOTES
Assessment / Plan:    A 58-year-old postmenopausal woman with stage II A right breast cancer, multifocal disease with combination of invasive ductal as well as invasive lobular carcinoma, grade 2. This was ER 90% positive, OK 45% positive, HER2 negative disease.  Her tumor was low risk, based on MammaPrint.  She underwent mastectomy and sentinel lymph node biopsy, resulting in TAMELA.  She had 1 positive lymph node with micrometastasis measuring 0.4 mm.  She is currently on adjuvant hormonal therapy with anastrozole with excellent tolerance.  She has no evidence of recurrent disease, based on her symptoms and physical examinations.  I recommended her to continue anastrozole 1 mg once a day.     terval History:  A 58-year-old postmenopausal woman with stage II A right breast cancer, multifocal disease with combination of invasive ductal as well as invasive lobular carcinoma, grade 2. This was ER 90% positive, OK 45% positive, HER2 negative disease.  Her tumor was low risk, based on MammaPrint.  She underwent mastectomy and sentinel lymph node biopsy, resulting in TAMELA.  She had 1 positive lymph node with micrometastasis measuring 0.4 mm.  Since August 2021, she has been on adjuvant hormonal therapy with anastrozole.  She presents today for routine follow-up.  Based on her DEXA scan in May 2022, she has osteoporosis.  She is on Prolia injection at the primary care physician's office.  She is tolerating anastrozole well.  She has no complaint of hot flashes or musculoskeletal symptoms.  Denies any pain.  Her weight is stable.  She has no respiratory symptoms.  Her performance status is normal.           Objective:      Primary Diagnosis:     1.  Right breast cancer, stage II A ( pT1c, pN1 (mi ), M0) grade 2, ER 90% positive, OK 45% positive, HER2 negative disease.  MammaPrint low risk.  Diagnosed in June 2021.     2.  BRCA gene mutation negative.     Cancer Staging:  Cancer Staging  Malignant neoplasm of overlapping sites  of right breast in female, estrogen receptor positive (HCC)  Staging form: Breast, AJCC 8th Edition  - Pathologic: Stage IA (pT1c, pN1mi(sn), cM0, G2, ER+, HI+, HER2-) - Unsigned  Method of lymph node assessment: [place] lymph node biopsy  Histologic grading system: 3 grade system           Previous Hematologic/ Oncologic Treatment:            Current Hematologic/ Oncologic Treatment:         Adjuvant hormonal therapy with anastrozole since August 2021.     Disease Status:        TAMELA status post mastectomy and sentinel lymph node biopsy.     Test Results:     Pathology:      multifocal invasive mammary carcinoma, grade 2. 20 mm of invasive ductal carcinoma as well as 7 mm of invasive lobular carcinoma.  Lymphovascular invasion was present.  1 sentinel lymph node had micrometastasis measuring 0.4 mm.  1 lesion was % positive, % positive, HER2 negative disease.  The other lesion was ER 90% positive, HI 45% positive, HER2 negative disease.  MammaPrint low risk.  Stage II A ( pT1c, pN1 (mi ), M0)     Radiology:     Mammography in May 2023 was benign.  BI-RADS 2.     DEXA scan in June 2022 showed T score -3.6, consistent with osteoporosis.     Laboratory:       See below.     Physical Exam:        General Appearance:    Alert, oriented          Eyes:    PERRL   Ears:    Normal external ear canals, both ears   Nose:   Nares normal, septum midline   Throat:   Mucosa moist. Pharynx without injection.    Neck:   Supple         Lungs:     Clear to auscultation bilaterally   Chest Wall:    No tenderness or deformity    Heart:    Regular rate and rhythm         Abdomen:     Soft, non-tender, bowel sounds +, no organomegaly               Extremities:   Extremities no cyanosis or edema         Skin:   no rash or icterus.    Lymph nodes:   Cervical, supraclavicular, and axillary nodes normal   Neurologic:   CNII-XII intact, normal strength, sensation and reflexes     Throughout             Breast exam:     Status post  right mastectomy without reconstruction.  No palpable abnormality in her right chest wall.  Left breast was quite lumpy.               ROS: Review of Systems   All other systems reviewed and are negative.              Imaging: No results found.        Labs:         Lab Results   Component Value Date     WBC 4.77 01/05/2024     HGB 14.2 01/05/2024     HCT 44.5 01/05/2024     MCV 97 01/05/2024      01/05/2024            Lab Results   Component Value Date     K 3.8 01/05/2024      01/05/2024     CO2 25 01/05/2024     BUN 22 09/17/2024     CREATININE 0.93 09/17/2024     GLUF 112 (H) 01/05/2024     CALCIUM 9.7 01/05/2024     AST 23 01/05/2024     ALT 19 01/05/2024     ALKPHOS 73 01/05/2024     EGFR 67 09/17/2024            Current Medications: Reviewed  Allergies: Reviewed  PMH/FH/SH:  Reviewed        Vital Sign:     Body surface area is 1.49 meters squared.        Wt Readings from Last 3 Encounters:   09/26/24 48.1 kg (106 lb)   09/11/24 48 kg (105 lb 12.8 oz)   08/27/24 48.4 kg (106 lb 9.6 oz)            Temp Readings from Last 3 Encounters:   09/26/24 98.1 °F (36.7 °C) (Temporal)   09/11/24 97.5 °F (36.4 °C) (Tympanic)   08/27/24 98.9 °F (37.2 °C) (Temporal)            BP Readings from Last 3 Encounters:   09/26/24 136/84   09/12/24 131/87   09/11/24 120/78             Pulse Readings from Last 3 Encounters:   09/26/24 71   09/12/24 95   09/11/24 99         7/4/2022 - DEXA osteoporosis     She had an excision of a palpable lymph node in the right axilla which is coming back as breast cancer recurrence but the pathology also describes follicular changes.  She is following up with surgical oncology and will have her come back a few days after that appointment.     A. Right axillary lymph node (core needle biopsy): - Invasive mammary carcinoma of no special type (ductal, 1.2mm) involving fibroadipose tissue - Idania score: 4 (of 9), grade 1 - Tubule formation: score 1 (of 3) - Nuclear pleomorphism: score  2 (of 3) - Mitoses: Score 1 (of 3 - In situ follicular neoplasia (ISFN); see comment Comment: - The findings are of in situ follicular neoplasia (ISFN) without overt involvement by follicular lymphoma on these small biopsies. Involvement by follicular lymphoma elsewhere in the lymph node or in other lymph nodes cannot be ruled out on this sample. Asubsetof ISFN will never progress to follicular lymphoma; however, a subset either already have, or will develop follicular lymphomaor otherB-cell lymphomas. Correlation with systemic imaging to rule out lymphadenopathy elsewhere is recommended, as clinically indicated. - The minute focus of invasive carcinoma is present outside of the lymph node, within the fibroadipose tissue. This focus mayrepresentmetastasis versus primary carcinoma. The focus of carcinoma is highlighted by CK AE1/3, GATA3 with absent myoepithelial cellsonSMHC and p63 stains. The lesional tissues are negative for SOX10 and HMB45. Suggest clinical correlation and appropriatefollow-up. [id number] Digna Juárez [id number] Page: 2 of 4 Printed: 9/25/2024 8:00 AM - Bcl6 and CD10 stain germinal centers which also show aberrant Bcl2 expression consistent with in situ follicular neoplasia. CD3/5stain T-cells while CD20 stains B-cells. Bcl1 is negative. Ki67 is approximately 2% within lymphoid tissue. Intradepartmental consultation concurs with the diagnosis of carcinoma and in situ follicular neoplasia        Impression     Early breast cancer, both invasive lobular and ductal, and anastrozole adjuvant therapy, and doing well.  She says that she never received Prolia as documented in the 6/1/22 note. We will repeat DEXA. The plan would be to continue Arimidex and can make a decision of 5 versus 10 years of adjuvant hormonal therapy in 2 years.     The axillary recurrence is of concern.   On 10/10/2024 she was seen by Breast Surgery who said:  ...status post right mastectomy with sentinel node biopsy in  2021. All she has been on anastrozole and continues on this in spite of the recent recurrence. She apparently was counseled on postmastectomy radiation given the micro met in the sentinel node but she declined. She recently had an axillary ultrasound secondary to a mass which revealed a stable seroma as well as an enlarged node. Node biopsy reveals recurrent carcinoma as well as in situ follicular neoplasia. Staging scans showed no evidence of lymphoma. She does have a thyroid nodule and will be undergoing biopsy later this month. There were additional nonspecific findings on the staging scans and 3-month follow-up CAT scan of the chest and abdomen are recommended. She also needs a pelvic ultrasound. I discussed axillary dissection with her. I will refer her to the lymphedema therapist preoperatively. ..... She would like to have surgery until she completes her additional workup including thyroid biopsy.      She had the thyroid biopsy, but no results yet. I reinforced that she can use this time to get a DEXA scan   She stopped anastrozole.  At this point the center of gravity is with the surgeons.  After the decisions are made about axillary dissection and possible thyroid surgery, she can return back to us to reconsider additional chemotherapy or hormonal therapy.  She has an appointment in 1 month.

## 2024-11-01 ENCOUNTER — TELEPHONE (OUTPATIENT)
Age: 59
End: 2024-11-01

## 2024-11-01 NOTE — TELEPHONE ENCOUNTER
Spoke with patient. She is requesting someone call her to discuss the results from her thyroid biopsy. She states the results were not back in time for her visit on 10/30 with Dr. Penn. Informed her I would notify Dr. Penn that the results are back but that I will also send a message to the Surgical Oncology office who ordered the biopsy. She verbalized an understanding.

## 2024-11-04 ENCOUNTER — DOCUMENTATION (OUTPATIENT)
Dept: HEMATOLOGY ONCOLOGY | Facility: CLINIC | Age: 59
End: 2024-11-04

## 2024-11-04 ENCOUNTER — TELEPHONE (OUTPATIENT)
Dept: SURGICAL ONCOLOGY | Facility: CLINIC | Age: 59
End: 2024-11-04

## 2024-11-04 NOTE — PROGRESS NOTES
BREAST CANCER MULTIDISCIPLINARY CASE CONFERENCE    DATE: 11/4/24      PRESENTING DOCTOR:  Dr. Ashleigh Doran  OTHER RELEVANT PROVIDERS:  Dr. Zachary Penn, Dr. Asif Florence      DIAGNOSIS:  Right axillary lymph node with invasive mammary carcinoma of no special type (ductal, 1.2 mm) involving fibroadipose tissue,  grade 1, ER 91% positive, HI 1% weak positive, HER-2 0 negative      Digna Juárez is a 59 y.o. female who was presented at the Breast Multidisciplinary Case Conference today to discuss her recently diagnosed metastatic breast cancer found in the right axillary lymph node and management options.  Patient was originally diagnosed with multifocal right breast cancer in 2021.  She had micrometastasis in one right axillary lymph node at the time.  She was treated with a right mastectomy with SLB biopsy and adjuvant anastrozole.  She declined adjuvant radiation treatment.  MammaPrint performed on surgical specimen came back Low Risk, Luminal A.  Patient started with painful swelling in her right axillary region at the end of August 2024.  She was sent to the breast center for an axillary ultrasound.  Subsequent biopsy was performed and diagnosis was made.  Patient has had staging studies completed.  She has met with surgical oncology and medical oncology.      GENETICS: 6/10/21-negative    GENOMICS: 7/7/2021-MammaPrint Low risk Luminal A    IMAGING REVIEWED:   8/30/24-US breast axilla right      PATHOLOGY REVIEWED:  9/12/24-tissue exam-Ultrasound guided right axillary lymph node biopsy-pathology slides shown      PHYSICIAN RECOMMENDED PLAN:  Recommend right axillary node dissection, as discussed with the patient  Recommend referral to radiation oncology after surgery to discuss adjuvant radiation treatment  Recommend follow-up with medical oncology after surgery to discuss adjuvant treatment         FOLLOW UP APPOINTMENTS:    Future Appointments   Date Time Provider Department Center   11/6/2024  5:45 PM DIOGO RUELAS  SLN 1 BE SLN Dexa BE NORTH   12/2/2024  2:00 PM [facility]   12/11/2024 10:00 AM EA CT 1 EA CT [facility]   12/16/2024  3:00 PM NATALIE Knox DO PC [facility]   1/6/2025  1:40 PM Asif Florence [facility]   5/8/2025  8:45 AM [facility]   5/27/2025  3:45 PM BE MAMMO RBC 2 BE RBC Mammo BE RBC          Team agreed to plan.    The final treatment plan will be left to the discretion of the patient and the treating physician.     DISCLAIMERS:  TO THE TREATING PHYSICIAN:  This conference is a meeting of clinicians from various specialty areas who evaluate and discuss patients for whom a multidisciplinary treatment approach is being considered. Please note that the above opinion was a consensus of the conference attendees and is intended only to assist in quality care of the discussed patient.  The responsibility for follow up on the input given during the conference, along with any final decisions regarding plan of care, is that of the patient and the patient's provider. Accordingly, appointments have only been recommended based on this information and have NOT been scheduled unless otherwise noted.      TO THE PATIENT:  This summary is a brief record of major aspects of your cancer treatment. You may choose to share a copy with any of your doctors or nurses. However, this is not a detailed or comprehensive record of your care.      NCCN guidelines were readily available for review at this discussion

## 2024-11-04 NOTE — TELEPHONE ENCOUNTER
Called and spoke with patient in regards to setting up her surgery now that she had her Thyroid biopsy. She was agreeable and has scheduled for 11/19/24 at [facility] [place] and address has been provided. She is aware that she needs to get someone to take her to hospital and back home day of surgery. We reviewed the PAT process and she is aware to have tests done at a [facility]. Digna plans to get them done Wednesday as she has a Dexa scan scheduled at [facility]. Provided her with a post op visit at the [facility] with Dr Doran on 12/5/24 at 10 am and she is aware I will arrange transport for her to and from this appointment. Provided her with my direct number. She is aware I am fed exing her all this information in written form. She was appreciative of this call. Instructed her to call with any questions.

## 2024-11-05 ENCOUNTER — TELEPHONE (OUTPATIENT)
Dept: SURGICAL ONCOLOGY | Facility: CLINIC | Age: 59
End: 2024-11-05

## 2024-11-05 NOTE — TELEPHONE ENCOUNTER
Digna called in to let me know she received her pre op instructions I mailed her via [employer] ex and was she was appreciative of it. She was instructed to call me if she should have any questions.

## 2024-11-06 ENCOUNTER — TELEPHONE (OUTPATIENT)
Dept: SURGICAL ONCOLOGY | Facility: CLINIC | Age: 59
End: 2024-11-06

## 2024-11-06 ENCOUNTER — OFFICE VISIT (OUTPATIENT)
Dept: LAB | Age: 59
End: 2024-11-06
Payer: COMMERCIAL

## 2024-11-06 ENCOUNTER — APPOINTMENT (OUTPATIENT)
Dept: LAB | Age: 59
End: 2024-11-06
Payer: COMMERCIAL

## 2024-11-06 ENCOUNTER — HOSPITAL ENCOUNTER (OUTPATIENT)
Dept: RADIOLOGY | Age: 59
Discharge: HOME | End: 2024-11-06
Payer: COMMERCIAL

## 2024-11-06 DIAGNOSIS — C50.911 CARCINOMA OF RIGHT BREAST METASTATIC TO AXILLARY LYMPH NODE (HCC): ICD-10-CM

## 2024-11-06 DIAGNOSIS — C77.3 CARCINOMA OF RIGHT BREAST METASTATIC TO AXILLARY LYMPH NODE (HCC): ICD-10-CM

## 2024-11-06 DIAGNOSIS — C50.811 MALIGNANT NEOPLASM OF OVERLAPPING SITES OF RIGHT BREAST IN FEMALE, ESTROGEN RECEPTOR POSITIVE (HCC): ICD-10-CM

## 2024-11-06 DIAGNOSIS — Z13.820 ENCOUNTER FOR SCREENING FOR OSTEOPOROSIS: ICD-10-CM

## 2024-11-06 DIAGNOSIS — Z17.0 MALIGNANT NEOPLASM OF OVERLAPPING SITES OF RIGHT BREAST IN FEMALE, ESTROGEN RECEPTOR POSITIVE (HCC): ICD-10-CM

## 2024-11-06 LAB
ALBUMIN SERPL BCG-MCNC: 4.4 G/DL (ref 3.5–5)
ALP SERPL-CCNC: 65 U/L (ref 34–104)
ALT SERPL W P-5'-P-CCNC: 30 U/L (ref 7–52)
ANION GAP SERPL CALCULATED.3IONS-SCNC: 7 MMOL/L (ref 4–13)
AST SERPL W P-5'-P-CCNC: 26 U/L (ref 13–39)
BACTERIA UR QL AUTO: ABNORMAL /HPF
BASOPHILS # BLD AUTO: 0.06 THOUSANDS/ÂΜL (ref 0–0.1)
BASOPHILS NFR BLD AUTO: 1 % (ref 0–1)
BILIRUB SERPL-MCNC: 0.39 MG/DL (ref 0.2–1)
BILIRUB UR QL STRIP: NEGATIVE
BUN SERPL-MCNC: 16 MG/DL (ref 5–25)
CALCIUM SERPL-MCNC: 9.1 MG/DL (ref 8.4–10.2)
CAOX CRY URNS QL MICRO: ABNORMAL /HPF
CHLORIDE SERPL-SCNC: 103 MMOL/L (ref 96–108)
CLARITY UR: CLEAR
CO2 SERPL-SCNC: 28 MMOL/L (ref 21–32)
COLOR UR: ABNORMAL
CREAT SERPL-MCNC: 0.9 MG/DL (ref 0.6–1.3)
EOSINOPHIL # BLD AUTO: 0.1 THOUSAND/ÂΜL (ref 0–0.61)
EOSINOPHIL NFR BLD AUTO: 2 % (ref 0–6)
ERYTHROCYTE [DISTWIDTH] IN BLOOD BY AUTOMATED COUNT: 12.8 % (ref 11.6–15.1)
GFR SERPL CREATININE-BSD FRML MDRD: 69 ML/MIN/1.73SQ M
GLUCOSE SERPL-MCNC: 146 MG/DL (ref 65–140)
GLUCOSE UR STRIP-MCNC: NEGATIVE MG/DL
HCT VFR BLD AUTO: 38.2 % (ref 34.8–46.1)
HGB BLD-MCNC: 12.1 G/DL (ref 11.5–15.4)
HGB UR QL STRIP.AUTO: NEGATIVE
IMM GRANULOCYTES # BLD AUTO: 0.02 THOUSAND/UL (ref 0–0.2)
IMM GRANULOCYTES NFR BLD AUTO: 0 % (ref 0–2)
KETONES UR STRIP-MCNC: NEGATIVE MG/DL
LEUKOCYTE ESTERASE UR QL STRIP: ABNORMAL
LYMPHOCYTES # BLD AUTO: 2.04 THOUSANDS/ÂΜL (ref 0.6–4.47)
LYMPHOCYTES NFR BLD AUTO: 35 % (ref 14–44)
MCH RBC QN AUTO: 30.6 PG (ref 26.8–34.3)
MCHC RBC AUTO-ENTMCNC: 31.7 G/DL (ref 31.4–37.4)
MCV RBC AUTO: 97 FL (ref 82–98)
MONOCYTES # BLD AUTO: 0.5 THOUSAND/ÂΜL (ref 0.17–1.22)
MONOCYTES NFR BLD AUTO: 9 % (ref 4–12)
NEUTROPHILS # BLD AUTO: 3.19 THOUSANDS/ÂΜL (ref 1.85–7.62)
NEUTS SEG NFR BLD AUTO: 53 % (ref 43–75)
NITRITE UR QL STRIP: NEGATIVE
NON-SQ EPI CELLS URNS QL MICRO: ABNORMAL /HPF
NRBC BLD AUTO-RTO: 0 /100 WBCS
PH UR STRIP.AUTO: 6.5 [PH]
PLATELET # BLD AUTO: 232 THOUSANDS/UL (ref 149–390)
PMV BLD AUTO: 11.5 FL (ref 8.9–12.7)
POTASSIUM SERPL-SCNC: 4.5 MMOL/L (ref 3.5–5.3)
PROT SERPL-MCNC: 6.7 G/DL (ref 6.4–8.4)
PROT UR STRIP-MCNC: NEGATIVE MG/DL
RBC # BLD AUTO: 3.96 MILLION/UL (ref 3.81–5.12)
RBC #/AREA URNS AUTO: ABNORMAL /HPF
SODIUM SERPL-SCNC: 138 MMOL/L (ref 135–147)
SP GR UR STRIP.AUTO: 1.01 (ref 1–1.03)
UROBILINOGEN UR STRIP-ACNC: <2 MG/DL
WBC # BLD AUTO: 5.91 THOUSAND/UL (ref 4.31–10.16)
WBC #/AREA URNS AUTO: ABNORMAL /HPF

## 2024-11-06 PROCEDURE — 81001 URINALYSIS AUTO W/SCOPE: CPT

## 2024-11-06 PROCEDURE — 36415 COLL VENOUS BLD VENIPUNCTURE: CPT

## 2024-11-06 PROCEDURE — 85025 COMPLETE CBC W/AUTO DIFF WBC: CPT

## 2024-11-06 PROCEDURE — 77080 DXA BONE DENSITY AXIAL: CPT

## 2024-11-06 PROCEDURE — 93005 ELECTROCARDIOGRAM TRACING: CPT

## 2024-11-06 PROCEDURE — 80053 COMPREHEN METABOLIC PANEL: CPT

## 2024-11-06 NOTE — TELEPHONE ENCOUNTER
Digna called in to ask about any diet restrictions prior to surgery. She wanted to know if she should refrain from eating fish. I explained no she does not need to refrain from eating fish but should stop taking fish oil supplements pre operatively as stated on her instructions. Patient was appreciative of this info and I instructed her to call me with any further questions.

## 2024-11-07 LAB
ATRIAL RATE: 81 BPM
P AXIS: 59 DEGREES
PR INTERVAL: 142 MS
QRS AXIS: 51 DEGREES
QRSD INTERVAL: 90 MS
QT INTERVAL: 402 MS
QTC INTERVAL: 467 MS
T WAVE AXIS: 55 DEGREES
VENTRICULAR RATE: 81 BPM

## 2024-11-07 PROCEDURE — 93010 ELECTROCARDIOGRAM REPORT: CPT | Performed by: INTERNAL MEDICINE

## 2024-11-11 ENCOUNTER — APPOINTMENT (OUTPATIENT)
Dept: PREADMISSION TESTING | Facility: HOSPITAL | Age: 59
End: 2024-11-11
Payer: COMMERCIAL

## 2024-11-11 NOTE — PRE-PROCEDURE INSTRUCTIONS
Pre-Surgery Instructions:   Medication Instructions    atenolol (TENORMIN) 50 mg tablet Take day of surgery.    carbamide peroxide (DEBROX) 6.5 % otic solution Hold day of surgery.    Cholecalciferol ( Ultra Strength) 50 MCG (2000 UT) CAPS Stop taking 7 days prior to surgery.    famotidine (PEPCID) 40 MG tablet Take night before surgery    gabapentin (Neurontin) 100 mg capsule Instructions provided by MD    levothyroxine 50 mcg tablet Take day of surgery.    lisinopril (ZESTRIL) 10 mg tablet Hold day of surgery.    Multiple Vitamin (Daily-Alanis) TABS Stop taking 7 days prior to surgery.    OLANZapine (ZyPREXA) 7.5 mg tablet Take night before surgery    traMADol (Ultram) 50 mg tablet Instructions provided by MD      Medication instructions for day surgery reviewed. Please use only a sip of water to take your instructed medications. Avoid all over the counter vitamins, supplements and NSAIDS for one week prior to surgery per anesthesia guidelines. Tylenol is ok to take as needed.     You will receive a call one business day prior to surgery with an arrival time and hospital directions. If your surgery is scheduled on a Monday, the hospital will be calling you on the Friday prior to your surgery. If you have not heard from anyone by 8pm, please call the hospital supervisor through the hospital  at [phone]. ([place] [phone] or John Day [phone]).    Do not eat or drink anything after midnight the night before your surgery, including candy, mints, lifesavers, or chewing gum. Do not drink alcohol 24hrs before your surgery. Try not to smoke at least 24hrs before your surgery.       Follow the pre surgery showering instructions as listed in the “My Surgical Experience Booklet” or otherwise provided by your surgeon's office. Do not use a blade to shave the surgical area 1 week before surgery. It is okay to use a clean electric clippers up to 24 hours before surgery. Do not apply any lotions,  creams, including makeup, cologne, deodorant, or perfumes after showering on the day of your surgery. Do not use dry shampoo, hair spray, hair gel, or any type of hair products.     No contact lenses, eye make-up, or artificial eyelashes. Remove nail polish, including gel polish, and any artificial, gel, or acrylic nails if possible. Remove all jewelry including rings and body piercing jewelry.     Wear causal clothing that is easy to take on and off. Consider your type of surgery.    Keep any valuables, jewelry, piercings at home. Please bring any specially ordered equipment (sling, braces) if indicated.    Arrange for a responsible person to drive you to and from the hospital on the day of your surgery. Please confirm the visitor policy for the day of your procedure when you receive your phone call with an arrival time.     Call the surgeon's office with any new illnesses, exposures, or additional questions prior to surgery.    Please reference your “My Surgical Experience Booklet” for additional information to prepare for your upcoming surgery.

## 2024-11-12 ENCOUNTER — TELEPHONE (OUTPATIENT)
Age: 59
End: 2024-11-12

## 2024-11-12 NOTE — TELEPHONE ENCOUNTER
FYI:  Pt calling just wanting to reconfirm her medications prior to surgery.  Discussed with the pt the med list as per her conversation last evening with preop nurse Nithya ZALDIVAR. Referenced pre procedure instructions.Pt understood.  Pt also wanting to know about her labs and EKG.  Advised pt that Dr Doran will review all preop testing and if anything abnormal pt will be informed.  Pt appreciative of the information provided to her today.  All questions were answered to her satisfaction.

## 2024-11-18 ENCOUNTER — ANESTHESIA EVENT (OUTPATIENT)
Dept: PERIOP | Facility: HOSPITAL | Age: 59
End: 2024-11-18
Payer: COMMERCIAL

## 2024-11-19 ENCOUNTER — ANESTHESIA (OUTPATIENT)
Dept: PERIOP | Facility: HOSPITAL | Age: 59
End: 2024-11-19
Payer: COMMERCIAL

## 2024-11-19 ENCOUNTER — APPOINTMENT (OUTPATIENT)
Dept: MAMMOGRAPHY | Facility: HOSPITAL | Age: 59
End: 2024-11-19
Payer: COMMERCIAL

## 2024-11-19 ENCOUNTER — HOSPITAL ENCOUNTER (OUTPATIENT)
Facility: HOSPITAL | Age: 59
Setting detail: OUTPATIENT SURGERY
Discharge: HOME | End: 2024-11-19
Attending: SURGERY | Admitting: SURGERY
Payer: COMMERCIAL

## 2024-11-19 VITALS
HEART RATE: 69 BPM | BODY MASS INDEX: 18.05 KG/M2 | RESPIRATION RATE: 16 BRPM | SYSTOLIC BLOOD PRESSURE: 111 MMHG | WEIGHT: 105.16 LBS | DIASTOLIC BLOOD PRESSURE: 65 MMHG | TEMPERATURE: 98.3 F | OXYGEN SATURATION: 100 %

## 2024-11-19 DIAGNOSIS — C77.3 CARCINOMA OF RIGHT BREAST METASTATIC TO AXILLARY LYMPH NODE (HCC): Primary | ICD-10-CM

## 2024-11-19 DIAGNOSIS — C50.911 CARCINOMA OF RIGHT BREAST METASTATIC TO AXILLARY LYMPH NODE (HCC): Primary | ICD-10-CM

## 2024-11-19 DIAGNOSIS — C50.811 MALIGNANT NEOPLASM OF OVERLAPPING SITES OF RIGHT BREAST IN FEMALE, ESTROGEN RECEPTOR POSITIVE (HCC): ICD-10-CM

## 2024-11-19 DIAGNOSIS — Z17.0 MALIGNANT NEOPLASM OF OVERLAPPING SITES OF RIGHT BREAST IN FEMALE, ESTROGEN RECEPTOR POSITIVE (HCC): ICD-10-CM

## 2024-11-19 PROCEDURE — 38745 REMOVE ARMPIT LYMPH NODES: CPT | Performed by: PHYSICIAN ASSISTANT

## 2024-11-19 PROCEDURE — 88341 IMHCHEM/IMCYTCHM EA ADD ANTB: CPT | Performed by: STUDENT IN AN ORGANIZED HEALTH CARE EDUCATION/TRAINING PROGRAM

## 2024-11-19 PROCEDURE — 88307 TISSUE EXAM BY PATHOLOGIST: CPT | Performed by: STUDENT IN AN ORGANIZED HEALTH CARE EDUCATION/TRAINING PROGRAM

## 2024-11-19 PROCEDURE — 38745 REMOVE ARMPIT LYMPH NODES: CPT | Performed by: SURGERY

## 2024-11-19 PROCEDURE — NC001 PR NO CHARGE: Performed by: SURGERY

## 2024-11-19 PROCEDURE — 88342 IMHCHEM/IMCYTCHM 1ST ANTB: CPT | Performed by: STUDENT IN AN ORGANIZED HEALTH CARE EDUCATION/TRAINING PROGRAM

## 2024-11-19 RX ORDER — KETOROLAC TROMETHAMINE 30 MG/ML
INJECTION, SOLUTION INTRAMUSCULAR; INTRAVENOUS AS NEEDED
Status: DISCONTINUED | OUTPATIENT
Start: 2024-11-19 | End: 2024-11-19

## 2024-11-19 RX ORDER — LIDOCAINE HYDROCHLORIDE 20 MG/ML
INJECTION, SOLUTION EPIDURAL; INFILTRATION; INTRACAUDAL; PERINEURAL AS NEEDED
Status: DISCONTINUED | OUTPATIENT
Start: 2024-11-19 | End: 2024-11-19

## 2024-11-19 RX ORDER — BUPIVACAINE HYDROCHLORIDE 5 MG/ML
INJECTION, SOLUTION EPIDURAL; INTRACAUDAL AS NEEDED
Status: DISCONTINUED | OUTPATIENT
Start: 2024-11-19 | End: 2024-11-19 | Stop reason: HOSPADM

## 2024-11-19 RX ORDER — EPHEDRINE SULFATE 50 MG/ML
INJECTION INTRAVENOUS AS NEEDED
Status: DISCONTINUED | OUTPATIENT
Start: 2024-11-19 | End: 2024-11-19

## 2024-11-19 RX ORDER — SODIUM CHLORIDE 9 MG/ML
125 INJECTION, SOLUTION INTRAVENOUS CONTINUOUS
Status: DISCONTINUED | OUTPATIENT
Start: 2024-11-19 | End: 2024-11-19 | Stop reason: HOSPADM

## 2024-11-19 RX ORDER — ONDANSETRON 2 MG/ML
INJECTION INTRAMUSCULAR; INTRAVENOUS AS NEEDED
Status: DISCONTINUED | OUTPATIENT
Start: 2024-11-19 | End: 2024-11-19

## 2024-11-19 RX ORDER — HYDROMORPHONE HCL/PF 1 MG/ML
0.5 SYRINGE (ML) INJECTION
Status: DISCONTINUED | OUTPATIENT
Start: 2024-11-19 | End: 2024-11-19 | Stop reason: HOSPADM

## 2024-11-19 RX ORDER — CEFAZOLIN SODIUM 1 G/50ML
1000 SOLUTION INTRAVENOUS ONCE
Status: COMPLETED | OUTPATIENT
Start: 2024-11-19 | End: 2024-11-19

## 2024-11-19 RX ORDER — FENTANYL CITRATE 50 UG/ML
INJECTION, SOLUTION INTRAMUSCULAR; INTRAVENOUS AS NEEDED
Status: DISCONTINUED | OUTPATIENT
Start: 2024-11-19 | End: 2024-11-19

## 2024-11-19 RX ORDER — MIDAZOLAM HYDROCHLORIDE 2 MG/2ML
INJECTION, SOLUTION INTRAMUSCULAR; INTRAVENOUS AS NEEDED
Status: DISCONTINUED | OUTPATIENT
Start: 2024-11-19 | End: 2024-11-19

## 2024-11-19 RX ORDER — PHENYLEPHRINE HCL IN 0.9% NACL 1 MG/10 ML
SYRINGE (ML) INTRAVENOUS AS NEEDED
Status: DISCONTINUED | OUTPATIENT
Start: 2024-11-19 | End: 2024-11-19

## 2024-11-19 RX ORDER — DEXMEDETOMIDINE HYDROCHLORIDE 100 UG/ML
INJECTION, SOLUTION INTRAVENOUS AS NEEDED
Status: DISCONTINUED | OUTPATIENT
Start: 2024-11-19 | End: 2024-11-19

## 2024-11-19 RX ORDER — DEXAMETHASONE SODIUM PHOSPHATE 10 MG/ML
INJECTION, SOLUTION INTRAMUSCULAR; INTRAVENOUS AS NEEDED
Status: DISCONTINUED | OUTPATIENT
Start: 2024-11-19 | End: 2024-11-19

## 2024-11-19 RX ORDER — MEPERIDINE HYDROCHLORIDE 25 MG/ML
12.5 INJECTION INTRAMUSCULAR; INTRAVENOUS; SUBCUTANEOUS ONCE AS NEEDED
Status: DISCONTINUED | OUTPATIENT
Start: 2024-11-19 | End: 2024-11-19 | Stop reason: HOSPADM

## 2024-11-19 RX ORDER — TRAMADOL HYDROCHLORIDE 50 MG/1
50 TABLET ORAL EVERY 6 HOURS PRN
Status: DISCONTINUED | OUTPATIENT
Start: 2024-11-19 | End: 2024-11-19 | Stop reason: HOSPADM

## 2024-11-19 RX ORDER — MAGNESIUM HYDROXIDE 1200 MG/15ML
LIQUID ORAL AS NEEDED
Status: DISCONTINUED | OUTPATIENT
Start: 2024-11-19 | End: 2024-11-19 | Stop reason: HOSPADM

## 2024-11-19 RX ORDER — PROPOFOL 10 MG/ML
INJECTION, EMULSION INTRAVENOUS AS NEEDED
Status: DISCONTINUED | OUTPATIENT
Start: 2024-11-19 | End: 2024-11-19

## 2024-11-19 RX ORDER — ONDANSETRON 2 MG/ML
4 INJECTION INTRAMUSCULAR; INTRAVENOUS ONCE AS NEEDED
Status: DISCONTINUED | OUTPATIENT
Start: 2024-11-19 | End: 2024-11-19 | Stop reason: HOSPADM

## 2024-11-19 RX ORDER — PROMETHAZINE HYDROCHLORIDE 25 MG/ML
6.25 INJECTION, SOLUTION INTRAMUSCULAR; INTRAVENOUS ONCE AS NEEDED
Status: DISCONTINUED | OUTPATIENT
Start: 2024-11-19 | End: 2024-11-19 | Stop reason: HOSPADM

## 2024-11-19 RX ORDER — GABAPENTIN 100 MG/1
100 CAPSULE ORAL 3 TIMES DAILY
Status: DISCONTINUED | OUTPATIENT
Start: 2024-11-19 | End: 2024-11-19 | Stop reason: HOSPADM

## 2024-11-19 RX ORDER — FENTANYL CITRATE/PF 50 MCG/ML
50 SYRINGE (ML) INJECTION
Status: DISCONTINUED | OUTPATIENT
Start: 2024-11-19 | End: 2024-11-19 | Stop reason: HOSPADM

## 2024-11-19 RX ORDER — ACETAMINOPHEN 325 MG/1
650 TABLET ORAL EVERY 6 HOURS PRN
Status: DISCONTINUED | OUTPATIENT
Start: 2024-11-19 | End: 2024-11-19 | Stop reason: HOSPADM

## 2024-11-19 RX ADMIN — LIDOCAINE HYDROCHLORIDE 60 MG: 20 INJECTION, SOLUTION EPIDURAL; INFILTRATION; INTRACAUDAL at 07:37

## 2024-11-19 RX ADMIN — DEXMEDETOMIDINE 2 MCG: 100 INJECTION, SOLUTION INTRAVENOUS at 07:55

## 2024-11-19 RX ADMIN — CEFAZOLIN SODIUM 1000 MG: 1 SOLUTION INTRAVENOUS at 07:41

## 2024-11-19 RX ADMIN — KETOROLAC TROMETHAMINE 15 MG: 30 INJECTION, SOLUTION INTRAMUSCULAR; INTRAVENOUS at 08:49

## 2024-11-19 RX ADMIN — MIDAZOLAM 2 MG: 1 INJECTION INTRAMUSCULAR; INTRAVENOUS at 07:26

## 2024-11-19 RX ADMIN — Medication 100 MCG: at 08:05

## 2024-11-19 RX ADMIN — EPHEDRINE SULFATE 15 MG: 50 INJECTION INTRAVENOUS at 08:32

## 2024-11-19 RX ADMIN — Medication 100 MCG: at 07:57

## 2024-11-19 RX ADMIN — FENTANYL CITRATE 25 MCG: 50 INJECTION, SOLUTION INTRAMUSCULAR; INTRAVENOUS at 07:35

## 2024-11-19 RX ADMIN — EPHEDRINE SULFATE 20 MG: 50 INJECTION INTRAVENOUS at 08:47

## 2024-11-19 RX ADMIN — ONDANSETRON 4 MG: 2 INJECTION INTRAMUSCULAR; INTRAVENOUS at 08:27

## 2024-11-19 RX ADMIN — EPHEDRINE SULFATE 15 MG: 50 INJECTION INTRAVENOUS at 08:37

## 2024-11-19 RX ADMIN — FENTANYL CITRATE 25 MCG: 50 INJECTION, SOLUTION INTRAMUSCULAR; INTRAVENOUS at 07:55

## 2024-11-19 RX ADMIN — DEXAMETHASONE SODIUM PHOSPHATE 10 MG: 10 INJECTION INTRAMUSCULAR; INTRAVENOUS at 07:38

## 2024-11-19 RX ADMIN — SODIUM CHLORIDE 125 ML/HR: 0.9 INJECTION, SOLUTION INTRAVENOUS at 05:50

## 2024-11-19 RX ADMIN — FENTANYL CITRATE 25 MCG: 50 INJECTION, SOLUTION INTRAMUSCULAR; INTRAVENOUS at 08:25

## 2024-11-19 RX ADMIN — PROPOFOL 120 MG: 10 INJECTION, EMULSION INTRAVENOUS at 07:37

## 2024-11-19 RX ADMIN — FENTANYL CITRATE 25 MCG: 50 INJECTION, SOLUTION INTRAMUSCULAR; INTRAVENOUS at 08:21

## 2024-11-19 NOTE — ANESTHESIA POSTPROCEDURE EVALUATION
Post-Op Assessment Note    CV Status:  Stable  Pain Score: 0    Pain management: adequate    Multimodal analgesia used between 6 hours prior to anesthesia start to PACU discharge    Mental Status:  Sleepy and arousable   Hydration Status:  Stable   PONV Controlled:  None   Airway Patency:  Patent  Airway: intubated (LMA)     Post Op Vitals Reviewed: Yes    No anethesia notable event occurred.    Staff: Anesthesiologist, CRNA           Last Filed PACU Vitals:  Vitals Value Taken Time   Temp 36.6    Pulse 72 11/19/24 0912   /72 11/19/24 0910   Resp 16    SpO2 100 % 11/19/24 0912   Vitals shown include unfiled device data.    Modified Koko:  No data recorded

## 2024-11-19 NOTE — DISCHARGE INSTR - AVS FIRST PAGE
POST-OPERATIVE CARE INSTRUCTIONS       Care after your procedure:   General  Rest and relax for 24 hours, then gradually return to normal activities.  Do not perform any heavy lifting or strenuous physical activities for 14 days.  Your activity restrictions will be re-evaluated at your post op visit.  Drink clear liquids until you are certain there is no nausea, then resume a normal diet.  Do not drink alcohol, drive any vehicle, operate mechanical equipment or make critical decisions for at least 24 hours and until you are off any narcotic pain medications.  The Incision  Your incision is closed with:   dissolvable stiches just underneath the skin.                The incision is also covered with:                          clear waterproof glue  A gauze-pad is covering the wound.  Wound care  Remove your gauze-pad after 24 hours.   You may then shower using soap and water to clean your incision. Gently dry the wound.  You may redress your wound with additional gauze and tape if you choose.  A little bruising at the wound site is normal.   Collin-Seals Drain (JPDrain) see separate instructions.  Medication  Resume all previous medications  Take either Naproxen (Aleve) one tablet every 8 hours or Ibuprofen(Advil/Motrin) one(1) to two(2) tablets every 6 hours as needed  Pain Medication Instructions:may also use over the counter tylenol or prescription tramadol and/or gabapentin if needed          Other (If applicable)  Wear a post-surgical bra around the clock.  May use ice to the incision site(s) for the next 24-48 hours, twice daily.   Call your  doctor if you have any of the following:  Redness, swelling, heat, drainage, and/or bleeding from your wound  Chills or fever ( above 101' F )  Pain, not relieved with the above medications  If you have any questions or problems call our office [phone]    Follow-up appointment:  As scheduled

## 2024-11-19 NOTE — ANESTHESIA POSTPROCEDURE EVALUATION
Post-Op Assessment Note    CV Status:  Stable  Pain Score: 1    Pain management: adequate       Mental Status:  Alert and awake   Hydration Status:  Euvolemic   PONV Controlled:  Controlled   Airway Patency:  Patent     Post Op Vitals Reviewed: Yes    No anethesia notable event occurred.    Staff: Anesthesiologist           Last Filed PACU Vitals:  Vitals Value Taken Time   Temp 97.5 °F (36.4 °C) 11/19/24 0910   Pulse 64 11/19/24 0939   /70 11/19/24 0925   Resp 18 11/19/24 0939   SpO2 100 % 11/19/24 0939   Vitals shown include unfiled device data.    Modified Koko:  Activity: 2 (11/19/2024  9:25 AM)  Respiration: 2 (11/19/2024  9:25 AM)  Circulation: 2 (11/19/2024  9:25 AM)  Consciousness: 2 (11/19/2024  9:25 AM)  Oxygen Saturation: 2 (11/19/2024  9:25 AM)  Modified Koko Score: 10 (11/19/2024  9:25 AM)

## 2024-11-19 NOTE — ANESTHESIA PREPROCEDURE EVALUATION
Procedure:  RIGHT AXILLARY DISSECTION (Right: Axilla)    Relevant Problems   CARDIO   (+) Hypertension      ENDO   (+) Hypothyroidism      GI/HEPATIC   (+) GERD without esophagitis      GYN   (+) Malignant neoplasm of overlapping sites of right breast in female, estrogen receptor positive (HCC)      Behavioral Health   (+) Anorexia nervosa      Other   (+) Body mass index (BMI) of 19.9 or less in adult   (+) Carcinoma of right breast metastatic to axillary lymph node (HCC)   (+) Seasonal allergies        Physical Exam    Airway    Mallampati score: II         Dental   No notable dental hx     Cardiovascular  Cardiovascular exam normal    Pulmonary  Pulmonary exam normal     Other Findings  post-pubertal.      Anesthesia Plan  ASA Score- 2     Anesthesia Type- general with ASA Monitors.         Additional Monitors:     Airway Plan: LMA.           Plan Factors-Exercise tolerance (METS): >4 METS.    Chart reviewed.        Patient is not a current smoker.              Induction- intravenous.    Postoperative Plan- Plan for postoperative opioid use. Planned trial extubation        Informed Consent- Anesthetic plan and risks discussed with patient.

## 2024-11-19 NOTE — H&P
Surgical Oncology Follow Up       [facility] [place]  [address]  [place] [address]    Digna Juárez  1965  [id number]  [facility] [place]  [address]  [place] [address]    No chief complaint on file.      Assessment & Plan   Diagnoses and all orders for this visit:    Carcinoma of right breast metastatic to axillary lymph node (HCC)  -     Case request operating room: RIGHT AXILLARY DISSECTION; Standing  -     CBC and differential; Future  -     Comprehensive metabolic panel; Future  -     UA w Reflex to Microscopic w Reflex to Culture; Future  -     EKG 12 lead; Future  -     XR chest pa and lateral; Future  -     Case request operating room: RIGHT AXILLARY DISSECTION  -     traMADol (Ultram) 50 mg tablet; Take 1 tablet (50 mg total) by mouth every 8 (eight) hours as needed for severe pain  -     gabapentin (Neurontin) 100 mg capsule; Take 1 capsule (100 mg total) by mouth 3 (three) times a day  -     Ambulatory referral to PT/OT lymphedema therapy; Future    Malignant neoplasm of overlapping sites of right breast in female, estrogen receptor positive (HCC)  -     Case request operating room: RIGHT AXILLARY DISSECTION; Standing  -     CBC and differential; Future  -     Comprehensive metabolic panel; Future  -     UA w Reflex to Microscopic w Reflex to Culture; Future  -     EKG 12 lead; Future  -     XR chest pa and lateral; Future  -     Case request operating room: RIGHT AXILLARY DISSECTION    Use of anastrozole (Arimidex)    Left ovarian cyst  -     US pelvis complete non OB; Future    Other orders  -     Incentive spirometry; Standing  -     Insert and maintain IV line; Standing  -     Void On-Call to O.R.; Standing  -     Place sequential compression device; Standing  -     ceFAZolin (ANCEF) 1,000 mg in dextrose 5 % 100 mL IVPB        Advance Care Planning/Advance  Directives:  Discussed disease status, cancer treatment plans and/or cancer treatment goals with the patient.     Oncology History:    Oncology History   Malignant neoplasm of overlapping sites of right breast in female, estrogen receptor positive (HCC)   4/13/2021 -  Cancer Staged    Staging form: Breast, AJCC 8th Edition  - Pathologic stage from 4/13/2021: Stage IA (pT1c, pN1mi(sn), cM0, G2, ER+, MI+, HER2-) - Signed by ELZA Santos on 11/7/2023  Stage prefix: Initial diagnosis  Method of lymph node assessment: [place] lymph node biopsy  Multigene prognostic tests performed: MammaPrint  Histologic grading system: 3 grade system       4/16/2021 Biopsy    Right breast US guided biopsy:  A. 11 o'clock 7 cm from the nipple  Invasive lobular carcinoma  Grade 1  ER 90, MI 45, HER2 1+  Lymphovascular invasion: not identified    B. 10 o'clock 7 cm from the nipple  Invasive mammary carcinoma of no special type  Grade 2  , , HER2 1+  Lymphovascular invasion: not identified    Concordant. Malignancy appears multifocal. MRI recommended.     4/30/2021 Observation    Bilateral breast MRI  Multifocal right breast cancer; 10:00 mass measures up to 1.8 cm and abuts pectoral muscle, without evidence of invasion. Two areas of carcinoma have a total extent of disease of 5 cm. Left breast clear. Axilla negative.     6/10/2021 Genetic Testing    The following genes were evaluated: BATSHEVA, BRCA1, BRCA2, CDH1, CHEK2, PALB2, PTEN, STK11, TP53  Additional genes analyzed for a total of 20  Negative result. No pathogenic sequence variants or deletions/dupllications identified  [employer]     6/25/2021 Surgery    Right breast ANJALI  directed mastectomy with sentinel lymph node biopsy  Invasive carcinoma of no special type (ductal)  Grade 2  2 cm (2 foci)  Margins negative  1/1 Lymph node with micrometastases (0.4 mm)  Anatomic Stage IB  Prognostic Stage IA     7/7/2021 Genomic Testing    MammaPrint for FLEX  trial  Low risk (Luminal A)     7/28/2021 -  Hormone Therapy    Anastrozole 1 mg daily  Dr. Cochran     7/29/2021 - 7/29/2021 Radiation    Consult with Dr. Florence  Patient declined post-mastectomy RT     Carcinoma of right breast metastatic to axillary lymph node (HCC)   9/12/2024 Biopsy    Right axillary lymph node ultrasound-guided biopsy (open coil)  Invasive mammary carcinoma of no special type (ductal) involving fibroadipose tissue  Grade 1  ER ; OH 1; HER2 0  In situ follicular neoplasia (involvement by follicular lymphoma elsewhere in the lymph node or in other lymph nodes cannot be ruled out; correlation with systemic imaging to rule out lymphadenopathy elsewhere is recommended as clinically indicated)    Concordant. Minute focus of invasive carcinoma is present outside of the lymph node, within the fibroadipose tissue. This focus may represent metastasis vs primary carcinoma. Single morphologically abnormal lymph node seen on US. Left breast clear on 5/2024 imaging.     9/12/2024 -  Cancer Staged    Staging form: Breast, AJCC 8th Edition  - Clinical stage from 9/12/2024: Stage IA (rcT0, cN1mi(f), cM0, G1, ER+, OH+, HER2-) - Signed by Ashleigh Doran MD on 10/10/2024  Stage prefix: Recurrence  Method of lymph node assessment: Core biopsy  Histologic grading system: 3 grade system       9/17/2024 Observation    CT chest, abdomen, pelvis IMPRESSION:     Bilateral pulmonary nodules measuring 3 to 5 mm as described.  Follow-up CT chest recommended in 3 months considering history of recurrent breast cancer.     Ill-defined hypodense right thyroid gland nodule measures up to 1.5 cm.  Thyroid ultrasound recommended for further characterization.     Lobulated low-density right axillary node measuring 2.2 x 1 cm, consistent with biopsy-proven malignancy.  1.5 cm rounded soft tissue nodule in the left breast with coarse calcification versus biopsy clip.  Correlation with mammographic history recommended.      Ill-defined heterogeneously hypodense lesion within the subcapsular inferior right liver margin.  1.2 cm indeterminate left adrenal gland nodule.  MRI with contrast recommended for further evaluation of the above two lesions.     Punctate sclerotic lesion in the left sternum.  3 mm sclerotic lesion in the posterior/medial right fifth rib, potentially representing a bone island.     Prominent uterus considering age with probable partially exophytic fibroid.     Bladder wall thickening considering degree of distention.  Correlate with urinalysis for possible cystitis.     Note of no priors for comparison     9/23/2024 Observation    MRI abdomen IMPRESSION:     No suspicious focal hepatic lesion. The 1.3 cm observation on prior CT may represent volume averaging of the hepatic border with adjacent mesenteric fat or small amount of focal fat infiltration. However given history of recently diagnosed metastatic   right breast neoplasm, follow-up CT abdomen with contrast in 3 months is recommended to assess for stability of findings     Irregular thickening of the left adrenal gland without discrete nodule findings can represent hyperplasia versus early small metastatic nodule. No remote prior cross-sectional studies of the abdomen are available for direct comparison. Attention on   follow-up CT abdomen in 3 months is recommended to assess for stability.     A 2.5 cm incompletely characterized hypoenhancing uterine lesion, statistically likely representing fibroid. Left ovarian 1.2 cm cystic lesion, also incompletely characterized. Nonemergent pelvic ultrasound is recommended for complete characterization.     Small volume free pelvic fluid of uncertain etiology. Clinical correlation is recommended.     9/24/2024 Observation    NM Bone scan IMPRESSION:     1.  No scintigraphic evidence of osseous metastasis. Incidental findings as above.         History of Present Illness: Patient is here today to discuss surgery for the  recurrent carcinoma of the right axilla, she is status post right mastectomy with sentinel node biopsy.  She had a micro met on the node biopsy at that time and apparently postmastectomy radiation was recommended however the patient declined.  She continues on anastrozole.  She recently saw medical oncology on 9/26/2024.  -Interval History: Right axillary ultrasound with lymph node biopsy, multiple staging scans    Review of Systems:  Review of Systems   Constitutional: Negative.  Negative for appetite change, fever and unexpected weight change.   HENT:  Positive for dental problem. Negative for trouble swallowing.    Eyes: Negative.    Respiratory: Negative.  Negative for cough and shortness of breath.    Cardiovascular: Negative.  Negative for chest pain.   Gastrointestinal: Negative.  Negative for abdominal pain, nausea and vomiting.   Endocrine: Negative.    Genitourinary: Negative.  Negative for dysuria.   Musculoskeletal: Negative.  Negative for arthralgias and myalgias.   Skin: Negative.    Allergic/Immunologic: Negative.    Neurological: Negative.  Negative for headaches.   Hematological: Negative.  Negative for adenopathy. Does not bruise/bleed easily.   Psychiatric/Behavioral: Negative.         Patient Active Problem List   Diagnosis    GERD without esophagitis    Hypothyroidism    Vitamin D deficiency    Hypertension    Deficiency of multiple vitamins    Malignant neoplasm of overlapping sites of right breast in female, estrogen receptor positive (HCC)    Anorexia nervosa    Use of anastrozole (Arimidex)    Seasonal allergies    Body mass index (BMI) of 19.9 or less in adult    Medication management    Encounter for long-term (current) use of medications    Dental abscess    Dentalgia    Encounter to discuss test results    Carcinoma of right breast metastatic to axillary lymph node (HCC)    Need for vaccination    Thyroid nodule greater than or equal to 1 cm in diameter incidentally noted on imaging  study    Left ovarian cyst    Uterine fibroid     Past Medical History:   Diagnosis Date    Abnormal weight loss     Anorexia nervosa     Cancer (HCC)     breast    Disease of thyroid gland     Elevated blood sugar     Occasional     Fibroadenoma of both breasts     GERD (gastroesophageal reflux disease)     Hypertension     Hyperthyroidism     Osteopenia     Schizophrenia (HCC)      Past Surgical History:   Procedure Laterality Date    BREAST BIOPSY Right 2021    BREAST CYST EXCISION Left     CHOLECYSTECTOMY      MASTECTOMY Right     MASTECTOMY W/ SENTINEL NODE BIOPSY Right 2021    Procedure: BREAST MASTECTOMY WITH BIOPSY LYMPH NODE SENTINEL; ANJALI  GUIDED, LYMPHATIC MAPPING WITH BLUE DYE AND RADIOACTIVE DYE (INJECT AT 1130 BY DR LEMUS IN THE OR);  Surgeon: Jim Lemus MD;  Location: [facility];  Service: Surgical Oncology    US BREAST CLIP NEEDLE LOC RIGHT Right 2021    US BREAST NEEDLE LOC RIGHT EACH ADDITIONAL Right 2021    US GUIDANCE BREAST BIOPSY RIGHT EACH ADDITIONAL Right 2021    US GUIDED BREAST BIOPSY RIGHT COMPLETE Right 2021    US GUIDED BREAST LYMPH NODE BIOPSY RIGHT Right 2024    US GUIDED THYROID BIOPSY  10/24/2024     Family History   Adopted: Yes     Social History     Socioeconomic History    Marital status: Single     Spouse name: Not on file    Number of children: 0    Years of education: 12    Highest education level: Not on file   Occupational History    Occupation: never worked    Tobacco Use    Smoking status: Never    Smokeless tobacco: Never   Vaping Use    Vaping status: Never Used   Substance and Sexual Activity    Alcohol use: Yes     Alcohol/week: 2.0 standard drinks of alcohol     Types: 2 Cans of beer per week    Drug use: Never    Sexual activity: Not Currently     Comment: Sexually active:   No - As per [place]    Other Topics Concern    Not on file   Social History Narrative    · Most recent tobacco use screenin2019       · Do you currently or have you served in the [employer]:   No      · Were you activated, into active duty, as a member of the [employer] or as a Reservist:   No      · Caffeine intake:   None decaf coffee only     · Sexual orientation:   Heterosexual      · General stress level:   Low      · Diet:   Regular      · Single or multi-level home/work:   single level home      · Live alone or with others:   alone      · Exercise level:   Moderate outside walk only     · Overweight:   No      · Obese:   No      · Guns present in home:   No      · Seat belts used routinely:   Yes      · Advance directive:   Yes      · Sunscreen used routinely:   Yes      · Smoke alarm in home:   Yes      · Performs monthly self-breast exam:   Yes      · Legally blind in one or both eyes:   No      · Hard of hearing or deaf in one or both ears:   No      · Presence of domestic violence:   No      · Are there stairs in your home:   No      · Pets:   No      Social Drivers of Health     Financial Resource Strain: Low Risk  (5/5/2020)    Overall Financial Resource Strain (CARDIA)     Difficulty of Paying Living Expenses: Not hard at all   Food Insecurity: No Food Insecurity (5/5/2020)    Hunger Vital Sign     Worried About Running Out of Food in the Last Year: Never true     Ran Out of Food in the Last Year: Never true   Transportation Needs: No Transportation Needs (5/5/2020)    PRAPARE - Transportation     Lack of Transportation (Medical): No     Lack of Transportation (Non-Medical): No   Physical Activity: Sufficiently Active (5/5/2020)    Exercise Vital Sign     Days of Exercise per Week: 7 days     Minutes of Exercise per Session: 60 min   Stress: No Stress Concern Present (5/5/2020)    [employer] - Occupational Stress Questionnaire     Feeling of Stress : Not at all   Social Connections: Socially Isolated (5/5/2020)    Social Connection and Isolation Panel [NHANES]     Frequency of Communication  with Friends and Family: More than three times a week     Frequency of Social Gatherings with Friends and Family: More than three times a week     Attends Yarsani Services: Never     Active Member of Clubs or Organizations: No     Attends Club or Organization Meetings: Never     Marital Status: Never    Intimate Partner Violence: Not At Risk (5/5/2020)    Humiliation, Afraid, Rape, and Kick questionnaire     Fear of Current or Ex-Partner: No     Emotionally Abused: No     Physically Abused: No     Sexually Abused: No   Housing Stability: Not on file       Current Facility-Administered Medications:     bupivacaine (PF) (MARCAINE) 0.5 % injection, , , PRN, Ashleigh Doran MD, 10 mL at 11/19/24 0801    sodium chloride 0.9 % infusion, 125 mL/hr, Intravenous, Continuous, Marlo Buckley DO, Last Rate: 100 mL/hr at 11/19/24 0730, Rate Change at 11/19/24 0730    sodium chloride 0.9 % irrigation solution, , , PRN, Ashleigh Doran MD, 1,000 mL at 11/19/24 0809    Facility-Administered Medications Ordered in Other Encounters:     dexamethasone (PF) (DECADRON) injection, , Intravenous, PRN, Amber Kelly, CRNA, 10 mg at 11/19/24 0738    dexmedeTOMIDine (Precedex) injection, , Intravenous, PRN, Amber Kelly, CRNA, 2 mcg at 11/19/24 0755    ePHEDrine injection, , Intravenous, PRN, Amber Kelly, CRNA, 15 mg at 11/19/24 0837    fentaNYL injection, , Intravenous, PRN, Amber Kelly, CRNA, 25 mcg at 11/19/24 0825    lidocaine (PF) (XYLOCAINE-MPF) 2 % injection, , Intravenous, PRN, Amber Kelly, CRNA, 60 mg at 11/19/24 0737    midazolam (VERSED) injection, , Intravenous, PRN, Amber Kelly, CRNA, 2 mg at 11/19/24 0726    ondansetron (ZOFRAN) injection, , Intravenous, PRN, Amber Kelly, CRNA, 4 mg at 11/19/24 0827    phenylephrine 1,000 mcg/10 mL prefilled syringe, , Intravenous, PRN, Amber Kelly, CRNA, 100 mcg at 11/19/24 0805    propofol (DIPRIVAN) 1000 mg in 100 mL infusion (premix), , Intravenous, PRN, Amber  Kelly, CRNA, 120 mg at 11/19/24 0737  No Known Allergies    The following portions of the patient's history were reviewed and updated as appropriate: allergies, current medications, past family history, past medical history, past social history, past surgical history, and problem list.        Vitals:    11/19/24 0540   BP: 140/82   Pulse: 65   Resp: 16   Temp: 97.6 °F (36.4 °C)   SpO2: 100%       Physical Exam  Constitutional:       General: She is not in acute distress.     Appearance: Normal appearance. She is well-developed.   HENT:      Head: Normocephalic and atraumatic.   Cardiovascular:      Heart sounds: Normal heart sounds.   Pulmonary:      Breath sounds: Normal breath sounds.   Chest:   Breasts:     Right: Skin change (mastectomy scar) present. No swelling, bleeding, mass or tenderness.      Left: No swelling, bleeding, inverted nipple, mass, nipple discharge, skin change or tenderness.   Abdominal:      Palpations: Abdomen is soft.   Musculoskeletal:      Right lower leg: No edema.      Left lower leg: No edema.   Lymphadenopathy:      Upper Body:      Right upper body: Axillary adenopathy present. No supraclavicular or pectoral adenopathy.      Left upper body: No supraclavicular, axillary or pectoral adenopathy.   Neurological:      Mental Status: She is alert and oriented to person, place, and time.   Psychiatric:         Mood and Affect: Mood normal.           Results:  Labs:  9/12/2024 right axillary lymph node biopsy shows invasive mammary carcinoma of no special type i.e. ductal 1.2 mm in the fibroadipose tissue with low-grade along with in situ follicular neoplasia, estrogen is 100%, progesterone 1%, HER2 is 0    Imaging  5/20/2024 left 3D diagnostic mammogram and ultrasound was benign    8/3024 right axillary ultrasound  Shows an unchanged postop collection as well as an abnormal level 1 axillary node with cortical thickening    9/12/2024 core biopsy right axillary node results are  concordant    9/17/2024 CAT scan of the chest abdomen and pelvis with bilateral pulmonary nodules for which short-term follow-up was recommended, ill-defined hypodensity in the right thyroid gland for which ultrasound was recommended, right axillary node measuring 2.2 cm consistent with a biopsy-proven malignancy, rounded density in the left breast with calcification versus biopsy clip, ill-defined hypodense lesion in the right liver, sclerotic lesion left sternum    9/23/2024 MRI of the abdomen shows no suspicious focal hepatic lesion however short-term follow-up was recommended, thickening of the left adrenal gland for which short-term follow-up is also recommended, 2.5 cm incompletely characterized uterine lesion likely representing a fibroid along with a left ovarian cyst for which ultrasound was recommended    9/23/2024 thyroid ultrasound recommended a biopsy of the right thyroid lobe with nodule measuring 2 cm, this is scheduled for 1024    9/24/24 bone scan was negative  I reviewed the above laboratory and imaging data.    Discussion/Summary: 59-year-old female who is status post right mastectomy with sentinel node biopsy in 2021.  All she has been on anastrozole and continues on this in spite of the recent recurrence.  She apparently was counseled on postmastectomy radiation given the micro met in the sentinel node but she declined.  She recently had an axillary ultrasound secondary to a mass which revealed a stable seroma as well as an enlarged node.  Node biopsy reveals recurrent carcinoma as well as in situ follicular neoplasia.  Staging scans showed no evidence of lymphoma.  She does have a thyroid nodule and will be undergoing biopsy later this month.  There were additional nonspecific findings on the staging scans and 3-month follow-up CAT scan of the chest and abdomen are recommended.  She also needs a pelvic ultrasound.  I discussed axillary dissection with her.  I will refer her to the lymphedema  therapist preoperatively.  I reached out to her current medical oncologist about the anastrozole.  I am also placing her on tumor board.  All of her questions were answered.  Consent was signed today in the office.  She would like to have surgery until she completes her additional workup including thyroid biopsy.

## 2024-11-19 NOTE — OP NOTE
OPERATIVE REPORT  PATIENT NAME: Digna Juárez    :  1965  MRN: [id number]  Pt Location: [facility] OR ROOM 05    SURGERY DATE: 2024    Surgeons and Role:     * Ashleigh Doran MD - Primary     * Kieran Williamson PA-C - Assisting    Preop Diagnosis:  Carcinoma of right breast metastatic to axillary lymph node (HCC) [C50.911, C77.3]  Malignant neoplasm of overlapping sites of right breast in female, estrogen receptor positive (HCC) [C50.811, Z17.0]    Post-Op Diagnosis Codes:     * Carcinoma of right breast metastatic to axillary lymph node (HCC) [C50.911, C77.3]     * Malignant neoplasm of overlapping sites of right breast in female, estrogen receptor positive (HCC) [C50.811, Z17.0]    Procedure(s):  Right - RIGHT AXILLARY DISSECTION    Specimen(s):  ID Type Source Tests Collected by Time Destination   1 : Right Axillary Contents Tissue Axillary TISSUE EXAM Ashleigh Doran MD 2024 0832        Estimated Blood Loss:   Minimal    Drains:  Closed/Suction Drain Lateral;Right Breast Bulb 19 Fr. (Active)   Number of days: 1243       Closed/Suction Drain Lateral;Right Chest Bulb 19 Fr. (Active)   Number of days: 1243       Closed/Suction Drain Right Other (Comment) Bulb (Active)   Number of days: 0       Anesthesia Type:   General    Operative Indications:  Carcinoma of right breast metastatic to axillary lymph node (HCC) [C50.911, C77.3]  Malignant neoplasm of overlapping sites of right breast in female, estrogen receptor positive (HCC) [C50.811, Z17.0]      Operative Findings:  Multiple enlarged nodes in the specimen      Complications:   None    Procedure and Technique:  Digna is a 59-year-old female status post right muscle node biopsy.  She had a prior positive sentinel node was counseled on radiation therapy which she declined.  She now presents with an axillary recurrence and was counseled on a right axillary dissection.  She presented the day of surgery to the operating room.  She had preoperative  antibiotics.  She was administered general anesthesia.  She was prepped and draped in the usual standard fashion.  Timeout was performed.  Attention was turned to the right axilla.  High percent Marcaine plain was injected for local anesthesia.  The prior mastectomy incision was extended into the axilla.  Electrocautery was used to dissect through the subcutaneous tissue and clavipectoral fascia to enter the axillary fat pad.  Dissection was carried out to the level of the axillary vein superior, latissimus dorsi muscle lateral, serratus medial and thoracodorsal bundle deep.  The pectoralis edge was also elevated to dissect the level 2 aleena basin.  Both the long thoracic nerve and thoracodorsal bundle were identified and preserved intact throughout.  At least 1 branch of the intercostal brachial nerve was sacrificed as this was running within the specimen itself.  The tissue was submitted to pathology in formalin.  Her wound was irrigated and hemostasis was achieved.  Surgicel gauze was placed within the axilla for additional hemostasis.  A 10 flat UMM was also placed within the axilla and sutured to the skin using a 3-0 nylon suture.  Her wound was then closed in a layered fashion using multiple interrupted 3-0 Monocryl suture and a running 4-0 Monocryl subcuticular stitch.  All counts were correct.  The skin was cleaned and dried.  Surgical glue, fluffs and a bra were applied.  The patient was then extubated and taken to recovery in stable condition.     A physician assistant was required during the procedure for retraction, tissue handling, dissection and suturing.    Patient Disposition:  guarded condition    Axillary Lymph Node Dissection for Breast Cancer - Right  Operation performed with curative intent. Yes   Resection was performed within the boundaries of the axillary vein, chest wall (serratus anterior), and latissimus dorsi. Yes   Nerves identified and preserved during dissection (select all that apply).  Long thoracic nerve, Thoracodorsal nerve, and Branches of the intercostobrachial nerves   Level III nodes were removed. No                 SIGNATURE: Ashleigh Doran MD  DATE: November 19, 2024  TIME: 8:54 AM

## 2024-11-21 ENCOUNTER — NURSE TRIAGE (OUTPATIENT)
Age: 59
End: 2024-11-21

## 2024-11-21 ENCOUNTER — TELEPHONE (OUTPATIENT)
Age: 59
End: 2024-11-21

## 2024-11-21 NOTE — TELEPHONE ENCOUNTER
Called and spoke directly with patient at this time. I walked her through how to strip the tube with a wet cloth or alcohol swab and that she will need to do it several times before it might start to drain again. Pt verbalized understanding and states that she will call back if she has any other questions or concerns.

## 2024-11-21 NOTE — TELEPHONE ENCOUNTER
Patient calling asking if she can take a light walk when it's nice out.  I confirmed with her that she can take a light walk that is not strenuous to her for a light exercise, she reports is take short walks inside the house.  Patient reports doing well at home and getting around fine, she asked if she should have a visiting nurse at home. I informed her she can discuss this with Dr. Doran when she sees her on 12/5 if this is necessary.  She thanked me.

## 2024-11-21 NOTE — TELEPHONE ENCOUNTER
"Patient calling in reporting drainage tube stopped draining yesterday sometime.  Reports bloody drainage about a teaspoon amount, last emptied 11/19 around 1700.  She denies pain, fever, N/V, reports insertion site looks good.  Pt reports did strip the drainage tube without change. Requesting direction on what to do.     Reason for Disposition   Other post-op symptom or question    Answer Assessment - Initial Assessment Questions  1. SYMPTOM: \"What's the main symptom you're concerned about?\" (e.g., pain, fever, vomiting)      Drain stopped emptying yesterday.    2. ONSET: \"When did drain issue start?\"      11/120    3. SURGERY: \"What surgery did you have?\"      Right arm lymph node removal     4. DATE of SURGERY: \"When was the surgery?\"       11/19/24    5. ANESTHESIA: \"What type of anesthesia did you have?\" (e.g., general, spinal, epidural, local)      Yes    6. DRAINS: \"Were any drains place in or around the wound?\" (e.g., Hemovac, Collin-Seals, Penrose)      One drain, reports thick blood, now reports no output since yesterday, no accumulation overnight.  Just about a teaspoon amount in collection, last emptied 11/19 in the evening.     7. PAIN: \"Is there any pain?\" If Yes, ask: \"How bad is it?\"  (Scale 1-10; or mild, moderate, severe)      No. Slight soreness only when moving right arm.     8. FEVER: \"Do you have a fever?\" If Yes, ask: \"What is your temperature, how was it measured, and when did it start?\"      No.    9. VOMITING: \"Is there any vomiting?\" If Yes, ask: \"How many times?\"      No.     10. BLEEDING: \"Is there any bleeding?\" If Yes, ask: \"How much?\" and \"Where?\"        No.    11. OTHER SYMPTOMS: \"Do you have any other symptoms?\" (e.g., drainage from wound, painful urination, constipation)        No.    Protocols used: Post-Op Symptoms and Questions-Adult-[place]    "

## 2024-11-22 ENCOUNTER — TELEPHONE (OUTPATIENT)
Age: 59
End: 2024-11-22

## 2024-11-22 NOTE — TELEPHONE ENCOUNTER
How does the incision look? WNL    Do you have fever or chills? No    Are you having any pain? No    Do you have a drain(s)? Yes, little pink tinged colored drainage.  Drained 20 ml yesterday.     Are there any concerns with your drain? No, denies    Verify post-op appointment date and time  [x]    Do you have any other questions or concerns? Patient stated that she has a soft lump under arm.  Patient stated that it is a little bigger than a quarter.  Patient is not able to take picture and upload to [employer].  Patient has not applied any ice.  Instructed patient to try warm compresses a few times a day and call back if size increases and/or becomes hard and painful.  Confirmed patient had number to call if needed.  Patient verbalized understanding.      **NOTE TO TRIAGER: If patient requires further triage, based upon the answers above, move to appropriate triage protocol.      Received a phone call from patient.  Patient inquiring if she can resume taking multivitamin and Vitamin D.

## 2024-11-23 ENCOUNTER — TELEPHONE (OUTPATIENT)
Dept: OTHER | Facility: OTHER | Age: 59
End: 2024-11-23

## 2024-11-24 NOTE — TELEPHONE ENCOUNTER
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 Per patient her drainage tube has a leak and she also has an itch around the guaze area. Patient is not sure what to do. Please follow up and advise. Thank you in advance.

## 2024-11-25 ENCOUNTER — OFFICE VISIT (OUTPATIENT)
Dept: SURGICAL ONCOLOGY | Facility: CLINIC | Age: 59
End: 2024-11-25

## 2024-11-25 ENCOUNTER — TELEPHONE (OUTPATIENT)
Dept: SURGICAL ONCOLOGY | Facility: CLINIC | Age: 59
End: 2024-11-25

## 2024-11-25 VITALS
HEIGHT: 64 IN | OXYGEN SATURATION: 98 % | BODY MASS INDEX: 18.44 KG/M2 | TEMPERATURE: 97.9 F | DIASTOLIC BLOOD PRESSURE: 68 MMHG | WEIGHT: 108 LBS | HEART RATE: 85 BPM | SYSTOLIC BLOOD PRESSURE: 112 MMHG

## 2024-11-25 DIAGNOSIS — C50.911 RECURRENT BREAST CANCER, RIGHT (HCC): ICD-10-CM

## 2024-11-25 DIAGNOSIS — T14.8XXA WOUND DRAINAGE: Primary | ICD-10-CM

## 2024-11-25 PROCEDURE — 99024 POSTOP FOLLOW-UP VISIT: CPT

## 2024-11-25 NOTE — TELEPHONE ENCOUNTER
Called and spoke with patient at this time about her drain concern. Scheduled her for an appt with Una today at [place] at 1 pm. PETER set up through roundtrip. Pt verbalizes understanding.

## 2024-11-25 NOTE — PROGRESS NOTES
Name: Digna Juárez      : 1965      MRN: [id number]  Encounter Provider: ELZA Santos  Encounter Date: 2024   Encounter department: [facility] [place]     Cancer Staging   Carcinoma of right breast metastatic to axillary lymph node (HCC)  Staging form: Breast, AJCC 8th Edition  - Clinical stage from 2024: Stage IA (rcT0, cN1mi(f), cM0, G1, ER+, WA+, HER2-) - Signed by Ashleigh Doran MD on 10/10/2024    Malignant neoplasm of overlapping sites of right breast in female, estrogen receptor positive (HCC)  Staging form: Breast, AJCC 8th Edition  - Pathologic stage from 2021: Stage IA (pT1c, pN1mi(sn), cM0, G2, ER+, WA+, HER2-) - Signed by ELZA Santos on 2023  :  Assessment & Plan  Recurrent breast cancer, right (HCC)       Wound drainage  - post- op visit scheduled with Dr. Doran on            History of Present Illness   Chief Complaint   Patient presents with    Post-op       Ms. Digna Juárez is a 59 y.o. female presenting today with c/o post-operative right axilla swelling as well as drain function concerns. Ms. Rivas underwent right axillary dissection with Dr. Doran on 2024 for recurrent carcinoma of the right axilla. Surgical pathology still pending at the time of the visit. As a reminder, pt is already s/p right mastectomy with SLNB in 2021 secondary to a diagnosis of multifocal right breast cancer. Her pathology demonstrated a micrometastasis in one right axillary LN, in which XRT was recommended however pt declined treatment. Mammaprint performed, result shows Low Risk, Luminal A. She was maintained on Anastrozole until 2024, when she developed painful swelling in her right axilla with eventual core needle biopsy of right axillary node demonstrated recurrent invasive carcinoma. Today, she reports intermittent leaking at the axillary dissection incision site. She also previously noted some  axillary swelling, which resolved after a hot shower. She describes the drainage onto her surgical bra to be the same color as her UMM drain output. Each time this has occurred she has changed the incision site dressing. Upon exam, no evidence of infection, swelling, erythema present. Incision site is well approximated and appears to be healing well. Discussed possibility of UMM drain occlusion, relieved after showering. Provided reassurance, encouraged pt to continue with current regimen for wound healing. I stripped the drainage tube today in office, which successfully cleared any remaining minor occlusions the drainage pathway. Drain was emptied as well, discarding 60cc of serous fluid. Advised pt to call if drainage amount is less than 30cc over 48hrs prior to scheduled follow up appmt with Dr. Doran (12/05/2024). Pt in agreement with plan. She was instructed to call with any questions or concerns prior to this time. All questions were answered today.    No follow-ups on file.    Staging: Stage IA (pT1c, pN1mi(sn), cM0, G2, ER+, MA+, HER2-)  Treatment history: See above  Current treatment: None, continues on observation  Disease status: TAMELA    Pertinent Medical History       Oncology History   Oncology History   Malignant neoplasm of overlapping sites of right breast in female, estrogen receptor positive (HCC)   4/13/2021 -  Cancer Staged    Staging form: Breast, AJCC 8th Edition  - Pathologic stage from 4/13/2021: Stage IA (pT1c, pN1mi(sn), cM0, G2, ER+, MA+, HER2-) - Signed by ELZA Santos on 11/7/2023  Stage prefix: Initial diagnosis  Method of lymph node assessment: [place] lymph node biopsy  Multigene prognostic tests performed: MammaPrint  Histologic grading system: 3 grade system       4/16/2021 Biopsy    Right breast US guided biopsy:  A. 11 o'clock 7 cm from the nipple  Invasive lobular carcinoma  Grade 1  ER 90, MA 45, HER2 1+  Lymphovascular invasion: not identified    B. 10 o'clock 7  cm from the nipple  Invasive mammary carcinoma of no special type  Grade 2  , , HER2 1+  Lymphovascular invasion: not identified    Concordant. Malignancy appears multifocal. MRI recommended.     4/30/2021 Observation    Bilateral breast MRI  Multifocal right breast cancer; 10:00 mass measures up to 1.8 cm and abuts pectoral muscle, without evidence of invasion. Two areas of carcinoma have a total extent of disease of 5 cm. Left breast clear. Axilla negative.     6/10/2021 Genetic Testing    The following genes were evaluated: BATSHEVA, BRCA1, BRCA2, CDH1, CHEK2, PALB2, PTEN, STK11, TP53  Additional genes analyzed for a total of 20  Negative result. No pathogenic sequence variants or deletions/dupllications identified  [employer]     6/25/2021 Surgery    Right breast ANJALI  directed mastectomy with sentinel lymph node biopsy  Invasive carcinoma of no special type (ductal)  Grade 2  2 cm (2 foci)  Margins negative  1/1 Lymph node with micrometastases (0.4 mm)  Anatomic Stage IB  Prognostic Stage IA     7/7/2021 Genomic Testing    MammaPrint for FLEX trial  Low risk (Luminal A)     7/28/2021 -  Hormone Therapy    Anastrozole 1 mg daily  Dr. Cochran     7/29/2021 - 7/29/2021 Radiation    Consult with Dr. 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 Minute focus of invasive carcinoma is present outside of the lymph node, within the fibroadipose tissue. This focus may represent  metastasis vs primary carcinoma. Single morphologically abnormal lymph node seen on US. Left breast clear on 5/2024 imaging.     9/12/2024 -  Cancer Staged    Staging form: Breast, AJCC 8th Edition  - Clinical stage from 9/12/2024: Stage IA (rcT0, cN1mi(f), cM0, G1, ER+, OH+, HER2-) - Signed by Ashleigh Doran MD on 10/10/2024  Stage prefix: Recurrence  Method of lymph node assessment: Core biopsy  Histologic grading system: 3 grade system       9/17/2024 Observation    CT chest, abdomen, pelvis IMPRESSION:     Bilateral pulmonary nodules measuring 3 to 5 mm as described.  Follow-up CT chest recommended in 3 months considering history of recurrent breast cancer.     Ill-defined hypodense right thyroid gland nodule measures up to 1.5 cm.  Thyroid ultrasound recommended for further characterization.     Lobulated low-density right axillary node measuring 2.2 x 1 cm, consistent with biopsy-proven malignancy.  1.5 cm rounded soft tissue nodule in the left breast with coarse calcification versus biopsy clip.  Correlation with mammographic history recommended.     Ill-defined heterogeneously hypodense lesion within the subcapsular inferior right liver margin.  1.2 cm indeterminate left adrenal gland nodule.  MRI with contrast recommended for further evaluation of the above two lesions.     Punctate sclerotic lesion in the left sternum.  3 mm sclerotic lesion in the posterior/medial right fifth rib, potentially representing a bone island.     Prominent uterus considering age with probable partially exophytic fibroid.     Bladder wall thickening considering degree of distention.  Correlate with urinalysis for possible cystitis.     Note of no priors for comparison     9/23/2024 Observation    MRI abdomen IMPRESSION:     No suspicious focal hepatic lesion. The 1.3 cm observation on prior CT may represent volume averaging of the hepatic border with adjacent mesenteric fat or small amount of focal fat infiltration. However given  history of recently diagnosed metastatic   right breast neoplasm, follow-up CT abdomen with contrast in 3 months is recommended to assess for stability of findings     Irregular thickening of the left adrenal gland without discrete nodule findings can represent hyperplasia versus early small metastatic nodule. No remote prior cross-sectional studies of the abdomen are available for direct comparison. Attention on   follow-up CT abdomen in 3 months is recommended to assess for stability.     A 2.5 cm incompletely characterized hypoenhancing uterine lesion, statistically likely representing fibroid. Left ovarian 1.2 cm cystic lesion, also incompletely characterized. Nonemergent pelvic ultrasound is recommended for complete characterization.     Small volume free pelvic fluid of uncertain etiology. Clinical correlation is recommended.     9/24/2024 Observation    NM Bone scan IMPRESSION:     1.  No scintigraphic evidence of osseous metastasis. Incidental findings as above.          Review of Systems   Constitutional:  Negative for activity change, appetite change, fatigue, fever and unexpected weight change.   Respiratory:  Negative for cough and shortness of breath.    Gastrointestinal:  Negative for abdominal pain.   Musculoskeletal:  Negative for back pain.   Skin:  Positive for wound (See HPI).   Neurological: Negative.    Psychiatric/Behavioral:  Negative for confusion.     A complete review of systems is negative other than that noted above in the HPI.       Current Outpatient Medications   Medication Sig Dispense Refill    atenolol (TENORMIN) 50 mg tablet Take 1 tablet (50 mg total) by mouth daily Take 100 mg by mouth every other morning. (Patient taking differently: Take 50 mg by mouth daily daily) 90 tablet 1    carbamide peroxide (DEBROX) 6.5 % otic solution Administer 5 drops into both ears 2 (two) times a day (Patient taking differently: Administer 5 drops into both ears 2 (two) times a day as needed) 15 mL  "2    Cholecalciferol ( Ultra Strength) 50 MCG (2000 UT) CAPS TAKE ONE CAPSULE BY MOUTH EVERY DAY 90 capsule 3    famotidine (PEPCID) 40 MG tablet TAKE ONE TABLET BY MOUTH EVERY MORNING 90 tablet 1    gabapentin (Neurontin) 100 mg capsule Take 1 capsule (100 mg total) by mouth 3 (three) times a day 30 capsule 0    levothyroxine 50 mcg tablet TAKE ONE TABLET BY MOUTH EVERY DAY IN THE EARLY MORNING 1/2 HOUR BEFORE BREAKFAST 90 tablet 1    lisinopril (ZESTRIL) 10 mg tablet Take 1 tablet (10 mg total) by mouth daily 90 tablet 3    Multiple Vitamin (Daily-Alanis) TABS TAKE ONE TABLET BY MOUTH EVERY DAY 90 tablet 3    OLANZapine (ZyPREXA) 7.5 mg tablet Take 1 tablet (7.5 mg total) by mouth daily at bedtime 90 tablet 2    traMADol (Ultram) 50 mg tablet Take 1 tablet (50 mg total) by mouth every 8 (eight) hours as needed for severe pain 9 tablet 0    anastrozole (ARIMIDEX) 1 mg tablet Take 1 tablet (1 mg total) by mouth daily (Patient not taking: Reported on 10/30/2024) 90 tablet 3    chlorhexidine (PERIDEX) 0.12 % solution Apply 15 mL to the mouth or throat 2 (two) times a day (Patient not taking: Reported on 10/10/2024) 120 mL 0    neomycin-polymyxin-hydrocortisone (CORTISPORIN) 0.35%-10,000 units/mL-1% otic suspension Administer 4 drops to the right ear 3 (three) times a day (Patient not taking: Reported on 9/11/2024) 10 mL 0     No current facility-administered medications for this visit.     Objective   /68   Pulse 85   Temp 97.9 °F (36.6 °C)   Ht 5' 4\" (1.626 m)   Wt 49 kg (108 lb)   SpO2 98%   BMI 18.54 kg/m²     Vitals:    11/25/24 1251   BP: 112/68   Pulse: 85   Temp: 97.9 °F (36.6 °C)   SpO2: 98%     ECOG    Physical Exam  Vitals and nursing note reviewed.   Constitutional:       Appearance: Normal appearance. She is normal weight.   Eyes:      General: No scleral icterus.     Conjunctiva/sclera: Conjunctivae normal.   Cardiovascular:      Rate and Rhythm: Normal rate and regular rhythm.   Pulmonary: "      Effort: Pulmonary effort is normal.      Breath sounds: Normal breath sounds.   Chest:      Chest wall: No mass.   Breasts:     Right: Skin change (surgical scar) present. No swelling, bleeding, inverted nipple, mass, nipple discharge or tenderness.      Left: No swelling, bleeding, inverted nipple, mass, nipple discharge, skin change or tenderness.          Comments: UMM drain in place  Lymphadenopathy:      Upper Body:      Right upper body: No supraclavicular or axillary adenopathy.      Left upper body: No supraclavicular or axillary adenopathy.   Skin:     General: Skin is warm and dry.   Neurological:      General: No focal deficit present.      Mental Status: She is alert and oriented to person, place, and time. Mental status is at baseline.   Psychiatric:         Mood and Affect: Mood normal.         Behavior: Behavior normal.         Thought Content: Thought content normal.         Judgment: Judgment normal.        Constitutional: General appearance: The Patient is well-developed and well-nourished who appears the stated age in no acute distress. Patient is pleasant and talkative.  HEENT: Normocephalic. Sclerae are anicteric. Mucous membranes are moist. Neck is supple without adenopathy. No JVD.  Chest: The lungs are clear to auscultation.  Cardiac: Heart is regular rate.  Abdomen: Abdomen is soft, non-tender, non-distended and without masses.  Extremities: There is no clubbing or cyanosis. There is no edema. Symmetric.  Neuro: Grossly nonfocal. Gait is normal.  Lymphatic: No evidence of cervical adenopathy bilaterally.  No evidence of axillary adenopathy bilaterally. No evidence of inguinal adenopathy bilaterally.  Skin: Warm, anicteric.  Psych: Patient is pleasant and talkative    Labs: I have reviewed pertinent labs.      Pathology: I have reviewed pathology reports described above.    Administrative Statements   I have spent a total time of 15 minutes in caring for this patient on the day of the  visit/encounter including Prognosis, Counseling / Coordination of care, Documenting in the medical record, Reviewing / ordering tests, medicine, procedures  , and Obtaining or reviewing history  .

## 2024-11-26 PROCEDURE — 88341 IMHCHEM/IMCYTCHM EA ADD ANTB: CPT | Performed by: STUDENT IN AN ORGANIZED HEALTH CARE EDUCATION/TRAINING PROGRAM

## 2024-11-26 PROCEDURE — 88307 TISSUE EXAM BY PATHOLOGIST: CPT | Performed by: STUDENT IN AN ORGANIZED HEALTH CARE EDUCATION/TRAINING PROGRAM

## 2024-11-26 PROCEDURE — 88342 IMHCHEM/IMCYTCHM 1ST ANTB: CPT | Performed by: STUDENT IN AN ORGANIZED HEALTH CARE EDUCATION/TRAINING PROGRAM

## 2024-12-02 ENCOUNTER — DOCUMENTATION (OUTPATIENT)
Dept: HEMATOLOGY ONCOLOGY | Facility: CLINIC | Age: 59
End: 2024-12-02

## 2024-12-02 ENCOUNTER — OFFICE VISIT (OUTPATIENT)
Dept: HEMATOLOGY ONCOLOGY | Facility: MEDICAL CENTER | Age: 59
End: 2024-12-02
Payer: COMMERCIAL

## 2024-12-02 VITALS
HEART RATE: 94 BPM | DIASTOLIC BLOOD PRESSURE: 78 MMHG | OXYGEN SATURATION: 100 % | SYSTOLIC BLOOD PRESSURE: 118 MMHG | WEIGHT: 105 LBS | BODY MASS INDEX: 17.93 KG/M2 | HEIGHT: 64 IN | TEMPERATURE: 97.8 F

## 2024-12-02 DIAGNOSIS — C50.811 MALIGNANT NEOPLASM OF OVERLAPPING SITES OF RIGHT BREAST IN FEMALE, ESTROGEN RECEPTOR POSITIVE (HCC): Primary | ICD-10-CM

## 2024-12-02 DIAGNOSIS — Z17.0 MALIGNANT NEOPLASM OF OVERLAPPING SITES OF RIGHT BREAST IN FEMALE, ESTROGEN RECEPTOR POSITIVE (HCC): Primary | ICD-10-CM

## 2024-12-02 PROCEDURE — 99213 OFFICE O/P EST LOW 20 MIN: CPT | Performed by: INTERNAL MEDICINE

## 2024-12-02 NOTE — PROGRESS NOTES
A 58-year-old postmenopausal woman with stage II A right breast cancer, multifocal disease with combination of invasive ductal as well as invasive lobular carcinoma, grade 2. This was ER 90% positive, OK 45% positive, HER2 negative disease.  Her tumor was low risk, based on MammaPrint.  She underwent mastectomy and sentinel lymph node biopsy, resulting in TAMELA.  She had 1 positive lymph node with micrometastasis measuring 0.4 mm.  Since August 2021, she has been on adjuvant hormonal therapy with anastrozole.  She presents today for routine follow-up.  Based on her DEXA scan in May 2022, she has osteoporosis.  She is on Prolia injection at the primary care physician's office.  She is tolerating anastrozole well.  She has no complaint of hot flashes or musculoskeletal symptoms.  Denies any pain.  Her weight is stable.  She has no respiratory symptoms.  Her performance status is normal.           Objective:      Primary Diagnosis:     1.  Right breast cancer, stage II A ( pT1c, pN1 (mi ), M0) grade 2, ER 90% positive, OK 45% positive, HER2 negative disease.  MammaPrint low risk.  Diagnosed in June 2021.     2.  BRCA gene mutation negative.     Cancer Staging:  Cancer Staging  Malignant neoplasm of overlapping sites of right breast in female, estrogen receptor positive (HCC)  Staging form: Breast, AJCC 8th Edition  - Pathologic: Stage IA (pT1c, pN1mi(sn), cM0, G2, ER+, OK+, HER2-) - Unsigned  Method of lymph node assessment: Childs lymph node biopsy  Histologic grading system: 3 grade system           Previous Hematologic/ Oncologic Treatment:            Current Hematologic/ Oncologic Treatment:         Adjuvant hormonal therapy with anastrozole since August 2021.     Disease Status:        TAMELA status post mastectomy and sentinel lymph node biopsy.     Test Results:     Pathology:      multifocal invasive mammary carcinoma, grade 2. 20 mm of invasive ductal carcinoma as well as 7 mm of invasive lobular carcinoma.   Lymphovascular invasion was present.  1 sentinel lymph node had micrometastasis measuring 0.4 mm.  1 lesion was % positive, % positive, HER2 negative disease.  The other lesion was ER 90% positive, [place] 45% positive, HER2 negative disease.  MammaPrint low risk.  Stage II A ( pT1c, pN1 (mi ), M0)     Radiology:     Mammography in May 2023 was benign.  BI-RADS 2.     DEXA scan in June 2022 showed T score -3.6, consistent with osteoporosis.     Laboratory:       See below.     Physical Exam:        General Appearance:    Alert, oriented          Eyes:    PERRL   Ears:    Normal external ear canals, both ears   Nose:   Nares normal, septum midline   Throat:   Mucosa moist. Pharynx without injection.    Neck:   Supple         Lungs:     Clear to auscultation bilaterally   Chest Wall:    No tenderness or deformity    Heart:    Regular rate and rhythm         Abdomen:     Soft, non-tender, bowel sounds +, no organomegaly               Extremities:   Extremities no cyanosis or edema         Skin:   no rash or icterus.    Lymph nodes:   Cervical, supraclavicular, and axillary nodes normal   Neurologic:   CNII-XII intact, normal strength, sensation and reflexes     Throughout         At last visit, the plan was for a surgical followup.Evelin Rivas underwent right axillary dissection with Dr. Doran on 11/19/2024 for recurrent carcinoma of the right axilla. Her pathology demonstrated a micrometastasis in one right axillary LN, in which XRT was recommended however pt declined treatment. Mammaprint performed, result shows Low Risk, Luminal A. She was maintained on Anastrozole until August 2024, when she developed painful swelling in her right axilla with eventual core needle biopsy of right axillary node demonstrated recurrent invasive carcinoma. Her pathology demonstrated a micrometastasis in one right axillary LN, in which XRT was recommended however pt declined treatment. Mammaprint performed, result shows Low  Risk, Luminal A. She was maintained on Anastrozole until August 2024, when she developed painful swelling in her right axilla with eventual core needle biopsy of right axillary node demonstrated recurrent invasive carcinoma.     Impression    She still has a drain in on the right side it looks clean but there is still some discharge.  She has an appointment in 3 days in surgery to look at it.    We will put the case for discussion on breast cancer tumor board and she will return in 2 weeks to discuss. The drain should be out by hina Lyon MD

## 2024-12-02 NOTE — PROGRESS NOTES
In-basket message received from Cheryl Ndiaye RN to add patient to the breast [facility] on 12/9/2024. Chart reviewed and prep completed.      Eat healthy foods you enjoy. Apixaban/Eliquis DOES NOT have a special diet. Limit your alcohol intake.

## 2024-12-05 ENCOUNTER — TELEPHONE (OUTPATIENT)
Age: 59
End: 2024-12-05

## 2024-12-05 ENCOUNTER — OFFICE VISIT (OUTPATIENT)
Dept: SURGICAL ONCOLOGY | Facility: CLINIC | Age: 59
End: 2024-12-05

## 2024-12-05 ENCOUNTER — DOCUMENTATION (OUTPATIENT)
Dept: HEMATOLOGY ONCOLOGY | Facility: CLINIC | Age: 59
End: 2024-12-05

## 2024-12-05 VITALS
DIASTOLIC BLOOD PRESSURE: 62 MMHG | WEIGHT: 105 LBS | BODY MASS INDEX: 17.93 KG/M2 | TEMPERATURE: 97.5 F | HEIGHT: 64 IN | OXYGEN SATURATION: 99 % | SYSTOLIC BLOOD PRESSURE: 104 MMHG | HEART RATE: 106 BPM

## 2024-12-05 DIAGNOSIS — Z17.0 MALIGNANT NEOPLASM OF OVERLAPPING SITES OF RIGHT BREAST IN FEMALE, ESTROGEN RECEPTOR POSITIVE (HCC): ICD-10-CM

## 2024-12-05 DIAGNOSIS — C50.911 CARCINOMA OF RIGHT BREAST METASTATIC TO AXILLARY LYMPH NODE (HCC): Primary | ICD-10-CM

## 2024-12-05 DIAGNOSIS — C77.3 CARCINOMA OF RIGHT BREAST METASTATIC TO AXILLARY LYMPH NODE (HCC): Primary | ICD-10-CM

## 2024-12-05 DIAGNOSIS — C50.811 MALIGNANT NEOPLASM OF OVERLAPPING SITES OF RIGHT BREAST IN FEMALE, ESTROGEN RECEPTOR POSITIVE (HCC): ICD-10-CM

## 2024-12-05 PROCEDURE — 99024 POSTOP FOLLOW-UP VISIT: CPT | Performed by: SURGERY

## 2024-12-05 NOTE — TELEPHONE ENCOUNTER
Patient finished appmt with Dr Doran.    Transportation to get home has NOT arrived.    Waiting still downstairs in [facility].  Needs Ly[employer] or [employer].

## 2024-12-05 NOTE — PROGRESS NOTES
Referral to radiation oncology received from Dr Doran.  Chart reviewed by oncology nurse navigator at this time.      Diagnosis: recurrent breast cancer.  Patient recently had right axillary lymph node dissection.  After review of chart, instructions for scheduling added to referral and sent to be scheduled as advised.

## 2024-12-05 NOTE — PROGRESS NOTES
59 y.o. female is here today s/p right axillary node dissection. She reports no concerns.      Physical Exam  Constitutional:       General: She is not in acute distress.     Appearance: Normal appearance.   Chest:   Breasts:     Right: Skin change (Well-healing incision with no signs of infection) present.      Comments: UMM drain removed today in the office  Neurological:      Mental Status: She is alert and oriented to person, place, and time.   Psychiatric:         Mood and Affect: Mood normal.             Diagnoses and all orders for this visit:    Carcinoma of right breast metastatic to axillary lymph node (HCC)  -     Ambulatory Referral to Radiation Oncology; Future    Malignant neoplasm of overlapping sites of right breast in female, estrogen receptor positive (HCC)  -     Ambulatory Referral to Radiation Oncology; Future        Assessment/Plan: 59-year-old female who had recurrent carcinoma in the right axilla.  She is status post axillary node dissection.  She is healing well with no signs of infection.  She is scheduled for follow-up with medical oncology.  She needs to see radiation oncology as well.  She is scheduled for follow-up scans of the chest and abdomen.  She also has a follow-up appointment with me in May.

## 2024-12-05 NOTE — TELEPHONE ENCOUNTER
Received a phone call from patient.  Patient stated that she just saw Dr. Doran in office today and forgot to ask if she may do some light chores around the house, particularly raking leaves in yard with a light weighted rake.  Patient stated that she does not have any drains.  Please advise and call patient with updates.

## 2024-12-05 NOTE — TELEPHONE ENCOUNTER
Pt forgot to ask Dr. Doran if it's okay for her to move her arms for sweeping and raking leaves (etc)? Pt would like a call back at [phone]. Thank you.

## 2024-12-06 NOTE — ASSESSMENT & PLAN NOTE
Patient is a 59 y.o. female with recurrent right breast cancer to axillary lymph node. She has a left upper outer breast nodule which per prior imaging and notes has been stable for a long time. Case was discussed at breast tumor board. Recommend post-mastectomy radiotherapy. Planning 4256 cGy in 16 fractions to the right chest wall and regional lymph nodes. Potential side effects include fatigue, dermatitis, fibrosis, lymphedema, risk of damage to heart and lungs, risk of secondary malignancy.

## 2024-12-06 NOTE — PROGRESS NOTES
Radiation Oncology Consult    Name: Digna Juárez      : 1965      MRN: [id number]  Encounter Provider: J Carlos Craig MD  Encounter Date: 2024   Encounter department: [facility] RADIATION ONCOLOGY     Cancer Staging   Carcinoma of right breast metastatic to axillary lymph node (HCC)  Staging form: Breast, AJCC 8th Edition  - Clinical stage from 2024: Stage IA (rcT0, cN1mi(f), cM0, G1, ER+, MA+, HER2-) - Signed by Ashleigh Doran MD on 10/10/2024  - Pathologic stage from 2024: rpT0, pN1, cM0 - Signed by Ashleigh Doran MD on 2024    Malignant neoplasm of overlapping sites of right breast in female, estrogen receptor positive (HCC)  Staging form: Breast, AJCC 8th Edition  - Pathologic stage from 2021: Stage IA (pT1c, pN1mi(sn), cM0, G2, ER+, MA+, HER2-) - Signed by ELZA Santos on 2023    :  Assessment & Plan  Carcinoma of right breast metastatic to axillary lymph node (HCC)    Patient is a 59 y.o. female with recurrent right breast cancer to axillary lymph node. She has a left upper outer breast nodule which per prior imaging and notes has been stable for a long time. Case was discussed at breast tumor board. Recommend post-mastectomy radiotherapy. Planning 4256 cGy in 16 fractions to the right chest wall and regional lymph nodes. Potential side effects include fatigue, dermatitis, fibrosis, lymphedema, risk of damage to heart and lungs, risk of secondary malignancy.    The patient agreed to proceed with radiation therapy, and informed consent was signed. CT simulation to be scheduled at [facility]. Radiation therapy to start 6-8 weeks post-op to allow healing.        History of Present Illness     Prior Treatment:  - 21 right mastectomy+SLNB  - adjuvant anastrozole  - 24 right ALND    Implanted Devices:  none    Digna Juárez is a 59 y.o. female with recurrent right breast cancer to the axilla. History of left upper outer breast  nodule s/p biopsy in 2011. Initial diagnosis of right breast cancer in June 2021, treated with right mastectomy and SLNB on 6/25/21 which showed multifocal (2) invasive mammary carcinoma, 20 mm invasive ductal carcinoma in RLQ, grade 2, ER/IA+ HER2-, and 7 mm invasive lobular carcinoma, grade 2, ER/IA+ HER2-, all margins negative, 1/1 lymph node with micrometastases 0.4 mm, pT1c N1mi. Mammaprint low risk. She met with medical oncology and radiation oncology, and elected for adjuvant anastrozole. In 8/2024, she presented to her family physician with a sore lymph node under her right armpit. Right axillary US on 8/30/24 showed a 3.2 cm postoperative collection which was stable, however there was a morphologically abnormal right axillary level 1 lymph node with cortical thickness 5 mm. Biopsy on 9/12/24 showed invasive ductal carcinoma in the lymph node. CT chest/abd/pelvis on 9/17/24 showed a lobulated 2.2 cm right axillary node, 1.5 cm rounded soft tissue nodule in the left breast with coarse calcification vs biopsy clip, bilateral pulmonary nodules 3-5 m, ill-defined hypodense right thyroid nodule 1.5 cm, ill-defined heterogeneously hypodense lesion within subcapsular inferior right liver margin, 1.2 cm indeterminate left adrenal nodule, punctate sclerotic lesion in left sternum, 3 mm sclerotic lesion in posterior/medial right 5th rib (potential bone island), prominent uterus. MRI abdomen on 9/23/24 showed no suspicious liver lesion, irregular thickening of the left adrenal gland without discrete nodule. US thyroid on 9/23/24 showed TR4 2.0 cm lesion in midpole of right thyroid lobe. Bone scan 9/24/24 showed no evidence of osseous disease. US pelvis 10/11/24 showed fibroid uterus. Thyroid FNA on 10/24/24 was benign. On 11/19/24 she underwent right axillary dissection which showed 1/11 LN+ for breast cancer, 4 mm, <2 mm extranodal extension. Referred to discuss radiotherapy.    She is doing well today. Healing well  from surgery. Is able to move her right arm well. No significant weight loss recently.    Review of Systems:  Review of Systems   Constitutional: Negative.  Negative for appetite change and fatigue.   HENT: Negative.     Eyes: Negative.    Respiratory:  Negative for cough.    Cardiovascular: Negative.    Gastrointestinal: Negative.    Endocrine: Negative.    Genitourinary: Negative.    Musculoskeletal:  Negative for arthralgias.   Skin: Negative.         Mild tenderness at right axilla, healing well   Allergic/Immunologic: Negative.    Neurological:  Negative for dizziness and headaches.   Hematological: Negative.    Psychiatric/Behavioral: Negative.      Oncology History   Malignant neoplasm of overlapping sites of right breast in female, estrogen receptor positive (HCC)   4/13/2021 -  Cancer Staged    Staging form: Breast, AJCC 8th Edition  - Pathologic stage from 4/13/2021: Stage IA (pT1c, pN1mi(sn), cM0, G2, ER+, TN+, HER2-) - Signed by ELZA Santos on 11/7/2023  Stage prefix: Initial diagnosis  Method of lymph node assessment: [place] lymph node biopsy  Multigene prognostic tests performed: MammaPrint  Histologic grading system: 3 grade system       4/16/2021 Biopsy    Right breast US guided biopsy:  A. 11 o'clock 7 cm from the nipple  Invasive lobular carcinoma  Grade 1  ER 90, TN 45, HER2 1+  Lymphovascular invasion: not identified    B. 10 o'clock 7 cm from the nipple  Invasive mammary carcinoma of no special type  Grade 2  , , HER2 1+  Lymphovascular invasion: not identified    Concordant. Malignancy appears multifocal. MRI recommended.     4/30/2021 Observation    Bilateral breast MRI  Multifocal right breast cancer; 10:00 mass measures up to 1.8 cm and abuts pectoral muscle, without evidence of invasion. Two areas of carcinoma have a total extent of disease of 5 cm. Left breast clear. Axilla negative.     6/10/2021 Genetic Testing    The following genes were evaluated: BATSHEVA,  BRCA1, BRCA2, CDH1, CHEK2, PALB2, PTEN, STK11, TP53  Additional genes analyzed for a total of 20  Negative result. No pathogenic sequence variants or deletions/dupllications identified  [employer]     6/25/2021 Surgery    Right breast ANJALI  directed mastectomy with sentinel lymph node biopsy  Invasive carcinoma of no special type (ductal)  Grade 2  2 cm (2 foci)  Margins negative  1/1 Lymph node with micrometastases (0.4 mm)  Anatomic Stage IB  Prognostic Stage IA     7/7/2021 Genomic Testing    MammaPrint for FLEX trial  Low risk (Luminal A)     7/28/2021 - 11/2024 Hormone Therapy    Anastrozole 1 mg daily       7/29/2021 - 7/29/2021 Radiation    Consult with Dr. Florence  Patient declined post-mastectomy RT     Carcinoma of right breast metastatic to axillary lymph node (HCC)   9/12/2024 Biopsy    Right axillary lymph node ultrasound-guided biopsy (open coil)  Invasive mammary carcinoma of no special type (ductal) involving fibroadipose tissue  Grade 1  ER ; TN 1; HER2 0  In situ follicular neoplasia (involvement by follicular lymphoma elsewhere in the lymph node or in other lymph nodes cannot be ruled out; correlation with systemic imaging to rule out lymphadenopathy elsewhere is recommended as clinically indicated)    Concordant. Minute focus of invasive carcinoma is present outside of the lymph node, within the fibroadipose tissue. This focus may represent metastasis vs primary carcinoma. Single morphologically abnormal lymph node seen on US. Left breast clear on 5/2024 imaging.     9/12/2024 -  Cancer Staged    Staging form: Breast, AJCC 8th Edition  - Clinical stage from 9/12/2024: Stage IA (rcT0, cN1mi(f), cM0, G1, ER+, TN+, HER2-) - Signed by Ashleigh Doran MD on 10/10/2024  Stage prefix: Recurrence  Method of lymph node assessment: Core biopsy  Histologic grading system: 3 grade system       9/17/2024 Observation    CT chest, abdomen, pelvis IMPRESSION:     Bilateral pulmonary nodules measuring 3 to 5  mm as described.  Follow-up CT chest recommended in 3 months considering history of recurrent breast cancer.     Ill-defined hypodense right thyroid gland nodule measures up to 1.5 cm.  Thyroid ultrasound recommended for further characterization.     Lobulated low-density right axillary node measuring 2.2 x 1 cm, consistent with biopsy-proven malignancy.  1.5 cm rounded soft tissue nodule in the left breast with coarse calcification versus biopsy clip.  Correlation with mammographic history recommended.     Ill-defined heterogeneously hypodense lesion within the subcapsular inferior right liver margin.  1.2 cm indeterminate left adrenal gland nodule.  MRI with contrast recommended for further evaluation of the above two lesions.     Punctate sclerotic lesion in the left sternum.  3 mm sclerotic lesion in the posterior/medial right fifth rib, potentially representing a bone island.     Prominent uterus considering age with probable partially exophytic fibroid.     Bladder wall thickening considering degree of distention.  Correlate with urinalysis for possible cystitis.     Note of no priors for comparison     9/23/2024 Observation    MRI abdomen IMPRESSION:     No suspicious focal hepatic lesion. The 1.3 cm observation on prior CT may represent volume averaging of the hepatic border with adjacent mesenteric fat or small amount of focal fat infiltration. However given history of recently diagnosed metastatic   right breast neoplasm, follow-up CT abdomen with contrast in 3 months is recommended to assess for stability of findings     Irregular thickening of the left adrenal gland without discrete nodule findings can represent hyperplasia versus early small metastatic nodule. No remote prior cross-sectional studies of the abdomen are available for direct comparison. Attention on   follow-up CT abdomen in 3 months is recommended to assess for stability.     A 2.5 cm incompletely characterized hypoenhancing uterine  lesion, statistically likely representing fibroid. Left ovarian 1.2 cm cystic lesion, also incompletely characterized. Nonemergent pelvic ultrasound is recommended for complete characterization.     Small volume free pelvic fluid of uncertain etiology. Clinical correlation is recommended.     9/24/2024 Observation    NM Bone scan IMPRESSION:     1.  No scintigraphic evidence of osseous metastasis. Incidental findings as above.     11/19/2024 Surgery    RIGHT axillary dissection  ONE out of ELEVEN (1/11) lymph nodes positive for metastatic carcinoma  4 mm  < 2 mm extranodal extension  Persistent in situ follicular neoplasm  No evidence of overt follicular lymphoma     11/19/2024 -  Cancer Staged    Staging form: Breast, AJCC 8th Edition  - Pathologic stage from 11/19/2024: rpT0, pN1, cM0 - Signed by Ashleigh Doran MD on 12/5/2024  Stage prefix: Recurrence  Method of lymph node assessment: Axillary lymph node dissection         Past Medical History:   Diagnosis Date    Abnormal weight loss     Anorexia nervosa     Disease of thyroid gland     Elevated blood sugar     Occasional     Fibroadenoma of both breasts     GERD (gastroesophageal reflux disease)     Hypertension     Hyperthyroidism     Osteopenia     Schizophrenia (HCC)      Past Surgical History:   Procedure Laterality Date    BREAST BIOPSY Right 04/16/2021    BREAST CYST EXCISION Left 2011    CHOLECYSTECTOMY      LYMPH NODE DISSECTION Right 11/19/2024    Procedure: RIGHT AXILLARY DISSECTION;  Surgeon: Ashleigh Doran MD;  Location: [facility];  Service: Surgical Oncology    MASTECTOMY Right 2021    MASTECTOMY W/ SENTINEL NODE BIOPSY Right 06/25/2021    Procedure: BREAST MASTECTOMY WITH BIOPSY LYMPH NODE SENTINEL; ANJALI  GUIDED, LYMPHATIC MAPPING WITH BLUE DYE AND RADIOACTIVE DYE (INJECT AT 1130 BY DR LEMUS IN THE OR);  Surgeon: Jim Lemus MD;  Location: [facility];  Service: Surgical Oncology    US BREAST CLIP NEEDLE LOC RIGHT Right 06/14/2021    US BREAST  NEEDLE LOC RIGHT EACH ADDITIONAL Right 06/14/2021    US GUIDANCE BREAST BIOPSY RIGHT EACH ADDITIONAL Right 04/16/2021    US GUIDED BREAST BIOPSY RIGHT COMPLETE Right 04/16/2021    US GUIDED BREAST LYMPH NODE BIOPSY RIGHT Right 9/12/2024    US GUIDED THYROID BIOPSY  10/24/2024     Family History   Adopted: Yes     Social History     Tobacco Use    Smoking status: Never    Smokeless tobacco: Never   Vaping Use    Vaping status: Never Used   Substance and Sexual Activity    Alcohol use: Yes     Alcohol/week: 2.0 standard drinks of alcohol     Types: 2 Cans of beer per week    Drug use: Never    Sexual activity: Not Currently     Comment: Sexually active:   No - As per [place]      Current Outpatient Medications on File Prior to Visit   Medication Sig Dispense Refill    atenolol (TENORMIN) 50 mg tablet Take 1 tablet (50 mg total) by mouth daily Take 100 mg by mouth every other morning. 90 tablet 1    Cholecalciferol ( Ultra Strength) 50 MCG (2000 UT) CAPS TAKE ONE CAPSULE BY MOUTH EVERY DAY 90 capsule 3    famotidine (PEPCID) 40 MG tablet TAKE ONE TABLET BY MOUTH EVERY MORNING 90 tablet 1    levothyroxine 50 mcg tablet TAKE ONE TABLET BY MOUTH EVERY DAY IN THE EARLY MORNING 1/2 HOUR BEFORE BREAKFAST 90 tablet 1    lisinopril (ZESTRIL) 10 mg tablet Take 1 tablet (10 mg total) by mouth daily 90 tablet 3    Multiple Vitamin (Daily-Alanis) TABS TAKE ONE TABLET BY MOUTH EVERY DAY 90 tablet 3    OLANZapine (ZyPREXA) 7.5 mg tablet Take 1 tablet (7.5 mg total) by mouth daily at bedtime 90 tablet 2    anastrozole (ARIMIDEX) 1 mg tablet Take 1 tablet (1 mg total) by mouth daily (Patient not taking: Reported on 10/30/2024) 90 tablet 3    carbamide peroxide (DEBROX) 6.5 % otic solution Administer 5 drops into both ears 2 (two) times a day (Patient not taking: Reported on 12/9/2024) 15 mL 2    chlorhexidine (PERIDEX) 0.12 % solution Apply 15 mL to the mouth or throat 2 (two) times a day (Patient not taking: Reported on 10/10/2024)  120 mL 0    gabapentin (Neurontin) 100 mg capsule Take 1 capsule (100 mg total) by mouth 3 (three) times a day (Patient not taking: Reported on 12/2/2024) 30 capsule 0    neomycin-polymyxin-hydrocortisone (CORTISPORIN) 0.35%-10,000 units/mL-1% otic suspension Administer 4 drops to the right ear 3 (three) times a day (Patient not taking: Reported on 9/11/2024) 10 mL 0    [DISCONTINUED] traMADol (Ultram) 50 mg tablet Take 1 tablet (50 mg total) by mouth every 8 (eight) hours as needed for severe pain (Patient not taking: Reported on 12/2/2024) 9 tablet 0     No current facility-administered medications on file prior to visit.     No Known Allergies    Objective   /62 (BP Location: Left arm)   Pulse 93   Temp 98.2 °F (36.8 °C) (Temporal)   Resp 18   SpO2 98%     Pain Screening:   Pain Score: 0  ECOG ECOG Performance Status: 0 - Fully active, able to carry on all pre-disease performance without restriction  Physical Exam  Vitals and nursing note reviewed. Exam conducted with a chaperone present.   Constitutional:       General: She is not in acute distress.     Appearance: She is not ill-appearing or toxic-appearing.   HENT:      Head: Normocephalic and atraumatic.      Right Ear: External ear normal.      Left Ear: External ear normal.      Nose: Nose normal.   Eyes:      Extraocular Movements: Extraocular movements intact.      Conjunctiva/sclera: Conjunctivae normal.   Cardiovascular:      Rate and Rhythm: Normal rate and regular rhythm.      Pulses: Normal pulses.      Heart sounds: Normal heart sounds. No murmur heard.     No friction rub. No gallop.   Pulmonary:      Effort: Pulmonary effort is normal. No respiratory distress.      Breath sounds: Normal breath sounds. No stridor. No wheezing, rhonchi or rales.   Chest:      Comments: Right breast surgically absent. Mastectomy scar healed well. Right axillary scar healing appropriately. No concerning masses or lesions noted. Left breast upper outer mass  1-2 cm in size. No other concerning masses or lesions.  Abdominal:      General: Bowel sounds are normal. There is no distension.      Palpations: Abdomen is soft. There is no mass.      Tenderness: There is no abdominal tenderness. There is no guarding.   Musculoskeletal:      Right lower leg: No edema.      Left lower leg: No edema.   Lymphadenopathy:      Cervical: No cervical adenopathy.   Skin:     General: Skin is warm and dry.      Capillary Refill: Capillary refill takes less than 2 seconds.   Neurological:      General: No focal deficit present.      Mental Status: She is alert.      Cranial Nerves: No cranial nerve deficit.   Psychiatric:         Mood and Affect: Mood normal.         Behavior: Behavior normal.         Thought Content: Thought content normal.         Judgment: Judgment normal.        Lab Results   Component Value Date    WBC 5.91 2024    HGB 12.1 2024    HCT 38.2 2024    MCV 97 2024     2024       Chemistry        Component Value Date/Time    K 4.5 2024 1849    K 3.6 2022 0943     2024 1849     2022 0943    CO2 28 2024 1849    CO2 28 2022 0943    BUN 16 2024 1849    BUN 7 2022 0943    CREATININE 0.90 2024 1849        Component Value Date/Time    CALCIUM 9.1 2024 1849    CALCIUM 9.2 2022 0943    ALKPHOS 65 2024 1849    ALKPHOS 53 2022 0943    AST 26 2024 1849    AST 21 2022 0943    ALT 30 2024 1849    ALT 23 2022 0943            Imagin24 DIAGNOSIS: Cellulitis of axilla, right     TECHNIQUE:   Ultrasound of the right breast(s) was performed.     COMPARISONS: Prior breast imaging dated: 2024, 2024, 2023, 2023, 2022, 2021, 2021, 2021, 2021, 2021, 2021, 2021, 2021, 2021, 2021, 2020, 2019, 2019, 2018, 2016, 2016,  and 04/21/2015     RELEVANT HISTORY:   Family Breast Cancer History: No known family history of breast cancer.  Family Medical History: No known relevant family medical history.   Personal History: Hormone history includes other. Surgical history includes breast biopsy, mastectomy, and breast excisional biopsy. Medical history includes breast cancer.     RISK ASSESSMENT:   [facility] risk assessment reporting was suppressed due to the patient's history and/or demographic factors.     INDICATION: Digna Juárez is a 59 y.o. female presenting for evaluation of right axillary painful palpable concern..     FINDINGS:   RIGHT  Sonographic evaluation of the right axilla shows unchanged appearance of a small postoperative collection measuring 32 x 14 x 12 mm, previously 30 x 14 x 13 mm on 5/18/2023.     1) LYMPH NODE [D]: There is a morphologically abnormal right axillary level 1 lymph node with cortical thickness 5 mm.        IMPRESSION:   1.  Patient's painful palpable concern in the right axilla is a postoperative collection.  This is unchanged in size since 5/18/2023.  Recommend clinical management.  2.  Morphologically abnormal right axilla 1 lymph node.  Ultrasound-guided core biopsy is recommended.     I personally discussed these findings and recommendations with the patient.        ASSESSMENT/BI-RADS CATEGORY:  Right: 4 - Suspicious  Overall: 4 - Suspicious     RECOMMENDATION:       - Ultrasound-guided breast biopsy for the right breast.       - Clinical management for the right breast.        9/17/24 CT CHEST, ABDOMEN AND PELVIS WITH IV CONTRAST     INDICATION: C50.911: Malignant neoplasm of unspecified site of right female breast.     COMPARISON: None.     TECHNIQUE: CT examination of the chest, abdomen and pelvis was performed. Multiplanar 2D reformatted images were created from the source data.     This examination, like all CT scans performed in the [facility], was performed utilizing  techniques to minimize radiation dose exposure, including the use of iterative reconstruction and automated exposure control. Radiation dose length   product (DLP) for this visit: 440.9 mGy-cm     IV Contrast: 50 mL of iohexol (OMNIPAQUE) 50 mL of iohexol (OMNIPAQUE)  Enteric Contrast: Administered.     FINDINGS:     CHEST     LUNGS:  5 mm left apex nodule (604:39)  3 mm left upper lobe nodule (604:47)  3 mm subpleural right lower lobe nodule (604:29)  No tracheal or endobronchial lesion.     PLEURA: Unremarkable.     HEART/GREAT VESSELS:  Heart is unremarkable for patient's age.  Incidental note of an aberrant right subclavian artery coursing posterior to the esophagus.  No thoracic aortic aneurysm.        MEDIASTINUM AND JIM: Unremarkable.     CHEST WALL AND LOWER NECK:  Ill-defined hypodense right thyroid lobe nodule (301:9), measuring up to approximate 1.5 cm.  Lobulated low-density right axillary node measuring up to 2.2 x 1 cm (coronal 601:65).  Right mastectomy.  Rounded 1.5 cm soft tissue density nodule in the left breast containing coarse calcification versus biopsy clip (301:55)     ABDOMEN     LIVER/BILIARY TREE:  Along the inferior right liver margin there is a subcapsular heterogeneously hypodense 1.3 cm lesion (coronal 601:71)  CBD prominence and trace intrahepatic biliary dilation likely related to postcholecystectomy state.     GALLBLADDER: Post cholecystectomy     SPLEEN: Unremarkable.     PANCREAS: Unremarkable.     ADRENAL GLANDS: 1.2 cm indeterminate left adrenal gland nodule.     KIDNEYS/URETERS:  No hydronephrosis or urinary tract calculi.  Subcentimeter hypoattenuating renal lesion(s), too small to characterize but statistically likely benign.     STOMACH AND BOWEL: Unremarkable.     APPENDIX: No findings to suggest appendicitis. Not well visualized.     ABDOMINOPELVIC CAVITY: No ascites. No pneumoperitoneum. No lymphadenopathy.     VESSELS: Unremarkable for patient's age.     PELVIS      REPRODUCTIVE ORGANS:  Partially exophytic posterior right lower uterine heterogeneously hypodense lesion most consistent with a fibroid.  The uterus is overall prominent considering age, however otherwise unremarkable aside from the above finding.     URINARY BLADDER: Bladder wall thickening considering degree of distention.     ABDOMINAL WALL/INGUINAL REGIONS: Unremarkable.     BONES:  4 mm sclerotic lesion within the medial posterior right fifth rib (301:28)  Punctate sclerotic lesion in the left sternum (301:53)        IMPRESSION:     Bilateral pulmonary nodules measuring 3 to 5 mm as described.  Follow-up CT chest recommended in 3 months considering history of recurrent breast cancer.     Ill-defined hypodense right thyroid gland nodule measures up to 1.5 cm.  Thyroid ultrasound recommended for further characterization.     Lobulated low-density right axillary node measuring 2.2 x 1 cm, consistent with biopsy-proven malignancy.  1.5 cm rounded soft tissue nodule in the left breast with coarse calcification versus biopsy clip.  Correlation with mammographic history recommended.     Ill-defined heterogeneously hypodense lesion within the subcapsular inferior right liver margin.  1.2 cm indeterminate left adrenal gland nodule.  MRI with contrast recommended for further evaluation of the above two lesions.     Punctate sclerotic lesion in the left sternum.  3 mm sclerotic lesion in the posterior/medial right fifth rib, potentially representing a bone island.     Prominent uterus considering age with probable partially exophytic fibroid.     Bladder wall thickening considering degree of distention.  Correlate with urinalysis for possible cystitis.     Note of no priors for comparison.     The study was marked in EPIC for immediate notification.        9/23/24 MRI - ABDOMEN - WITH AND WITHOUT CONTRAST     INDICATION: 59 years / Female. R93.2: Abnormal findings on diagnostic imaging of liver and biliary tract.      COMPARISON: CT chest, abdomen and pelvis 9/17/2024     TECHNIQUE: Multiplanar/multisequence MRI of the abdomen was performed before and after administration of contrast. Imaging performed on 1.5T MRI.     IV Contrast: 4 mL of Gadobutrol injection (SINGLE-DOSE)     FINDINGS:     LOWER CHEST: Unremarkable.     LIVER:  Normal in size and configuration.  No suspicious mass to correspond with the lesion seen on prior CT 9/17/2024. The observation on prior CT likely represents volume averaging of irregular hepatic border with surrounding mesenteric fat or small amount of focal fat infiltration..  Patent hepatic and portal veins.     BILE DUCTS: No intrahepatic or extrahepatic bile duct dilation.     GALLBLADDER: Post cholecystectomy.     PANCREAS: Unremarkable.     ADRENAL GLANDS: There is irregular thickening of the left adrenal gland compared to the right (series 7 image 17), without a discrete adrenal nodule.     SPLEEN: Normal.     KIDNEYS/PROXIMAL URETERS: No hydroureteronephrosis. No suspicious renal mass.     BOWEL: No dilated loops of bowel.     PERITONEUM/RETROPERITONEUM: Small volume free pelvic fluid.     LYMPH NODES: No abdominal lymphadenopathy.     VESSELS: No aneurysm.     ABDOMINAL WALL: Unremarkable     BONES: No suspicious osseous lesion.     OTHERS: There is a 2.5 cm hypoenhancing uterine lesion (series 11 image 39), incompletely characterized since it is visualized only on pre and postcontrast coronal images.     A 1.2 cm left ovarian cystic lesion.     IMPRESSION:     No suspicious focal hepatic lesion. The 1.3 cm observation on prior CT may represent volume averaging of the hepatic border with adjacent mesenteric fat or small amount of focal fat infiltration. However given history of recently diagnosed metastatic   right breast neoplasm, follow-up CT abdomen with contrast in 3 months is recommended to assess for stability of findings     Irregular thickening of the left adrenal gland without  discrete nodule findings can represent hyperplasia versus early small metastatic nodule. No remote prior cross-sectional studies of the abdomen are available for direct comparison. Attention on   follow-up CT abdomen in 3 months is recommended to assess for stability.     A 2.5 cm incompletely characterized hypoenhancing uterine lesion, statistically likely representing fibroid. Left ovarian 1.2 cm cystic lesion, also incompletely characterized. Nonemergent pelvic ultrasound is recommended for complete characterization.     Small volume free pelvic fluid of uncertain etiology. Clinical correlation is recommended.     The study was marked in EPIC for immediate notification.        9/23/24 THYROID ULTRASOUND     INDICATION: E04.2: Nontoxic multinodular goiter  C79.9: Secondary malignant neoplasm of unspecified site  C50.919: Malignant neoplasm of unspecified site of unspecified female breast.     COMPARISON: None     TECHNIQUE: Ultrasound of the thyroid was performed with a high frequency linear transducer in transverse and sagittal planes including volumetric imaging sweeps as well as traditional still imaging technique.     FINDINGS:  Thyroid texture: Normal homogeneous smooth echotexture.     Right lobe: 4.6 x 2.1 x 1.5 cm. Volume 7.0 mL  Left lobe: 3.8 x 1.4 x 1.0 cm. Volume 2.7 mL  Isthmus: 0.2 cm.     Nodule #1. Image 7 of series 1.  RIGHT midgland nodule measuring 2.0 x 1.8 x 1.2 cm. No priors for comparison.  COMPOSITION: 2 points, solid or almost completely solid.  ECHOGENICITY: 1 point, hyperechoic or isoechoic.  SHAPE: 3 points, taller-than-wide.  MARGIN: 0 points, smooth.  ECHOGENIC FOCI: 0 points, none or large comet-tail artifacts.  TI-RADS Classification: TR 4 (4-6 points). FNA if >/= 1.5 cm. Follow if >/= 1 cm.     IMPRESSION:     The following meet current ACR criteria for recommending ultrasound guided biopsy:     Isoechoic nodule in the midpole of the right thyroid lobe measuring up to 2.0  moisés.  TIRADS Category TR4 -moderately suspicious     Reference: ACR Thyroid Imaging, Reporting and Data System (TI-RADS): White Paper of the ACR TI-RADS Committee. J AM Shilpi Radiol 2017;14:587-595. Additional recommendations based on American Thyroid Association 2015 guidelines.     The study was marked in EPIC for immediate notification.        9/24/24 BONE SCAN  WHOLE BODY     INDICATION: C50.911: Malignant neoplasm of unspecified site of right female breast     PREVIOUS FILM CORRELATION:    No prior pertinent studies for comparison.     TECHNIQUE:   This study was performed following the intravenous administration of 26.5 mCi Tc-99m labeled MDP.  Delayed, anterior and posterior whole body images were acquired, 2-3 hours after radiopharmaceutical administration.     FINDINGS:     There is no scintigraphic evidence of osseous metastasis.     Mild asymmetric activity in the left mandible and left maxilla may be related to dental disease.     Focal activity in the left knee and right foot first MTP joint region. Additional foci of activity in the left foot along the dorsal forefoot and multiple MTP joints. These are likely degenerative.     Symmetric renal activity. Asymmetric curvilinear activity in the groin region (right greater than left), likely related to urine contamination.     IMPRESSION:     1.  No scintigraphic evidence of osseous metastasis. Incidental findings as above.        Resident: FAUSTO Fernandez I, the attending radiologist, have reviewed the images and agree with the final report above.        10/11/24 ddendum    ADDENDUM:     As mentioned in the body the report, there is trace volume of free fluid in the pelvis, of indeterminate etiology. Clinical correlation is recommended     The study was marked in EPIC for significant notification.   Addended by Jamie Tyson MD on 10/16/2024  2:19 PM     Study Result    Narrative & Impression   PELVIC ULTRASOUND, COMPLETE     INDICATION: The patient is  59 years old. N83.202: Unspecified ovarian cyst, left side.     COMPARISON: CT chest, abdomen and pelvis 9/17/2024     TECHNIQUE: Transabdominal pelvic ultrasound was performed in sagittal and transverse planes with a curvilinear transducer. Additional transvaginal imaging was performed to better evaluate the endometrium and ovaries. Imaging included volumetric sweeps as   well as traditional still imaging technique.     FINDINGS:     UTERUS:  The uterus is anteverted in position, measuring 7.9 x 4.4 x 6.5 cm.  Rounded well-defined nodule(s) present, likely representing leiomyoma(s), as follows: Fibroid 1: 2.4 x 2.5 x 2.1 cm. Subserosal location. Right uterine body location.  Fibroid 2: 1.6 x 1.1 x 0.9 cm. Intramural location. Right fundal location.  Fibroid 3: 2.4 x 1.9 x 3.0 cm. Submucosal location in the left uterine body/lower uterine segment, with mass effect and deviation of the adjacent endometrial stripe.  The cervix appears within normal limits.     ENDOMETRIUM:  Suboptimal visualization of the endometrial stripe due to presence of a 3.0 cm echogenic mass lesion in the lower uterine body as described above. It is visualized in the fundal region measuring up to 4 mm.     OVARIES/ADNEXA:  Right ovary: 1.9 x 1.7 x 0.9 cm. 1.5 mL.  Ovarian Doppler flow is within normal limits.  No suspicious ovarian or adnexal abnormality.     Left ovary: 3.3 x 2.7 x 1.3 cm. 5.9 mL.  Ovarian Doppler flow is within normal limits. A 1.2 cm left ovarian unilocular cystic lesion, for which no definite follow-up is recommended as per O RADS criteria  No suspicious ovarian or adnexal abnormality.     OTHER:  Trace volume free fluid in the pelvis.     IMPRESSION:     Fibroid uterus. A 3.0 cm echogenic lesion in the left uterine body/lower segment, statistically likely representing a submucosal fibroid. Suboptimal assessment of the endometrial stripe due to compression and deviation secondary to mass effect from this   lesion. It  measures 4 mm in the fundal region. In absence of remote prior cross-sectional images of the uterus, pelvic ultrasound in 6 months is recommended to assess for stability.     The study was marked in EPIC for significant notification.             Pathology:  Case Report   Surgical Pathology Report                         Case: [id number]                                   Authorizing Provider:  Jim Lemus MD              Collected:           06/25/2021 2086               Ordering Location:     [facility]        Received:            06/25/2021 [address] [place] [address] Operating Room                                                       Pathologist:           Amairani Méndez MD                                                             Intraop:               Amairani Méndez MD                                                             Specimens:   A) - Lymph Node, [place], right axillary sentinel lymph node                                        B) - Breast, Right, right breast                                                          Addendum   At the request of Dr. Lemus, unstained slides from paraffin BLOCK B15 containing the patient's cancer cells were sent to [facility] for  Mammaprint testing. Upon completion of testing, the [facility] report will be directly sent to the requesting physician as well as posted in the Media Tab of the patient's [facility] EMR by the [facility] Pathology Department.     Please note: The [facility]'s analysis and report is performed independently of [facility], and neither the Hospital nor the Pathology Department screen, review or comment upon [facility]'s report.  Because the role of testing in cancer diagnosis and management is subject to evolving development, usage and interpretation, we strongly urge that the test limitations described in the [facility] report be carefully read, appropriately  shared with the patient, and critically considered when reaching treatment decisions.     This pathology material was removed from archive for purpose of molecular testing. It was reviewed and approved by Dr. Armstrong.       Addendum electronically signed by Amairani Méndez MD on 7/22/2021 at 1057   Final Diagnosis   A. Lymph Node, [place], right axillary sentinel lymph node, lymphadenectomy:  - One lymph node (1/1mi) positive for micrometastatic carcinoma.     -- Subcapsular and sinusoidal metastases; largest metastatic deposit 0.4 mm in a 1.1 mm area.    -- No extranodal extension.     -- Confirmed by positive pankeratin (CKAE1/3) immunostain.      B. Breast, Right, right breast, mastectomy:  Multifocal (2) invasive mammary carcinoma.  Largest invasive carcinoma  - Invasive mammary carcinoma of no special type (ductal), 20 mm in greatest gross dimension,      present in lower outer quadrant (LOQ).    -- Idania histologic grade 2 of 3 (total score: 7 of 9)        * Glandular (acinar) Tubular Differentiation 10-75%, score 2.        * Nuclear Pleomorphism 2-3 of 3, score 3.        * Mitotic Rate 4-7/mm2, (8-14 mitoses/10HPF; 12 mitoses/10 HPF), score 2.    -- Confirmed by         * Negative: p63 and SMMHC immunostains.     -- Associated prior biopsy site changes with ribbon clip and ANJALI  marker.  Second invasive carcinoma  - Invasive lobular carcinoma, classic pattern with histiocytoid differentiation; 7 mm in      greatest gross dimension, present in upper outer quadrant (UOQ).    -- Idania histologic grade 2 of 3 (total score: 6 of 9)        * Glandular (acinar) Tubular Differentiation <10%, score 3.        * Nuclear Pleomorphism 1-2 of 3, score 2.        * Mitotic Rate < 3/mm2, (</= 7 mitoses/10HPF; 0 mitoses/10 hPF), score 1.    -- Confirmed by tumor cell immunophenotype:        * Positive: Pankeratin (CKAE1/3) and P120 (cytoplasmic).    -- Associated prior biopsy site changes with wing clip and  two ANJALI  markers.  - Focus suspicious for microinvasive carcinoma of no special type (ductal), present in tissue     between both invasive carcinomas; 0.7 mm.  - Ductal carcinoma in-situ (DCIS): Present; not an extensive intraductal component; associated with     largest invasive carcinoma (< 5% of total tumor).     -- Size and Extent: 7 mm in greatest estimated extent; present in 1 of 21 blocks.    -- Architectural Patterns: Micropapillary and cribriform.     -- Nuclear grade: II (intermediate).    -- Necrosis: Present, focal necrosis.   - Margins uninvolved by invasive and in-situ carcinoma.     -- Invasive carcinoma 5 mm from nearest posterior margin; 7 mm from nearest anterior UOQ margin.     -- DCIS > 5 mm from nearest gross posterior margin.   - Benign nipple and skin.    -- Invasive carcinoma does not invade into the dermis or epidermis; no ulceration.     -- DCIS does not involve the nipple epidermis.     -- No dermal lymphovascular invasion.   - Lymphovascular invasion: Present.    -- Confirmed by D2-40 and CD31 immunostains.   - Perineural invasion: Present.   - Microcalcifications: Present, associated with invasive carcinoma, lobular neoplasia and     non-neoplastic breast tissue.   - Benign proliferative and non-proliferative fibrocystic changes with atypia consisting of extensive     non-invasive lobular neoplasia (lobular carcinoma in-situ and atypical lobular hyperplasia; focally     involving sclerosing adenosis), sclerosing and apocrine adenosis, fibroadenomatoid changes and     cystic and papillary apocrine metaplasia.    -- Lobular neoplasia confirmed by positive p120 (cytoplasmic), SMMHC and p63; negative E-cadherin        immunostains.   - Duct ectasia.          Comments:   This is an appended report. These results have been appended to a previously preliminary verified report.      Electronically signed by Amaiarni Méndez MD on 7/1/2021 at 2128   Note    Intradepartmental consultation  (CV) agrees with the diagnosis for specimen B. A copy of the final report is faxed to Dr. JET Lemus on 7/1/21 @ 1074 hours.     1.  Ancillary Studies:  Performed on original biopsy material, [id number] (largest/ductal), and reported to be:     - ER:  100% / Positive     - PA:  100% / Positive     - HER2 (IHC):  1+ / Negative     - HER2 (FISH):  N/A     - Repeat HER2 testing (2013 ASCO/CAP Recommendations): Not Indicated      - Best representative tumor block: B4 (ductal).       -- Sufficient tumor present for          Agendia Mammaprint/Blueprint (1 cm2 of invasive tumor in aggregate): Yes.          MI Profile/Foundation One (at least 5 x 5 mm of tumor): Yes.      Ancillary Studies:  Performed on original biopsy material, [id number] (smaller/lobular), and reported to be:     - ER:  90-95% / Positive     - PA:  45-55% / Positive     - HER2 (IHC):  1+ / Negative     - HER2 (FISH):  N/A      - Repeat HER2 testing (2013 ASCO/CAP Recommendations): Not Indicated      - Best representative tumor blocks: B15 (lobular)       -- Sufficient tumor present for          Agendia Mammaprint/Blueprint (1 cm2 of invasive tumor in aggregate): No.          MI Profile/Foundation One (at least 5 x 5 mm of tumor):  Yes.      2.  Pathologic Stage Classification (pTNM, AJCC 8th Edition):       8th ed, AJCC Anatomic Stage:  at least Stage IB- pT1c(m/2), pN1mi(sn), cM0, G2.     3.  8th ed. AJCC Pathologic Prognostic Stage (use AJCC update):  IA.   Comment: This is an appended report. These results have been appended to a previously preliminary verified report.   Additional Information    All reported additional testing was performed with appropriately reactive controls.  These tests were developed and their performance characteristics determined by [facility] or appropriate performing facility, though some tests may be performed on tissues which have not been validated for performance characteristics (such as staining  performed on alcohol exposed cell blocks and decalcified tissues).  Results should be interpreted with caution and in the context of the patients’ clinical condition. These tests may not be cleared or approved by the U.S. Food and Drug Administration, though the FDA has determined that such clearance or approval is not necessary. These tests are used for clinical purposes and they should not be regarded as investigational or for research. This laboratory has been approved by [facility], designated as a high-complexity laboratory and is qualified to perform these tests.  Interpretation performed at [facility], [address] [address].   Comment: This is an appended report. These results have been appended to a previously preliminary verified report.   Synoptic Checklist   INVASIVE CARCINOMA OF THE BREAST: Resection   8th Edition - Protocol posted: 2/26/2020INVASIVE CARCINOMA OF THE BREAST: COMPLETE EXCISION - All Specimens  SPECIMEN   Procedure  Total mastectomy   Specimen Laterality  Right   TUMOR   Tumor Site  Upper outer quadrant     Lower outer quadrant   Histologic Type  Invasive carcinoma of no special type (ductal)   Glandular (Acinar) / Tubular Differentiation  Score 2   Nuclear Pleomorphism  Score 3   Mitotic Rate  Score 2   Overall Grade  Grade 2 (scores of 6 or 7)   Tumor Size  Greatest dimension of largest invasive focus (Millimeters): 20 mm   Tumor Focality  Multiple foci of invasive carcinoma   Number of Foci  2        Sizes of Individual Foci (Millimeters)  20, 7 mm   Ductal Carcinoma In Situ (DCIS)  Present     Negative for extensive intraductal component (EIC)   Size (Extent) of DCIS  Estimated size (extent) of DCIS is at least (Millimeters): 7 mm   Number of Blocks with DCIS  1   Number of Blocks Examined  21   Architectural Patterns  Cribriform     Micropapillary   Nuclear Grade  Grade II (intermediate)   Necrosis  Present, focal (small foci or single cell necrosis)   Lobular  Carcinoma In Situ (LCIS)  Present   Lymphovascular Invasion  Present   Dermal Lymphovascular Invasion  Not identified   Microcalcifications  Present in invasive carcinoma     Present in non-neoplastic tissue     Lobular neoplasia   Treatment Effect in the Breast  No known presurgical therapy   MARGINS   Invasive Carcinoma Margins  Uninvolved by invasive carcinoma   Distance from Closest Margin (Millimeters)  5 mm   Closest Margin(s)  Posterior   DCIS Margins  Uninvolved by DCIS   Distance from Closest Margin (Millimeters)  Greater than: 5 mm   Closest Margin(s)  Posterior   LYMPH NODES   Regional Lymph Nodes  Involved by tumor cells   Number of Lymph Nodes with Macrometastases (> 2 mm)  0   Number of Lymph Nodes with Micrometastases (> 0.2 mm to 2 mm and / or > 200 cells)  1   Size of Largest Metastatic Deposit (Millimeters)  0.4 mm   Extranodal Extension  Not identified   Total Number of Lymph Nodes Examined  1   Number of [place] Nodes Examined  1   PATHOLOGIC STAGE CLASSIFICATION (pTNM, AJCC 8th Edition)      TNM Descriptors  m (multiple foci of invasive carcinoma)   Primary Tumor (pT)  pT1c   Regional Lymph Nodes Modifier  (sn): [place] node(s) evaluated.   Regional Lymph Nodes (pN)  pN1mi   ADDITIONAL FINDINGS   Additional Findings  Second focus of invasive carcinoma- invasive lobular carcinoma, histologic grade 2 of 3. Focus suspicious for microinvasive carcinoma of no special type (ductal). Extensive non-invasive lobular neoplasia (LCIS and ALH). Perineural invasion identified.   SPECIAL STUDIES   Breast Biomarker Testing Performed on Previous Biopsy     Estrogen Receptor (ER) Status  Positive (greater than 10% of cells demonstrate nuclear positivity)   Percentage of Cells with Nuclear Positivity  100 %   Breast Biomarker Testing Performed on Previous Biopsy     Progesterone Receptor (PgR) Status  Positive   Percentage of Cells with Nuclear Positivity  100 %   Breast Biomarker Testing Performed on Previous  "Biopsy     HER2 (by immunohistochemistry)  Negative (Score 1+)   Testing Performed on Case Number  [id number]   Comment(s)   Comment(s)  8th ed. AJCC Pathologic Prognostic Stage: IA. Best representative tumor block: B4.   .      Intraoperative Consultation       TPDXA: Negative for malignant cells. Dr. JET Lemus notified 6/25/21 @ 1416 hours.       *Electronic Signature* Amairani Méndez M.D.   Interpretation performed at [facility], [address] [address].   Gross Description       A. Received fresh for touch preparation, labeled with patient's name and hospital number, and designated \"right axillary sentinel lymph node.\" The specimen consists of a 3.2 x 1.5 x 1.0 cm tan partially blue-dyed lymph node with a fatty hilum. Serially sectioned. Scrape and touch cytology preparations performed (4 slides). The lymph node is entirely submitted in 3 cassettes. FERNANDO        B. The specimen is received in formalin, labeled with the patient's name and hospital number, and is designated \"right breast.”  It consists of a right breast oriented with a short suture designating superior; long suture designating lateral and a medium suture designating medial.  The specimen measures 16.0 cm from the lateral, 12.0 cm from superior to inferior and 3.4 cm from anterior to posterior.  The anterior surface displays a 15.0 x 8.6 cm attached, overlying portion of white, wrinkled, unremarkable skin with a 3.3 x 2.5 cm areola and a 1.0 x 0.9 x 0.3 cm everted nipple.  The specimen is inked as follows:  Upper outer quadrant = yellow  Lower outer quadrant = green  Upper inner quadrant = blue  Lower inner quadrant = orange  Deep = black     The specimen is sectioned to reveal a white, firm, gritty, ill-defined mass measuring 2.0 cm from medial to lateral, 2.0 cm from superior to inferior and 1.6 cm from anterior to posterior.  The mass is located in the lower outer quadrant and is found to contain a ANJALI  device.  There is a " loosely attached ribbon-shaped, metallic tissue marker in the adjacent adipose tissue approximately 1.7 cm from the mass/ANJALI .  The mass abuts the deep margin and is 2.0 cm from the closest anterior margin and skin.  The mass is also surrounded by abundant dense, rubbery, white fibrous tissue.      Approximately 3.0 cm superior to the mass, is a tan-white, indurated, possibly calcified area which appears to be bracket by 2 additional ANJALI  devices. This area measures 0.5 cm from superior to inferior to 0.7 cm cm from medial to lateral and 0.4 cm from anterior to posterior.  Embedded within this area is a second wing-shaped, metallic tissue marker.  This area is located in the upper outer quadrant and is 0.9 cm from the closest posterior margin and 0.8 cm from closest anterior margin/skin.  The remainder of the specimen consists of approximately 60% yellow, lobulated adipose tissue to 40% dense, rubbery, white fibrous tissue.  Representative sections. Twenty-one cassettes.     B1-B2:  Fibrous tissue medial to mass  B3-B4:  Medial sections of mass  B5-B7:  Mass to closest posterior margin to include adjacent fibrous tissue, trisected from superior to inferior (site containing ANJALI  in the B6)  B8:  Lateral section of mass  B9: Fibrous tissue lateral to mass  B10: Closest anterior margin and skin corresponding to mass, perpendicular  B11-B12:  Fibrous tissue between mass and bracketed area  B13-B16:  Entire bracketed area from medial to lateral  B14: Wing-shaped clip site  B15: Closest skin and anterior margin  B16: Closest anterior margin  B17: Nipple, perpendicular  B18:  Fibrous tissue from upper outer quadrant  B19:  Fibrous tissue from lower outer quadrant  B20:  Fibrous tissue from lower inner quadrant  B21:  Fibrous tissue from upper inner quadrant   Comment: This is an appended report. These results have been appended to a previously preliminary verified report.   Clinical Information Malignant  neoplasm of overlapping sites of right breast in female ER+   Comment: This is an appended report. These results have been appended to a previously preliminary verified report.   Resulting Agency [id number]             Specimen Collected: 06/25/21 13:49 Last Resulted: 07/22/21 10:57         Case Report   Surgical Pathology Report                         Case: [id number]                                   Authorizing Provider:  NATALIE Knox DO          Collected:           09/12/2024 1025               Ordering Location:     [facility] Received:            09/12/2024 1321                                      [facility]                                                               Pathologist:           Jesenia Armijo MD                                                         Specimen:    Axillary lymph node, US RT AXILLA LN, 2 PASSES, 16G [place]                                Addendum 4   PART A      IgH/BCL2 t (14;18) FISH performed at [facility] and interpreted by Dr. IRIS Pierre MD shows:       The original diagnosis is the SAME.    Addendum electronically signed by Jesenia Armijo MD on 9/25/2024 at 0800   Addendum 3   Kappa and Lambda IHC show no light chain restriction.  The original diagnosis is the SAME.    Addendum electronically signed by Jesenia Armijo MD on 9/18/2024 at 1145   Addendum 2   The breast carcinoma shows intact membranous p120 and e-cadherin expression.  The original diagnosis is the SAME.    Addendum electronically signed by Jesenia Armijo MD on 9/17/2024 at 0809   Addendum   Addendum issued to report Breast Biomarkers. Please see CAP checklist below.   Addendum electronically signed by Donnie Nicole DO on 9/16/2024 at 1413   Final Diagnosis   A. Right axillary lymph node (core needle biopsy):  - Invasive mammary carcinoma of no special type (ductal, 1.2mm) involving fibroadipose tissue    - Burt score: 4 (of 9), grade 1        - Tubule formation: score  1 (of 3)       - Nuclear pleomorphism: score 2 (of 3)       - Mitoses: Score 1 (of 3  - In situ follicular neoplasia (ISFN); see comment     Comment:  - The findings are of in situ follicular neoplasia (ISFN) without overt involvement by follicular lymphoma on these small biopsies. Involvement by follicular lymphoma elsewhere in the lymph node or in other lymph nodes cannot be ruled out on this sample. A subset of ISFN will never progress to follicular lymphoma; however, a subset either already have, or will develop follicular lymphoma or other B-cell lymphomas.   Correlation with systemic imaging to rule out lymphadenopathy elsewhere is recommended, as clinically indicated.     - The minute focus of invasive carcinoma is present outside of the lymph node, within the fibroadipose tissue. This focus may represent metastasis versus primary carcinoma.  The focus of carcinoma is highlighted by CK AE1/3, GATA3 with absent myoepithelial cells on SMHC and p63 stains.  The lesional tissues are negative for SOX10 and HMB45.  Suggest clinical correlation and appropriate follow-up.   - Bcl6 and CD10 stain germinal centers which also show aberrant Bcl2 expression consistent with in situ follicular neoplasia.  CD3/5 stain T-cells while CD20 stains B-cells. Bcl1 is negative.  Ki67 is approximately 2% within lymphoid tissue.  Intradepartmental consultation concurs with the diagnosis of carcinoma and in situ follicular neoplasia.   - Nurse navigator NATALIE Bryant was notified of the diagnosis via voicemail on 9/16/24 at 1:37 pm.      Interpretation performed at [facility]- [address] [address]             Comments:   This is an appended report. These results have been appended to a previously preliminary verified report.      Electronically signed by Jesenia Armijo MD on 9/16/2024 at 1331   Preliminary Diagnosis   PRELIMINARY DIAGNOSIS ONLY - PENDING INTRADEPARTMENTAL REVIEW OF ATYPICAL LYMPHOID PROLIFERATION      A. Right axillary lymph node (core needle biopsy):  - Invasive mammary carcinoma of no special type (ductal, 1.2mm) involving fibroadipose tissue    - Idania score: 4 (of 9), grade 1        - Tubule formation: score 1 (of 3)       - Nuclear pleomorphism: score 2 (of 3)       - Mitoses: Score 1 (of 3)  - Atypical lymphoid proliferation     Comment:  - The minute focus of invasive carcinoma is present outside of the lymph node, within the fibroadipose tissue. The focus of carcinoma is highlighted by CK AE1/3, GATA3 with absent myoepithelial cells on SMHC and p63 stains.  The lesional tissues are negative for SOX10 and HMB45.   - Bcl6 and CD10 stain germinal centers which also show aberrant Bcl2 expression.  CD3/5 stain T-cells while CD20 stains B-cells. Bcl1 is negative.  Ki67 is approximately 2% within lymphoid tissue.        Interpretation performed at [facility]- [address] [address]      Preliminary result electronically signed by Jesenia Armijo MD on 9/16/2024 at 0857   Additional Information    All reported additional testing was performed with appropriately reactive controls.  These tests were developed and their performance characteristics determined by [facility] or appropriate performing facility, though some tests may be performed on tissues which have not been validated for performance characteristics (such as staining performed on alcohol exposed cell blocks and decalcified tissues).  Results should be interpreted with caution and in the context of the patients’ clinical condition. These tests may not be cleared or approved by the U.S. Food and Drug Administration, though the FDA has determined that such clearance or approval is not necessary. These tests are used for clinical purposes and they should not be regarded as investigational or for research. This laboratory has been approved by CLIA 88, designated as a high-complexity laboratory and is  "qualified to perform these tests.  .   Synoptic Checklist   Breast Biomarker Reporting Template   Protocol posted: 12/13/2023(Added in Addendum) BREAST BIOMARKER REPORTING TEMPLATE - All Specimens  Test(s) Performed     Estrogen Receptor (ER) Status  Positive (greater than 10% of cells demonstrate nuclear positivity)   Percentage of Cells with Nuclear Positivity  %   Average Intensity of Staining  Strong   Test Type  Food and Drug Administration (FDA) cleared (test / vendor): CONFIRM anti-Estrogen Receptor (ER)/[facility]   Primary Antibody  SP1   Test(s) Performed     Progesterone Receptor (PgR) Status  Positive   Percentage of Cells with Nuclear Positivity  1 %   Average Intensity of Staining  Weak   Test Type  Food and Drug Administration (FDA) cleared (test / vendor): CONFIRM anti-Progesterone Receptor (DE)/[facility]   Primary Antibody  1E2   Test(s) Performed     HER2 by Immunohistochemistry  Negative (Score 0)   Test Type  Food and Drug Administration (FDA) cleared (test / vendor): PATHWAY anti-HER-2/andreia (4B5)/[facility]   Primary Antibody  4B5   Cold Ischemia and Fixation Times  Meet requirements specified in latest version of the ASCO / CAP Guidelines   Testing Performed on Block Number(s)  A2   METHODS   Fixative  Formalin   Image Analysis  Not performed   .      Gross Description    A. The specimen is received in formalin, labeled with the patient's name and hospital number, and is designated \" ultrasound right axilla lymph node 2 passes\".  The specimen consists of 2 grayish-white yellow soft tissue cores each having a diameter of 0.1 cm and having lengths of 0.7 cm and 0.8 cm.  Also submitted in container are 3 grayish-white yellow soft tissue fragments ranging from 0.1 cm to 0.3 cm in greatest dimension.  Entirely submitted. Two cassettes, with 1 core between sponges in each cassette, and fragment also in cassette A2.     Note: The estimated total formalin fixation time based upon " information provided by the submitting clinician and the standard processing schedule is under 72 hours.  [employer]   Clinical Information PLS CONTACT NATHALIA CASH IF POSITIVE RESULTS @ [phone].   Resulting Agency [facility]             Specimen Collected: 09/12/24 10:25 Last Resulted: 09/25/24 08:00         Case Report   Surgical Pathology Report                         Case: [id number]                                   Authorizing Provider:  Ashleigh Doran MD           Collected:           11/19/2024 0832               Ordering Location:     [facility] hospital        Received:            11/19/2024 0857                                      [facility]                                                     Pathologist:           Jovany Garcia MD                                                           Specimen:    Axillary, Right Axillary Contents                                                          Final Diagnosis   A. Axillar, Contents, Right, Dissection:  -   One out of eleven (1/11) lymph nodes positive for metastatic carcinoma consistent with mammary origin.               ~4 mm in greatest dimension.                 < 2 mm of extranodal extension.  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               No evidence of a overt follicular lymphoma.  -   Prior procedural site changes identified.     Comment: The patient's history of breast cancer with recently identified metastasis and in-situ follicular neoplasm is noted. The current sample shows one lymph node positive for metastatic carcinoma with extranodal extension measuring < 2 mm in greatest dimension.               In regards to the in-situ follicular neoplasm; clinical correlation is advised to rule out the possibility of a concurrent follicular lymphoma. In the submitted tissue samples there is no evidence of overt follicular lymphoma. Clinical correlation is advised.      Intradepartmental consultation is in  agreement ([facility]).  Best block for ancillary studies is A5.      Electronically signed by Jovany Garcia MD on 11/26/2024 at 1305 EST   Note    Immunohistochemical stains show:     Block A1: AE1/3 is negative for metastatic carcinoma.   BCL-2 is focally bright within follicles; confirmed with expression of BCL-6 and CD10 consistent with ISFN.     Block A2: AE1/3 is negative for metastatic carcinoma.   BCL-6 and CD10 highlight germinal centers that are appropriately negative for BCL-2.      Block A5: Poly-3 highlights metastatic carcinoma.  CD3 highlights T-cells and CD20 highlights B-cells  Scattered B-cells stronglly express BCL-2 within follicles; confirmed with expression of BCL-6 and CD10 consistent with ISFN.     Block A7: AE1/3 is negative for metatatic carcinoma.  CD3 highlights T-cells and CD20 highlights B-cells.   BCL-6 and CD10 highlight germinal centers that are appropriately negative for BCL-2.    Additional Information    All reported additional testing was performed with appropriately reactive controls.  These tests were developed and their performance characteristics determined by [facility] or appropriate performing facility, though some tests may be performed on tissues which have not been validated for performance characteristics (such as staining performed on alcohol exposed cell blocks and decalcified tissues).  Results should be interpreted with caution and in the context of the patients’ clinical condition. These tests may not be cleared or approved by the U.S. Food and Drug Administration, though the FDA has determined that such clearance or approval is not necessary. These tests are used for clinical purposes and they should not be regarded as investigational or for research. This laboratory has been approved by CLIA 88, designated as a high-complexity laboratory and is qualified to perform these tests.  .Interpretation performed at [facility], [address]  "[facility] [address]      Gross Description    A. The specimen is received in formalin, labeled with the patient's name and hospital number, and is designated \" right axillary contents\".  The specimen consists of 1 partially ragged and friable portion of fibroadipose tissue measuring 9.0 x 4.8 x 1.5 cm.  Palpating the tissue reveals 14 dense nodules consistent with lymph nodes which range from 0.3 cm to 2.3 cm in greatest dimension.  The lymph nodes are entirely submitted in 9 cassettes as follows:     1: 5 lymph nodes  2: 4 lymph nodes  3-5: 1 lymph node bisected in each  6-7: 1 lymph node bisected  8-9: 1 possible lymph node bisected     Note: The estimated total formalin fixation time based upon information provided by the submitting clinician and the standard processing schedule is under 72 hours.  MSequino   Resulting Agency [facility]             Specimen Collected: 11/19/24 08:32 Last Resulted: 11/26/24 13:05           J Carlos Craig MD 12/09/24 2:53 PM  "

## 2024-12-09 ENCOUNTER — PATIENT OUTREACH (OUTPATIENT)
Dept: HEMATOLOGY ONCOLOGY | Facility: CLINIC | Age: 59
End: 2024-12-09

## 2024-12-09 ENCOUNTER — DOCUMENTATION (OUTPATIENT)
Dept: HEMATOLOGY ONCOLOGY | Facility: CLINIC | Age: 59
End: 2024-12-09

## 2024-12-09 ENCOUNTER — TELEPHONE (OUTPATIENT)
Age: 59
End: 2024-12-09

## 2024-12-09 ENCOUNTER — CONSULT (OUTPATIENT)
Dept: RADIATION ONCOLOGY | Facility: HOSPITAL | Age: 59
End: 2024-12-09
Attending: SURGERY
Payer: COMMERCIAL

## 2024-12-09 VITALS
RESPIRATION RATE: 18 BRPM | OXYGEN SATURATION: 98 % | TEMPERATURE: 98.2 F | HEART RATE: 93 BPM | DIASTOLIC BLOOD PRESSURE: 62 MMHG | SYSTOLIC BLOOD PRESSURE: 104 MMHG

## 2024-12-09 DIAGNOSIS — Z17.0 MALIGNANT NEOPLASM OF OVERLAPPING SITES OF RIGHT BREAST IN FEMALE, ESTROGEN RECEPTOR POSITIVE (HCC): ICD-10-CM

## 2024-12-09 DIAGNOSIS — C50.811 MALIGNANT NEOPLASM OF OVERLAPPING SITES OF RIGHT BREAST IN FEMALE, ESTROGEN RECEPTOR POSITIVE (HCC): ICD-10-CM

## 2024-12-09 DIAGNOSIS — C77.3 CARCINOMA OF RIGHT BREAST METASTATIC TO AXILLARY LYMPH NODE (HCC): Primary | ICD-10-CM

## 2024-12-09 DIAGNOSIS — C50.911 CARCINOMA OF RIGHT BREAST METASTATIC TO AXILLARY LYMPH NODE (HCC): Primary | ICD-10-CM

## 2024-12-09 PROCEDURE — 99211 OFF/OP EST MAY X REQ PHY/QHP: CPT | Performed by: STUDENT IN AN ORGANIZED HEALTH CARE EDUCATION/TRAINING PROGRAM

## 2024-12-09 PROCEDURE — 99204 OFFICE O/P NEW MOD 45 MIN: CPT | Performed by: STUDENT IN AN ORGANIZED HEALTH CARE EDUCATION/TRAINING PROGRAM

## 2024-12-09 NOTE — PROGRESS NOTES
I reached out and spoke with Digna to follow up and to review for any changes in barriers to care and offer supportive services as needed.    Barriers noted previously; co-morbidities.     Current barriers and interventions provided: co-morbidities.     Bases on individual needs I will follow up with them in 4-6 weeks. I have provided my direct contact information and welcome them to contact me if their needs as discussed above change. They were appreciative for the call.

## 2024-12-09 NOTE — PROGRESS NOTES
Digna Juárez 1965 is a 59 y.o. female referred by Dr. Doran for recurrent right breast cancer.    Patient was diagnosed with [id number] IDC and pT1bN0 ILC of the right breast s/p mastectomy with SLNBx. Pathology was notable for a 20mm IDC primary, G2, LVSI+, 1/1 SLNs+ (0.4mm), margins-, low mammaprint. She started anastrozole in July 2021. She was seen in consult in July 2021 (Dr. Florence) for consideration of adjuvant RT.  She opted to forgo post-mastectomy RT at that time and continued to follow with her other managing medical providers. Patient started with painful swelling in her right axillary region at the end of August 2024. She was sent to the breast center for an axillary ultrasound. Subsequent biopsy was performed showing metastatic breast cancer in right axillary lymph node.   She is s/p right axillary node dissection on 11/19/24. She is referred to discuss adjuvant radiation treatment to right axilla. She will also follow-up with medical oncology after surgery to discuss adjuvant treatment.       8/30/24 US breast axilla, right (cellulitis right axilla)  FINDINGS:   RIGHT  Sonographic evaluation of the right axilla shows unchanged appearance of a small postoperative collection measuring 32 x 14 x 12 mm, previously 30 x 14 x 13 mm on 5/18/2023.     1) LYMPH NODE [D]: There is a morphologically abnormal right axillary level 1 lymph node with cortical thickness 5 mm.      IMPRESSION:   1.  Patient's painful palpable concern in the right axilla is a postoperative collection.  This is unchanged in size since 5/18/2023.  Recommend clinical management.  2.  Morphologically abnormal right axilla 1 lymph node.  Ultrasound-guided core biopsy is recommended.      ASSESSMENT/BI-RADS CATEGORY:  Right: 4 - Suspicious  Overall: 4 - Suspicious     RECOMMENDATION:       - Ultrasound-guided breast biopsy for the right breast.       - Clinical management for the right breast.      9/12/24 Right axillary lymph node (core  needle biopsy):  - Invasive mammary carcinoma of no special type (ductal, 1.2mm) involving fibroadipose tissue    - Idania score: 4 (of 9), grade 1        - Tubule formation: score 1 (of 3)       - Nuclear pleomorphism: score 2 (of 3)       - Mitoses: Score 1 (of 3  - In situ follicular neoplasia (ISFN); see comment      ER %  SD 1%  HER2 negative      9/17/24 CT chest abdomen pelvis w contrast  Bilateral pulmonary nodules measuring 3 to 5 mm as described.  Follow-up CT chest recommended in 3 months considering history of recurrent breast cancer.   Ill-defined hypodense right thyroid gland nodule measures up to 1.5 cm.  Thyroid ultrasound recommended for further characterization.   Lobulated low-density right axillary node measuring 2.2 x 1 cm, consistent with biopsy-proven malignancy.  1.5 cm rounded soft tissue nodule in the left breast with coarse calcification versus biopsy clip.  Correlation with mammographic history recommended.   Ill-defined heterogeneously hypodense lesion within the subcapsular inferior right liver margin.  1.2 cm indeterminate left adrenal gland nodule.  MRI with contrast recommended for further evaluation of the above two lesions.   Punctate sclerotic lesion in the left sternum.  3 mm sclerotic lesion in the posterior/medial right fifth rib, potentially representing a bone island.   Prominent uterus considering age with probable partially exophytic fibroid.   Bladder wall thickening considering degree of distention.  Correlate with urinalysis for possible cystitis.   Note of no priors for comparison.       9/23/24 MRI abdomen   No suspicious focal hepatic lesion. The 1.3 cm observation on prior CT may represent volume averaging of the hepatic border with adjacent mesenteric fat or small amount of focal fat infiltration. However given history of recently diagnosed metastatic right breast neoplasm, follow-up CT abdomen with contrast in 3 months is recommended to assess for  stability of findings   Irregular thickening of the left adrenal gland without discrete nodule findings can represent hyperplasia versus early small metastatic nodule. No remote prior cross-sectional studies of the abdomen are available for direct comparison. Attention on follow-up CT abdomen in 3 months is recommended to assess for stability.   A 2.5 cm incompletely characterized hypoenhancing uterine lesion, statistically likely representing fibroid. Left ovarian 1.2 cm cystic lesion, also incompletely characterized. Nonemergent pelvic ultrasound is recommended for complete characterization.   Small volume free pelvic fluid of uncertain etiology. Clinical correlation is recommended.      9/23/24 US thyroid  The following meet current ACR criteria for recommending ultrasound guided biopsy:   Isoechoic nodule in the midpole of the right thyroid lobe measuring up to 2.0 cm.  TIRADS Category TR4 -moderately suspicious      9/24/24 Bone scan whole body  No scintigraphic evidence of osseous metastasis.       10/24/24 FNAB, Thyroid, Right, mid:  Benign ([place] Category II) - See note.  Follicular cells in monolayered groups.  Scant colloid.      11/4/24 BREAST CANCER MULTIDISCIPLINARY CASE CONFERENCE   DIAGNOSIS:  Right axillary lymph node with invasive mammary carcinoma of no special type (ductal, 1.2 mm) involving fibroadipose tissue,  grade 1, ER 91% positive, CA 1% weak positive, HER-2 0 negative  PHYSICIAN RECOMMENDED PLAN:  Recommend right axillary node dissection, as discussed with the patient  Recommend referral to radiation oncology after surgery to discuss adjuvant radiation treatment  Recommend follow-up with medical oncology after surgery to discuss adjuvant treatment        11/19/24 Axillary, Contents, Right, Dissection:  -   One out of eleven (1/11) lymph nodes positive for metastatic carcinoma consistent with mammary origin.               ~4 mm in greatest dimension.                 < 2 mm of extranodal  extension.  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               No evidence of a overt follicular lymphoma.  -   Prior procedural site changes identified.     Comment: The patient's history of breast cancer with recently identified metastasis and in-situ follicular neoplasm is noted. The current sample shows one lymph node positive for metastatic carcinoma with extranodal extension measuring < 2 mm in greatest dimension.               In regards to the in-situ follicular neoplasm; clinical correlation is advised to rule out the possibility of a concurrent follicular lymphoma. In the submitted tissue samples there is no evidence of overt follicular lymphoma. Clinical correlation is advised      12/2/24 Med Onc, Dr. Penn  Since August 2021, she has been on adjuvant hormonal therapy with anastrozole. She presents today for routine follow-up. Based on her DEXA scan in May 2022, she has osteoporosis. She is on Prolia injection at the primary care physician's office. She is tolerating anastrozole well. She has no complaint of hot flashes or musculoskeletal symptoms.   Plan to discuss her case at breast cancer tumor board and she will return in 2 weeks to discuss.       12/5/24 Surg Onc, Dr. Doran  UMM drain removed today   She is healing well with no signs of infection. She is scheduled for follow-up with medical oncology. She needs to see radiation oncology as well. She is scheduled for follow-up scans of the chest and abdomen. Return for follow-up in May      Upcoming appts:  12/11/24 CT chest thorax   12/19/24 Med Onc  5/8/25 Dr. Doran          Oncology History   Malignant neoplasm of overlapping sites of right breast in female, estrogen receptor positive (HCC)   4/13/2021 -  Cancer Staged    Staging form: Breast, AJCC 8th Edition  - Pathologic stage from 4/13/2021: Stage IA (pT1c, pN1mi(sn), cM0, G2, ER+, KS+, HER2-) - Signed by ELZA Santos on 11/7/2023  Stage prefix: Initial  diagnosis  Method of lymph node assessment: [place] lymph node biopsy  Multigene prognostic tests performed: MammaPrint  Histologic grading system: 3 grade system       4/16/2021 Biopsy    Right breast US guided biopsy:  A. 11 o'clock 7 cm from the nipple  Invasive lobular carcinoma  Grade 1  ER 90, WI 45, HER2 1+  Lymphovascular invasion: not identified    B. 10 o'clock 7 cm from the nipple  Invasive mammary carcinoma of no special type  Grade 2  , , HER2 1+  Lymphovascular invasion: not identified    Concordant. Malignancy appears multifocal. MRI recommended.     4/30/2021 Observation    Bilateral breast MRI  Multifocal right breast cancer; 10:00 mass measures up to 1.8 cm and abuts pectoral muscle, without evidence of invasion. Two areas of carcinoma have a total extent of disease of 5 cm. Left breast clear. Axilla negative.     6/10/2021 Genetic Testing    The following genes were evaluated: BATSHEVA, BRCA1, BRCA2, CDH1, CHEK2, PALB2, PTEN, STK11, TP53  Additional genes analyzed for a total of 20  Negative result. No pathogenic sequence variants or deletions/dupllications identified  [employer]     6/25/2021 Surgery    Right breast ANJALI  directed mastectomy with sentinel lymph node biopsy  Invasive carcinoma of no special type (ductal)  Grade 2  2 cm (2 foci)  Margins negative  1/1 Lymph node with micrometastases (0.4 mm)  Anatomic Stage IB  Prognostic Stage IA     7/7/2021 Genomic Testing    MammaPrint for FLEX trial  Low risk (Luminal A)     7/28/2021 - 11/2024 Hormone Therapy    Anastrozole 1 mg daily       7/29/2021 - 7/29/2021 Radiation    Consult with Dr. Florence  Patient declined post-mastectomy RT     Carcinoma of right breast metastatic to axillary lymph node (HCC)   9/12/2024 Biopsy    Right axillary lymph node ultrasound-guided biopsy (open coil)  Invasive mammary carcinoma of no special type (ductal) involving fibroadipose tissue  Grade 1  ER ; WI 1; HER2 0  In situ follicular neoplasia  (involvement by follicular lymphoma elsewhere in the lymph node or in other lymph nodes cannot be ruled out; correlation with systemic imaging to rule out lymphadenopathy elsewhere is recommended as clinically indicated)    Concordant. Minute focus of invasive carcinoma is present outside of the lymph node, within the fibroadipose tissue. This focus may represent metastasis vs primary carcinoma. Single morphologically abnormal lymph node seen on US. Left breast clear on 5/2024 imaging.     9/12/2024 -  Cancer Staged    Staging form: Breast, AJCC 8th Edition  - Clinical stage from 9/12/2024: Stage IA (rcT0, cN1mi(f), cM0, G1, ER+, TX+, HER2-) - Signed by Ashleigh Doran MD on 10/10/2024  Stage prefix: Recurrence  Method of lymph node assessment: Core biopsy  Histologic grading system: 3 grade system       9/17/2024 Observation    CT chest, abdomen, pelvis IMPRESSION:     Bilateral pulmonary nodules measuring 3 to 5 mm as described.  Follow-up CT chest recommended in 3 months considering history of recurrent breast cancer.     Ill-defined hypodense right thyroid gland nodule measures up to 1.5 cm.  Thyroid ultrasound recommended for further characterization.     Lobulated low-density right axillary node measuring 2.2 x 1 cm, consistent with biopsy-proven malignancy.  1.5 cm rounded soft tissue nodule in the left breast with coarse calcification versus biopsy clip.  Correlation with mammographic history recommended.     Ill-defined heterogeneously hypodense lesion within the subcapsular inferior right liver margin.  1.2 cm indeterminate left adrenal gland nodule.  MRI with contrast recommended for further evaluation of the above two lesions.     Punctate sclerotic lesion in the left sternum.  3 mm sclerotic lesion in the posterior/medial right fifth rib, potentially representing a bone island.     Prominent uterus considering age with probable partially exophytic fibroid.     Bladder wall thickening considering degree of  distention.  Correlate with urinalysis for possible cystitis.     Note of no priors for comparison     9/23/2024 Observation    MRI abdomen IMPRESSION:     No suspicious focal hepatic lesion. The 1.3 cm observation on prior CT may represent volume averaging of the hepatic border with adjacent mesenteric fat or small amount of focal fat infiltration. However given history of recently diagnosed metastatic   right breast neoplasm, follow-up CT abdomen with contrast in 3 months is recommended to assess for stability of findings     Irregular thickening of the left adrenal gland without discrete nodule findings can represent hyperplasia versus early small metastatic nodule. No remote prior cross-sectional studies of the abdomen are available for direct comparison. Attention on   follow-up CT abdomen in 3 months is recommended to assess for stability.     A 2.5 cm incompletely characterized hypoenhancing uterine lesion, statistically likely representing fibroid. Left ovarian 1.2 cm cystic lesion, also incompletely characterized. Nonemergent pelvic ultrasound is recommended for complete characterization.     Small volume free pelvic fluid of uncertain etiology. Clinical correlation is recommended.     9/24/2024 Observation    NM Bone scan IMPRESSION:     1.  No scintigraphic evidence of osseous metastasis. Incidental findings as above.     11/19/2024 Surgery    RIGHT axillary dissection  ONE out of ELEVEN (1/11) lymph nodes positive for metastatic carcinoma  4 mm  < 2 mm extranodal extension  Persistent in situ follicular neoplasm  No evidence of overt follicular lymphoma     11/19/2024 -  Cancer Staged    Staging form: Breast, AJCC 8th Edition  - Pathologic stage from 11/19/2024: rpT0, pN1, cM0 - Signed by Ashleigh Doran MD on 12/5/2024  Stage prefix: Recurrence  Method of lymph node assessment: Axillary lymph node dissection           Review of Systems:  Review of Systems   Constitutional: Negative.  Negative for appetite  change and fatigue.   HENT: Negative.     Eyes: Negative.    Respiratory:  Negative for cough.    Cardiovascular: Negative.    Gastrointestinal: Negative.    Endocrine: Negative.    Genitourinary: Negative.    Musculoskeletal:  Negative for arthralgias.   Skin: Negative.         Mild tenderness at right axilla, healing well   Allergic/Immunologic: Negative.    Neurological:  Negative for dizziness and headaches.   Hematological: Negative.    Psychiatric/Behavioral: Negative.         Clinical Trial: no    OB/GYN History:  The patient underwent menarche at 15 years  Menopause Status Post  No LMP recorded. Patient is postmenopausal.  Menopause at 56 years.  Menopause Reason natural  Hormone replacement therapy: no.     0   Para 0   Nursing: no.   Birth control pills: no.      Pregnancy test needed:  no    ONCOTYPE/MAMMOPRINT results: n/a    PFT: n/a    Prior Radiation: no    Teaching: [facility] radiation packet    MST completed    Implantable Devices (Port, Pacemaker, pain stimulator): no    Hip Replacement: no      Health Maintenance   Topic Date Due    Pneumococcal Vaccine: Pediatrics (0 to 5 Years) and At-Risk Patients (6 to 64 Years) (1 of 2 - PCV) Never done    Zoster Vaccine (1 of 2) Never done    DTaP,Tdap,and Td Vaccines (1 - Tdap) Never done    Colorectal Cancer Screening  Never done    COVID-19 Vaccine (3 - Pfizer risk series) 2021    ONC Colorectal Surgery Screening  Never done    Breast Cancer Survivorship Visit  Never done    ONC Cervical Cancer Screening  10/04/2023    ONC Physical Therapy Referral  Never done    RSV Vaccine Age 60+ Years (1 - Risk 60-74 years 1-dose series) Never done    Influenza Vaccine (1) Never done    BMI: Followup Plan  2024    Annual Physical  2025    Breast Cancer Screening: Mammogram  2025    BMI: Adult  2025    Depression Screening  2025    Cervical Cancer Screening  2027    HIV Screening  Completed    Hepatitis C Screening  Completed     Osteoporosis Screening  Completed    ONC DXA Scan  Completed    RSV Vaccine age 0-20 Months  Aged Out    HIB Vaccine  Aged Out    IPV Vaccine  Aged Out    Hepatitis A Vaccine  Aged Out    Meningococcal ACWY Vaccine  Aged Out    HPV Vaccine  Aged Out     Past Medical History:   Diagnosis Date    Abnormal weight loss     Anorexia nervosa     Disease of thyroid gland     Elevated blood sugar     Occasional     Fibroadenoma of both breasts     GERD (gastroesophageal reflux disease)     Hypertension     Hyperthyroidism     Osteopenia     Schizophrenia (HCC)      Past Surgical History:   Procedure Laterality Date    BREAST BIOPSY Right 04/16/2021    BREAST CYST EXCISION Left 2011    CHOLECYSTECTOMY      LYMPH NODE DISSECTION Right 11/19/2024    Procedure: RIGHT AXILLARY DISSECTION;  Surgeon: Ashleigh Doran MD;  Location: [facility];  Service: Surgical Oncology    MASTECTOMY Right 2021    MASTECTOMY W/ SENTINEL NODE BIOPSY Right 06/25/2021    Procedure: BREAST MASTECTOMY WITH BIOPSY LYMPH NODE SENTINEL; ANJALI  GUIDED, LYMPHATIC MAPPING WITH BLUE DYE AND RADIOACTIVE DYE (INJECT AT 1130 BY DR LEMUS IN THE OR);  Surgeon: Jim Lemus MD;  Location: [facility];  Service: Surgical Oncology    US BREAST CLIP NEEDLE LOC RIGHT Right 06/14/2021    US BREAST NEEDLE LOC RIGHT EACH ADDITIONAL Right 06/14/2021    US GUIDANCE BREAST BIOPSY RIGHT EACH ADDITIONAL Right 04/16/2021    US GUIDED BREAST BIOPSY RIGHT COMPLETE Right 04/16/2021    US GUIDED BREAST LYMPH NODE BIOPSY RIGHT Right 9/12/2024    US GUIDED THYROID BIOPSY  10/24/2024     Family History   Adopted: Yes     Social History     Tobacco Use    Smoking status: Never    Smokeless tobacco: Never   Vaping Use    Vaping status: Never Used   Substance Use Topics    Alcohol use: Yes     Alcohol/week: 2.0 standard drinks of alcohol     Types: 2 Cans of beer per week    Drug use: Never        Current Outpatient Medications:     atenolol (TENORMIN) 50 mg tablet, Take 1 tablet (50  mg total) by mouth daily Take 100 mg by mouth every other morning., Disp: 90 tablet, Rfl: 1    Cholecalciferol ( Ultra Strength) 50 MCG (2000 UT) CAPS, TAKE ONE CAPSULE BY MOUTH EVERY DAY, Disp: 90 capsule, Rfl: 3    famotidine (PEPCID) 40 MG tablet, TAKE ONE TABLET BY MOUTH EVERY MORNING, Disp: 90 tablet, Rfl: 1    levothyroxine 50 mcg tablet, TAKE ONE TABLET BY MOUTH EVERY DAY IN THE EARLY MORNING 1/2 HOUR BEFORE BREAKFAST, Disp: 90 tablet, Rfl: 1    lisinopril (ZESTRIL) 10 mg tablet, Take 1 tablet (10 mg total) by mouth daily, Disp: 90 tablet, Rfl: 3    Multiple Vitamin (Daily-Alanis) TABS, TAKE ONE TABLET BY MOUTH EVERY DAY, Disp: 90 tablet, Rfl: 3    OLANZapine (ZyPREXA) 7.5 mg tablet, Take 1 tablet (7.5 mg total) by mouth daily at bedtime, Disp: 90 tablet, Rfl: 2    anastrozole (ARIMIDEX) 1 mg tablet, Take 1 tablet (1 mg total) by mouth daily (Patient not taking: Reported on 10/30/2024), Disp: 90 tablet, Rfl: 3    carbamide peroxide (DEBROX) 6.5 % otic solution, Administer 5 drops into both ears 2 (two) times a day (Patient not taking: Reported on 12/9/2024), Disp: 15 mL, Rfl: 2    chlorhexidine (PERIDEX) 0.12 % solution, Apply 15 mL to the mouth or throat 2 (two) times a day (Patient not taking: Reported on 10/10/2024), Disp: 120 mL, Rfl: 0    gabapentin (Neurontin) 100 mg capsule, Take 1 capsule (100 mg total) by mouth 3 (three) times a day (Patient not taking: Reported on 12/2/2024), Disp: 30 capsule, Rfl: 0    neomycin-polymyxin-hydrocortisone (CORTISPORIN) 0.35%-10,000 units/mL-1% otic suspension, Administer 4 drops to the right ear 3 (three) times a day (Patient not taking: Reported on 9/11/2024), Disp: 10 mL, Rfl: 0  No Known Allergies   Vitals:    12/09/24 1359   BP: 104/62   BP Location: Left arm   Pulse: 93   Resp: 18   Temp: 98.2 °F (36.8 °C)   TempSrc: Temporal   SpO2: 98%

## 2024-12-09 NOTE — TELEPHONE ENCOUNTER
Pt calling in stating she is wondering when [facility] is coming to pick her up for her 2 PM appointment. Not sure if STAR was arranged as appt notes did not reflect it was needed until I entered it right now., pt would like a call back.

## 2024-12-09 NOTE — PROGRESS NOTES
BREAST CANCER MULTIDISCIPLINARY CASE CONFERENCE    DATE: 12/9/2024      PRESENTING DOCTOR: Dr. Ashleigh Doran  OTHER RELEVANT PROVIDERS: Dr. Asif Florence      DIAGNOSIS: Right axillary lymph node with invasive mammary carcinoma of no special type (ductal, 1.2 mm) involving fibroadipose tissue,  grade 1, ER 91% positive, VA 1% weak positive, HER-2 0 negative           Digna Juárez is a 59 y.o. female who was presented at the Breast Multidisciplinary Case Conference today to discuss her recently diagnosed metastatic breast cancer found in the right axillary lymph node and management options.  Patient was originally diagnosed with multifocal right breast cancer in 2021.  She had micrometastasis in one right axillary lymph node at the time.  She was treated with a right mastectomy with SLB biopsy and adjuvant anastrozole.  She declined adjuvant radiation treatment.  MammaPrint performed on surgical specimen came back Low Risk, Luminal A.  Patient started with painful swelling in her right axillary region at the end of August 2024.  She was sent to the breast center for an axillary ultrasound.  Subsequent biopsy was performed and diagnosis was made.  Patient has had staging studies completed.  She has met with surgical oncology and medical oncology. Patient had an axillary node dissection on 11/19/2024, she is healing well with no signs of infection. Post op treatment and follow up discussed today. Pt is currently taking Arimidex.            GENETICS: 6/10/2021-negative    GENOMICS: 6/25/2021 Mammaprint- low risk      IMAGING REVIEWED:   9/17/2024- CT chest, abdomen and pelvis  9/24/2024- Bone scan        PATHOLOGY REVIEWED: 11/19/2024- surgical pathology tissue exam-slides shown, 10/24/2024- right thyroid FNA        PHYSICIAN RECOMMENDED PLAN:  Consider switching AI from Arimidex to Exemestane  Consider Caris testing for ESR1 mutation  Recommend radiation therapy to right axilla        FOLLOW UP APPOINTMENTS:  Future  Appointments   Date Time Provider Department [place]   12/9/2024  2:00 PM J Carlos Craig MD [facility] Rad Onc [facility]   12/11/2024 10:00 AM EA CT 1 [facility]   12/16/2024  3:00 PM NATALIE Knox DO [facility]   12/19/2024  2:20 PM [facility]   1/6/2025  1:40 PM Asif Florence [facility]   5/8/2025  8:45 AM [facility]   5/27/2025  3:45 PM BE MAMMO RBC 2 BE RBC Mammo BE RBC          Team agreed to plan.    The final treatment plan will be left to the discretion of the patient and the treating physician.     DISCLAIMERS:  TO THE TREATING PHYSICIAN:  This conference is a meeting of clinicians from various specialty areas who evaluate and discuss patients for whom a multidisciplinary treatment approach is being considered. Please note that the above opinion was a consensus of the conference attendees and is intended only to assist in quality care of the discussed patient.  The responsibility for follow up on the input given during the conference, along with any final decisions regarding plan of care, is that of the patient and the patient's provider. Accordingly, appointments have only been recommended based on this information and have NOT been scheduled unless otherwise noted.      TO THE PATIENT:  This summary is a brief record of major aspects of your cancer treatment. You may choose to share a copy with any of your doctors or nurses. However, this is not a detailed or comprehensive record of your care.      NCCN guidelines were readily available for review at this discussion

## 2024-12-10 ENCOUNTER — TELEPHONE (OUTPATIENT)
Age: 59
End: 2024-12-10

## 2024-12-10 PROCEDURE — 77263 THER RADIOLOGY TX PLNG CPLX: CPT | Performed by: STUDENT IN AN ORGANIZED HEALTH CARE EDUCATION/TRAINING PROGRAM

## 2024-12-10 NOTE — TELEPHONE ENCOUNTER
Patient called in asking If she had a ride scheduled for tomorrow for chest CT. Advised patient as per visit note [facility] was arranged. Patient asked what time they were coming, spoke with [place] imaging location and they were not sure. Spoke to PCP office clerical and advised she should receive a text tomorrow morning with  information if she does not to give us a call back.    NFA needed at this time, thank you

## 2024-12-11 ENCOUNTER — APPOINTMENT (OUTPATIENT)
Dept: RADIOLOGY | Facility: HOSPITAL | Age: 59
End: 2024-12-11
Payer: COMMERCIAL

## 2024-12-11 ENCOUNTER — HOSPITAL ENCOUNTER (OUTPATIENT)
Dept: CT IMAGING | Facility: HOSPITAL | Age: 59
Discharge: HOME | End: 2024-12-11
Payer: COMMERCIAL

## 2024-12-11 DIAGNOSIS — R91.8 LUNG NODULES: ICD-10-CM

## 2024-12-11 PROCEDURE — 71260 CT THORAX DX C+: CPT

## 2024-12-11 PROCEDURE — 74160 CT ABDOMEN W/CONTRAST: CPT

## 2024-12-11 RX ADMIN — IOHEXOL 50 ML: 350 INJECTION, SOLUTION INTRAVENOUS at 10:17

## 2024-12-16 ENCOUNTER — OFFICE VISIT (OUTPATIENT)
Dept: FAMILY MEDICINE CLINIC | Facility: CLINIC | Age: 59
End: 2024-12-16
Payer: COMMERCIAL

## 2024-12-16 VITALS
OXYGEN SATURATION: 97 % | BODY MASS INDEX: 18.23 KG/M2 | SYSTOLIC BLOOD PRESSURE: 114 MMHG | WEIGHT: 106.8 LBS | HEART RATE: 72 BPM | TEMPERATURE: 98.3 F | HEIGHT: 64 IN | DIASTOLIC BLOOD PRESSURE: 72 MMHG

## 2024-12-16 DIAGNOSIS — C77.3 CARCINOMA OF RIGHT BREAST METASTATIC TO AXILLARY LYMPH NODE (HCC): ICD-10-CM

## 2024-12-16 DIAGNOSIS — C50.811 MALIGNANT NEOPLASM OF OVERLAPPING SITES OF RIGHT BREAST IN FEMALE, ESTROGEN RECEPTOR POSITIVE (HCC): ICD-10-CM

## 2024-12-16 DIAGNOSIS — E03.8 OTHER SPECIFIED HYPOTHYROIDISM: ICD-10-CM

## 2024-12-16 DIAGNOSIS — K04.7 DENTAL ABSCESS: ICD-10-CM

## 2024-12-16 DIAGNOSIS — C50.911 CARCINOMA OF RIGHT BREAST METASTATIC TO AXILLARY LYMPH NODE (HCC): ICD-10-CM

## 2024-12-16 DIAGNOSIS — Z79.899 MEDICATION MANAGEMENT: Primary | ICD-10-CM

## 2024-12-16 DIAGNOSIS — I10 PRIMARY HYPERTENSION: ICD-10-CM

## 2024-12-16 DIAGNOSIS — Z17.0 MALIGNANT NEOPLASM OF OVERLAPPING SITES OF RIGHT BREAST IN FEMALE, ESTROGEN RECEPTOR POSITIVE (HCC): ICD-10-CM

## 2024-12-16 PROCEDURE — 99214 OFFICE O/P EST MOD 30 MIN: CPT | Performed by: FAMILY MEDICINE

## 2024-12-16 RX ORDER — LISINOPRIL 10 MG/1
10 TABLET ORAL DAILY
Qty: 100 TABLET | Refills: 3 | Status: SHIPPED | OUTPATIENT
Start: 2024-12-16

## 2024-12-16 NOTE — ASSESSMENT & PLAN NOTE
Blood pressure well-controlled at 114/72 taking atenolol, lisinopril.  Tolerating well yes  Orders:  •  lisinopril (ZESTRIL) 10 mg tablet; Take 1 tablet (10 mg total) by mouth daily

## 2024-12-16 NOTE — ASSESSMENT & PLAN NOTE
Tolerating levothyroxine 50 mcg once daily half hour AC breakfast check TSH level at appropriate interval

## 2024-12-16 NOTE — PROGRESS NOTES
Name: Digna Juárez      : 1965      MRN: [id number]  Encounter Provider: NATALIE Knox DO  Encounter Date: 2024   Encounter department: [facility] [place]    Assessment & Plan  Primary hypertension  Blood pressure well-controlled at 114/72 taking atenolol, lisinopril.  Tolerating well yes  Orders:  •  lisinopril (ZESTRIL) 10 mg tablet; Take 1 tablet (10 mg total) by mouth daily    Medication management  All medications reviewed for safety efficacy and tolerance as well as compliance       Other specified hypothyroidism  Tolerating levothyroxine 50 mcg once daily half hour AC breakfast check TSH level at appropriate interval       Carcinoma of right breast metastatic to axillary lymph node (HCC)  Recurrent right breast carcinoma will begin radiation therapy right after first of the year markers to be placed        Malignant neoplasm of overlapping sites of right breast in female, estrogen receptor positive (HCC)  Workup is underway to give her definitive treatment for her recurrent right breast cancer and the following is the multi disciplinary note from 2024 outlining history and plan:    BREAST CANCER MULTIDISCIPLINARY CASE CONFERENCE     DATE: 2024        PRESENTING DOCTOR: Dr. Ashleigh Doran  OTHER RELEVANT PROVIDERS: Dr. Asif Florence        DIAGNOSIS: Right axillary lymph node with invasive mammary carcinoma of no special type (ductal, 1.2 mm) involving fibroadipose tissue,  grade 1, ER 91% positive, FL 1% weak positive, HER-2 0 negative              Digna Juárez is a 59 y.o. female who was presented at the Breast Multidisciplinary Case Conference today to discuss her recently diagnosed metastatic breast cancer found in the right axillary lymph node and management options.  Patient was originally diagnosed with multifocal right breast cancer in .  She had micrometastasis in one right axillary lymph node at the time.  She was treated with a right  mastectomy with SLB biopsy and adjuvant anastrozole.  She declined adjuvant radiation treatment.  MammaPrint performed on surgical specimen came back Low Risk, Luminal A.  Patient started with painful swelling in her right axillary region at the end of August 2024.  She was sent to the breast center for an axillary ultrasound.  Subsequent biopsy was performed and diagnosis was made.  Patient has had staging studies completed.  She has met with surgical oncology and medical oncology. Patient had an axillary node dissection on 11/19/2024, she is healing well with no signs of infection. Post op treatment and follow up discussed today. Pt is currently taking Arimidex.             Dental abscess  No more dental problems at all feels well and has everything correct            History of Present Illness     History of Present Illness       Review of Systems   Constitutional:  Negative for activity change, appetite change, chills, diaphoresis, fatigue and fever.   HENT:  Negative for congestion, ear discharge, ear pain, facial swelling, nosebleeds, sore throat, tinnitus, trouble swallowing and voice change.    Eyes:  Negative for photophobia, pain, discharge, redness, itching and visual disturbance.   Respiratory:  Negative for apnea, cough, choking, chest tightness and shortness of breath.    Cardiovascular:  Negative for chest pain, palpitations and leg swelling.   Gastrointestinal:  Negative for abdominal distention, abdominal pain, blood in stool, constipation, diarrhea, nausea and vomiting.   Endocrine: Negative for cold intolerance, heat intolerance, polydipsia, polyphagia and polyuria.   Genitourinary:  Negative for decreased urine volume, difficulty urinating, dysuria, enuresis, frequency, hematuria, pelvic pain, urgency and vaginal bleeding.   Musculoskeletal:  Negative for arthralgias, back pain, gait problem, joint swelling, neck pain and neck stiffness.   Skin:  Negative for color change, pallor and rash.  "  Allergic/Immunologic: Negative for immunocompromised state.   Neurological:  Negative for dizziness, seizures, facial asymmetry, light-headedness, numbness and headaches.   Hematological:  Negative for adenopathy.   Psychiatric/Behavioral:  Negative for agitation, behavioral problems, confusion, decreased concentration, dysphoric mood and hallucinations.      Objective   /72 (BP Location: Left arm, Patient Position: Sitting, Cuff Size: Standard)   Pulse 72   Temp 98.3 °F (36.8 °C) (Temporal)   Ht 5' 4\" (1.626 m)   Wt 48.4 kg (106 lb 12.8 oz)   SpO2 97%   BMI 18.33 kg/m²     Physical Exam    Physical Exam  Vitals and nursing note reviewed.   Constitutional:       General: She is not in acute distress.     Appearance: Normal appearance. She is well-developed and normal weight. She is not ill-appearing or diaphoretic.   HENT:      Head: Normocephalic and atraumatic.      Nose: Nose normal.      Mouth/Throat:      Mouth: Mucous membranes are moist.   Eyes:      Conjunctiva/sclera: Conjunctivae normal.   Cardiovascular:      Rate and Rhythm: Normal rate and regular rhythm.      Heart sounds: No murmur heard.  Pulmonary:      Effort: Pulmonary effort is normal. No respiratory distress.      Breath sounds: Normal breath sounds.   Abdominal:      Palpations: Abdomen is soft.      Tenderness: There is no abdominal tenderness.   Musculoskeletal:         General: No swelling.      Cervical back: Neck supple.      Right lower leg: No edema.      Left lower leg: No edema.   Skin:     General: Skin is warm and dry.      Capillary Refill: Capillary refill takes less than 2 seconds.      Coloration: Skin is pale.      Findings: No rash.      Comments: right breast axillary lymph node biopsy November 19 incision healing well continue same Rx of planned radiation shortly   Neurological:      General: No focal deficit present.      Mental Status: She is alert and oriented to person, place, and time. Mental status is at " baseline.   Psychiatric:         Mood and Affect: Mood normal.         Behavior: Behavior normal.         Thought Content: Thought content normal.         Judgment: Judgment normal.       Administrative Statements   I have spent a total time of 30 minutes in caring for this patient on the day of the visit/encounter including Diagnostic results, Prognosis, Risks and benefits of tx options, Instructions for management, Patient and family education, Importance of tx compliance, Risk factor reductions, Impressions, Counseling / Coordination of care, Documenting in the medical record, Reviewing / ordering tests, medicine, procedures  , and Obtaining or reviewing history  . Topics discussed with the patient / family include symptom assessment and management, medication review, medication adjustment, psychosocial support, advanced directives, goals of care, hospice services, medical marijuana, opioid titration, and supportive listening.

## 2024-12-16 NOTE — ASSESSMENT & PLAN NOTE
Recurrent right breast carcinoma will begin radiation therapy right after first of the year markers to be placed December 24

## 2024-12-16 NOTE — ASSESSMENT & PLAN NOTE
Workup is underway to give her definitive treatment for her recurrent right breast cancer and the following is the multi disciplinary note from December 9, 2024 outlining history and plan:    BREAST CANCER MULTIDISCIPLINARY CASE CONFERENCE     DATE: 12/9/2024        PRESENTING DOCTOR: Dr. Ashleigh Doran  OTHER RELEVANT PROVIDERS: Dr. Asif Florence        DIAGNOSIS: Right axillary lymph node with invasive mammary carcinoma of no special type (ductal, 1.2 mm) involving fibroadipose tissue,  grade 1, ER 91% positive, MD 1% weak positive, HER-2 0 negative              Digna Juárez is a 59 y.o. female who was presented at the Breast Multidisciplinary Case Conference today to discuss her recently diagnosed metastatic breast cancer found in the right axillary lymph node and management options.  Patient was originally diagnosed with multifocal right breast cancer in 2021.  She had micrometastasis in one right axillary lymph node at the time.  She was treated with a right mastectomy with SLB biopsy and adjuvant anastrozole.  She declined adjuvant radiation treatment.  MammaPrint performed on surgical specimen came back Low Risk, Luminal A.  Patient started with painful swelling in her right axillary region at the end of August 2024.  She was sent to the breast center for an axillary ultrasound.  Subsequent biopsy was performed and diagnosis was made.  Patient has had staging studies completed.  She has met with surgical oncology and medical oncology. Patient had an axillary node dissection on 11/19/2024, she is healing well with no signs of infection. Post op treatment and follow up discussed today. Pt is currently taking Arimidex.

## 2024-12-19 ENCOUNTER — OFFICE VISIT (OUTPATIENT)
Dept: HEMATOLOGY ONCOLOGY | Facility: MEDICAL CENTER | Age: 59
End: 2024-12-19
Payer: COMMERCIAL

## 2024-12-19 VITALS
SYSTOLIC BLOOD PRESSURE: 124 MMHG | RESPIRATION RATE: 17 BRPM | HEIGHT: 64 IN | HEART RATE: 66 BPM | BODY MASS INDEX: 18.27 KG/M2 | DIASTOLIC BLOOD PRESSURE: 83 MMHG | OXYGEN SATURATION: 99 % | WEIGHT: 107 LBS | TEMPERATURE: 98.2 F

## 2024-12-19 DIAGNOSIS — C77.3 CARCINOMA OF RIGHT BREAST METASTATIC TO AXILLARY LYMPH NODE (HCC): Primary | ICD-10-CM

## 2024-12-19 DIAGNOSIS — C50.911 CARCINOMA OF RIGHT BREAST METASTATIC TO AXILLARY LYMPH NODE (HCC): Primary | ICD-10-CM

## 2024-12-19 PROCEDURE — 99215 OFFICE O/P EST HI 40 MIN: CPT | Performed by: INTERNAL MEDICINE

## 2024-12-19 NOTE — PROGRESS NOTES
Name: Digna Juárez      : 1965      MRN: [id number]  Encounter Provider: Asif Florence MD  Encounter Date: 2024   Encounter department: [facility] HEMATOLOGY [facility] MAIN  :  Assessment & Plan  Carcinoma of right breast metastatic to axillary lymph node (HCC)         59-year-old female with breast cancer.  Issues:    Right sided breast cancer.  Patient was initially diagnosed in 2021 with pT1c pN1mi G0 R0 ER+ IA+ HER2/andreia = 1+ + DCIS, G2 disease.  Patient underwent mastectomy and sentinel lymph node sampling (which was positive).  Mrs. Juárez deferred RT; patient was started on anastrozole.  MammaPrint came back as low risk, luminal A.    More recently patient developed right axillary swelling at the end of 2024.  Patient underwent workup and eventual right axillary dissection (2024) demonstrated recurrent carcinoma in the right axilla.  Pathology results are listed below,  lymph nodes was positive.  Patient will follow-up with Dr. Doran as directed.    Patient was presented at the breast tumor board on 2024.  The physician recommended plan was to switch the AI from Arimidex to Aromasin.  Caris testing will be sent for the ESR 1 mutation.  Patient has already been seen by radiation oncology and is scheduled to begin RT to the right axilla.    The below information comes from GeneCards, the human gene database  https://www.genecards.org/cgi-bin/carddisp.pl?gene=ESR1    ESR1 has been a focus in breast cancer for quite some time, but is also clinically relevant in endometrial, ovarian and other cancer types. The identification of ER-positive breast cancers that are resistant to hormone therapy have inspired clinical sequencing efforts to shed light on the mechanisms of this resistance. A number of mutations in the ligand binding domain of ESR1 have been implicated in hormone resistance and anti-estrogen therapies. These observations have spurred  efforts to develop therapeutics that stimulate ESR1 protein degradation (e.g. Fulvestrant), rather than acting as a small molecule antagonist. These agents are currently in clinical trials and have seen some success.    Nursing staff spent time with patient today going over the potential benefits, risks/toxicities of exemestane.  Literature was provided.    Carefully review your medication list and verify that the list is accurate and up-to-date. Please call the hematology/oncology office if there are medications missing from the list, medications on the list that you are not currently taking or if there is a dosage or instruction that is different from how you're taking that medication.     Patient goals and areas of care: Start Aromasin, follow-up with surgical oncology, follow-up with radiation oncology  Barriers to care: None  Patient is able to self-care  ___________________________________________________________________________________________________________    History of Present Illness   Chief Complaint   Patient presents with    Follow-up   Digna Juárez is a 59 y.o. female with breast cancer.    59-year-old female with somewhat complicated breast cancer history.  Mrs. Rivas was diagnosed with right-sided breast cancer approximately 3 years ago (April 13, 2021: Stage IA) status post mastectomy.  Specifics not presently available but patient was offered postoperative RT to the right axilla but patient deferred.    Recently patient was found to have recurrence and a right axillary lymph node.  Patient was seen/evaluated by Dr. Doran and underwent lymph node dissection.  Pathology results are listed below.    Presently Mrs. Rivas states feeling okay, baseline.  No pain control issues.  No respiratory problems.  Right axilla has healed well.  Activities are close to baseline.  No other GI,  or GYN issues.  Patient had been on anastrozole, states being compliant.  This was recently put on  hold.    Oncology History   Oncology History   Malignant neoplasm of overlapping sites of right breast in female, estrogen receptor positive (HCC)   4/13/2021 -  Cancer Staged    Staging form: Breast, AJCC 8th Edition  - Pathologic stage from 4/13/2021: Stage IA (pT1c, pN1mi(sn), cM0, G2, ER+, CO+, HER2-) - Signed by ELZA Santos on 11/7/2023  Stage prefix: Initial diagnosis  Method of lymph node assessment: Radisson lymph node biopsy  Multigene prognostic tests performed: MammaPrint  Histologic grading system: 3 grade system       4/16/2021 Biopsy    Right breast US guided biopsy:  A. 11 o'clock 7 cm from the nipple  Invasive lobular carcinoma  Grade 1  ER 90, CO 45, HER2 1+  Lymphovascular invasion: not identified    B. 10 o'clock 7 cm from the nipple  Invasive mammary carcinoma of no special type  Grade 2  , , HER2 1+  Lymphovascular invasion: not identified    Concordant. Malignancy appears multifocal. MRI recommended.     4/30/2021 Observation    Bilateral breast MRI  Multifocal right breast cancer; 10:00 mass measures up to 1.8 cm and abuts pectoral muscle, without evidence of invasion. Two areas of carcinoma have a total extent of disease of 5 cm. Left breast clear. Axilla negative.     6/10/2021 Genetic Testing    The following genes were evaluated: BATSHEVA, BRCA1, BRCA2, CDH1, CHEK2, PALB2, PTEN, STK11, TP53  Additional genes analyzed for a total of 20  Negative result. No pathogenic sequence variants or deletions/dupllications identified  [employer]     6/25/2021 Surgery    Right breast ANJALI  directed mastectomy with sentinel lymph node biopsy  Invasive carcinoma of no special type (ductal)  Grade 2  2 cm (2 foci)  Margins negative  1/1 Lymph node with micrometastases (0.4 mm)  Anatomic Stage IB  Prognostic Stage IA     7/7/2021 Genomic Testing    MammaPrint for FLEX trial  Low risk (Luminal A)     7/28/2021 - 11/2024 Hormone Therapy    Anastrozole 1 mg daily       7/29/2021 -  7/29/2021 Radiation    Consult with Dr. Florence  Patient declined post-mastectomy RT     Carcinoma of right breast metastatic to axillary lymph node (HCC)   9/12/2024 Biopsy    Right axillary lymph node ultrasound-guided biopsy (open coil)  Invasive mammary carcinoma of no special type (ductal) involving fibroadipose tissue  Grade 1  ER ; MA 1; HER2 0  In situ follicular neoplasia (involvement by follicular lymphoma elsewhere in the lymph node or in other lymph nodes cannot be ruled out; correlation with systemic imaging to rule out lymphadenopathy elsewhere is recommended as clinically indicated)    Concordant. Minute focus of invasive carcinoma is present outside of the lymph node, within the fibroadipose tissue. This focus may represent metastasis vs primary carcinoma. Single morphologically abnormal lymph node seen on US. Left breast clear on 5/2024 imaging.     9/12/2024 -  Cancer Staged    Staging form: Breast, AJCC 8th Edition  - Clinical stage from 9/12/2024: Stage IA (rcT0, cN1mi(f), cM0, G1, ER+, MA+, HER2-) - Signed by Ashleigh Doran MD on 10/10/2024  Stage prefix: Recurrence  Method of lymph node assessment: Core biopsy  Histologic grading system: 3 grade system       9/17/2024 Observation    CT chest, abdomen, pelvis IMPRESSION:     Bilateral pulmonary nodules measuring 3 to 5 mm as described.  Follow-up CT chest recommended in 3 months considering history of recurrent breast cancer.     Ill-defined hypodense right thyroid gland nodule measures up to 1.5 cm.  Thyroid ultrasound recommended for further characterization.     Lobulated low-density right axillary node measuring 2.2 x 1 cm, consistent with biopsy-proven malignancy.  1.5 cm rounded soft tissue nodule in the left breast with coarse calcification versus biopsy clip.  Correlation with mammographic history recommended.     Ill-defined heterogeneously hypodense lesion within the subcapsular inferior right liver margin.  1.2 cm indeterminate  left adrenal gland nodule.  MRI with contrast recommended for further evaluation of the above two lesions.     Punctate sclerotic lesion in the left sternum.  3 mm sclerotic lesion in the posterior/medial right fifth rib, potentially representing a bone island.     Prominent uterus considering age with probable partially exophytic fibroid.     Bladder wall thickening considering degree of distention.  Correlate with urinalysis for possible cystitis.     Note of no priors for comparison     9/23/2024 Observation    MRI abdomen IMPRESSION:     No suspicious focal hepatic lesion. The 1.3 cm observation on prior CT may represent volume averaging of the hepatic border with adjacent mesenteric fat or small amount of focal fat infiltration. However given history of recently diagnosed metastatic   right breast neoplasm, follow-up CT abdomen with contrast in 3 months is recommended to assess for stability of findings     Irregular thickening of the left adrenal gland without discrete nodule findings can represent hyperplasia versus early small metastatic nodule. No remote prior cross-sectional studies of the abdomen are available for direct comparison. Attention on   follow-up CT abdomen in 3 months is recommended to assess for stability.     A 2.5 cm incompletely characterized hypoenhancing uterine lesion, statistically likely representing fibroid. Left ovarian 1.2 cm cystic lesion, also incompletely characterized. Nonemergent pelvic ultrasound is recommended for complete characterization.     Small volume free pelvic fluid of uncertain etiology. Clinical correlation is recommended.     9/24/2024 Observation    NM Bone scan IMPRESSION:     1.  No scintigraphic evidence of osseous metastasis. Incidental findings as above.     11/19/2024 Surgery    RIGHT axillary dissection  ONE out of ELEVEN (1/11) lymph nodes positive for metastatic carcinoma  4 mm  < 2 mm extranodal extension  Persistent in situ follicular neoplasm  No  "evidence of overt follicular lymphoma     11/19/2024 -  Cancer Staged    Staging form: Breast, AJCC 8th Edition  - Pathologic stage from 11/19/2024: rpT0, pN1, cM0 - Signed by Ashleigh Doran MD on 12/5/2024  Stage prefix: Recurrence  Method of lymph node assessment: Axillary lymph node dissection          Review of Systems        Objective   /83 (BP Location: Left arm, Patient Position: Sitting, Cuff Size: Adult)   Pulse 66   Temp 98.2 °F (36.8 °C) (Temporal)   Resp 17   Ht 5' 4\" (1.626 m)   Wt 48.5 kg (107 lb)   SpO2 99%   BMI 18.37 kg/m²     ECOG   ECOG = 1  Physical Exam  Constitutional:       Appearance: She is well-developed.      Comments: Well-nourished female, no respiratory distress, no signs of pain   HENT:      Head: Normocephalic and atraumatic.      Right Ear: External ear normal.      Left Ear: External ear normal.   Eyes:      Conjunctiva/sclera: Conjunctivae normal.      Pupils: Pupils are equal, round, and reactive to light.   Cardiovascular:      Rate and Rhythm: Normal rate and regular rhythm.      Heart sounds: Normal heart sounds.   Pulmonary:      Effort: Pulmonary effort is normal.      Breath sounds: Normal breath sounds.      Comments: Good air entry bilaterally, clear  Abdominal:      General: Bowel sounds are normal.      Palpations: Abdomen is soft.   Musculoskeletal:         General: Normal range of motion.      Cervical back: Normal range of motion and neck supple.   Skin:     General: Skin is warm.      Comments: Warm, moist, good color, no petechiae or ecchymoses   Neurological:      Mental Status: She is alert and oriented to person, place, and time.      Deep Tendon Reflexes: Reflexes are normal and symmetric.   Psychiatric:         Behavior: Behavior normal.         Thought Content: Thought content normal.         Judgment: Judgment normal.     Lymphatics: No adenopathy in the neck, supraclavicular region, axilla bilaterally  Extremities: No lower extremity edema " bilaterally, no cords, pulses are 1+  Breasts: Right anterior chest wall suture line without any nodules or redness, right axilla with well-healing suture line    Labs: I have reviewed the following labs:  Lab Results   Component Value Date/Time    WBC 5.91 11/06/2024 06:49 PM    RBC 3.96 11/06/2024 06:49 PM    Hemoglobin 12.1 11/06/2024 06:49 PM    Hematocrit 38.2 11/06/2024 06:49 PM    MCV 97 11/06/2024 06:49 PM    MCH 30.6 11/06/2024 06:49 PM    RDW 12.8 11/06/2024 06:49 PM    Platelets 232 11/06/2024 06:49 PM    Segmented % 53 11/06/2024 06:49 PM    Lymphocytes % 35 11/06/2024 06:49 PM    Monocytes % 9 11/06/2024 06:49 PM    Eosinophils Relative 2 11/06/2024 06:49 PM    Basophils Relative 1 11/06/2024 06:49 PM    Immature Grans % 0 11/06/2024 06:49 PM    Absolute Neutrophils 3.19 11/06/2024 06:49 PM     Lab Results   Component Value Date/Time    Potassium 4.5 11/06/2024 06:49 PM    Chloride 103 11/06/2024 06:49 PM    CO2 28 11/06/2024 06:49 PM    BUN 16 11/06/2024 06:49 PM    Creatinine 0.90 11/06/2024 06:49 PM    Calcium 9.1 11/06/2024 06:49 PM    AST 26 11/06/2024 06:49 PM    ALT 30 11/06/2024 06:49 PM    Alkaline Phosphatase 65 11/06/2024 06:49 PM    Total Protein 6.7 11/06/2024 06:49 PM    Albumin 4.4 11/06/2024 06:49 PM    Total Bilirubin 0.39 11/06/2024 06:49 PM    eGFR 69 11/06/2024 06:49 PM     Imaging    12/11/2024 CAT scan chest abdomen with contrast    IMPRESSION:     New postoperative changes in the right axilla as detailed above. No residual adenopathy identified.     Stable 4 mm nodule left upper lobe. No new nodules.     Stable tiny sclerotic foci in the sternum and right fifth rib. No new osseous lesions.     Stable subtle subcapsular hypoattenuating focus in the right liver. No new lesions.    11/6/2024 DEXA bone density spine hip and pelvis.  Impression stated osteoporosis.   Since a DXA study from 6/27/2022, there has been:  A  STATISTICALLY SIGNIFICANT INCREASE in bone mineral density of  0.035 g/cm2 (6.4%) in the left total hip    9/24/2024 bone scan whole body.  Impression stated no scintigraphic evidence of osseous metastases.    9/23/2024 MRI abdomen with and without contrast    IMPRESSION:     No suspicious focal hepatic lesion. The 1.3 cm observation on prior CT may represent volume averaging of the hepatic border with adjacent mesenteric fat or small amount of focal fat infiltration. However given history of recently diagnosed metastatic right breast neoplasm, follow-up CT abdomen with contrast in 3 months is recommended to assess for stability of findings     Irregular thickening of the left adrenal gland without discrete nodule findings can represent hyperplasia versus early small metastatic nodule. No remote prior cross-sectional studies of the abdomen are available for direct comparison. Attention on follow-up CT abdomen in 3 months is recommended to assess for stability.     A 2.5 cm incompletely characterized hypoenhancing uterine lesion, statistically likely representing fibroid. Left ovarian 1.2 cm cystic lesion, also incompletely characterized. Nonemergent pelvic ultrasound is recommended for complete characterization.     Small volume free pelvic fluid of uncertain etiology. Clinical correlation is recommended.      Pathology    Case Report   Surgical Pathology Report                         Case: [id number]                                   Authorizing Provider:  Ashleigh Doran MD           Collected:           11/19/2024 0832               Ordering Location:     [facility]        Received:            11/19/2024 [address] [place] [address] Operating Room                                                     Pathologist:           Jovany Garcia MD                                                           Specimen:    Axillary, Right Axillary Contents                                                          Final Diagnosis   A. Axillar, Contents,  Right, Dissection:  -   One out of eleven (1/11) lymph nodes positive for metastatic carcinoma consistent with mammary origin.               ~4 mm in greatest dimension.                 < 2 mm of extranodal extension.  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               No evidence of a overt follicular lymphoma.  -   Prior procedural site changes identified.     Comment: The patient's history of breast cancer with recently identified metastasis and in-situ follicular neoplasm is noted. The current sample shows one lymph node positive for metastatic carcinoma with extranodal extension measuring < 2 mm in greatest dimension.               In regards to the in-situ follicular neoplasm; clinical correlation is advised to rule out the possibility of a concurrent follicular lymphoma. In the submitted tissue samples there is no evidence of overt follicular lymphoma. Clinical correlation is advised.      Intradepartmental consultation is in agreement (EMM).  Best block for ancillary studies is A5.      Electronically signed by Jovany Garcia MD on 11/26/2024 at 1305 EST   Note    Immunohistochemical stains show:     Block A1: AE1/3 is negative for metastatic carcinoma.   BCL-2 is focally bright within follicles; confirmed with expression of BCL-6 and CD10 consistent with ISFN.     Block A2: AE1/3 is negative for metastatic carcinoma.   BCL-6 and CD10 highlight germinal centers that are appropriately negative for BCL-2.      Block A5: Poly-3 highlights metastatic carcinoma.  CD3 highlights T-cells and CD20 highlights B-cells  Scattered B-cells stronglly express BCL-2 within follicles; confirmed with expression of BCL-6 and CD10 consistent with ISFN.     Block A7: AE1/3 is negative for metatatic carcinoma.  CD3 highlights T-cells and CD20 highlights B-cells.   BCL-6 and CD10 highlight germinal centers that are appropriately negative for BCL-2.            Case Report   Surgical Pathology Report                          Case: [id number]                                   Authorizing Provider:  Jim Lemus MD              Collected:           06/25/2021 1349               Ordering Location:     [facility]        Received:            06/25/2021 [address] [place] [address] Operating Room                                                       Pathologist:           Amairani Méndez MD                                                             Intraop:               Amairani Méndez MD                                                             Specimens:   A) - Lymph Node, [place], right axillary sentinel lymph node                                        B) - Breast, Right, right breast                                                          Addendum   At the request of Dr. Lemus, unstained slides from paraffin BLOCK B15 containing the patient's cancer cells were sent to [facility] for  Mammaprint testing. Upon completion of testing, the [facility] report will be directly sent to the requesting physician as well as posted in the Media Tab of the patient's [employer] EMR by the [facility] Pathology Department.     Please note: The [facility]'s analysis and report is performed independently of [facility], and neither the Hospital nor the Pathology Department screen, review or comment upon [facility]'s report.  Because the role of testing in cancer diagnosis and management is subject to evolving development, usage and interpretation, we strongly urge that the test limitations described in the [facility] [employer] report be carefully read, appropriately shared with the patient, and critically considered when reaching treatment decisions.     This pathology material was removed from archive for purpose of molecular testing. It was reviewed and approved by Dr. Armstrong.       Addendum electronically signed by Amairani Méndez MD on 7/22/2021 at 10:57 AM   Final  Diagnosis   A. Lymph Node, [place], right axillary sentinel lymph node, lymphadenectomy:  - One lymph node (1/1mi) positive for micrometastatic carcinoma.     -- Subcapsular and sinusoidal metastases; largest metastatic deposit 0.4 mm in a 1.1 mm area.    -- No extranodal extension.     -- Confirmed by positive pankeratin (CKAE1/3) immunostain.      B. Breast, Right, right breast, mastectomy:  Multifocal (2) invasive mammary carcinoma.  Largest invasive carcinoma  - Invasive mammary carcinoma of no special type (ductal), 20 mm in greatest gross dimension,      present in lower outer quadrant (LOQ).    -- [place]ania histologic grade 2 of 3 (total score: 7 of 9)        * Glandular (acinar) Tubular Differentiation 10-75%, score 2.        * Nuclear Pleomorphism 2-3 of 3, score 3.        * Mitotic Rate 4-7/mm2, (8-14 mitoses/10HPF; 12 mitoses/10 HPF), score 2.    -- Confirmed by         * Negative: p63 and SMMHC immunostains.     -- Associated prior biopsy site changes with ribbon clip and ANJALI  marker.  Second invasive carcinoma  - Invasive lobular carcinoma, classic pattern with histiocytoid differentiation; 7 mm in      greatest gross dimension, present in upper outer quadrant (UOQ).    -- [place] histologic grade 2 of 3 (total score: 6 of 9)        * Glandular (acinar) Tubular Differentiation <10%, score 3.        * Nuclear Pleomorphism 1-2 of 3, score 2.        * Mitotic Rate < 3/mm2, (</= 7 mitoses/10HPF; 0 mitoses/10 hPF), score 1.    -- Confirmed by tumor cell immunophenotype:        * Positive: Pankeratin (CKAE1/3) and P120 (cytoplasmic).    -- Associated prior biopsy site changes with wing clip and two ANJALI  markers.  - Focus suspicious for microinvasive carcinoma of no special type (ductal), present in tissue     between both invasive carcinomas; 0.7 mm.  - Ductal carcinoma in-situ (DCIS): Present; not an extensive intraductal component; associated with     largest invasive carcinoma (< 5% of  total tumor).     -- Size and Extent: 7 mm in greatest estimated extent; present in 1 of 21 blocks.    -- Architectural Patterns: Micropapillary and cribriform.     -- Nuclear grade: II (intermediate).    -- Necrosis: Present, focal necrosis.   - Margins uninvolved by invasive and in-situ carcinoma.     -- Invasive carcinoma 5 mm from nearest posterior margin; 7 mm from nearest anterior UOQ margin.     -- DCIS > 5 mm from nearest gross posterior margin.   - Benign nipple and skin.    -- Invasive carcinoma does not invade into the dermis or epidermis; no ulceration.     -- DCIS does not involve the nipple epidermis.     -- No dermal lymphovascular invasion.   - Lymphovascular invasion: Present.    -- Confirmed by D2-40 and CD31 immunostains.   - Perineural invasion: Present.   - Microcalcifications: Present, associated with invasive carcinoma, lobular neoplasia and     non-neoplastic breast tissue.   - Benign proliferative and non-proliferative fibrocystic changes with atypia consisting of extensive     non-invasive lobular neoplasia (lobular carcinoma in-situ and atypical lobular hyperplasia; focally     involving sclerosing adenosis), sclerosing and apocrine adenosis, fibroadenomatoid changes and     cystic and papillary apocrine metaplasia.    -- Lobular neoplasia confirmed by positive p120 (cytoplasmic), SMMHC and p63; negative E-cadherin        immunostains.   - Duct ectasia.          Comments:   This is an appended report. These results have been appended to a previously preliminary verified report.      Electronically signed by Amairani Méndez MD on 7/1/2021 at  9:28 PM   Note    Intradepartmental consultation (CV) agrees with the diagnosis for specimen B. A copy of the final report is faxed to Dr. JET Lemus on 7/1/21 @ 6103 hours.     1.  Ancillary Studies:  Performed on original biopsy material, [id number] (largest/ductal), and reported to be:     - ER:  100% / Positive     - WI:  100% / Positive     -  HER2 (IHC):  1+ / Negative     - HER2 (FISH):  N/A     - Repeat HER2 testing (2013 ASCO/CAP Recommendations): Not Indicated      - Best representative tumor block: B4 (ductal).       -- Sufficient tumor present for          Agendia Mammaprint/Blueprint (1 cm2 of invasive tumor in aggregate): Yes.          MI Profile/Foundation One (at least 5 x 5 mm of tumor): Yes.      Ancillary Studies:  Performed on original biopsy material, [id number] (smaller/lobular), and reported to be:     - ER:  90-95% / Positive     - NV:  45-55% / Positive     - HER2 (IHC):  1+ / Negative     - HER2 (FISH):  N/A      - Repeat HER2 testing (2013 ASCO/CAP Recommendations): Not Indicated      - Best representative tumor blocks: B15 (lobular)       -- Sufficient tumor present for          Agendia Mammaprint/Blueprint (1 cm2 of invasive tumor in aggregate): No.          MI Profile/Foundation One (at least 5 x 5 mm of tumor):  Yes.      2.  Pathologic Stage Classification (pTNM, AJCC 8th Edition):       8th ed, AJCC Anatomic Stage:  at least Stage IB- pT1c(m/2), pN1mi(sn), cM0, G2.     3.  8th ed. AJCC Pathologic Prognostic Stage (use AJCC update):  IA.   Comment: This is an appended report. These results have been appended to a previously preliminary verified report.   Additional Information    All reported additional testing was performed with appropriately reactive controls.  These tests were developed and their performance characteristics determined by [facility] or appropriate performing facility, though some tests may be performed on tissues which have not been validated for performance characteristics (such as staining performed on alcohol exposed cell blocks and decalcified tissues).  Results should be interpreted with caution and in the context of the patients’ clinical condition. These tests may not be cleared or approved by the U.S. Food and Drug Administration, though the FDA has determined that such clearance  or approval is not necessary. These tests are used for clinical purposes and they should not be regarded as investigational or for research. This laboratory has been approved by CLIA 88, designated as a high-complexity laboratory and is qualified to perform these tests.  Interpretation performed at [facility], [address] [address].   Comment: This is an appended report. These results have been appended to a previously preliminary verified report.   Synoptic Checklist   INVASIVE CARCINOMA OF THE BREAST: Resection   8th Edition - Protocol posted: 2/26/2020INVASIVE CARCINOMA OF THE BREAST: COMPLETE EXCISION - All Specimens  SPECIMEN   Procedure  Total mastectomy   Specimen Laterality  Right   TUMOR   Tumor Site  Upper outer quadrant     Lower outer quadrant   Histologic Type  Invasive carcinoma of no special type (ductal)   Glandular (Acinar) / Tubular Differentiation  Score 2   Nuclear Pleomorphism  Score 3   Mitotic Rate  Score 2   Overall Grade  Grade 2 (scores of 6 or 7)   Tumor Size  Greatest dimension of largest invasive focus (Millimeters): 20 mm   Tumor Focality  Multiple foci of invasive carcinoma   Number of Foci  2        Sizes of Individual Foci (Millimeters)  20, 7 mm   Ductal Carcinoma In Situ (DCIS)  Present     Negative for extensive intraductal component (EIC)   Size (Extent) of DCIS  Estimated size (extent) of DCIS is at least (Millimeters): 7 mm   Number of Blocks with DCIS  1   Number of Blocks Examined  21   Architectural Patterns  Cribriform     Micropapillary   Nuclear Grade  Grade II (intermediate)   Necrosis  Present, focal (small foci or single cell necrosis)   Lobular Carcinoma In Situ (LCIS)  Present   Lymphovascular Invasion  Present   Dermal Lymphovascular Invasion  Not identified   Microcalcifications  Present in invasive carcinoma     Present in non-neoplastic tissue     Lobular neoplasia   Treatment Effect in the Breast  No known presurgical therapy   MARGINS    Invasive Carcinoma Margins  Uninvolved by invasive carcinoma   Distance from Closest Margin (Millimeters)  5 mm   Closest Margin(s)  Posterior   DCIS Margins  Uninvolved by DCIS   Distance from Closest Margin (Millimeters)  Greater than: 5 mm   Closest Margin(s)  Posterior   LYMPH NODES   Regional Lymph Nodes  Involved by tumor cells   Number of Lymph Nodes with Macrometastases (> 2 mm)  0   Number of Lymph Nodes with Micrometastases (> 0.2 mm to 2 mm and / or > 200 cells)  1   Size of Largest Metastatic Deposit (Millimeters)  0.4 mm   Extranodal Extension  Not identified   Total Number of Lymph Nodes Examined  1   Number of [place] Nodes Examined  1   PATHOLOGIC STAGE CLASSIFICATION (pTNM, AJCC 8th Edition)      TNM Descriptors  m (multiple foci of invasive carcinoma)   Primary Tumor (pT)  pT1c   Regional Lymph Nodes Modifier  (sn): [place] node(s) evaluated.   Regional Lymph Nodes (pN)  pN1mi   ADDITIONAL FINDINGS   Additional Findings  Second focus of invasive carcinoma- invasive lobular carcinoma, histologic grade 2 of 3. Focus suspicious for microinvasive carcinoma of no special type (ductal). Extensive non-invasive lobular neoplasia (LCIS and ALH). Perineural invasion identified.   SPECIAL STUDIES   Breast Biomarker Testing Performed on Previous Biopsy     Estrogen Receptor (ER) Status  Positive (greater than 10% of cells demonstrate nuclear positivity)   Percentage of Cells with Nuclear Positivity  100 %   Breast Biomarker Testing Performed on Previous Biopsy     Progesterone Receptor (PgR) Status  Positive   Percentage of Cells with Nuclear Positivity  100 %   Breast Biomarker Testing Performed on Previous Biopsy     HER2 (by immunohistochemistry)  Negative (Score 1+)   Testing Performed on Case Number  [id number]   Comment(s)   Comment(s)  8th ed. AJCC Pathologic Prognostic Stage: IA. Best representative tumor block: B4.   .

## 2024-12-20 ENCOUNTER — TELEPHONE (OUTPATIENT)
Age: 59
End: 2024-12-20

## 2024-12-20 NOTE — TELEPHONE ENCOUNTER
Patient looking for CT scan results.  They are in her chart.  Please review and call patient back.      Thank you

## 2024-12-23 ENCOUNTER — TELEPHONE (OUTPATIENT)
Age: 59
End: 2024-12-23

## 2024-12-23 DIAGNOSIS — Z17.0 MALIGNANT NEOPLASM OF OVERLAPPING SITES OF RIGHT BREAST IN FEMALE, ESTROGEN RECEPTOR POSITIVE (HCC): ICD-10-CM

## 2024-12-23 DIAGNOSIS — C50.811 MALIGNANT NEOPLASM OF OVERLAPPING SITES OF RIGHT BREAST IN FEMALE, ESTROGEN RECEPTOR POSITIVE (HCC): ICD-10-CM

## 2024-12-23 DIAGNOSIS — C77.3 CARCINOMA OF RIGHT BREAST METASTATIC TO AXILLARY LYMPH NODE (HCC): Primary | ICD-10-CM

## 2024-12-23 DIAGNOSIS — C50.911 CARCINOMA OF RIGHT BREAST METASTATIC TO AXILLARY LYMPH NODE (HCC): Primary | ICD-10-CM

## 2024-12-23 RX ORDER — EXEMESTANE 25 MG/1
25 TABLET ORAL DAILY
Qty: 30 TABLET | Refills: 2 | Status: SHIPPED | OUTPATIENT
Start: 2024-12-23

## 2024-12-23 NOTE — TELEPHONE ENCOUNTER
Called and spoke w pt re PCP note. Pt was agreeable. She states she has noticed no differences in breathing at this time compared to baseline. She will notify office if there are any changes. NFA needed.

## 2024-12-23 NOTE — TELEPHONE ENCOUNTER
Patient calling to inform Dr. Florence the medication he ordered exemestane -aramasin 1mg once a day. [facility] - [address].  She went on Friday and order wasn't sent. Please call her [phone]

## 2024-12-24 ENCOUNTER — APPOINTMENT (OUTPATIENT)
Dept: RADIATION ONCOLOGY | Facility: HOSPITAL | Age: 59
End: 2024-12-24
Attending: STUDENT IN AN ORGANIZED HEALTH CARE EDUCATION/TRAINING PROGRAM
Payer: COMMERCIAL

## 2024-12-24 PROCEDURE — 77332 RADIATION TREATMENT AID(S): CPT | Performed by: STUDENT IN AN ORGANIZED HEALTH CARE EDUCATION/TRAINING PROGRAM

## 2024-12-24 PROCEDURE — 77290 THER RAD SIMULAJ FIELD CPLX: CPT | Performed by: STUDENT IN AN ORGANIZED HEALTH CARE EDUCATION/TRAINING PROGRAM

## 2024-12-30 ENCOUNTER — TELEPHONE (OUTPATIENT)
Age: 59
End: 2024-12-30

## 2024-12-30 NOTE — TELEPHONE ENCOUNTER
Patient called stating she received the [employer] Cdx draw kit in the mail. She inquired about when they will be coming to draw her labs. Advised to hold on to the kit for now and that someone will reach out to arrange this. She verbalized an understanding.

## 2024-12-30 NOTE — TELEPHONE ENCOUNTER
Patient called in and said that she was supposed to get a call from someone to set up a mobil lab appt but she still hasn't heard from anyone.  Please call to discuss   Statement Selected

## 2024-12-30 NOTE — TELEPHONE ENCOUNTER
Spoke with patient informing her that [employer] would reach out once she was confirmed to get the package.  Relayed that I confirmed with the rep that they will reach out once she has received it.  Digna verbalized understanding.

## 2025-01-02 ENCOUNTER — APPOINTMENT (OUTPATIENT)
Dept: RADIATION ONCOLOGY | Facility: HOSPITAL | Age: 60
End: 2025-01-02
Attending: STUDENT IN AN ORGANIZED HEALTH CARE EDUCATION/TRAINING PROGRAM
Payer: COMMERCIAL

## 2025-01-03 ENCOUNTER — TELEPHONE (OUTPATIENT)
Age: 60
End: 2025-01-03

## 2025-01-03 NOTE — TELEPHONE ENCOUNTER
Patient called in to ask when she will be scheduled for radiation treatment.  Please call her back to discuss

## 2025-01-06 PROCEDURE — 77338 DESIGN MLC DEVICE FOR IMRT: CPT | Performed by: STUDENT IN AN ORGANIZED HEALTH CARE EDUCATION/TRAINING PROGRAM

## 2025-01-06 PROCEDURE — 77300 RADIATION THERAPY DOSE PLAN: CPT | Performed by: STUDENT IN AN ORGANIZED HEALTH CARE EDUCATION/TRAINING PROGRAM

## 2025-01-06 PROCEDURE — 77301 RADIOTHERAPY DOSE PLAN IMRT: CPT | Performed by: STUDENT IN AN ORGANIZED HEALTH CARE EDUCATION/TRAINING PROGRAM

## 2025-01-07 ENCOUNTER — TELEPHONE (OUTPATIENT)
Age: 60
End: 2025-01-07

## 2025-01-07 NOTE — TELEPHONE ENCOUNTER
Patient called to get result of her blood work from Sunday. I reviewed her chart and explained that the lab has not been resulted. She verbalized understanding and will like to be notified with the results when completed.

## 2025-01-08 ENCOUNTER — TELEPHONE (OUTPATIENT)
Age: 60
End: 2025-01-08

## 2025-01-08 NOTE — TELEPHONE ENCOUNTER
Provider: Dr. Florence    Patient starts radiation tomorrow and is aware she needs to stop taking her Aromasin, she is inquiring if she should take her last dose today and then stop. I confirmed with patient we would discuss with the provider and return her phone call. Patient verbalized understanding and has no additional questions or concerns at this moment.

## 2025-01-09 ENCOUNTER — APPOINTMENT (OUTPATIENT)
Dept: RADIATION ONCOLOGY | Facility: HOSPITAL | Age: 60
End: 2025-01-09
Attending: STUDENT IN AN ORGANIZED HEALTH CARE EDUCATION/TRAINING PROGRAM
Payer: COMMERCIAL

## 2025-01-09 PROCEDURE — 77014 CHG CT GUIDANCE RADIATION THERAPY FLDS PLACEMENT: CPT | Performed by: INTERNAL MEDICINE

## 2025-01-09 PROCEDURE — 77427 RADIATION TX MANAGEMENT X5: CPT | Performed by: STUDENT IN AN ORGANIZED HEALTH CARE EDUCATION/TRAINING PROGRAM

## 2025-01-09 PROCEDURE — 77385 HB NTSTY MODUL RAD TX DLVR SMPL: CPT | Performed by: INTERNAL MEDICINE

## 2025-01-10 ENCOUNTER — APPOINTMENT (OUTPATIENT)
Dept: RADIATION ONCOLOGY | Facility: HOSPITAL | Age: 60
End: 2025-01-10
Attending: STUDENT IN AN ORGANIZED HEALTH CARE EDUCATION/TRAINING PROGRAM
Payer: COMMERCIAL

## 2025-01-10 PROCEDURE — 77385 HB NTSTY MODUL RAD TX DLVR SMPL: CPT | Performed by: STUDENT IN AN ORGANIZED HEALTH CARE EDUCATION/TRAINING PROGRAM

## 2025-01-10 PROCEDURE — 77014 CHG CT GUIDANCE RADIATION THERAPY FLDS PLACEMENT: CPT | Performed by: STUDENT IN AN ORGANIZED HEALTH CARE EDUCATION/TRAINING PROGRAM

## 2025-01-13 ENCOUNTER — APPOINTMENT (OUTPATIENT)
Dept: RADIATION ONCOLOGY | Facility: HOSPITAL | Age: 60
End: 2025-01-13
Attending: STUDENT IN AN ORGANIZED HEALTH CARE EDUCATION/TRAINING PROGRAM
Payer: COMMERCIAL

## 2025-01-13 PROCEDURE — 77014 CHG CT GUIDANCE RADIATION THERAPY FLDS PLACEMENT: CPT | Performed by: STUDENT IN AN ORGANIZED HEALTH CARE EDUCATION/TRAINING PROGRAM

## 2025-01-13 PROCEDURE — 77385 HB NTSTY MODUL RAD TX DLVR SMPL: CPT | Performed by: STUDENT IN AN ORGANIZED HEALTH CARE EDUCATION/TRAINING PROGRAM

## 2025-01-14 ENCOUNTER — APPOINTMENT (OUTPATIENT)
Dept: RADIATION ONCOLOGY | Facility: HOSPITAL | Age: 60
End: 2025-01-14
Attending: STUDENT IN AN ORGANIZED HEALTH CARE EDUCATION/TRAINING PROGRAM
Payer: COMMERCIAL

## 2025-01-14 PROCEDURE — 77385 HB NTSTY MODUL RAD TX DLVR SMPL: CPT | Performed by: STUDENT IN AN ORGANIZED HEALTH CARE EDUCATION/TRAINING PROGRAM

## 2025-01-14 PROCEDURE — 77014 CHG CT GUIDANCE RADIATION THERAPY FLDS PLACEMENT: CPT | Performed by: STUDENT IN AN ORGANIZED HEALTH CARE EDUCATION/TRAINING PROGRAM

## 2025-01-15 ENCOUNTER — APPOINTMENT (OUTPATIENT)
Dept: RADIATION ONCOLOGY | Facility: HOSPITAL | Age: 60
End: 2025-01-15
Attending: STUDENT IN AN ORGANIZED HEALTH CARE EDUCATION/TRAINING PROGRAM
Payer: COMMERCIAL

## 2025-01-15 PROCEDURE — 77385 HB NTSTY MODUL RAD TX DLVR SMPL: CPT | Performed by: RADIOLOGY

## 2025-01-15 PROCEDURE — 77336 RADIATION PHYSICS CONSULT: CPT | Performed by: STUDENT IN AN ORGANIZED HEALTH CARE EDUCATION/TRAINING PROGRAM

## 2025-01-15 PROCEDURE — 77014 CHG CT GUIDANCE RADIATION THERAPY FLDS PLACEMENT: CPT | Performed by: RADIOLOGY

## 2025-01-16 ENCOUNTER — APPOINTMENT (OUTPATIENT)
Dept: RADIATION ONCOLOGY | Facility: HOSPITAL | Age: 60
End: 2025-01-16
Attending: STUDENT IN AN ORGANIZED HEALTH CARE EDUCATION/TRAINING PROGRAM
Payer: COMMERCIAL

## 2025-01-16 PROCEDURE — 77385 HB NTSTY MODUL RAD TX DLVR SMPL: CPT | Performed by: INTERNAL MEDICINE

## 2025-01-16 PROCEDURE — 77427 RADIATION TX MANAGEMENT X5: CPT | Performed by: STUDENT IN AN ORGANIZED HEALTH CARE EDUCATION/TRAINING PROGRAM

## 2025-01-16 PROCEDURE — 77014 CHG CT GUIDANCE RADIATION THERAPY FLDS PLACEMENT: CPT | Performed by: INTERNAL MEDICINE

## 2025-01-17 ENCOUNTER — APPOINTMENT (OUTPATIENT)
Dept: RADIATION ONCOLOGY | Facility: HOSPITAL | Age: 60
End: 2025-01-17
Attending: STUDENT IN AN ORGANIZED HEALTH CARE EDUCATION/TRAINING PROGRAM
Payer: COMMERCIAL

## 2025-01-17 PROCEDURE — 77385 HB NTSTY MODUL RAD TX DLVR SMPL: CPT | Performed by: STUDENT IN AN ORGANIZED HEALTH CARE EDUCATION/TRAINING PROGRAM

## 2025-01-17 PROCEDURE — 77014 CHG CT GUIDANCE RADIATION THERAPY FLDS PLACEMENT: CPT | Performed by: STUDENT IN AN ORGANIZED HEALTH CARE EDUCATION/TRAINING PROGRAM

## 2025-01-20 ENCOUNTER — APPOINTMENT (OUTPATIENT)
Dept: RADIATION ONCOLOGY | Facility: HOSPITAL | Age: 60
End: 2025-01-20
Attending: STUDENT IN AN ORGANIZED HEALTH CARE EDUCATION/TRAINING PROGRAM
Payer: COMMERCIAL

## 2025-01-20 PROCEDURE — 77385 HB NTSTY MODUL RAD TX DLVR SMPL: CPT | Performed by: RADIOLOGY

## 2025-01-20 PROCEDURE — 77014 CHG CT GUIDANCE RADIATION THERAPY FLDS PLACEMENT: CPT | Performed by: RADIOLOGY

## 2025-01-21 ENCOUNTER — APPOINTMENT (OUTPATIENT)
Dept: RADIATION ONCOLOGY | Facility: HOSPITAL | Age: 60
End: 2025-01-21
Attending: STUDENT IN AN ORGANIZED HEALTH CARE EDUCATION/TRAINING PROGRAM
Payer: COMMERCIAL

## 2025-01-21 DIAGNOSIS — I10 HYPERTENSION, UNSPECIFIED TYPE: ICD-10-CM

## 2025-01-21 PROCEDURE — 77014 CHG CT GUIDANCE RADIATION THERAPY FLDS PLACEMENT: CPT | Performed by: STUDENT IN AN ORGANIZED HEALTH CARE EDUCATION/TRAINING PROGRAM

## 2025-01-21 PROCEDURE — 77385 HB NTSTY MODUL RAD TX DLVR SMPL: CPT | Performed by: STUDENT IN AN ORGANIZED HEALTH CARE EDUCATION/TRAINING PROGRAM

## 2025-01-21 RX ORDER — ATENOLOL 50 MG/1
50 TABLET ORAL DAILY
Qty: 90 TABLET | Refills: 1 | Status: SHIPPED | OUTPATIENT
Start: 2025-01-21 | End: 2025-07-20

## 2025-01-21 RX ORDER — OLANZAPINE 7.5 MG/1
7.5 TABLET, FILM COATED ORAL
Qty: 90 TABLET | Refills: 2 | Status: SHIPPED | OUTPATIENT
Start: 2025-01-21

## 2025-01-21 NOTE — TELEPHONE ENCOUNTER
Medication: atenolol (TENORMIN) 50 mg tablet     Dose/Frequency: Take 1 tablet (50 mg total) by mouth daily Take 100 mg by mouth every other morning.     Quantity: 90 tab     Pharmacy: [facility] - [address]     Office:   [x] PCP/Provider -   [] Speciality/Provider -     Does the patient have enough for 3 days?   [] Yes   [x] No - Send as HP to POD

## 2025-01-21 NOTE — TELEPHONE ENCOUNTER
Medication:  OLANZapine (ZyPREXA) 7.5 mg tablet    Dose/Frequency: Take 1 tablet (7.5 mg total) by mouth daily at bedtime     Quantity: 90 tab    Pharmacy: [facility] [place] [facility] - [address]     Office:   [x] PCP/Provider -   [] Speciality/Provider -     Does the patient have enough for 3 days?   [] Yes   [x] No - Send as HP to POD

## 2025-01-22 ENCOUNTER — APPOINTMENT (OUTPATIENT)
Dept: RADIATION ONCOLOGY | Facility: HOSPITAL | Age: 60
End: 2025-01-22
Attending: STUDENT IN AN ORGANIZED HEALTH CARE EDUCATION/TRAINING PROGRAM
Payer: COMMERCIAL

## 2025-01-22 DIAGNOSIS — Z17.0 MALIGNANT NEOPLASM OF OVERLAPPING SITES OF RIGHT BREAST IN FEMALE, ESTROGEN RECEPTOR POSITIVE (HCC): ICD-10-CM

## 2025-01-22 DIAGNOSIS — C50.911 CARCINOMA OF RIGHT BREAST METASTATIC TO AXILLARY LYMPH NODE (HCC): Primary | ICD-10-CM

## 2025-01-22 DIAGNOSIS — C50.811 MALIGNANT NEOPLASM OF OVERLAPPING SITES OF RIGHT BREAST IN FEMALE, ESTROGEN RECEPTOR POSITIVE (HCC): ICD-10-CM

## 2025-01-22 DIAGNOSIS — C77.3 CARCINOMA OF RIGHT BREAST METASTATIC TO AXILLARY LYMPH NODE (HCC): Primary | ICD-10-CM

## 2025-01-22 LAB
RAD ONC ARIA COURSE FIRST TREATMENT DATE: NORMAL
RAD ONC ARIA COURSE ID: NORMAL
RAD ONC ARIA COURSE INTENT: NORMAL
RAD ONC ARIA COURSE LAST TREATMENT DATE: NORMAL
RAD ONC ARIA COURSE SESSION NUMBER: 10
RAD ONC ARIA COURSE TREATMENT ELAPSED DAYS: 13
RAD ONC ARIA PLAN FRACTIONS TREATED TO DATE: 10
RAD ONC ARIA PLAN ID: NORMAL
RAD ONC ARIA PLAN NAME: NORMAL
RAD ONC ARIA PLAN PRESCRIBED DOSE PER FRACTION: 2.66 GY
RAD ONC ARIA PLAN PRIMARY REFERENCE POINT: NORMAL
RAD ONC ARIA PLAN TOTAL FRACTIONS PRESCRIBED: 16
RAD ONC ARIA PLAN TOTAL PRESCRIBED DOSE: 4256 CGY
RAD ONC ARIA REFERENCE POINT DOSAGE GIVEN TO DATE: 26.6 GY
RAD ONC ARIA REFERENCE POINT ID: NORMAL
RAD ONC ARIA REFERENCE POINT SESSION DOSAGE GIVEN: 2.66 GY

## 2025-01-22 PROCEDURE — 77336 RADIATION PHYSICS CONSULT: CPT | Performed by: STUDENT IN AN ORGANIZED HEALTH CARE EDUCATION/TRAINING PROGRAM

## 2025-01-22 PROCEDURE — 77385 HB NTSTY MODUL RAD TX DLVR SMPL: CPT | Performed by: RADIOLOGY

## 2025-01-22 PROCEDURE — 77014 CHG CT GUIDANCE RADIATION THERAPY FLDS PLACEMENT: CPT | Performed by: RADIOLOGY

## 2025-01-23 ENCOUNTER — APPOINTMENT (OUTPATIENT)
Dept: RADIATION ONCOLOGY | Facility: HOSPITAL | Age: 60
End: 2025-01-23
Attending: STUDENT IN AN ORGANIZED HEALTH CARE EDUCATION/TRAINING PROGRAM
Payer: COMMERCIAL

## 2025-01-23 LAB
RAD ONC ARIA COURSE FIRST TREATMENT DATE: NORMAL
RAD ONC ARIA COURSE ID: NORMAL
RAD ONC ARIA COURSE INTENT: NORMAL
RAD ONC ARIA COURSE LAST TREATMENT DATE: NORMAL
RAD ONC ARIA COURSE SESSION NUMBER: 11
RAD ONC ARIA COURSE TREATMENT ELAPSED DAYS: 14
RAD ONC ARIA PLAN FRACTIONS TREATED TO DATE: 11
RAD ONC ARIA PLAN ID: NORMAL
RAD ONC ARIA PLAN NAME: NORMAL
RAD ONC ARIA PLAN PRESCRIBED DOSE PER FRACTION: 2.66 GY
RAD ONC ARIA PLAN PRIMARY REFERENCE POINT: NORMAL
RAD ONC ARIA PLAN TOTAL FRACTIONS PRESCRIBED: 16
RAD ONC ARIA PLAN TOTAL PRESCRIBED DOSE: 4256 CGY
RAD ONC ARIA REFERENCE POINT DOSAGE GIVEN TO DATE: 29.26 GY
RAD ONC ARIA REFERENCE POINT ID: NORMAL
RAD ONC ARIA REFERENCE POINT SESSION DOSAGE GIVEN: 2.66 GY

## 2025-01-23 PROCEDURE — 77385 HB NTSTY MODUL RAD TX DLVR SMPL: CPT | Performed by: INTERNAL MEDICINE

## 2025-01-23 PROCEDURE — 77427 RADIATION TX MANAGEMENT X5: CPT | Performed by: STUDENT IN AN ORGANIZED HEALTH CARE EDUCATION/TRAINING PROGRAM

## 2025-01-23 PROCEDURE — 77014 CHG CT GUIDANCE RADIATION THERAPY FLDS PLACEMENT: CPT | Performed by: INTERNAL MEDICINE

## 2025-01-24 ENCOUNTER — OFFICE VISIT (OUTPATIENT)
Age: 60
End: 2025-01-24
Payer: COMMERCIAL

## 2025-01-24 ENCOUNTER — APPOINTMENT (OUTPATIENT)
Dept: RADIATION ONCOLOGY | Facility: HOSPITAL | Age: 60
End: 2025-01-24
Attending: STUDENT IN AN ORGANIZED HEALTH CARE EDUCATION/TRAINING PROGRAM
Payer: COMMERCIAL

## 2025-01-24 VITALS
HEART RATE: 101 BPM | DIASTOLIC BLOOD PRESSURE: 60 MMHG | WEIGHT: 105.8 LBS | SYSTOLIC BLOOD PRESSURE: 122 MMHG | TEMPERATURE: 98.2 F | BODY MASS INDEX: 18.06 KG/M2 | OXYGEN SATURATION: 95 % | HEIGHT: 64 IN

## 2025-01-24 DIAGNOSIS — J02.0 STREP THROAT: ICD-10-CM

## 2025-01-24 DIAGNOSIS — J02.0 STREP PHARYNGITIS: ICD-10-CM

## 2025-01-24 DIAGNOSIS — J02.9 SORE THROAT: Primary | ICD-10-CM

## 2025-01-24 DIAGNOSIS — H66.92 LEFT OTITIS MEDIA, UNSPECIFIED OTITIS MEDIA TYPE: ICD-10-CM

## 2025-01-24 LAB
RAD ONC ARIA COURSE FIRST TREATMENT DATE: NORMAL
RAD ONC ARIA COURSE ID: NORMAL
RAD ONC ARIA COURSE INTENT: NORMAL
RAD ONC ARIA COURSE LAST TREATMENT DATE: NORMAL
RAD ONC ARIA COURSE SESSION NUMBER: 12
RAD ONC ARIA COURSE TREATMENT ELAPSED DAYS: 15
RAD ONC ARIA PLAN FRACTIONS TREATED TO DATE: 12
RAD ONC ARIA PLAN ID: NORMAL
RAD ONC ARIA PLAN NAME: NORMAL
RAD ONC ARIA PLAN PRESCRIBED DOSE PER FRACTION: 2.66 GY
RAD ONC ARIA PLAN PRIMARY REFERENCE POINT: NORMAL
RAD ONC ARIA PLAN TOTAL FRACTIONS PRESCRIBED: 16
RAD ONC ARIA PLAN TOTAL PRESCRIBED DOSE: 4256 CGY
RAD ONC ARIA REFERENCE POINT DOSAGE GIVEN TO DATE: 31.92 GY
RAD ONC ARIA REFERENCE POINT ID: NORMAL
RAD ONC ARIA REFERENCE POINT SESSION DOSAGE GIVEN: 2.66 GY
S PYO AG THROAT QL: POSITIVE
SARS-COV-2 AG UPPER RESP QL IA: NEGATIVE
SL AMB POCT RAPID FLU A: NORMAL
VALID CONTROL: NORMAL

## 2025-01-24 PROCEDURE — 87880 STREP A ASSAY W/OPTIC: CPT

## 2025-01-24 PROCEDURE — 87804 INFLUENZA ASSAY W/OPTIC: CPT

## 2025-01-24 PROCEDURE — 77014 CHG CT GUIDANCE RADIATION THERAPY FLDS PLACEMENT: CPT | Performed by: STUDENT IN AN ORGANIZED HEALTH CARE EDUCATION/TRAINING PROGRAM

## 2025-01-24 PROCEDURE — 87811 SARS-COV-2 COVID19 W/OPTIC: CPT

## 2025-01-24 PROCEDURE — 77385 HB NTSTY MODUL RAD TX DLVR SMPL: CPT | Performed by: STUDENT IN AN ORGANIZED HEALTH CARE EDUCATION/TRAINING PROGRAM

## 2025-01-24 PROCEDURE — 99213 OFFICE O/P EST LOW 20 MIN: CPT

## 2025-01-24 RX ORDER — AMOXICILLIN 875 MG/1
875 TABLET, COATED ORAL 2 TIMES DAILY
Qty: 20 TABLET | Refills: 0 | Status: SHIPPED | OUTPATIENT
Start: 2025-01-24 | End: 2025-02-03

## 2025-01-24 NOTE — PROGRESS NOTES
Name: Digna Juárez      : 1965      MRN: [id number]  Encounter Provider: Michael Pickens MD  Encounter Date: 2025   Encounter department: [facility]  :  Assessment & Plan  Sore throat  See plan for strep pharyngitis.  Orders:    POCT rapid ANTIGEN strepA    POCT Rapid Covid Ag    POCT rapid flu A and B    amoxicillin (AMOXIL) 875 mg tablet; Take 1 tablet (875 mg total) by mouth 2 (two) times a day for 10 days    Strep pharyngitis  2 days of sore throat and minimal congestion.  She reports no sick contacts.  She has radiation on Monday for breast cancer.  Physical exam positive for erythematous tonsils as well as tender submandibular lymphadenopathy.  Positive strep antigen in clinic.  Amoxicillin prescribed.  Patient advised to drink plenty of fluids, use a humidifier at home.  Orders:    amoxicillin (AMOXIL) 875 mg tablet; Take 1 tablet (875 mg total) by mouth 2 (two) times a day for 10 days           History of Present Illness   59-year-old female presents to the clinic for sore throat for the past 2 days.  She denies any sick contacts, denies any fever, nausea, chills, cough or shortness of breath.  She has not taken anything for the sore throat but hopes to have this addressed, before she has her radiation appointment on Monday.  Other than what is noted above, she has no other concerns or complaints at this time.        Review of Systems   Constitutional:  Negative for chills and fever.   HENT:  Positive for sneezing and sore throat. Negative for congestion, ear pain, sinus pressure and sinus pain.    Eyes:  Negative for pain and visual disturbance.   Respiratory:  Negative for cough and shortness of breath.    Cardiovascular:  Negative for chest pain and palpitations.   Gastrointestinal:  Negative for abdominal pain and vomiting.   Genitourinary:  Negative for dysuria and hematuria.   Musculoskeletal:  Negative for arthralgias and back pain.   Skin:  Negative for color  "change and rash.   Neurological:  Negative for seizures and syncope.   All other systems reviewed and are negative.      Objective   /60 (BP Location: Left arm)   Pulse 101   Temp 98.2 °F (36.8 °C)   Ht 5' 4\" (1.626 m)   Wt 48 kg (105 lb 12.8 oz)   SpO2 95%   BMI 18.16 kg/m²      Physical Exam  Vitals and nursing note reviewed.   Constitutional:       General: She is not in acute distress.     Appearance: She is well-developed.   HENT:      Head: Normocephalic and atraumatic.      Mouth/Throat:      Pharynx: Posterior oropharyngeal erythema present.   Eyes:      Conjunctiva/sclera: Conjunctivae normal.   Cardiovascular:      Rate and Rhythm: Normal rate and regular rhythm.      Heart sounds: No murmur heard.  Pulmonary:      Effort: Pulmonary effort is normal. No respiratory distress.      Breath sounds: Normal breath sounds.   Abdominal:      Palpations: Abdomen is soft.      Tenderness: There is no abdominal tenderness.   Musculoskeletal:         General: No swelling.      Cervical back: Neck supple. Tenderness present.   Lymphadenopathy:      Cervical: Cervical adenopathy present.   Skin:     General: Skin is warm and dry.      Capillary Refill: Capillary refill takes less than 2 seconds.   Neurological:      Mental Status: She is alert.   Psychiatric:         Mood and Affect: Mood normal.     What time to be alive  "

## 2025-01-27 ENCOUNTER — APPOINTMENT (OUTPATIENT)
Dept: RADIATION ONCOLOGY | Facility: HOSPITAL | Age: 60
End: 2025-01-27
Attending: STUDENT IN AN ORGANIZED HEALTH CARE EDUCATION/TRAINING PROGRAM
Payer: COMMERCIAL

## 2025-01-27 ENCOUNTER — TELEPHONE (OUTPATIENT)
Age: 60
End: 2025-01-27

## 2025-01-27 LAB
RAD ONC ARIA COURSE FIRST TREATMENT DATE: NORMAL
RAD ONC ARIA COURSE ID: NORMAL
RAD ONC ARIA COURSE INTENT: NORMAL
RAD ONC ARIA COURSE LAST TREATMENT DATE: NORMAL
RAD ONC ARIA COURSE SESSION NUMBER: 13
RAD ONC ARIA COURSE TREATMENT ELAPSED DAYS: 18
RAD ONC ARIA PLAN FRACTIONS TREATED TO DATE: 13
RAD ONC ARIA PLAN ID: NORMAL
RAD ONC ARIA PLAN NAME: NORMAL
RAD ONC ARIA PLAN PRESCRIBED DOSE PER FRACTION: 2.66 GY
RAD ONC ARIA PLAN PRIMARY REFERENCE POINT: NORMAL
RAD ONC ARIA PLAN TOTAL FRACTIONS PRESCRIBED: 16
RAD ONC ARIA PLAN TOTAL PRESCRIBED DOSE: 4256 CGY
RAD ONC ARIA REFERENCE POINT DOSAGE GIVEN TO DATE: 34.58 GY
RAD ONC ARIA REFERENCE POINT ID: NORMAL
RAD ONC ARIA REFERENCE POINT SESSION DOSAGE GIVEN: 2.66 GY

## 2025-01-27 PROCEDURE — 77014 CHG CT GUIDANCE RADIATION THERAPY FLDS PLACEMENT: CPT | Performed by: RADIOLOGY

## 2025-01-27 PROCEDURE — 77385 HB NTSTY MODUL RAD TX DLVR SMPL: CPT | Performed by: RADIOLOGY

## 2025-01-27 NOTE — TELEPHONE ENCOUNTER
Patient would like to know if her transportation for 1/30 is set up to take her from her 100pm radiation treatment at [facility] to then take her to dr tripathi appointment at [address] [place] [address]

## 2025-01-28 ENCOUNTER — APPOINTMENT (OUTPATIENT)
Dept: RADIATION ONCOLOGY | Facility: HOSPITAL | Age: 60
End: 2025-01-28
Attending: STUDENT IN AN ORGANIZED HEALTH CARE EDUCATION/TRAINING PROGRAM
Payer: COMMERCIAL

## 2025-01-28 ENCOUNTER — TELEPHONE (OUTPATIENT)
Age: 60
End: 2025-01-28

## 2025-01-28 LAB
RAD ONC ARIA COURSE FIRST TREATMENT DATE: NORMAL
RAD ONC ARIA COURSE ID: NORMAL
RAD ONC ARIA COURSE INTENT: NORMAL
RAD ONC ARIA COURSE LAST TREATMENT DATE: NORMAL
RAD ONC ARIA COURSE SESSION NUMBER: 14
RAD ONC ARIA COURSE TREATMENT ELAPSED DAYS: 19
RAD ONC ARIA PLAN FRACTIONS TREATED TO DATE: 14
RAD ONC ARIA PLAN ID: NORMAL
RAD ONC ARIA PLAN NAME: NORMAL
RAD ONC ARIA PLAN PRESCRIBED DOSE PER FRACTION: 2.66 GY
RAD ONC ARIA PLAN PRIMARY REFERENCE POINT: NORMAL
RAD ONC ARIA PLAN TOTAL FRACTIONS PRESCRIBED: 16
RAD ONC ARIA PLAN TOTAL PRESCRIBED DOSE: 4256 CGY
RAD ONC ARIA REFERENCE POINT DOSAGE GIVEN TO DATE: 37.24 GY
RAD ONC ARIA REFERENCE POINT ID: NORMAL
RAD ONC ARIA REFERENCE POINT SESSION DOSAGE GIVEN: 2.66 GY

## 2025-01-28 PROCEDURE — 77385 HB NTSTY MODUL RAD TX DLVR SMPL: CPT | Performed by: STUDENT IN AN ORGANIZED HEALTH CARE EDUCATION/TRAINING PROGRAM

## 2025-01-28 PROCEDURE — 77014 CHG CT GUIDANCE RADIATION THERAPY FLDS PLACEMENT: CPT | Performed by: STUDENT IN AN ORGANIZED HEALTH CARE EDUCATION/TRAINING PROGRAM

## 2025-01-28 NOTE — TELEPHONE ENCOUNTER
Ed from [employer] called, stating the patient will be running a little late to her appointment

## 2025-01-29 ENCOUNTER — APPOINTMENT (OUTPATIENT)
Dept: RADIATION ONCOLOGY | Facility: HOSPITAL | Age: 60
End: 2025-01-29
Attending: STUDENT IN AN ORGANIZED HEALTH CARE EDUCATION/TRAINING PROGRAM
Payer: COMMERCIAL

## 2025-01-29 LAB
RAD ONC ARIA COURSE FIRST TREATMENT DATE: NORMAL
RAD ONC ARIA COURSE ID: NORMAL
RAD ONC ARIA COURSE INTENT: NORMAL
RAD ONC ARIA COURSE LAST TREATMENT DATE: NORMAL
RAD ONC ARIA COURSE SESSION NUMBER: 15
RAD ONC ARIA COURSE TREATMENT ELAPSED DAYS: 20
RAD ONC ARIA PLAN FRACTIONS TREATED TO DATE: 15
RAD ONC ARIA PLAN ID: NORMAL
RAD ONC ARIA PLAN NAME: NORMAL
RAD ONC ARIA PLAN PRESCRIBED DOSE PER FRACTION: 2.66 GY
RAD ONC ARIA PLAN PRIMARY REFERENCE POINT: NORMAL
RAD ONC ARIA PLAN TOTAL FRACTIONS PRESCRIBED: 16
RAD ONC ARIA PLAN TOTAL PRESCRIBED DOSE: 4256 CGY
RAD ONC ARIA REFERENCE POINT DOSAGE GIVEN TO DATE: 39.9 GY
RAD ONC ARIA REFERENCE POINT ID: NORMAL
RAD ONC ARIA REFERENCE POINT SESSION DOSAGE GIVEN: 2.66 GY

## 2025-01-29 PROCEDURE — 77336 RADIATION PHYSICS CONSULT: CPT | Performed by: STUDENT IN AN ORGANIZED HEALTH CARE EDUCATION/TRAINING PROGRAM

## 2025-01-29 PROCEDURE — 77385 HB NTSTY MODUL RAD TX DLVR SMPL: CPT | Performed by: RADIOLOGY

## 2025-01-29 PROCEDURE — 77014 CHG CT GUIDANCE RADIATION THERAPY FLDS PLACEMENT: CPT | Performed by: RADIOLOGY

## 2025-01-30 ENCOUNTER — APPOINTMENT (OUTPATIENT)
Dept: RADIATION ONCOLOGY | Facility: HOSPITAL | Age: 60
End: 2025-01-30
Attending: STUDENT IN AN ORGANIZED HEALTH CARE EDUCATION/TRAINING PROGRAM
Payer: COMMERCIAL

## 2025-01-30 ENCOUNTER — OFFICE VISIT (OUTPATIENT)
Dept: HEMATOLOGY ONCOLOGY | Facility: MEDICAL CENTER | Age: 60
End: 2025-01-30
Payer: COMMERCIAL

## 2025-01-30 VITALS
DIASTOLIC BLOOD PRESSURE: 78 MMHG | HEART RATE: 73 BPM | WEIGHT: 102.8 LBS | SYSTOLIC BLOOD PRESSURE: 120 MMHG | TEMPERATURE: 98.1 F | HEIGHT: 64 IN | OXYGEN SATURATION: 99 % | BODY MASS INDEX: 17.55 KG/M2 | RESPIRATION RATE: 16 BRPM

## 2025-01-30 DIAGNOSIS — C77.3 CARCINOMA OF RIGHT BREAST METASTATIC TO AXILLARY LYMPH NODE (HCC): Primary | ICD-10-CM

## 2025-01-30 DIAGNOSIS — C50.911 CARCINOMA OF RIGHT BREAST METASTATIC TO AXILLARY LYMPH NODE (HCC): Primary | ICD-10-CM

## 2025-01-30 LAB
RAD ONC ARIA COURSE FIRST TREATMENT DATE: NORMAL
RAD ONC ARIA COURSE ID: NORMAL
RAD ONC ARIA COURSE INTENT: NORMAL
RAD ONC ARIA COURSE LAST TREATMENT DATE: NORMAL
RAD ONC ARIA COURSE SESSION NUMBER: 16
RAD ONC ARIA COURSE TREATMENT ELAPSED DAYS: 21
RAD ONC ARIA PLAN FRACTIONS TREATED TO DATE: 16
RAD ONC ARIA PLAN ID: NORMAL
RAD ONC ARIA PLAN NAME: NORMAL
RAD ONC ARIA PLAN PRESCRIBED DOSE PER FRACTION: 2.66 GY
RAD ONC ARIA PLAN PRIMARY REFERENCE POINT: NORMAL
RAD ONC ARIA PLAN TOTAL FRACTIONS PRESCRIBED: 16
RAD ONC ARIA PLAN TOTAL PRESCRIBED DOSE: 4256 CGY
RAD ONC ARIA REFERENCE POINT DOSAGE GIVEN TO DATE: 42.56 GY
RAD ONC ARIA REFERENCE POINT ID: NORMAL
RAD ONC ARIA REFERENCE POINT SESSION DOSAGE GIVEN: 2.66 GY

## 2025-01-30 PROCEDURE — 77014 CHG CT GUIDANCE RADIATION THERAPY FLDS PLACEMENT: CPT | Performed by: INTERNAL MEDICINE

## 2025-01-30 PROCEDURE — 77385 HB NTSTY MODUL RAD TX DLVR SMPL: CPT | Performed by: INTERNAL MEDICINE

## 2025-01-30 PROCEDURE — 99214 OFFICE O/P EST MOD 30 MIN: CPT | Performed by: INTERNAL MEDICINE

## 2025-01-30 NOTE — PROGRESS NOTES
Name: Digna Juárez      : 1965      MRN: [id number]  Encounter Provider: Asif Florence MD  Encounter Date: 2025   Encounter department: [facility]  :    30 minutes was spent with patient in direct consultation, examination and/or reviewing the patient's chart.  Assessment & Plan  Carcinoma of right breast metastatic to axillary lymph node (HCC)    59-year-old female with breast cancer.  Issues:    Right sided breast cancer.  Patient was initially diagnosed in 2021 with pT1c pN1mi G0 R0 ER+ MD+ HER2/andreia = 1+ + DCIS, G2 disease.  Patient underwent mastectomy and sentinel lymph node sampling (which was positive).  Mrs. Juárez deferred RT; patient was started on anastrozole.  MammaPrint came back as low risk, luminal A.    More recently patient developed right axillary swelling at the end of 2024.  Patient underwent workup and eventual right axillary dissection (2024) demonstrated recurrent carcinoma in the right axilla.  Pathology results are listed below, / lymph nodes was positive.  Patient will follow-up with Dr. Doran as directed.  The area has healed quite well.    Patient was presented at the breast tumor board on 2024.  The physician recommended plan was to switch the AI from Arimidex to Aromasin.  Caris testing will be sent for the ESR 1 mutation.  Patient has already been seen by radiation oncology completed RT.  Patient will follow-up with radiation oncology as directed.    ESR mutation analysis is still in process.    The below information comes from GeneCards, the human gene database  https://www.genecards.org/cgi-bin/carddisp.pl?gene=ESR1    ESR1 has been a focus in breast cancer for quite some time, but is also clinically relevant in endometrial, ovarian and other cancer types. The identification of ER-positive breast cancers that are resistant to hormone therapy have inspired clinical sequencing efforts to shed  light on the mechanisms of this resistance. A number of mutations in the ligand binding domain of ESR1 have been implicated in hormone resistance and anti-estrogen therapies. These observations have spurred efforts to develop therapeutics that stimulate ESR1 protein degradation (e.g. Fulvestrant), rather than acting as a small molecule antagonist. These agents are currently in clinical trials and have seen some success.    Nursing staff previously spent time with patient today going over the potential benefits, risks/toxicities of exemestane.  Literature was previously provided.  So far patient has tolerated the exemestane quite well.  Patient will continue.    Patient is to return in 3 months, afterwards follow-ups can be spread further apart.  Mrs. Rivas knows to call if she has any other oncology questions or concerns.    Carefully review your medication list and verify that the list is accurate and up-to-date. Please call the hematology/oncology office if there are medications missing from the list, medications on the list that you are not currently taking or if there is a dosage or instruction that is different from how you're taking that medication.     Patient goals and areas of care: Continue exemestane  Barriers to care: None  Patient is able to self-care  ___________________________________________________________________________________________________________    History of Present Illness   Chief Complaint   Patient presents with    Follow-up   Digna Juárez is a 59 y.o. female with breast cancer.    59-year-old female with somewhat complicated breast cancer history.  Mrs. Rivas was diagnosed with right-sided breast cancer approximately 3 years ago (April 13, 2021: Stage IA) status post mastectomy.  Specifics not presently available but patient was offered postoperative RT to the right axilla but patient deferred.    Recently patient was found to have recurrence and a right axillary lymph node.   Patient was seen/evaluated by Dr. Doran and underwent lymph node dissection.  Pathology results are listed below.    Patient has been seen/evaluated by radiation and completed RT.  Patient is on the Aromasin, tolerating it well.  Appetite is okay, weight is stable.  No problems with body aches, shortness of breath, headaches or dizziness.  Patient was able to get through the radiation quite well.  No other GI,  or GYN issues recently.    Oncology History   Cancer Staging   Carcinoma of right breast metastatic to axillary lymph node (HCC)  Staging form: Breast, AJCC 8th Edition  - Clinical stage from 9/12/2024: Stage IA (rcT0, cN1mi(f), cM0, G1, ER+, VT+, HER2-) - Signed by Ashleigh Doran MD on 10/10/2024  Stage prefix: Recurrence  Method of lymph node assessment: Core biopsy  Histologic grading system: 3 grade system  - Pathologic stage from 11/19/2024: rpT0, pN1, cM0 - Signed by Ashleigh Doran MD on 12/5/2024  Stage prefix: Recurrence  Method of lymph node assessment: Axillary lymph node dissection    Malignant neoplasm of overlapping sites of right breast in female, estrogen receptor positive (HCC)  Staging form: Breast, AJCC 8th Edition  - Pathologic stage from 4/13/2021: Stage IA (pT1c, pN1mi(sn), cM0, G2, ER+, VT+, HER2-) - Signed by ELZA Santos on 11/7/2023  Stage prefix: Initial diagnosis  Method of lymph node assessment: Whitney lymph node biopsy  Multigene prognostic tests performed: MammaPrint  Histologic grading system: 3 grade system  Oncology History   Malignant neoplasm of overlapping sites of right breast in female, estrogen receptor positive (HCC)   4/13/2021 -  Cancer Staged    Staging form: Breast, AJCC 8th Edition  - Pathologic stage from 4/13/2021: Stage IA (pT1c, pN1mi(sn), cM0, G2, ER+, VT+, HER2-) - Signed by ELZA Santos on 11/7/2023  Stage prefix: Initial diagnosis  Method of lymph node assessment: Whitney lymph node biopsy  Multigene prognostic tests  performed: MammaPrint  Histologic grading system: 3 grade system       4/16/2021 Biopsy    Right breast US guided biopsy:  A. 11 o'clock 7 cm from the nipple  Invasive lobular carcinoma  Grade 1  ER 90, CT 45, HER2 1+  Lymphovascular invasion: not identified    B. 10 o'clock 7 cm from the nipple  Invasive mammary carcinoma of no special type  Grade 2  , , HER2 1+  Lymphovascular invasion: not identified    Concordant. Malignancy appears multifocal. MRI recommended.     4/30/2021 Observation    Bilateral breast MRI  Multifocal right breast cancer; 10:00 mass measures up to 1.8 cm and abuts pectoral muscle, without evidence of invasion. Two areas of carcinoma have a total extent of disease of 5 cm. Left breast clear. Axilla negative.     6/10/2021 Genetic Testing    The following genes were evaluated: BATSHEVA, BRCA1, BRCA2, CDH1, CHEK2, PALB2, PTEN, STK11, TP53  Additional genes analyzed for a total of 20  Negative result. No pathogenic sequence variants or deletions/dupllications identified  [employer]     6/25/2021 Surgery    Right breast ANJALI  directed mastectomy with sentinel lymph node biopsy  Invasive carcinoma of no special type (ductal)  Grade 2  2 cm (2 foci)  Margins negative  1/1 Lymph node with micrometastases (0.4 mm)  Anatomic Stage IB  Prognostic Stage IA     7/7/2021 Genomic Testing    MammaPrint for FLEX trial  Low risk (Luminal A)     7/28/2021 - 11/2024 Hormone Therapy    Anastrozole 1 mg daily       7/29/2021 - 7/29/2021 Radiation    Consult with Dr. Florence  Patient declined post-mastectomy RT     Carcinoma of right breast metastatic to axillary lymph node (HCC)   9/12/2024 Biopsy    Right axillary lymph node ultrasound-guided biopsy (open coil)  Invasive mammary carcinoma of no special type (ductal) involving fibroadipose tissue  Grade 1  ER ; CT 1; HER2 0  In situ follicular neoplasia (involvement by follicular lymphoma elsewhere in the lymph node or in other lymph nodes cannot be  ruled out; correlation with systemic imaging to rule out lymphadenopathy elsewhere is recommended as clinically indicated)    Concordant. Minute focus of invasive carcinoma is present outside of the lymph node, within the fibroadipose tissue. This focus may represent metastasis vs primary carcinoma. Single morphologically abnormal lymph node seen on US. Left breast clear on 5/2024 imaging.     9/12/2024 -  Cancer Staged    Staging form: Breast, AJCC 8th Edition  - Clinical stage from 9/12/2024: Stage IA (rcT0, cN1mi(f), cM0, G1, ER+, IL+, HER2-) - Signed by Ashleigh Doran MD on 10/10/2024  Stage prefix: Recurrence  Method of lymph node assessment: Core biopsy  Histologic grading system: 3 grade system       9/17/2024 Observation    CT chest, abdomen, pelvis IMPRESSION:     Bilateral pulmonary nodules measuring 3 to 5 mm as described.  Follow-up CT chest recommended in 3 months considering history of recurrent breast cancer.     Ill-defined hypodense right thyroid gland nodule measures up to 1.5 cm.  Thyroid ultrasound recommended for further characterization.     Lobulated low-density right axillary node measuring 2.2 x 1 cm, consistent with biopsy-proven malignancy.  1.5 cm rounded soft tissue nodule in the left breast with coarse calcification versus biopsy clip.  Correlation with mammographic history recommended.     Ill-defined heterogeneously hypodense lesion within the subcapsular inferior right liver margin.  1.2 cm indeterminate left adrenal gland nodule.  MRI with contrast recommended for further evaluation of the above two lesions.     Punctate sclerotic lesion in the left sternum.  3 mm sclerotic lesion in the posterior/medial right fifth rib, potentially representing a bone island.     Prominent uterus considering age with probable partially exophytic fibroid.     Bladder wall thickening considering degree of distention.  Correlate with urinalysis for possible cystitis.     Note of no priors for  comparison     9/23/2024 Observation    MRI abdomen IMPRESSION:     No suspicious focal hepatic lesion. The 1.3 cm observation on prior CT may represent volume averaging of the hepatic border with adjacent mesenteric fat or small amount of focal fat infiltration. However given history of recently diagnosed metastatic   right breast neoplasm, follow-up CT abdomen with contrast in 3 months is recommended to assess for stability of findings     Irregular thickening of the left adrenal gland without discrete nodule findings can represent hyperplasia versus early small metastatic nodule. No remote prior cross-sectional studies of the abdomen are available for direct comparison. Attention on   follow-up CT abdomen in 3 months is recommended to assess for stability.     A 2.5 cm incompletely characterized hypoenhancing uterine lesion, statistically likely representing fibroid. Left ovarian 1.2 cm cystic lesion, also incompletely characterized. Nonemergent pelvic ultrasound is recommended for complete characterization.     Small volume free pelvic fluid of uncertain etiology. Clinical correlation is recommended.     9/24/2024 Observation    NM Bone scan IMPRESSION:     1.  No scintigraphic evidence of osseous metastasis. Incidental findings as above.     11/19/2024 Surgery    RIGHT axillary dissection  ONE out of ELEVEN (1/11) lymph nodes positive for metastatic carcinoma  4 mm  < 2 mm extranodal extension  Persistent in situ follicular neoplasm  No evidence of overt follicular lymphoma     11/19/2024 -  Cancer Staged    Staging form: Breast, AJCC 8th Edition  - Pathologic stage from 11/19/2024: rpT0, pN1, cM0 - Signed by Ashleigh Doran MD on 12/5/2024  Stage prefix: Recurrence  Method of lymph node assessment: Axillary lymph node dissection          Review of Systems   Constitutional: Negative.    HENT: Negative.     Eyes: Negative.    Respiratory: Negative.     Cardiovascular: Negative.    Gastrointestinal: Negative.   "  Endocrine: Negative.    Genitourinary: Negative.    Musculoskeletal: Negative.    Skin: Negative.    Allergic/Immunologic: Negative.    Neurological: Negative.    Hematological: Negative.    Psychiatric/Behavioral: Negative.     All other systems reviewed and are negative.    Objective   /78 (BP Location: Left arm, Patient Position: Sitting, Cuff Size: Adult)   Pulse 73   Temp 98.1 °F (36.7 °C) (Temporal)   Resp 16   Ht 5' 4\" (1.626 m)   Wt 46.6 kg (102 lb 12.8 oz)   SpO2 99%   BMI 17.65 kg/m²     ECOG   ECOG = 1  Physical Exam  Constitutional:       Appearance: She is well-developed.      Comments: Well-nourished female, no respiratory distress, no signs of pain   HENT:      Head: Normocephalic and atraumatic.      Right Ear: External ear normal.      Left Ear: External ear normal.   Eyes:      Conjunctiva/sclera: Conjunctivae normal.      Pupils: Pupils are equal, round, and reactive to light.   Cardiovascular:      Rate and Rhythm: Normal rate and regular rhythm.      Heart sounds: Normal heart sounds.   Pulmonary:      Effort: Pulmonary effort is normal.      Breath sounds: Normal breath sounds.      Comments: Good air entry bilaterally, clear  Abdominal:      General: Bowel sounds are normal.      Palpations: Abdomen is soft.   Musculoskeletal:         General: Normal range of motion.      Cervical back: Normal range of motion and neck supple.   Skin:     General: Skin is warm.      Comments: Warm, moist, good color, no petechiae or ecchymoses   Neurological:      Mental Status: She is alert and oriented to person, place, and time.      Deep Tendon Reflexes: Reflexes are normal and symmetric.   Psychiatric:         Behavior: Behavior normal.         Thought Content: Thought content normal.         Judgment: Judgment normal.     Lymphatics: No adenopathy in the neck, supraclavicular region, axilla bilaterally  Extremities: No lower extremity edema bilaterally, no cords, pulses are 1+  Breasts " (female present during exam) well-healed right anterior chest wall suture line, well-healed right axillary suture line, no redness or swelling    Labs: I have reviewed the following labs:  Lab Results   Component Value Date/Time    WBC 5.91 11/06/2024 06:49 PM    RBC 3.96 11/06/2024 06:49 PM    Hemoglobin 12.1 11/06/2024 06:49 PM    Hematocrit 38.2 11/06/2024 06:49 PM    MCV 97 11/06/2024 06:49 PM    MCH 30.6 11/06/2024 06:49 PM    RDW 12.8 11/06/2024 06:49 PM    Platelets 232 11/06/2024 06:49 PM    Segmented % 53 11/06/2024 06:49 PM    Lymphocytes % 35 11/06/2024 06:49 PM    Monocytes % 9 11/06/2024 06:49 PM    Eosinophils Relative 2 11/06/2024 06:49 PM    Basophils Relative 1 11/06/2024 06:49 PM    Immature Grans % 0 11/06/2024 06:49 PM    Absolute Neutrophils 3.19 11/06/2024 06:49 PM     Lab Results   Component Value Date/Time    Potassium 4.5 11/06/2024 06:49 PM    Chloride 103 11/06/2024 06:49 PM    CO2 28 11/06/2024 06:49 PM    BUN 16 11/06/2024 06:49 PM    Creatinine 0.90 11/06/2024 06:49 PM    Calcium 9.1 11/06/2024 06:49 PM    AST 26 11/06/2024 06:49 PM    ALT 30 11/06/2024 06:49 PM    Alkaline Phosphatase 65 11/06/2024 06:49 PM    Total Protein 6.7 11/06/2024 06:49 PM    Albumin 4.4 11/06/2024 06:49 PM    Total Bilirubin 0.39 11/06/2024 06:49 PM    eGFR 69 11/06/2024 06:49 PM     Imaging    12/11/2024 CAT scan chest abdomen with contrast    IMPRESSION:     New postoperative changes in the right axilla as detailed above. No residual adenopathy identified.     Stable 4 mm nodule left upper lobe. No new nodules.     Stable tiny sclerotic foci in the sternum and right fifth rib. No new osseous lesions.     Stable subtle subcapsular hypoattenuating focus in the right liver. No new lesions.    11/6/2024 DEXA bone density spine hip and pelvis.  Impression stated osteoporosis.   Since a DXA study from 6/27/2022, there has been:  A  STATISTICALLY SIGNIFICANT INCREASE in bone mineral density of 0.035 g/cm2 (6.4%) in  the left total hip    9/24/2024 bone scan whole body.  Impression stated no scintigraphic evidence of osseous metastases.    9/23/2024 MRI abdomen with and without contrast    IMPRESSION:     No suspicious focal hepatic lesion. The 1.3 cm observation on prior CT may represent volume averaging of the hepatic border with adjacent mesenteric fat or small amount of focal fat infiltration. However given history of recently diagnosed metastatic right breast neoplasm, follow-up CT abdomen with contrast in 3 months is recommended to assess for stability of findings     Irregular thickening of the left adrenal gland without discrete nodule findings can represent hyperplasia versus early small metastatic nodule. No remote prior cross-sectional studies of the abdomen are available for direct comparison. Attention on follow-up CT abdomen in 3 months is recommended to assess for stability.     A 2.5 cm incompletely characterized hypoenhancing uterine lesion, statistically likely representing fibroid. Left ovarian 1.2 cm cystic lesion, also incompletely characterized. Nonemergent pelvic ultrasound is recommended for complete characterization.     Small volume free pelvic fluid of uncertain etiology. Clinical correlation is recommended.      Pathology    Case Report   Surgical Pathology Report                         Case: [id number]                                   Authorizing Provider:  Ashleigh Doran MD           Collected:           11/19/2024 0832               Ordering Location:     Pending sale to [facility]        Received:            11/19/2024 0857                                      [facility]                                                     Pathologist:           Jovany Garcia MD                                                           Specimen:    Axillary, Right Axillary Contents                                                          Final Diagnosis   A. Axillar, Contents, Right, Dissection:  -   One  out of eleven (1/11) lymph nodes positive for metastatic carcinoma consistent with mammary origin.               ~4 mm in greatest dimension.                 < 2 mm of extranodal extension.  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               No evidence of a overt follicular lymphoma.  -   Prior procedural site changes identified.     Comment: The patient's history of breast cancer with recently identified metastasis and in-situ follicular neoplasm is noted. The current sample shows one lymph node positive for metastatic carcinoma with extranodal extension measuring < 2 mm in greatest dimension.               In regards to the in-situ follicular neoplasm; clinical correlation is advised to rule out the possibility of a concurrent follicular lymphoma. In the submitted tissue samples there is no evidence of overt follicular lymphoma. Clinical correlation is advised.      Intradepartmental consultation is in agreement ([facility]).  Best block for ancillary studies is A5.      Electronically signed by Jovany Garcia MD on 11/26/2024 at 1305 EST   Note    Immunohistochemical stains show:     Block A1: AE1/3 is negative for metastatic carcinoma.   BCL-2 is focally bright within follicles; confirmed with expression of BCL-6 and CD10 consistent with ISFN.     Block A2: AE1/3 is negative for metastatic carcinoma.   BCL-6 and CD10 highlight germinal centers that are appropriately negative for BCL-2.      Block A5: Poly-3 highlights metastatic carcinoma.  CD3 highlights T-cells and CD20 highlights B-cells  Scattered B-cells stronglly express BCL-2 within follicles; confirmed with expression of BCL-6 and CD10 consistent with ISFN.     Block A7: AE1/3 is negative for metatatic carcinoma.  CD3 highlights T-cells and CD20 highlights B-cells.   BCL-6 and CD10 highlight germinal centers that are appropriately negative for BCL-2.            Case Report   Surgical Pathology Report                         Case:  [id number]                                   Authorizing Provider:  Jim Lemus MD              Collected:           06/25/2021 1349               Ordering Location:     Critical access hospital        Received:            06/25/2021 [address] [place] [address] Operating Room                                                       Pathologist:           Amairani Méndez MD                                                             Intraop:               Amairani Méndez MD                                                             Specimens:   A) - Lymph Node, [place], right axillary sentinel lymph node                                        B) - Breast, Right, right breast                                                          Addendum   At the request of Dr. Lemus, unstained slides from paraffin BLOCK B15 containing the patient's cancer cells were sent to [facility] for  Mammaprint testing. Upon completion of testing, the [facility] report will be directly sent to the requesting physician as well as posted in the Media Tab of the patient's [employer] ([employer]) EMR by the [facility] Pathology Department.     Please note: The [facility]'s analysis and report is performed independently of [facility], and neither the Hospital nor the Pathology Department screen, review or comment upon [facility]'s report.  Because the role of testing in cancer diagnosis and management is subject to evolving development, usage and interpretation, we strongly urge that the test limitations described in the [facility] report be carefully read, appropriately shared with the patient, and critically considered when reaching treatment decisions.     This pathology material was removed from archive for purpose of molecular testing. It was reviewed and approved by Dr. Armstrong.       Addendum electronically signed by Amairani Méndez MD on 7/22/2021 at 10:57 AM   Final Diagnosis   A.  Lymph Node, [place], right axillary sentinel lymph node, lymphadenectomy:  - One lymph node (1/1mi) positive for micrometastatic carcinoma.     -- Subcapsular and sinusoidal metastases; largest metastatic deposit 0.4 mm in a 1.1 mm area.    -- No extranodal extension.     -- Confirmed by positive pankeratin (CKAE1/3) immunostain.      B. Breast, Right, right breast, mastectomy:  Multifocal (2) invasive mammary carcinoma.  Largest invasive carcinoma  - Invasive mammary carcinoma of no special type (ductal), 20 mm in greatest gross dimension,      present in lower outer quadrant (LOQ).    -- Idania histologic grade 2 of 3 (total score: 7 of 9)        * Glandular (acinar) Tubular Differentiation 10-75%, score 2.        * Nuclear Pleomorphism 2-3 of 3, score 3.        * Mitotic Rate 4-7/mm2, (8-14 mitoses/10HPF; 12 mitoses/10 HPF), score 2.    -- Confirmed by         * Negative: p63 and [facility] immunostains.     -- Associated prior biopsy site changes with ribbon clip and ANJALI  marker.  Second invasive carcinoma  - Invasive lobular carcinoma, classic pattern with histiocytoid differentiation; 7 mm in      greatest gross dimension, present in upper outer quadrant (UOQ).    -- Linwood histologic grade 2 of 3 (total score: 6 of 9)        * Glandular (acinar) Tubular Differentiation <10%, score 3.        * Nuclear Pleomorphism 1-2 of 3, score 2.        * Mitotic Rate < 3/mm2, (</= 7 mitoses/10HPF; 0 mitoses/10 hPF), score 1.    -- Confirmed by tumor cell immunophenotype:        * Positive: Pankeratin (CKAE1/3) and P120 (cytoplasmic).    -- Associated prior biopsy site changes with wing clip and two ANJALI  markers.  - Focus suspicious for microinvasive carcinoma of no special type (ductal), present in tissue     between both invasive carcinomas; 0.7 mm.  - Ductal carcinoma in-situ (DCIS): Present; not an extensive intraductal component; associated with     largest invasive carcinoma (< 5% of total tumor).      -- Size and Extent: 7 mm in greatest estimated extent; present in 1 of 21 blocks.    -- Architectural Patterns: Micropapillary and cribriform.     -- Nuclear grade: II (intermediate).    -- Necrosis: Present, focal necrosis.   - Margins uninvolved by invasive and in-situ carcinoma.     -- Invasive carcinoma 5 mm from nearest posterior margin; 7 mm from nearest anterior UOQ margin.     -- DCIS > 5 mm from nearest gross posterior margin.   - Benign nipple and skin.    -- Invasive carcinoma does not invade into the dermis or epidermis; no ulceration.     -- DCIS does not involve the nipple epidermis.     -- No dermal lymphovascular invasion.   - Lymphovascular invasion: Present.    -- Confirmed by D2-40 and CD31 immunostains.   - Perineural invasion: Present.   - Microcalcifications: Present, associated with invasive carcinoma, lobular neoplasia and     non-neoplastic breast tissue.   - Benign proliferative and non-proliferative fibrocystic changes with atypia consisting of extensive     non-invasive lobular neoplasia (lobular carcinoma in-situ and atypical lobular hyperplasia; focally     involving sclerosing adenosis), sclerosing and apocrine adenosis, fibroadenomatoid changes and     cystic and papillary apocrine metaplasia.    -- Lobular neoplasia confirmed by positive p120 (cytoplasmic), SMMHC and p63; negative E-cadherin        immunostains.   - Duct ectasia.          Comments:   This is an appended report. These results have been appended to a previously preliminary verified report.      Electronically signed by Amairani Méndez MD on 7/1/2021 at  9:28 PM   Note    Intradepartmental consultation (CV) agrees with the diagnosis for specimen B. A copy of the final report is faxed to Dr. JET Lemus on 7/1/21 @ 2120 hours.     1.  Ancillary Studies:  Performed on original biopsy material, [id number] (largest/ductal), and reported to be:     - ER:  100% / Positive     - OK:  100% / Positive     - HER2 (IHC):  1+ /  Negative     - HER2 (FISH):  N/A     - Repeat HER2 testing (2013 ASCO/CAP Recommendations): Not Indicated      - Best representative tumor block: B4 (ductal).       -- Sufficient tumor present for          Agendia Mammaprint/Blueprint (1 cm2 of invasive tumor in aggregate): Yes.          MI Profile/Foundation One (at least 5 x 5 mm of tumor): Yes.      Ancillary Studies:  Performed on original biopsy material, [id number] (smaller/lobular), and reported to be:     - ER:  90-95% / Positive     - NV:  45-55% / Positive     - HER2 (IHC):  1+ / Negative     - HER2 (FISH):  N/A      - Repeat HER2 testing (2013 ASCO/CAP Recommendations): Not Indicated      - Best representative tumor blocks: B15 (lobular)       -- Sufficient tumor present for          Agendia Mammaprint/Blueprint (1 cm2 of invasive tumor in aggregate): No.          MI Profile/Foundation One (at least 5 x 5 mm of tumor):  Yes.      2.  Pathologic Stage Classification (pTNM, AJCC 8th Edition):       8th ed, AJCC Anatomic Stage:  at least Stage IB- pT1c(m/2), pN1mi(sn), cM0, G2.     3.  8th ed. AJCC Pathologic Prognostic Stage (use AJCC update):  IA.   Comment: This is an appended report. These results have been appended to a previously preliminary verified report.   Additional Information    All reported additional testing was performed with appropriately reactive controls.  These tests were developed and their performance characteristics determined by [facility] or appropriate performing facility, though some tests may be performed on tissues which have not been validated for performance characteristics (such as staining performed on alcohol exposed cell blocks and decalcified tissues).  Results should be interpreted with caution and in the context of the patients’ clinical condition. These tests may not be cleared or approved by the U.S. Food and Drug Administration, though the FDA has determined that such clearance or approval is not  necessary. These tests are used for clinical purposes and they should not be regarded as investigational or for research. This laboratory has been approved by CLIA 88, designated as a high-complexity laboratory and is qualified to perform these tests.  Interpretation performed at [facility], [address] [address].   Comment: This is an appended report. These results have been appended to a previously preliminary verified report.   Synoptic Checklist   INVASIVE CARCINOMA OF THE BREAST: Resection   8th Edition - Protocol posted: 2/26/2020INVASIVE CARCINOMA OF THE BREAST: COMPLETE EXCISION - All Specimens  SPECIMEN   Procedure  Total mastectomy   Specimen Laterality  Right   TUMOR   Tumor Site  Upper outer quadrant     Lower outer quadrant   Histologic Type  Invasive carcinoma of no special type (ductal)   Glandular (Acinar) / Tubular Differentiation  Score 2   Nuclear Pleomorphism  Score 3   Mitotic Rate  Score 2   Overall Grade  Grade 2 (scores of 6 or 7)   Tumor Size  Greatest dimension of largest invasive focus (Millimeters): 20 mm   Tumor Focality  Multiple foci of invasive carcinoma   Number of Foci  2        Sizes of Individual Foci (Millimeters)  20, 7 mm   Ductal Carcinoma In Situ (DCIS)  Present     Negative for extensive intraductal component (EIC)   Size (Extent) of DCIS  Estimated size (extent) of DCIS is at least (Millimeters): 7 mm   Number of Blocks with DCIS  1   Number of Blocks Examined  21   Architectural Patterns  Cribriform     Micropapillary   Nuclear Grade  Grade II (intermediate)   Necrosis  Present, focal (small foci or single cell necrosis)   Lobular Carcinoma In Situ (LCIS)  Present   Lymphovascular Invasion  Present   Dermal Lymphovascular Invasion  Not identified   Microcalcifications  Present in invasive carcinoma     Present in non-neoplastic tissue     Lobular neoplasia   Treatment Effect in the Breast  No known presurgical therapy   MARGINS   Invasive Carcinoma  Margins  Uninvolved by invasive carcinoma   Distance from Closest Margin (Millimeters)  5 mm   Closest Margin(s)  Posterior   DCIS Margins  Uninvolved by DCIS   Distance from Closest Margin (Millimeters)  Greater than: 5 mm   Closest Margin(s)  Posterior   LYMPH NODES   Regional Lymph Nodes  Involved by tumor cells   Number of Lymph Nodes with Macrometastases (> 2 mm)  0   Number of Lymph Nodes with Micrometastases (> 0.2 mm to 2 mm and / or > 200 cells)  1   Size of Largest Metastatic Deposit (Millimeters)  0.4 mm   Extranodal Extension  Not identified   Total Number of Lymph Nodes Examined  1   Number of Bivins Nodes Examined  1   PATHOLOGIC STAGE CLASSIFICATION (pTNM, AJCC 8th Edition)      TNM Descriptors  m (multiple foci of invasive carcinoma)   Primary Tumor (pT)  pT1c   Regional Lymph Nodes Modifier  (sn): Bivins node(s) evaluated.   Regional Lymph Nodes (pN)  pN1mi   ADDITIONAL FINDINGS   Additional Findings  Second focus of invasive carcinoma- invasive lobular carcinoma, histologic grade 2 of 3. Focus suspicious for microinvasive carcinoma of no special type (ductal). Extensive non-invasive lobular neoplasia (LCIS and ALH). Perineural invasion identified.   SPECIAL STUDIES   Breast Biomarker Testing Performed on Previous Biopsy     Estrogen Receptor (ER) Status  Positive (greater than 10% of cells demonstrate nuclear positivity)   Percentage of Cells with Nuclear Positivity  100 %   Breast Biomarker Testing Performed on Previous Biopsy     Progesterone Receptor (PgR) Status  Positive   Percentage of Cells with Nuclear Positivity  100 %   Breast Biomarker Testing Performed on Previous Biopsy     HER2 (by immunohistochemistry)  Negative (Score 1+)   Testing Performed on Case Number  [id number]   Comment(s)   Comment(s)  8th ed. AJCC Pathologic Prognostic Stage: IA. Best representative tumor block: B4.   .

## 2025-01-31 ENCOUNTER — PATIENT OUTREACH (OUTPATIENT)
Dept: HEMATOLOGY ONCOLOGY | Facility: CLINIC | Age: 60
End: 2025-01-31

## 2025-01-31 ENCOUNTER — TELEPHONE (OUTPATIENT)
Age: 60
End: 2025-01-31

## 2025-01-31 NOTE — TELEPHONE ENCOUNTER
Patient states she was told to stop the exemestane while receiving Radiation and she completed this yesterday. She is inquiring about when she should resume it.

## 2025-01-31 NOTE — TELEPHONE ENCOUNTER
Received a call back from Digna.      Reviewed with Digna that Cheryl Ndiaye RN wanted you to know that she may resume the exemestane.    Pt was appreciative for this information.

## 2025-01-31 NOTE — TELEPHONE ENCOUNTER
Patient is calling to request that we mail her the AVS from her appointment with Dr Florence from 1/30/25

## 2025-01-31 NOTE — PROGRESS NOTES
"Patient Navigation    I did outreach Pt to review for any changes in barriers to care and other supportive services as needed.  I was unable to reach the Pt, phone rings several times and then says \"call cannot be completed at this time\".  I will try to outreach again in a few days.  "

## 2025-02-06 ENCOUNTER — PATIENT OUTREACH (OUTPATIENT)
Dept: HEMATOLOGY ONCOLOGY | Facility: CLINIC | Age: 60
End: 2025-02-06

## 2025-02-11 ENCOUNTER — TELEPHONE (OUTPATIENT)
Age: 60
End: 2025-02-11

## 2025-02-11 NOTE — TELEPHONE ENCOUNTER
Spoke with patient and made aware that she is not due for labs yet. PCP will likely place future lab orders for her to have done at the time of her visit.

## 2025-02-11 NOTE — TELEPHONE ENCOUNTER
Pt. Has an appt. On 3/3 with Dr. Knox.  Does he want her to get blood work before her visit or after?  If he wants them before, please ask him to put the labs in Epic and if someone could pls call the pt. To let her know they are in.   Ty

## 2025-03-03 ENCOUNTER — OFFICE VISIT (OUTPATIENT)
Dept: FAMILY MEDICINE CLINIC | Facility: CLINIC | Age: 60
End: 2025-03-03
Payer: COMMERCIAL

## 2025-03-03 VITALS
BODY MASS INDEX: 17.62 KG/M2 | TEMPERATURE: 98.1 F | HEIGHT: 64 IN | WEIGHT: 103.2 LBS | SYSTOLIC BLOOD PRESSURE: 116 MMHG | DIASTOLIC BLOOD PRESSURE: 76 MMHG | OXYGEN SATURATION: 99 % | HEART RATE: 66 BPM

## 2025-03-03 DIAGNOSIS — Z79.899 MEDICATION MANAGEMENT: ICD-10-CM

## 2025-03-03 DIAGNOSIS — Z13.89 SCREENING FOR HEMATURIA OR PROTEINURIA: ICD-10-CM

## 2025-03-03 DIAGNOSIS — E56.9 DEFICIENCY OF MULTIPLE VITAMINS: ICD-10-CM

## 2025-03-03 DIAGNOSIS — Z13.83 SCREENING FOR CARDIOVASCULAR, RESPIRATORY, AND GENITOURINARY DISEASES: ICD-10-CM

## 2025-03-03 DIAGNOSIS — Z71.2 ENCOUNTER TO DISCUSS TEST RESULTS: ICD-10-CM

## 2025-03-03 DIAGNOSIS — R53.82 CHRONIC FATIGUE: ICD-10-CM

## 2025-03-03 DIAGNOSIS — Z13.89 SCREENING FOR CARDIOVASCULAR, RESPIRATORY, AND GENITOURINARY DISEASES: ICD-10-CM

## 2025-03-03 DIAGNOSIS — Z79.899 ENCOUNTER FOR LONG-TERM (CURRENT) USE OF MEDICATIONS: ICD-10-CM

## 2025-03-03 DIAGNOSIS — Z23 NEED FOR VACCINATION: ICD-10-CM

## 2025-03-03 DIAGNOSIS — Z13.6 SCREENING FOR CARDIOVASCULAR, RESPIRATORY, AND GENITOURINARY DISEASES: ICD-10-CM

## 2025-03-03 DIAGNOSIS — E78.2 MIXED HYPERLIPIDEMIA: ICD-10-CM

## 2025-03-03 DIAGNOSIS — E03.8 OTHER SPECIFIED HYPOTHYROIDISM: Primary | ICD-10-CM

## 2025-03-03 DIAGNOSIS — M81.0 AGE-RELATED OSTEOPOROSIS WITHOUT CURRENT PATHOLOGICAL FRACTURE: ICD-10-CM

## 2025-03-03 DIAGNOSIS — E55.9 VITAMIN D DEFICIENCY: ICD-10-CM

## 2025-03-03 PROCEDURE — 99214 OFFICE O/P EST MOD 30 MIN: CPT | Performed by: FAMILY MEDICINE

## 2025-03-03 RX ORDER — LANOLIN ALCOHOL/MO/W.PET/CERES
1 CREAM (GRAM) TOPICAL 4 TIMES DAILY
Qty: 100 TABLET | Refills: 6 | Status: SHIPPED | OUTPATIENT
Start: 2025-03-03

## 2025-03-03 NOTE — PROGRESS NOTES
Name: Digna Juárez      : 1965      MRN: [id number]  Encounter Provider: NATALIE Knox DO  Encounter Date: 3/3/2025   Encounter department: [facility] [place]    Assessment & Plan  Other specified hypothyroidism  Advised to continue levothyroxine 50 mcg once daily on empty stomach for ever       Deficiency of multiple vitamins  Taking Multivite daily       Medication management  All medications reviewed for safety efficacy and tolerance       Encounter to discuss test results  All test results reviewed and discussed with patient.  All immunizations reviewed as well needs most       Encounter for long-term (current) use of medications         Need for vaccination  Advised to get RSV wait 2 weeks get Tdap wait 2 weeks get a Prevnar wait 2 weeks and begin the Shingrix series for shingles.  Written instructions given she will go to the pharmacy       Chronic fatigue    Orders:    CBC; Future    TSH, 3rd generation; Future    Screening for hematuria or proteinuria    Orders:    UA w Reflex to Microscopic w Reflex to Culture    Albumin / creatinine urine ratio    Screening for cardiovascular, respiratory, and genitourinary diseases    Orders:    Comprehensive metabolic panel; Future    Mixed hyperlipidemia    Orders:    Lipid panel; Future    Vitamin D deficiency    Orders:    Vitamin D 25 hydroxy; Future    Age-related osteoporosis without current pathological fracture    Orders:    calcium citrate-vitamin D 315 mg-5 mcg tablet; Take 1 tablet by mouth 4 (four) times a day      Assessment & Plan  1. Carcinoma of the right breast metastatic to axillary lymph nodes: Stable. Surgery on 2024, followed by radiation therapy starting 2025, with a total of 16 treatments.  - Continue Aromasin 25 mg daily, which replaced anastrozole.    2. Hypothyroidism.  - Continue levothyroxine 50 mcg once daily on an empty stomach indefinitely.    3. Osteoporosis: Severe. DEXA scan from 2024 showed  T-scores of -3.5, -3.0, and -3.1.  - Start Citracal D, taking one tablet 3 to 4 times daily.  - Discuss further pharmacologic therapy with Dr. Florence on 04/28/2025.    4. Health Maintenance.  - Receive the RSV vaccine, wait 2 weeks, get the Tdap vaccine, wait another 2 weeks, and then receive the Prevnar 20 vaccine. After another 2 weeks, begin the Shingrix series for shingles. Written instructions provided.  - Complete a set of blood tests in 3 months.    Follow-up  - Appointment with Dr. Florence on 04/28/2025 to discuss osteoporosis treatment.  - Blood tests to be completed in 3 months.    PROCEDURE  The patient underwent lymph node removal surgery on 11/19/2024.       History of Present Illness     History of Present Illness  The patient is a 60-year-old female who presents for evaluation of breast cancer, hypothyroidism, osteoporosis, and health maintenance.    Breast Cancer  - Under the care of Dr. Jessenia Doran, who performed lymph node removal surgery on 11/19/2024  - Underwent radiation therapy starting from 01/09/2025, completing a total of 16 sessions  - Currently on a daily regimen of Aromasin 25 mg, prescribed by Dr. Asif Florence, which replaced anastrozole  - Reports no issues with her other medications and maintains a healthy diet    Hypothyroidism  - Takes levothyroxine once daily in the early morning    Osteoporosis  - Had a DEXA scan in the fall of 2024  - Has an appointment with Dr. Florence on 04/28/2025  - Has not experienced any bone fractures  - Not currently taking calcium supplements  - Has not yet started treatment for osteoporosis    Health Maintenance  - Continues to take olanzapine 7.5 mg at bedtime, which aids in her sleep  - Takes lisinopril 10 mg once daily for blood pressure management  - Takes famotidine once daily for stomach issues  - Takes gabapentin 100 mg 3 times daily  - Reports no current issues with GERD or indigestion  - Had a strep throat infection during her radiation therapy,  managed by Dr. Pickens  - Received the COVID-19 vaccine  - Continues to take a multivitamin supplement  - Last blood sugar reading was 146    Supplemental information: She has resolved her dental issues.    SOCIAL HISTORY  She does not drink alcohol in public or on the street.    MEDICATIONS  Current: Aromasin, olanzapine, lisinopril, levothyroxine, famotidine, atenolol, multivitamin  Discontinued: anastrozole, gabapentin    IMMUNIZATIONS  She has had the COVID-19 vaccine.     Review of Systems   Constitutional:  Negative for activity change, appetite change, chills, diaphoresis, fatigue and fever.   HENT:  Negative for congestion, ear discharge, ear pain, facial swelling, nosebleeds, sore throat, tinnitus, trouble swallowing and voice change.    Eyes:  Negative for photophobia, pain, discharge, redness, itching and visual disturbance.   Respiratory:  Negative for apnea, cough, choking, chest tightness and shortness of breath.    Cardiovascular:  Negative for chest pain, palpitations and leg swelling.   Gastrointestinal:  Negative for abdominal distention, abdominal pain, blood in stool, constipation, diarrhea, nausea and vomiting.   Endocrine: Negative for cold intolerance, heat intolerance, polydipsia, polyphagia and polyuria.   Genitourinary:  Negative for decreased urine volume, difficulty urinating, dysuria, enuresis, frequency, hematuria, pelvic pain, urgency and vaginal bleeding.   Musculoskeletal:  Negative for arthralgias, back pain, gait problem, joint swelling, neck pain and neck stiffness.   Skin:  Negative for color change, pallor and rash.   Allergic/Immunologic: Negative for immunocompromised state.   Neurological:  Negative for dizziness, seizures, facial asymmetry, light-headedness, numbness and headaches.   Hematological:  Negative for adenopathy.   Psychiatric/Behavioral:  Negative for agitation, behavioral problems, confusion, decreased concentration, dysphoric mood and hallucinations.   "    Objective   /76 (BP Location: Left arm, Patient Position: Sitting, Cuff Size: Standard)   Pulse 66   Temp 98.1 °F (36.7 °C) (Temporal)   Ht 5' 4\" (1.626 m)   Wt 46.8 kg (103 lb 3.2 oz)   SpO2 99%   BMI 17.71 kg/m²     Physical Exam  Lungs were auscultated.  Physical Exam  Vitals and nursing note reviewed.   Constitutional:       General: She is not in acute distress.     Appearance: Normal appearance. She is not ill-appearing, toxic-appearing or diaphoretic.   HENT:      Head: Normocephalic.      Nose: Nose normal.      Mouth/Throat:      Mouth: Mucous membranes are moist.      Pharynx: No oropharyngeal exudate or posterior oropharyngeal erythema.   Eyes:      Extraocular Movements: Extraocular movements intact.      Pupils: Pupils are equal, round, and reactive to light.   Cardiovascular:      Rate and Rhythm: Normal rate and regular rhythm.      Pulses: Normal pulses.      Heart sounds: Normal heart sounds.      No gallop.   Pulmonary:      Effort: No respiratory distress.      Breath sounds: Normal breath sounds. No wheezing, rhonchi or rales.   Abdominal:      General: Abdomen is flat.   Musculoskeletal:         General: No tenderness.      Cervical back: Normal range of motion and neck supple.      Right lower leg: No edema.      Left lower leg: No edema.   Skin:     General: Skin is warm.   Neurological:      General: No focal deficit present.      Mental Status: She is alert and oriented to person, place, and time. Mental status is at baseline.   Psychiatric:         Mood and Affect: Mood normal.         Behavior: Behavior normal.         Thought Content: Thought content normal.         Judgment: Judgment normal.         "

## 2025-03-03 NOTE — ASSESSMENT & PLAN NOTE
Advised to get RSV wait 2 weeks get Tdap wait 2 weeks get a Prevnar wait 2 weeks and begin the Shingrix series for shingles.  Written instructions given she will go to the pharmacy

## 2025-03-03 NOTE — ASSESSMENT & PLAN NOTE
All test results reviewed and discussed with patient.  All immunizations reviewed as well needs most

## 2025-03-10 ENCOUNTER — TELEPHONE (OUTPATIENT)
Dept: FAMILY MEDICINE CLINIC | Facility: CLINIC | Age: 60
End: 2025-03-10

## 2025-03-10 NOTE — PROGRESS NOTES
Radiation Oncology Follow-Up  Name: Digna Juárez      : 1965      MRN: [id number]  Encounter Provider: J Carlos Craig MD  Encounter Date: 3/11/2025   Encounter department: [facility] RADIATION ONCOLOGY  :  Assessment & Plan  Carcinoma of right breast metastatic to axillary lymph node (HCC)    Patient is a 60 y.o. female with locoregionally recurrent right breast cancer treated with surgery, AI, ALND, radiation. She is currently on active treatment with exemestane. Did well with radiation treatment, no concerns at this time. Radiation side effects have resolved. She continues following with medical and surgical oncology. Follow up with me in 6 months.            History of Present Illness   Prior Cancer Treatment:  - 21 right mastectomy+SLNB  - adjuvant anastrozole  - 24 right ALND  - adjuvant exemestane  - right chest wall and regional aleena irradiation to 4256 cGy in 16 fx completed 25    Implanted Devices:  none    Digna Juárez is a 60 y.o. female with a history of recurrent right breast cancer to the axilla. History of left upper outer breast nodule s/p biopsy in . Initial diagnosis of right breast cancer in 2021, treated with right mastectomy and SLNB on 21 which showed multifocal (2) invasive mammary carcinoma, 20 mm invasive ductal carcinoma in RLQ, grade 2, ER/ID+ HER2-, and 7 mm invasive lobular carcinoma, grade 2, ER/ID+ HER2-, all margins negative, 1/1 lymph node with micrometastases 0.4 mm, pT1c N1mi. Mammaprint low risk. She met with medical oncology and radiation oncology, and elected for adjuvant anastrozole without radiotherapy. In 2024, she presented to her family physician with a sore lymph node under her right armpit. Right axillary US on 24 showed a 3.2 cm postoperative collection which was stable, however there was a morphologically abnormal right axillary level 1 lymph node with cortical thickness 5 mm. Biopsy on 24  showed invasive ductal carcinoma in the lymph node. CT chest/abd/pelvis on 9/17/24 showed a lobulated 2.2 cm right axillary node, 1.5 cm rounded soft tissue nodule in the left breast with coarse calcification vs biopsy clip, bilateral pulmonary nodules 3-5 m, ill-defined hypodense right thyroid nodule 1.5 cm, ill-defined heterogeneously hypodense lesion within subcapsular inferior right liver margin, 1.2 cm indeterminate left adrenal nodule, punctate sclerotic lesion in left sternum, 3 mm sclerotic lesion in posterior/medial right 5th rib (potential bone island), prominent uterus. MRI abdomen on 9/23/24 showed no suspicious liver lesion, irregular thickening of the left adrenal gland without discrete nodule. US thyroid on 9/23/24 showed TR4 2.0 cm lesion in midpole of right thyroid lobe. Bone scan 9/24/24 showed no evidence of osseous disease. US pelvis 10/11/24 showed fibroid uterus. Thyroid FNA on 10/24/24 was benign. On 11/19/24 she underwent right axillary dissection which showed 1/11 LN+ for breast cancer, 4 mm, <2 mm extranodal extension. She started adjuvant exemestane. She completed adjuvant radiation to the right chest wall and regional lymph nodes on 1/30/25.     She is doing well today. No issues since completing her radiation treatment.    Review of Systems:  Review of Systems   Constitutional: Negative.    HENT: Negative.     Eyes: Negative.    Respiratory: Negative.     Cardiovascular: Negative.    Gastrointestinal: Negative.    Endocrine: Negative.    Genitourinary: Negative.    Musculoskeletal: Negative.    Skin: Negative.    Allergic/Immunologic: Negative.    Neurological: Negative.    Hematological: Negative.    Psychiatric/Behavioral: Negative.       Pertinent Medical History   Oncology History   Cancer Staging   Carcinoma of right breast metastatic to axillary lymph node (HCC)  Staging form: Breast, AJCC 8th Edition  - Clinical stage from 9/12/2024: Stage IA (rcT0, cN1mi(f), cM0, G1, ER+, VT+,  HER2-) - Signed by Ashleigh Doran MD on 10/10/2024  Stage prefix: Recurrence  Method of lymph node assessment: Core biopsy  Histologic grading system: 3 grade system  - Pathologic stage from 11/19/2024: rpT0, pN1, cM0 - Signed by Ashleigh Doran MD on 12/5/2024  Stage prefix: Recurrence  Method of lymph node assessment: Axillary lymph node dissection    Malignant neoplasm of overlapping sites of right breast in female, estrogen receptor positive (HCC)  Staging form: Breast, AJCC 8th Edition  - Pathologic stage from 4/13/2021: Stage IA (pT1c, pN1mi(sn), cM0, G2, ER+, NH+, HER2-) - Signed by ELZA Santos on 11/7/2023  Stage prefix: Initial diagnosis  Method of lymph node assessment: [place] lymph node biopsy  Multigene prognostic tests performed: MammaPrint  Histologic grading system: 3 grade system  Oncology History   Malignant neoplasm of overlapping sites of right breast in female, estrogen receptor positive (HCC)   4/13/2021 -  Cancer Staged    Staging form: Breast, AJCC 8th Edition  - Pathologic stage from 4/13/2021: Stage IA (pT1c, pN1mi(sn), cM0, G2, ER+, NH+, HER2-) - Signed by ELZA Santos on 11/7/2023  Stage prefix: Initial diagnosis  Method of lymph node assessment: [place] lymph node biopsy  Multigene prognostic tests performed: MammaPrint  Histologic grading system: 3 grade system       4/16/2021 Biopsy    Right breast US guided biopsy:  A. 11 o'clock 7 cm from the nipple  Invasive lobular carcinoma  Grade 1  ER 90, NH 45, HER2 1+  Lymphovascular invasion: not identified    B. 10 o'clock 7 cm from the nipple  Invasive mammary carcinoma of no special type  Grade 2  , , HER2 1+  Lymphovascular invasion: not identified    Concordant. Malignancy appears multifocal. MRI recommended.     4/30/2021 Observation    Bilateral breast MRI  Multifocal right breast cancer; 10:00 mass measures up to 1.8 cm and abuts pectoral muscle, without evidence of invasion. Two areas of  carcinoma have a total extent of disease of 5 cm. Left breast clear. Axilla negative.     6/10/2021 Genetic Testing    The following genes were evaluated: BATSHEVA, BRCA1, BRCA2, CDH1, CHEK2, PALB2, PTEN, STK11, TP53  Additional genes analyzed for a total of 20  Negative result. No pathogenic sequence variants or deletions/dupllications identified  [employer]     6/25/2021 Surgery    Right breast ANJALI  directed mastectomy with sentinel lymph node biopsy  Invasive carcinoma of no special type (ductal)  Grade 2  2 cm (2 foci)  Margins negative  1/1 Lymph node with micrometastases (0.4 mm)  Anatomic Stage IB  Prognostic Stage IA     7/7/2021 Genomic Testing    MammaPrint for FLEX trial  Low risk (Luminal A)     7/28/2021 - 11/2024 Hormone Therapy    Anastrozole 1 mg daily       7/29/2021 - 7/29/2021 Radiation    Consult with Dr. Florence  Patient declined post-mastectomy RT     Carcinoma of right breast metastatic to axillary lymph node (HCC)   9/12/2024 Biopsy    Right axillary lymph node ultrasound-guided biopsy (open coil)  Invasive mammary carcinoma of no special type (ductal) involving fibroadipose tissue  Grade 1  ER ; PA 1; HER2 0  In situ follicular neoplasia (involvement by follicular lymphoma elsewhere in the lymph node or in other lymph nodes cannot be ruled out; correlation with systemic imaging to rule out lymphadenopathy elsewhere is recommended as clinically indicated)    Concordant. Minute focus of invasive carcinoma is present outside of the lymph node, within the fibroadipose tissue. This focus may represent metastasis vs primary carcinoma. Single morphologically abnormal lymph node seen on US. Left breast clear on 5/2024 imaging.     9/12/2024 -  Cancer Staged    Staging form: Breast, AJCC 8th Edition  - Clinical stage from 9/12/2024: Stage IA (rcT0, cN1mi(f), cM0, G1, ER+, PA+, HER2-) - Signed by Ashleigh Doran MD on 10/10/2024  Stage prefix: Recurrence  Method of lymph node assessment: Core  biopsy  Histologic grading system: 3 grade system       9/17/2024 Observation    CT chest, abdomen, pelvis IMPRESSION:     Bilateral pulmonary nodules measuring 3 to 5 mm as described.  Follow-up CT chest recommended in 3 months considering history of recurrent breast cancer.     Ill-defined hypodense right thyroid gland nodule measures up to 1.5 cm.  Thyroid ultrasound recommended for further characterization.     Lobulated low-density right axillary node measuring 2.2 x 1 cm, consistent with biopsy-proven malignancy.  1.5 cm rounded soft tissue nodule in the left breast with coarse calcification versus biopsy clip.  Correlation with mammographic history recommended.     Ill-defined heterogeneously hypodense lesion within the subcapsular inferior right liver margin.  1.2 cm indeterminate left adrenal gland nodule.  MRI with contrast recommended for further evaluation of the above two lesions.     Punctate sclerotic lesion in the left sternum.  3 mm sclerotic lesion in the posterior/medial right fifth rib, potentially representing a bone island.     Prominent uterus considering age with probable partially exophytic fibroid.     Bladder wall thickening considering degree of distention.  Correlate with urinalysis for possible cystitis.     Note of no priors for comparison     9/23/2024 Observation    MRI abdomen IMPRESSION:     No suspicious focal hepatic lesion. The 1.3 cm observation on prior CT may represent volume averaging of the hepatic border with adjacent mesenteric fat or small amount of focal fat infiltration. However given history of recently diagnosed metastatic   right breast neoplasm, follow-up CT abdomen with contrast in 3 months is recommended to assess for stability of findings     Irregular thickening of the left adrenal gland without discrete nodule findings can represent hyperplasia versus early small metastatic nodule. No remote prior cross-sectional studies of the abdomen are available for direct  comparison. Attention on   follow-up CT abdomen in 3 months is recommended to assess for stability.     A 2.5 cm incompletely characterized hypoenhancing uterine lesion, statistically likely representing fibroid. Left ovarian 1.2 cm cystic lesion, also incompletely characterized. Nonemergent pelvic ultrasound is recommended for complete characterization.     Small volume free pelvic fluid of uncertain etiology. Clinical correlation is recommended.     9/24/2024 Observation    NM Bone scan IMPRESSION:     1.  No scintigraphic evidence of osseous metastasis. Incidental findings as above.     11/19/2024 Surgery    RIGHT axillary dissection  ONE out of ELEVEN (1/11) lymph nodes positive for metastatic carcinoma  4 mm  < 2 mm extranodal extension  Persistent in situ follicular neoplasm  No evidence of overt follicular lymphoma     11/19/2024 -  Cancer Staged    Staging form: Breast, AJCC 8th Edition  - Pathologic stage from 11/19/2024: rpT0, pN1, cM0 - Signed by Ashleigh Doran MD on 12/5/2024  Stage prefix: Recurrence  Method of lymph node assessment: Axillary lymph node dissection       1/9/2025 - 1/30/2025 Radiation    The patient saw @[facility]@ for radiation treatment. This is the current list of radiation treatment:  Radiation Treatments       Active   No active radiation treatments to show.     Historical   Plans   VMAT Rcw/node   Most recent treatment: Dose planned: 266 cGy (fraction 16 on 1/30/2025)   Total: Dose planned: 4,256 cGy (16 fractions)   Elapsed Days: 21      Reference Points   R CW   Most recent treatment: Dose given: 266 cGy (on 1/30/2025)   Total: Dose given: 4,256 cGy   Elapsed Days: 21                   Plan ID Energy Fractions Dose per Fraction (cGy) Dose Correction (cGy) Total Dose Delivered (cGy) Elapsed Days   VMAT Rcw/node 6X 16 / 16 266 0 4,256 21           Past Medical History:   Diagnosis Date    Abnormal weight loss     Anorexia nervosa     Disease of thyroid gland     Elevated blood sugar      Occasional     Fibroadenoma of both breasts     GERD (gastroesophageal reflux disease)     Hypertension     Hyperthyroidism     Osteopenia     Schizophrenia (HCC)      Past Surgical History:   Procedure Laterality Date    BREAST BIOPSY Right 04/16/2021    BREAST CYST EXCISION Left 2011    CHOLECYSTECTOMY      LYMPH NODE DISSECTION Right 11/19/2024    Procedure: RIGHT AXILLARY DISSECTION;  Surgeon: Ashleigh Doran MD;  Location: [facility];  Service: Surgical Oncology    MASTECTOMY Right 2021    MASTECTOMY W/ SENTINEL NODE BIOPSY Right 06/25/2021    Procedure: BREAST MASTECTOMY WITH BIOPSY LYMPH NODE SENTINEL; ANJALI  GUIDED, LYMPHATIC MAPPING WITH BLUE DYE AND RADIOACTIVE DYE (INJECT AT 1130 BY DR LEMUS IN THE OR);  Surgeon: Jim Lemus MD;  Location: [facility];  Service: Surgical Oncology    US BREAST CLIP NEEDLE LOC RIGHT Right 06/14/2021    US BREAST NEEDLE LOC RIGHT EACH ADDITIONAL Right 06/14/2021    US GUIDANCE BREAST BIOPSY RIGHT EACH ADDITIONAL Right 04/16/2021    US GUIDED BREAST BIOPSY RIGHT COMPLETE Right 04/16/2021    US GUIDED BREAST LYMPH NODE BIOPSY RIGHT Right 9/12/2024    US GUIDED THYROID BIOPSY  10/24/2024     Family History   Adopted: Yes     Social History     Tobacco Use    Smoking status: Never    Smokeless tobacco: Never   Vaping Use    Vaping status: Never Used   Substance and Sexual Activity    Alcohol use: Yes     Alcohol/week: 2.0 standard drinks of alcohol     Types: 2 Cans of beer per week    Drug use: Never    Sexual activity: Not Currently     Comment: Sexually active:   No - As per [place]      Current Outpatient Medications on File Prior to Visit   Medication Sig Dispense Refill    atenolol (TENORMIN) 50 mg tablet Take 1 tablet (50 mg total) by mouth daily Take 100 mg by mouth every other morning. 90 tablet 1    calcium citrate-vitamin D 315 mg-5 mcg tablet Take 1 tablet by mouth 4 (four) times a day 100 tablet 6    Cholecalciferol (D3 2000 PO) Take 2,000 Units by mouth in the  morning      exemestane (AROMASIN) 25 MG tablet Take 1 tablet (25 mg total) by mouth daily 30 tablet 2    famotidine (PEPCID) 40 MG tablet TAKE ONE TABLET BY MOUTH EVERY MORNING 90 tablet 1    levothyroxine 50 mcg tablet TAKE ONE TABLET BY MOUTH EVERY DAY IN THE EARLY MORNING 1/2 HOUR BEFORE BREAKFAST 90 tablet 1    lisinopril (ZESTRIL) 10 mg tablet Take 1 tablet (10 mg total) by mouth daily 100 tablet 3    Multiple Vitamin (Daily-Alanis) TABS TAKE ONE TABLET BY MOUTH EVERY DAY 90 tablet 3    OLANZapine (ZyPREXA) 7.5 mg tablet Take 1 tablet (7.5 mg total) by mouth daily at bedtime 90 tablet 2    chlorhexidine (PERIDEX) 0.12 % solution Apply 15 mL to the mouth or throat 2 (two) times a day (Patient not taking: Reported on 10/10/2024) 120 mL 0     No current facility-administered medications on file prior to visit.   No Known Allergies      Objective   /80   Pulse 78   Temp 98.2 °F (36.8 °C)   Resp 12   Wt 47.6 kg (105 lb)   SpO2 98%   BMI 18.02 kg/m²     Pain Screening:  Pain Score: 0-No pain   ECOG ECOG Performance Status: 0 - Fully active, able to carry on all pre-disease performance without restriction  Physical Exam  Vitals and nursing note reviewed. Exam conducted with a chaperone present.   Constitutional:       General: She is not in acute distress.  HENT:      Head: Normocephalic and atraumatic.      Right Ear: External ear normal.      Left Ear: External ear normal.      Nose: Nose normal.   Eyes:      Extraocular Movements: Extraocular movements intact.   Pulmonary:      Effort: Pulmonary effort is normal. No respiratory distress.   Chest:      Comments: Right chest wall and axilla without residual dermatitis, no concerning lesions.  Neurological:      General: No focal deficit present.      Mental Status: She is alert.   Psychiatric:         Mood and Affect: Mood normal.         Behavior: Behavior normal.         Thought Content: Thought content normal.         Judgment: Judgment normal.           Radiology Review:   none        Pathology Review:  none        Administrative Statements   I have spent a total time of 15 minutes in caring for this patient on the day of the visit/encounter including Instructions for management, Impressions, Counseling / Coordination of care, and Documenting in the medical record.

## 2025-03-10 NOTE — ASSESSMENT & PLAN NOTE
Patient is a 60 y.o. female with locoregionally recurrent right breast cancer treated with surgery, AI, ALND, radiation. She is currently on active treatment with exemestane. Did well with radiation treatment, no concerns at this time. Radiation side effects have resolved. She continues following with medical and surgical oncology. Follow up with me in 6 months.         N/A

## 2025-03-10 NOTE — TELEPHONE ENCOUNTER
Pt called and stated that she would like the paper that dr davies gave her when she was here she states that she cannot read the one that she has ,, she stated that it is of the vaccines she needs and when   She stated she would like it mailed to her    Thank you

## 2025-03-10 NOTE — TELEPHONE ENCOUNTER
PCP last OV note lists all vaccines to be gotten 2 wks apart. This was written down clearly and mailed to pt address on file at this time. NFA needed.

## 2025-03-11 ENCOUNTER — OFFICE VISIT (OUTPATIENT)
Dept: RADIATION ONCOLOGY | Facility: HOSPITAL | Age: 60
End: 2025-03-11
Attending: STUDENT IN AN ORGANIZED HEALTH CARE EDUCATION/TRAINING PROGRAM
Payer: COMMERCIAL

## 2025-03-11 VITALS
HEART RATE: 78 BPM | RESPIRATION RATE: 12 BRPM | DIASTOLIC BLOOD PRESSURE: 80 MMHG | BODY MASS INDEX: 18.02 KG/M2 | OXYGEN SATURATION: 98 % | WEIGHT: 105 LBS | TEMPERATURE: 98.2 F | SYSTOLIC BLOOD PRESSURE: 105 MMHG

## 2025-03-11 DIAGNOSIS — C77.3 CARCINOMA OF RIGHT BREAST METASTATIC TO AXILLARY LYMPH NODE (HCC): Primary | ICD-10-CM

## 2025-03-11 DIAGNOSIS — C50.911 CARCINOMA OF RIGHT BREAST METASTATIC TO AXILLARY LYMPH NODE (HCC): Primary | ICD-10-CM

## 2025-03-11 PROCEDURE — 99211 OFF/OP EST MAY X REQ PHY/QHP: CPT | Performed by: STUDENT IN AN ORGANIZED HEALTH CARE EDUCATION/TRAINING PROGRAM

## 2025-03-11 PROCEDURE — 99024 POSTOP FOLLOW-UP VISIT: CPT | Performed by: STUDENT IN AN ORGANIZED HEALTH CARE EDUCATION/TRAINING PROGRAM

## 2025-03-11 NOTE — PROGRESS NOTES
Digna Juárez 1965 is a 60 y.o. female with history of right breast cancer, recurrent to right axilla s/p right axillary node dissection. She completed post-mastectomy radiation therapy to the right chest wall and axilla. She tolerated treatment well without significant toxicity. She completed treatment 25. She presents today for 1 month EOT follow-up.    Upcomin25 Hematology Oncology   25 Surgical Oncology  25 Mammo    Follow up visit     Oncology History Overview Note   with history of right breast cancer, recurrent to right axilla s/p right axillary node dissection. She completed post-mastectomy radiation therapy to the right chest wall and axilla. She tolerated treatment well without significant toxicity. She completed treatment 25. She presents today for 1 month EOT follow-up.    Upcomin25 Hematology Oncology   25 Surgical Oncology  25 Mammo     Malignant neoplasm of overlapping sites of right breast in female, estrogen receptor positive (HCC)   2021 -  Cancer Staged    Staging form: Breast, AJCC 8th Edition  - Pathologic stage from 2021: Stage IA (pT1c, pN1mi(sn), cM0, G2, ER+, ND+, HER2-) - Signed by ELZA Santos on 2023  Stage prefix: Initial diagnosis  Method of lymph node assessment: Comstock lymph node biopsy  Multigene prognostic tests performed: MammaPrint  Histologic grading system: 3 grade system       2021 Biopsy    Right breast US guided biopsy:  A. 11 o'clock 7 cm from the nipple  Invasive lobular carcinoma  Grade 1  ER 90, ND 45, HER2 1+  Lymphovascular invasion: not identified    B. 10 o'clock 7 cm from the nipple  Invasive mammary carcinoma of no special type  Grade 2  , , HER2 1+  Lymphovascular invasion: not identified    Concordant. Malignancy appears multifocal. MRI recommended.     2021 Observation    Bilateral breast MRI  Multifocal right breast cancer; 10:00 mass measures up to 1.8 cm  and abuts pectoral muscle, without evidence of invasion. Two areas of carcinoma have a total extent of disease of 5 cm. Left breast clear. Axilla negative.     6/10/2021 Genetic Testing    The following genes were evaluated: BATSHEVA, BRCA1, BRCA2, CDH1, CHEK2, PALB2, PTEN, STK11, TP53  Additional genes analyzed for a total of 20  Negative result. No pathogenic sequence variants or deletions/dupllications identified  [employer]     6/25/2021 Surgery    Right breast ANJALI  directed mastectomy with sentinel lymph node biopsy  Invasive carcinoma of no special type (ductal)  Grade 2  2 cm (2 foci)  Margins negative  1/1 Lymph node with micrometastases (0.4 mm)  Anatomic Stage IB  Prognostic Stage IA     7/7/2021 Genomic Testing    MammaPrint for FLEX trial  Low risk (Luminal A)     7/28/2021 - 11/2024 Hormone Therapy    Anastrozole 1 mg daily       7/29/2021 - 7/29/2021 Radiation    Consult with Dr. Florence  Patient declined post-mastectomy RT     Carcinoma of right breast metastatic to axillary lymph node (HCC)   9/12/2024 Biopsy    Right axillary lymph node ultrasound-guided biopsy (open coil)  Invasive mammary carcinoma of no special type (ductal) involving fibroadipose tissue  Grade 1  ER ; IN 1; HER2 0  In situ follicular neoplasia (involvement by follicular lymphoma elsewhere in the lymph node or in other lymph nodes cannot be ruled out; correlation with systemic imaging to rule out lymphadenopathy elsewhere is recommended as clinically indicated)    Concordant. Minute focus of invasive carcinoma is present outside of the lymph node, within the fibroadipose tissue. This focus may represent metastasis vs primary carcinoma. Single morphologically abnormal lymph node seen on US. Left breast clear on 5/2024 imaging.     9/12/2024 -  Cancer Staged    Staging form: Breast, AJCC 8th Edition  - Clinical stage from 9/12/2024: Stage IA (rcT0, cN1mi(f), cM0, G1, ER+, IN+, HER2-) - Signed by Ashleigh Doran MD on  10/10/2024  Stage prefix: Recurrence  Method of lymph node assessment: Core biopsy  Histologic grading system: 3 grade system       9/17/2024 Observation    CT chest, abdomen, pelvis IMPRESSION:     Bilateral pulmonary nodules measuring 3 to 5 mm as described.  Follow-up CT chest recommended in 3 months considering history of recurrent breast cancer.     Ill-defined hypodense right thyroid gland nodule measures up to 1.5 cm.  Thyroid ultrasound recommended for further characterization.     Lobulated low-density right axillary node measuring 2.2 x 1 cm, consistent with biopsy-proven malignancy.  1.5 cm rounded soft tissue nodule in the left breast with coarse calcification versus biopsy clip.  Correlation with mammographic history recommended.     Ill-defined heterogeneously hypodense lesion within the subcapsular inferior right liver margin.  1.2 cm indeterminate left adrenal gland nodule.  MRI with contrast recommended for further evaluation of the above two lesions.     Punctate sclerotic lesion in the left sternum.  3 mm sclerotic lesion in the posterior/medial right fifth rib, potentially representing a bone island.     Prominent uterus considering age with probable partially exophytic fibroid.     Bladder wall thickening considering degree of distention.  Correlate with urinalysis for possible cystitis.     Note of no priors for comparison     9/23/2024 Observation    MRI abdomen IMPRESSION:     No suspicious focal hepatic lesion. The 1.3 cm observation on prior CT may represent volume averaging of the hepatic border with adjacent mesenteric fat or small amount of focal fat infiltration. However given history of recently diagnosed metastatic   right breast neoplasm, follow-up CT abdomen with contrast in 3 months is recommended to assess for stability of findings     Irregular thickening of the left adrenal gland without discrete nodule findings can represent hyperplasia versus early small metastatic nodule. No  remote prior cross-sectional studies of the abdomen are available for direct comparison. Attention on   follow-up CT abdomen in 3 months is recommended to assess for stability.     A 2.5 cm incompletely characterized hypoenhancing uterine lesion, statistically likely representing fibroid. Left ovarian 1.2 cm cystic lesion, also incompletely characterized. Nonemergent pelvic ultrasound is recommended for complete characterization.     Small volume free pelvic fluid of uncertain etiology. Clinical correlation is recommended.     9/24/2024 Observation    NM Bone scan IMPRESSION:     1.  No scintigraphic evidence of osseous metastasis. Incidental findings as above.     11/19/2024 Surgery    RIGHT axillary dissection  ONE out of ELEVEN (1/11) lymph nodes positive for metastatic carcinoma  4 mm  < 2 mm extranodal extension  Persistent in situ follicular neoplasm  No evidence of overt follicular lymphoma     11/19/2024 -  Cancer Staged    Staging form: Breast, AJCC 8th Edition  - Pathologic stage from 11/19/2024: rpT0, pN1, cM0 - Signed by Ashleigh Doran MD on 12/5/2024  Stage prefix: Recurrence  Method of lymph node assessment: Axillary lymph node dissection       1/9/2025 - 1/30/2025 Radiation    [facility] treatment. This is the current list of radiation treatment:  Radiation Treatments       Active   No active radiation treatments to show.     Historical   Plans   VMAT Rcw/node   Most recent treatment: Dose planned: 266 cGy (fraction 16 on 1/30/2025)   Total: Dose planned: 4,256 cGy (16 fractions)   Elapsed Days: 21      Reference Points   R CW   Most recent treatment: Dose given: 266 cGy (on 1/30/2025)   Total: Dose given: 4,256 cGy   Elapsed Days: 21                   Plan ID Energy Fractions Dose per Fraction (cGy) Dose Correction (cGy) Total Dose Delivered (cGy) Elapsed Days   VMAT Rcw/node 6X 16 / 16 266 0 4,256 21            Review of Systems:  Review of Systems   Constitutional:  Negative.    HENT: Negative.     Eyes: Negative.    Respiratory: Negative.     Cardiovascular: Negative.    Gastrointestinal: Negative.    Endocrine: Negative.    Genitourinary: Negative.    Musculoskeletal: Negative.    Skin: Negative.    Allergic/Immunologic: Negative.    Neurological: Negative.    Hematological: Negative.    Psychiatric/Behavioral: Negative.         Clinical Trial: no    Pregnancy test needed:  no    Teaching    Health Maintenance   Topic Date Due    Zoster Vaccine (1 of 2) Never done    Pneumococcal Vaccine: Pediatrics (0 to 5 Years) and At-Risk Patients (6 to 64 Years) (1 of 2 - PCV) Never done    DTaP,Tdap,and Td Vaccines (1 - Tdap) Never done    Colorectal Cancer Screening  Never done    COVID-19 Vaccine (3 - Pfizer risk series) 07/05/2021    ONC Colorectal Surgery Screening  Never done    Breast Cancer Survivorship Visit  Never done    ONC Cervical Cancer Screening  10/04/2023    ONC Physical Therapy Referral  Never done    RSV Vaccine for Pregnant Patients and Patients Age 60+ Years (1 - Risk 60-74 years 1-dose series) Never done    Influenza Vaccine (1) Never done    BMI: Followup Plan  09/25/2024    Annual Physical  03/04/2025    Breast Cancer Screening: Mammogram  05/20/2025    Depression Screening  12/09/2025    BMI: Adult  03/03/2026    Cervical Cancer Screening  03/14/2027    HIV Screening  Completed    Hepatitis C Screening  Completed    Osteoporosis Screening  Completed    ONC DXA Scan  Completed    Meningococcal B Vaccine  Aged Out    RSV Vaccine age 0-20 Months  Aged Out    HIB Vaccine  Aged Out    IPV Vaccine  Aged Out    Hepatitis A Vaccine  Aged Out    Meningococcal ACWY Vaccine  Aged Out    HPV Vaccine  Aged Out     Patient Active Problem List   Diagnosis    GERD without esophagitis    Hypothyroidism    Vitamin D deficiency    Hypertension    Deficiency of multiple vitamins    Malignant neoplasm of overlapping sites of right breast in female, estrogen receptor positive (HCC)     Anorexia nervosa    Use of anastrozole (Arimidex)    Seasonal allergies    Body mass index (BMI) of 19.9 or less in adult    Medication management    Encounter for long-term (current) use of medications    Dental abscess    Dentalgia    Encounter to discuss test results    Carcinoma of right breast metastatic to axillary lymph node (HCC)    Need for vaccination    Thyroid nodule greater than or equal to 1 cm in diameter incidentally noted on imaging study    Left ovarian cyst    Uterine fibroid     Past Medical History:   Diagnosis Date    Abnormal weight loss     Anorexia nervosa     Disease of thyroid gland     Elevated blood sugar     Occasional     Fibroadenoma of both breasts     GERD (gastroesophageal reflux disease)     Hypertension     Hyperthyroidism     Osteopenia     Schizophrenia (HCC)      Past Surgical History:   Procedure Laterality Date    BREAST BIOPSY Right 04/16/2021    BREAST CYST EXCISION Left 2011    CHOLECYSTECTOMY      LYMPH NODE DISSECTION Right 11/19/2024    Procedure: RIGHT AXILLARY DISSECTION;  Surgeon: Ashleigh Doran MD;  Location: [facility];  Service: Surgical Oncology    MASTECTOMY Right 2021    MASTECTOMY W/ SENTINEL NODE BIOPSY Right 06/25/2021    Procedure: BREAST MASTECTOMY WITH BIOPSY LYMPH NODE SENTINEL; ANJALI  GUIDED, LYMPHATIC MAPPING WITH BLUE DYE AND RADIOACTIVE DYE (INJECT AT 1130 BY DR LEMUS IN THE OR);  Surgeon: Jim Lemus MD;  Location: [facility];  Service: Surgical Oncology    US BREAST CLIP NEEDLE LOC RIGHT Right 06/14/2021    US BREAST NEEDLE LOC RIGHT EACH ADDITIONAL Right 06/14/2021    US GUIDANCE BREAST BIOPSY RIGHT EACH ADDITIONAL Right 04/16/2021    US GUIDED BREAST BIOPSY RIGHT COMPLETE Right 04/16/2021    US GUIDED BREAST LYMPH NODE BIOPSY RIGHT Right 9/12/2024    US GUIDED THYROID BIOPSY  10/24/2024     Family History   Adopted: Yes     Social History     Socioeconomic History    Marital status: Single     Spouse name: Not on file    Number of children:  0    Years of education: 12    Highest education level: Not on file   Occupational History    Occupation: never worked    Tobacco Use    Smoking status: Never    Smokeless tobacco: Never   Vaping Use    Vaping status: Never Used   Substance and Sexual Activity    Alcohol use: Yes     Alcohol/week: 2.0 standard drinks of alcohol     Types: 2 Cans of beer per week    Drug use: Never    Sexual activity: Not Currently     Comment: Sexually active:   No - As per Josette    Other Topics Concern    Not on file   Social History Narrative    · Most recent tobacco use screenin2019      · Do you currently or have you served in the [employer]:   No      · Were you activated, into active duty, as a member of the [employer] or as a Reservist:   No      · Caffeine intake:   None decaf coffee only     · Sexual orientation:   Heterosexual      · General stress level:   Low      · Diet:   Regular      · Single or multi-level home/work:   single level home      · Live alone or with others:   alone      · Exercise level:   Moderate outside walk only     · Overweight:   No      · Obese:   No      · Guns present in home:   No      · Seat belts used routinely:   Yes      · Advance directive:   Yes      · Sunscreen used routinely:   Yes      · Smoke alarm in home:   Yes      · Performs monthly self-breast exam:   Yes      · Legally blind in one or both eyes:   No      · Hard of hearing or deaf in one or both ears:   No      · Presence of domestic violence:   No      · Are there stairs in your home:   No      · Pets:   No      Social Drivers of Health     Financial Resource Strain: Low Risk  (2020)    Overall Financial Resource Strain (CARDIA)     Difficulty of Paying Living Expenses: Not hard at all   Food Insecurity: No Food Insecurity (2020)    Hunger Vital Sign     Worried About Running Out of Food in the Last Year: Never true     Ran Out of Food in the Last Year: Never true   Transportation Needs: No  Transportation Needs (5/5/2020)    PRAPARE - Transportation     Lack of Transportation (Medical): No     Lack of Transportation (Non-Medical): No   Physical Activity: Sufficiently Active (5/5/2020)    Exercise Vital Sign     Days of Exercise per Week: 7 days     Minutes of Exercise per Session: 60 min   Stress: No Stress Concern Present (5/5/2020)    [employer] [place] [employer] - Occupational Stress Questionnaire     Feeling of Stress : Not at all   Social Connections: Socially Isolated (5/5/2020)    Social Connection and Isolation Panel [NHANES]     Frequency of Communication with Friends and Family: More than three times a week     Frequency of Social Gatherings with Friends and Family: More than three times a week     Attends Muslim Services: Never     Active Member of Clubs or Organizations: No     Attends Club or Organization Meetings: Never     Marital Status: Never    Intimate Partner Violence: Not At Risk (5/5/2020)    Humiliation, Afraid, Rape, and Kick questionnaire     Fear of Current or Ex-Partner: No     Emotionally Abused: No     Physically Abused: No     Sexually Abused: No   Housing Stability: Not on file       Current Outpatient Medications:     atenolol (TENORMIN) 50 mg tablet, Take 1 tablet (50 mg total) by mouth daily Take 100 mg by mouth every other morning., Disp: 90 tablet, Rfl: 1    calcium citrate-vitamin D 315 mg-5 mcg tablet, Take 1 tablet by mouth 4 (four) times a day, Disp: 100 tablet, Rfl: 6    Cholecalciferol (D3 2000 PO), Take 2,000 Units by mouth in the morning, Disp: , Rfl:     exemestane (AROMASIN) 25 MG tablet, Take 1 tablet (25 mg total) by mouth daily, Disp: 30 tablet, Rfl: 2    famotidine (PEPCID) 40 MG tablet, TAKE ONE TABLET BY MOUTH EVERY MORNING, Disp: 90 tablet, Rfl: 1    levothyroxine 50 mcg tablet, TAKE ONE TABLET BY MOUTH EVERY DAY IN THE EARLY MORNING 1/2 HOUR BEFORE BREAKFAST, Disp: 90 tablet, Rfl: 1    lisinopril (ZESTRIL) 10 mg tablet, Take 1  tablet (10 mg total) by mouth daily, Disp: 100 tablet, Rfl: 3    Multiple Vitamin (Daily-Alanis) TABS, TAKE ONE TABLET BY MOUTH EVERY DAY, Disp: 90 tablet, Rfl: 3    OLANZapine (ZyPREXA) 7.5 mg tablet, Take 1 tablet (7.5 mg total) by mouth daily at bedtime, Disp: 90 tablet, Rfl: 2    chlorhexidine (PERIDEX) 0.12 % solution, Apply 15 mL to the mouth or throat 2 (two) times a day (Patient not taking: Reported on 10/10/2024), Disp: 120 mL, Rfl: 0  No Known Allergies  Vitals:    03/11/25 1259   BP: 105/80   Pulse: 78   Resp: 12   Temp: 98.2 °F (36.8 °C)   SpO2: 98%   Weight: 47.6 kg (105 lb)      Pain Score: 0-No pain

## 2025-03-20 DIAGNOSIS — E03.9 HYPOTHYROIDISM, UNSPECIFIED TYPE: ICD-10-CM

## 2025-03-20 RX ORDER — LEVOTHYROXINE SODIUM 50 UG/1
TABLET ORAL
Qty: 30 TABLET | Refills: 0 | Status: SHIPPED | OUTPATIENT
Start: 2025-03-20

## 2025-04-08 ENCOUNTER — TELEPHONE (OUTPATIENT)
Age: 60
End: 2025-04-08

## 2025-04-08 NOTE — TELEPHONE ENCOUNTER
Spoke with pt--she needs to call central scheduling to see if they can set up ride share for her, as PCP office does not set up for any other office than PCP.

## 2025-04-08 NOTE — TELEPHONE ENCOUNTER
Pt called to request to have transportation arranged to her ultrasound appt on 4/14/25.  Please call her to let her know if it has been approved and what her pickup time will be.  Thank you!

## 2025-04-14 ENCOUNTER — HOSPITAL ENCOUNTER (OUTPATIENT)
Dept: ULTRASOUND IMAGING | Facility: HOSPITAL | Age: 60
Discharge: HOME | End: 2025-04-14
Attending: SURGERY
Payer: COMMERCIAL

## 2025-04-14 DIAGNOSIS — D25.9 UTERINE LEIOMYOMA, UNSPECIFIED LOCATION: ICD-10-CM

## 2025-04-14 PROCEDURE — 76856 US EXAM PELVIC COMPLETE: CPT

## 2025-04-14 PROCEDURE — 76830 TRANSVAGINAL US NON-OB: CPT

## 2025-04-15 DIAGNOSIS — C77.3 CARCINOMA OF RIGHT BREAST METASTATIC TO AXILLARY LYMPH NODE (HCC): ICD-10-CM

## 2025-04-15 DIAGNOSIS — E03.9 HYPOTHYROIDISM, UNSPECIFIED TYPE: ICD-10-CM

## 2025-04-15 DIAGNOSIS — C50.911 CARCINOMA OF RIGHT BREAST METASTATIC TO AXILLARY LYMPH NODE (HCC): ICD-10-CM

## 2025-04-15 DIAGNOSIS — K21.9 GASTROESOPHAGEAL REFLUX DISEASE, UNSPECIFIED WHETHER ESOPHAGITIS PRESENT: ICD-10-CM

## 2025-04-15 DIAGNOSIS — C50.811 MALIGNANT NEOPLASM OF OVERLAPPING SITES OF RIGHT BREAST IN FEMALE, ESTROGEN RECEPTOR POSITIVE (HCC): ICD-10-CM

## 2025-04-15 DIAGNOSIS — Z17.0 MALIGNANT NEOPLASM OF OVERLAPPING SITES OF RIGHT BREAST IN FEMALE, ESTROGEN RECEPTOR POSITIVE (HCC): ICD-10-CM

## 2025-04-15 RX ORDER — FAMOTIDINE 40 MG/1
40 TABLET, FILM COATED ORAL DAILY
Qty: 30 TABLET | Refills: 5 | Status: SHIPPED | OUTPATIENT
Start: 2025-04-15

## 2025-04-15 RX ORDER — LEVOTHYROXINE SODIUM 50 UG/1
TABLET ORAL
Qty: 100 TABLET | Refills: 3 | Status: SHIPPED | OUTPATIENT
Start: 2025-04-15

## 2025-04-17 ENCOUNTER — TELEPHONE (OUTPATIENT)
Age: 60
End: 2025-04-17

## 2025-04-17 RX ORDER — EXEMESTANE 25 MG/1
25 TABLET ORAL DAILY
Qty: 30 TABLET | Refills: 5 | Status: SHIPPED | OUTPATIENT
Start: 2025-04-17

## 2025-04-17 NOTE — TELEPHONE ENCOUNTER
Pt would like to know if her appt on 5/8/25 can be moved to a different time. She would like an appt between 1pm-2pm. If it can't be approved for that day then she would like another day (there was not slots for me to book). Pt would like a call back at [phone].

## 2025-04-18 ENCOUNTER — TELEPHONE (OUTPATIENT)
Dept: SURGICAL ONCOLOGY | Facility: CLINIC | Age: 60
End: 2025-04-18

## 2025-04-18 NOTE — TELEPHONE ENCOUNTER
Spoke with patient in regards to rescheduling her time on 5/8 with Dr. Doran per patient request. Patient now will be seen 5/8 at 2pm with Brenton.

## 2025-04-28 ENCOUNTER — TELEPHONE (OUTPATIENT)
Dept: HEMATOLOGY ONCOLOGY | Facility: CLINIC | Age: 60
End: 2025-04-28

## 2025-04-28 ENCOUNTER — OFFICE VISIT (OUTPATIENT)
Dept: HEMATOLOGY ONCOLOGY | Facility: MEDICAL CENTER | Age: 60
End: 2025-04-28
Payer: COMMERCIAL

## 2025-04-28 VITALS
HEART RATE: 81 BPM | TEMPERATURE: 98 F | WEIGHT: 105 LBS | HEIGHT: 64 IN | BODY MASS INDEX: 17.93 KG/M2 | RESPIRATION RATE: 16 BRPM | DIASTOLIC BLOOD PRESSURE: 84 MMHG | OXYGEN SATURATION: 100 % | SYSTOLIC BLOOD PRESSURE: 144 MMHG

## 2025-04-28 DIAGNOSIS — C50.911 CARCINOMA OF RIGHT BREAST METASTATIC TO AXILLARY LYMPH NODE (HCC): Primary | ICD-10-CM

## 2025-04-28 DIAGNOSIS — C77.3 CARCINOMA OF RIGHT BREAST METASTATIC TO AXILLARY LYMPH NODE (HCC): Primary | ICD-10-CM

## 2025-04-28 PROCEDURE — 99213 OFFICE O/P EST LOW 20 MIN: CPT | Performed by: INTERNAL MEDICINE

## 2025-04-28 NOTE — TELEPHONE ENCOUNTER
I spoke with Digna and she will be calling back to schedule a 6 month follow up with Dr. Florence and will also need [employer].

## 2025-04-28 NOTE — PROGRESS NOTES
Name: Digna Juárez      : 1965      MRN: [id number]  Encounter Provider: Asif Florence MD  Encounter Date: 2025   Encounter department: [facility]AT[facility] MAIN  :    15 minutes was spent with patient in direct consultation, examination and/or reviewing the patient's chart.  Assessment & Plan  Carcinoma of right breast metastatic to axillary lymph node (HCC)         60-year-old female with breast cancer.  Issues:    Right sided breast cancer.  Patient was initially diagnosed in 2021 with pT1c pN1mi G0 R0 ER+ MA+ HER2/andreia = 1+ + DCIS, G2 disease.  Patient underwent mastectomy and sentinel lymph node sampling (which was positive).  Mrs. Juárez deferred RT; patient was started on anastrozole.  MammaPrint came back as low risk, luminal A.    More recently patient developed right axillary swelling at the end of 2024.  Patient underwent workup and eventual right axillary dissection (2024) demonstrated recurrent carcinoma in the right axilla.  Pathology results are listed below, / lymph nodes was positive.  Patient has healed quite well from the surgery.  Patient will follow-up with surgical oncology as directed.    Patient was presented at the breast tumor board on 2024.  The physician recommended plan was to switch the AI from Arimidex to Aromasin.  There was no evidence of ESR mutation.    Patient completed radiation previously, will follow-up with radiation oncology as directed.    The below information comes from GeneCREAT[employer], the human gene database  https://www.genecards.org/cgi-bin/carddisp.pl?gene=ESR1    ESR1 has been a focus in breast cancer for quite some time, but is also clinically relevant in endometrial, ovarian and other cancer types. The identification of ER-positive breast cancers that are resistant to hormone therapy have inspired clinical sequencing efforts to shed light on the mechanisms of this resistance. A number of mutations  in the ligand binding domain of ESR1 have been implicated in hormone resistance and anti-estrogen therapies. These observations have spurred efforts to develop therapeutics that stimulate ESR1 protein degradation (e.g. Fulvestrant), rather than acting as a small molecule antagonist. These agents are currently in clinical trials and have seen some success.    Nursing staff previously spent time with patient today going over the potential benefits, risks/toxicities of exemestane.  Literature was previously provided.  Patient continues to tolerate the exemestane quite well.  The plan is to continue.    Patient is to return in 6 months.  Mrs. Rivas knows to call if she has any other oncology questions or concerns.    Carefully review your medication list and verify that the list is accurate and up-to-date. Please call the hematology/oncology office if there are medications missing from the list, medications on the list that you are not currently taking or if there is a dosage or instruction that is different from how you're taking that medication.     Patient goals and areas of care: Continue exemestane  Barriers to care: None  Patient is able to self-care  ___________________________________________________________________________________________________________    History of Present Illness   Chief Complaint   Patient presents with    Follow-up    Breast Cancer   Digna Juárez is a 60 y.o. female with breast cancer.    60-year-old female with somewhat complicated breast cancer history.  Mrs. Rivas was diagnosed with right-sided breast cancer approximately 3+ years ago (April 13, 2021: Stage IA) status post mastectomy.  Specifics not presently available but patient was offered postoperative RT to the right axilla but patient deferred.    More recently patient was found to have recurrence and a right axillary lymph node.  Patient was seen/evaluated by Dr. Doran and underwent lymph node dissection.  Pathology  results are listed below.    Patient has been seen/evaluated by radiation and completed RT.  Patient is on the Aromasin, tolerating it well.  Appetite is okay, weight is stable.  No problems with body aches, shortness of breath, headaches or dizziness.  Patient was able to get through the radiation quite well.  No other GI,  or GYN issues recently.    Oncology History   Cancer Staging   Carcinoma of right breast metastatic to axillary lymph node (HCC)  Staging form: Breast, AJCC 8th Edition  - Clinical stage from 9/12/2024: Stage IA (rcT0, cN1mi(f), cM0, G1, ER+, MS+, HER2-) - Signed by Ashleigh Doran MD on 10/10/2024  Stage prefix: Recurrence  Method of lymph node assessment: Core biopsy  Histologic grading system: 3 grade system  - Pathologic stage from 11/19/2024: rpT0, pN1, cM0 - Signed by Ashleigh Doran MD on 12/5/2024  Stage prefix: Recurrence  Method of lymph node assessment: Axillary lymph node dissection    Malignant neoplasm of overlapping sites of right breast in female, estrogen receptor positive (HCC)  Staging form: Breast, AJCC 8th Edition  - Pathologic stage from 4/13/2021: Stage IA (pT1c, pN1mi(sn), cM0, G2, ER+, MS+, HER2-) - Signed by ELZA Santos on 11/7/2023  Stage prefix: Initial diagnosis  Method of lymph node assessment: [place] lymph node biopsy  Multigene prognostic tests performed: MammaPrint  Histologic grading system: 3 grade system  Oncology History   Malignant neoplasm of overlapping sites of right breast in female, estrogen receptor positive (HCC)   4/13/2021 -  Cancer Staged    Staging form: Breast, AJCC 8th Edition  - Pathologic stage from 4/13/2021: Stage IA (pT1c, pN1mi(sn), cM0, G2, ER+, MS+, HER2-) - Signed by ELZA Santos on 11/7/2023  Stage prefix: Initial diagnosis  Method of lymph node assessment: [place] lymph node biopsy  Multigene prognostic tests performed: MammaPrint  Histologic grading system: 3 grade system       4/16/2021 Biopsy     Right breast US guided biopsy:  A. 11 o'clock 7 cm from the nipple  Invasive lobular carcinoma  Grade 1  ER 90, AZ 45, HER2 1+  Lymphovascular invasion: not identified    B. 10 o'clock 7 cm from the nipple  Invasive mammary carcinoma of no special type  Grade 2  , , HER2 1+  Lymphovascular invasion: not identified    Concordant. Malignancy appears multifocal. MRI recommended.     4/30/2021 Observation    Bilateral breast MRI  Multifocal right breast cancer; 10:00 mass measures up to 1.8 cm and abuts pectoral muscle, without evidence of invasion. Two areas of carcinoma have a total extent of disease of 5 cm. Left breast clear. Axilla negative.     6/10/2021 Genetic Testing    The following genes were evaluated: BATSHEVA, BRCA1, BRCA2, CDH1, CHEK2, PALB2, PTEN, STK11, TP53  Additional genes analyzed for a total of 20  Negative result. No pathogenic sequence variants or deletions/dupllications identified  [employer]     6/25/2021 Surgery    Right breast ANJALI  directed mastectomy with sentinel lymph node biopsy  Invasive carcinoma of no special type (ductal)  Grade 2  2 cm (2 foci)  Margins negative  1/1 Lymph node with micrometastases (0.4 mm)  Anatomic Stage IB  Prognostic Stage IA     7/7/2021 Genomic Testing    MammaPrint for FLEX trial  Low risk (Luminal A)     7/28/2021 - 11/2024 Hormone Therapy    Anastrozole 1 mg daily       7/29/2021 - 7/29/2021 Radiation    Consult with Dr. Florence  Patient declined post-mastectomy RT     Carcinoma of right breast metastatic to axillary lymph node (HCC)   9/12/2024 Biopsy    Right axillary lymph node ultrasound-guided biopsy (open coil)  Invasive mammary carcinoma of no special type (ductal) involving fibroadipose tissue  Grade 1  ER ; AZ 1; HER2 0  In situ follicular neoplasia (involvement by follicular lymphoma elsewhere in the lymph node or in other lymph nodes cannot be ruled out; correlation with systemic imaging to rule out lymphadenopathy elsewhere is  recommended as clinically indicated)    Concordant. Minute focus of invasive carcinoma is present outside of the lymph node, within the fibroadipose tissue. This focus may represent metastasis vs primary carcinoma. Single morphologically abnormal lymph node seen on US. Left breast clear on 5/2024 imaging.     9/12/2024 -  Cancer Staged    Staging form: Breast, AJCC 8th Edition  - Clinical stage from 9/12/2024: Stage IA (rcT0, cN1mi(f), cM0, G1, ER+, [place]+, HER2-) - Signed by Ashleigh Doran MD on 10/10/2024  Stage prefix: Recurrence  Method of lymph node assessment: Core biopsy  Histologic grading system: 3 grade system       9/17/2024 Observation    CT chest, abdomen, pelvis IMPRESSION:     Bilateral pulmonary nodules measuring 3 to 5 mm as described.  Follow-up CT chest recommended in 3 months considering history of recurrent breast cancer.     Ill-defined hypodense right thyroid gland nodule measures up to 1.5 cm.  Thyroid ultrasound recommended for further characterization.     Lobulated low-density right axillary node measuring 2.2 x 1 cm, consistent with biopsy-proven malignancy.  1.5 cm rounded soft tissue nodule in the left breast with coarse calcification versus biopsy clip.  Correlation with mammographic history recommended.     Ill-defined heterogeneously hypodense lesion within the subcapsular inferior right liver margin.  1.2 cm indeterminate left adrenal gland nodule.  MRI with contrast recommended for further evaluation of the above two lesions.     Punctate sclerotic lesion in the left sternum.  3 mm sclerotic lesion in the posterior/medial right fifth rib, potentially representing a bone island.     Prominent uterus considering age with probable partially exophytic fibroid.     Bladder wall thickening considering degree of distention.  Correlate with urinalysis for possible cystitis.     Note of no priors for comparison     9/23/2024 Observation    MRI abdomen IMPRESSION:     No suspicious focal hepatic  lesion. The 1.3 cm observation on prior CT may represent volume averaging of the hepatic border with adjacent mesenteric fat or small amount of focal fat infiltration. However given history of recently diagnosed metastatic   right breast neoplasm, follow-up CT abdomen with contrast in 3 months is recommended to assess for stability of findings     Irregular thickening of the left adrenal gland without discrete nodule findings can represent hyperplasia versus early small metastatic nodule. No remote prior cross-sectional studies of the abdomen are available for direct comparison. Attention on   follow-up CT abdomen in 3 months is recommended to assess for stability.     A 2.5 cm incompletely characterized hypoenhancing uterine lesion, statistically likely representing fibroid. Left ovarian 1.2 cm cystic lesion, also incompletely characterized. Nonemergent pelvic ultrasound is recommended for complete characterization.     Small volume free pelvic fluid of uncertain etiology. Clinical correlation is recommended.     9/24/2024 Observation    NM Bone scan IMPRESSION:     1.  No scintigraphic evidence of osseous metastasis. Incidental findings as above.     11/19/2024 Surgery    RIGHT axillary dissection  ONE out of ELEVEN (1/11) lymph nodes positive for metastatic carcinoma  4 mm  < 2 mm extranodal extension  Persistent in situ follicular neoplasm  No evidence of overt follicular lymphoma     11/19/2024 -  Cancer Staged    Staging form: Breast, AJCC 8th Edition  - Pathologic stage from 11/19/2024: rpT0, pN1, cM0 - Signed by Ashleigh Doran MD on 12/5/2024  Stage prefix: Recurrence  Method of lymph node assessment: Axillary lymph node dissection       1/9/2025 - 1/30/2025 Radiation    [email or web address]. This is the current list of radiation treatment:  Radiation Treatments       Active   No active radiation treatments to show.     Historical   Plans   VMAT Rcw/node   Most recent treatment:  "Dose planned: 266 cGy (fraction 16 on 1/30/2025)   Total: Dose planned: 4,256 cGy (16 fractions)   Elapsed Days: 21      Reference Points   R CW   Most recent treatment: Dose given: 266 cGy (on 1/30/2025)   Total: Dose given: 4,256 cGy   Elapsed Days: 21                   Plan ID Energy Fractions Dose per Fraction (cGy) Dose Correction (cGy) Total Dose Delivered (cGy) Elapsed Days   VMAT Rcw/node 6X 16 / 16 266 0 4,256 21           Review of Systems   Constitutional: Negative.    HENT: Negative.     Eyes: Negative.    Respiratory: Negative.     Cardiovascular: Negative.    Gastrointestinal: Negative.    Endocrine: Negative.    Genitourinary: Negative.    Musculoskeletal: Negative.    Skin: Negative.    Allergic/Immunologic: Negative.    Neurological: Negative.    Hematological: Negative.    Psychiatric/Behavioral: Negative.     All other systems reviewed and are negative.    Objective   /84 (BP Location: Left arm, Patient Position: Sitting, Cuff Size: Adult)   Pulse 81   Temp 98 °F (36.7 °C) (Temporal)   Resp 16   Ht 5' 4\" (1.626 m)   Wt 47.6 kg (105 lb)   SpO2 100%   BMI 18.02 kg/m²     ECOG   ECOG = 1  Physical Exam  Constitutional:       Appearance: She is well-developed.      Comments: Well-nourished female, no respiratory distress, no signs of pain   HENT:      Head: Normocephalic and atraumatic.      Right Ear: External ear normal.      Left Ear: External ear normal.   Eyes:      Conjunctiva/sclera: Conjunctivae normal.      Pupils: Pupils are equal, round, and reactive to light.   Cardiovascular:      Rate and Rhythm: Normal rate and regular rhythm.      Heart sounds: Normal heart sounds.   Pulmonary:      Effort: Pulmonary effort is normal.      Breath sounds: Normal breath sounds.      Comments: Good air entry bilaterally, clear  Abdominal:      General: Bowel sounds are normal.      Palpations: Abdomen is soft.   Musculoskeletal:         General: Normal range of motion.      Cervical back: " Normal range of motion and neck supple.   Skin:     General: Skin is warm.      Comments: Warm, moist, good color, no petechiae or ecchymoses   Neurological:      Mental Status: She is alert and oriented to person, place, and time.      Deep Tendon Reflexes: Reflexes are normal and symmetric.   Psychiatric:         Behavior: Behavior normal.         Thought Content: Thought content normal.         Judgment: Judgment normal.     Lymphatics: No adenopathy in the neck, supraclavicular region, axilla bilaterally  Extremities: No lower extremity edema bilaterally, no cords, pulses are 1+    Labs: I have reviewed the following labs:  Lab Results   Component Value Date/Time    WBC 5.91 11/06/2024 06:49 PM    RBC 3.96 11/06/2024 06:49 PM    Hemoglobin 12.1 11/06/2024 06:49 PM    Hematocrit 38.2 11/06/2024 06:49 PM    MCV 97 11/06/2024 06:49 PM    MCH 30.6 11/06/2024 06:49 PM    RDW 12.8 11/06/2024 06:49 PM    Platelets 232 11/06/2024 06:49 PM    Segmented % 53 11/06/2024 06:49 PM    Lymphocytes % 35 11/06/2024 06:49 PM    Monocytes % 9 11/06/2024 06:49 PM    Eosinophils Relative 2 11/06/2024 06:49 PM    Basophils Relative 1 11/06/2024 06:49 PM    Immature Grans % 0 11/06/2024 06:49 PM    Absolute Neutrophils 3.19 11/06/2024 06:49 PM     Lab Results   Component Value Date/Time    Potassium 4.5 11/06/2024 06:49 PM    Chloride 103 11/06/2024 06:49 PM    CO2 28 11/06/2024 06:49 PM    BUN 16 11/06/2024 06:49 PM    Creatinine 0.90 11/06/2024 06:49 PM    Calcium 9.1 11/06/2024 06:49 PM    AST 26 11/06/2024 06:49 PM    ALT 30 11/06/2024 06:49 PM    Alkaline Phosphatase 65 11/06/2024 06:49 PM    Total Protein 6.7 11/06/2024 06:49 PM    Albumin 4.4 11/06/2024 06:49 PM    Total Bilirubin 0.39 11/06/2024 06:49 PM    eGFR 69 11/06/2024 06:49 PM     Imaging    12/11/2024 CAT scan chest abdomen with contrast    IMPRESSION:     New postoperative changes in the right axilla as detailed above. No residual adenopathy identified.     Stable 4  mm nodule left upper lobe. No new nodules.     Stable tiny sclerotic foci in the sternum and right fifth rib. No new osseous lesions.     Stable subtle subcapsular hypoattenuating focus in the right liver. No new lesions.    11/6/2024 DEXA bone density spine hip and pelvis.  Impression stated osteoporosis.   Since a DXA study from 6/27/2022, there has been:  A  STATISTICALLY SIGNIFICANT INCREASE in bone mineral density of 0.035 g/cm2 (6.4%) in the left total hip    9/24/2024 bone scan whole body.  Impression stated no scintigraphic evidence of osseous metastases.    9/23/2024 MRI abdomen with and without contrast    IMPRESSION:     No suspicious focal hepatic lesion. The 1.3 cm observation on prior CT may represent volume averaging of the hepatic border with adjacent mesenteric fat or small amount of focal fat infiltration. However given history of recently diagnosed metastatic right breast neoplasm, follow-up CT abdomen with contrast in 3 months is recommended to assess for stability of findings     Irregular thickening of the left adrenal gland without discrete nodule findings can represent hyperplasia versus early small metastatic nodule. No remote prior cross-sectional studies of the abdomen are available for direct comparison. Attention on follow-up CT abdomen in 3 months is recommended to assess for stability.     A 2.5 cm incompletely characterized hypoenhancing uterine lesion, statistically likely representing fibroid. Left ovarian 1.2 cm cystic lesion, also incompletely characterized. Nonemergent pelvic ultrasound is recommended for complete characterization.     Small volume free pelvic fluid of uncertain etiology. Clinical correlation is recommended.      Pathology    1/10/2025 [id number] CDX:  diagnostic-no reportable alterations within the  diagnostic claims    Case Report   Surgical Pathology Report                         Case: [id number]                                    Authorizing Provider:  Ashleigh Doran MD           Collected:           11/19/2024 0832               Ordering Location:     [facility]        Received:            11/19/2024 0857                                      [facility]                                                     Pathologist:           Jovany Garcia MD                                                           Specimen:    Axillary, Right Axillary Contents                                                          Final Diagnosis   A. Axillar, Contents, Right, Dissection:  -   One out of eleven (1/11) lymph nodes positive for metastatic carcinoma consistent with mammary origin.               ~4 mm in greatest dimension.                 < 2 mm of extranodal extension.  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               No evidence of a overt follicular lymphoma.  -   Prior procedural site changes identified.     Comment: The patient's history of breast cancer with recently identified metastasis and in-situ follicular neoplasm is noted. The current sample shows one lymph node positive for metastatic carcinoma with extranodal extension measuring < 2 mm in greatest dimension.               In regards to the in-situ follicular neoplasm; clinical correlation is advised to rule out the possibility of a concurrent follicular lymphoma. In the submitted tissue samples there is no evidence of overt follicular lymphoma. Clinical correlation is advised.      Intradepartmental consultation is in agreement (EMM).  Best block for ancillary studies is A5.      Electronically signed by Jovany Garcia MD on 11/26/2024 at 1305 EST   Note    Immunohistochemical stains show:     Block A1: AE1/3 is negative for metastatic carcinoma.   BCL-2 is focally bright within follicles; confirmed with expression of BCL-6 and CD10 consistent with ISFN.     Block A2: AE1/3 is negative for metastatic carcinoma.   BCL-6 and CD10 highlight  germinal centers that are appropriately negative for BCL-2.      Block A5: Poly-3 highlights metastatic carcinoma.  CD3 highlights T-cells and CD20 highlights B-cells  Scattered B-cells stronglly express BCL-2 within follicles; confirmed with expression of BCL-6 and CD10 consistent with ISFN.     Block A7: AE1/3 is negative for metatatic carcinoma.  CD3 highlights T-cells and CD20 highlights B-cells.   BCL-6 and CD10 highlight germinal centers that are appropriately negative for BCL-2.            Case Report   Surgical Pathology Report                         Case: [id number]                                   Authorizing Provider:  Jim Lemus MD              Collected:           06/25/2021 3204               Ordering Location:     [facility]        Received:            06/25/2021 [address] [place] [address] Operating Room                                                       Pathologist:           Amairani Méndez MD                                                             Intraop:               Amairani Méndez MD                                                             Specimens:   A) - Lymph Node, [place], right axillary sentinel lymph node                                        B) - Breast, Right, right breast                                                          Addendum   At the request of Dr. Lemus, unstained slides from paraffin BLOCK B15 containing the patient's cancer cells were sent to [employer]. for  Mammaprint testing. Upon completion of testing, the [facility] report will be directly sent to the requesting physician as well as posted in the Media Tab of the patient's [employer] EMR by the [facility] Pathology Department.     Please note: The [facility]'s analysis and report is performed independently of [facility], and neither the Hospital nor the Pathology Department screen, review or comment upon [facility]'s report.   Because the role of testing in cancer diagnosis and management is subject to evolving development, usage and interpretation, we strongly urge that the test limitations described in the [facility] report be carefully read, appropriately shared with the patient, and critically considered when reaching treatment decisions.     This pathology material was removed from archive for purpose of molecular testing. It was reviewed and approved by Dr. Armstrong.       Addendum electronically signed by Amairani Méndez MD on 7/22/2021 at 10:57 AM   Final Diagnosis   A. Lymph Node, [place], right axillary sentinel lymph node, lymphadenectomy:  - One lymph node (1/1mi) positive for micrometastatic carcinoma.     -- Subcapsular and sinusoidal metastases; largest metastatic deposit 0.4 mm in a 1.1 mm area.    -- No extranodal extension.     -- Confirmed by positive pankeratin (CKAE1/3) immunostain.      B. Breast, Right, right breast, mastectomy:  Multifocal (2) invasive mammary carcinoma.  Largest invasive carcinoma  - Invasive mammary carcinoma of no special type (ductal), 20 mm in greatest gross dimension,      present in lower outer quadrant (LOQ).    -- [place] histologic grade 2 of 3 (total score: 7 of 9)        * Glandular (acinar) Tubular Differentiation 10-75%, score 2.        * Nuclear Pleomorphism 2-3 of 3, score 3.        * Mitotic Rate 4-7/mm2, (8-14 mitoses/10HPF; 12 mitoses/10 HPF), score 2.    -- Confirmed by         * Negative: p63 and [facility] immunostains.     -- Associated prior biopsy site changes with ribbon clip and ANJALI  marker.  Second invasive carcinoma  - Invasive lobular carcinoma, classic pattern with histiocytoid differentiation; 7 mm in      greatest gross dimension, present in upper outer quadrant (UOQ).    -- [place] histologic grade 2 of 3 (total score: 6 of 9)        * Glandular (acinar) Tubular Differentiation <10%, score 3.        * Nuclear Pleomorphism 1-2 of 3, score 2.        *  Mitotic Rate < 3/mm2, (</= 7 mitoses/10HPF; 0 mitoses/10 hPF), score 1.    -- Confirmed by tumor cell immunophenotype:        * Positive: Pankeratin (CKAE1/3) and P120 (cytoplasmic).    -- Associated prior biopsy site changes with wing clip and two ANJALI  markers.  - Focus suspicious for microinvasive carcinoma of no special type (ductal), present in tissue     between both invasive carcinomas; 0.7 mm.  - Ductal carcinoma in-situ (DCIS): Present; not an extensive intraductal component; associated with     largest invasive carcinoma (< 5% of total tumor).     -- Size and Extent: 7 mm in greatest estimated extent; present in 1 of 21 blocks.    -- Architectural Patterns: Micropapillary and cribriform.     -- Nuclear grade: II (intermediate).    -- Necrosis: Present, focal necrosis.   - Margins uninvolved by invasive and in-situ carcinoma.     -- Invasive carcinoma 5 mm from nearest posterior margin; 7 mm from nearest anterior UOQ margin.     -- DCIS > 5 mm from nearest gross posterior margin.   - Benign nipple and skin.    -- Invasive carcinoma does not invade into the dermis or epidermis; no ulceration.     -- DCIS does not involve the nipple epidermis.     -- No dermal lymphovascular invasion.   - Lymphovascular invasion: Present.    -- Confirmed by D2-40 and CD31 immunostains.   - Perineural invasion: Present.   - Microcalcifications: Present, associated with invasive carcinoma, lobular neoplasia and     non-neoplastic breast tissue.   - Benign proliferative and non-proliferative fibrocystic changes with atypia consisting of extensive     non-invasive lobular neoplasia (lobular carcinoma in-situ and atypical lobular hyperplasia; focally     involving sclerosing adenosis), sclerosing and apocrine adenosis, fibroadenomatoid changes and     cystic and papillary apocrine metaplasia.    -- Lobular neoplasia confirmed by positive p120 (cytoplasmic), SMMHC and p63; negative E-cadherin        immunostains.   - Duct  ectasia.          Comments:   This is an appended report. These results have been appended to a previously preliminary verified report.      Electronically signed by Amairani Méndez MD on 7/1/2021 at  9:28 PM   Note    Intradepartmental consultation (CV) agrees with the diagnosis for specimen B. A copy of the final report is faxed to Dr. JET Lemus on 7/1/21 @ 2120 hours.     1.  Ancillary Studies:  Performed on original biopsy material, [id number] (largest/ductal), and reported to be:     - ER:  100% / Positive     - VT:  100% / Positive     - HER2 (IHC):  1+ / Negative     - HER2 (FISH):  N/A     - Repeat HER2 testing (2013 ASCO/CAP Recommendations): Not Indicated      - Best representative tumor block: B4 (ductal).       -- Sufficient tumor present for          Agendia Mammaprint/Blueprint (1 cm2 of invasive tumor in aggregate): Yes.          MI Profile/Foundation One (at least 5 x 5 mm of tumor): Yes.      Ancillary Studies:  Performed on original biopsy material, [id number] (smaller/lobular), and reported to be:     - ER:  90-95% / Positive     - VT:  45-55% / Positive     - HER2 (IHC):  1+ / Negative     - HER2 (FISH):  N/A      - Repeat HER2 testing (2013 ASCO/CAP Recommendations): Not Indicated      - Best representative tumor blocks: B15 (lobular)       -- Sufficient tumor present for          Agendia Mammaprint/Blueprint (1 cm2 of invasive tumor in aggregate): No.          MI Profile/Foundation One (at least 5 x 5 mm of tumor):  Yes.      2.  Pathologic Stage Classification (pTNM, AJCC 8th Edition):       8th ed, AJCC Anatomic Stage:  at least Stage IB- pT1c(m/2), pN1mi(sn), cM0, G2.     3.  8th ed. AJCC Pathologic Prognostic Stage (use AJCC update):  IA.   Comment: This is an appended report. These results have been appended to a previously preliminary verified report.   Additional Information    All reported additional testing was performed with appropriately reactive controls.  These tests were  developed and their performance characteristics determined by [facility] or appropriate performing facility, though some tests may be performed on tissues which have not been validated for performance characteristics (such as staining performed on alcohol exposed cell blocks and decalcified tissues).  Results should be interpreted with caution and in the context of the patients’ clinical condition. These tests may not be cleared or approved by the U.S. Food and Drug Administration, though the FDA has determined that such clearance or approval is not necessary. These tests are used for clinical purposes and they should not be regarded as investigational or for research. This laboratory has been approved by CLIA 88, designated as a high-complexity laboratory and is qualified to perform these tests.  Interpretation performed at [facility], [address] [address].   Comment: This is an appended report. These results have been appended to a previously preliminary verified report.   Synoptic Checklist   INVASIVE CARCINOMA OF THE BREAST: Resection   8th Edition - Protocol posted: 2/26/2020INVASIVE CARCINOMA OF THE BREAST: COMPLETE EXCISION - All Specimens  SPECIMEN   Procedure  Total mastectomy   Specimen Laterality  Right   TUMOR   Tumor Site  Upper outer quadrant     Lower outer quadrant   Histologic Type  Invasive carcinoma of no special type (ductal)   Glandular (Acinar) / Tubular Differentiation  Score 2   Nuclear Pleomorphism  Score 3   Mitotic Rate  Score 2   Overall Grade  Grade 2 (scores of 6 or 7)   Tumor Size  Greatest dimension of largest invasive focus (Millimeters): 20 mm   Tumor Focality  Multiple foci of invasive carcinoma   Number of Foci  2        Sizes of Individual Foci (Millimeters)  20, 7 mm   Ductal Carcinoma In Situ (DCIS)  Present     Negative for extensive intraductal component (EIC)   Size (Extent) of DCIS  Estimated size (extent) of DCIS is at least  (Millimeters): 7 mm   Number of Blocks with DCIS  1   Number of Blocks Examined  21   Architectural Patterns  Cribriform     Micropapillary   Nuclear Grade  Grade II (intermediate)   Necrosis  Present, focal (small foci or single cell necrosis)   Lobular Carcinoma In Situ (LCIS)  Present   Lymphovascular Invasion  Present   Dermal Lymphovascular Invasion  Not identified   Microcalcifications  Present in invasive carcinoma     Present in non-neoplastic tissue     Lobular neoplasia   Treatment Effect in the Breast  No known presurgical therapy   MARGINS   Invasive Carcinoma Margins  Uninvolved by invasive carcinoma   Distance from Closest Margin (Millimeters)  5 mm   Closest Margin(s)  Posterior   DCIS Margins  Uninvolved by DCIS   Distance from Closest Margin (Millimeters)  Greater than: 5 mm   Closest Margin(s)  Posterior   LYMPH NODES   Regional Lymph Nodes  Involved by tumor cells   Number of Lymph Nodes with Macrometastases (> 2 mm)  0   Number of Lymph Nodes with Micrometastases (> 0.2 mm to 2 mm and / or > 200 cells)  1   Size of Largest Metastatic Deposit (Millimeters)  0.4 mm   Extranodal Extension  Not identified   Total Number of Lymph Nodes Examined  1   Number of [place] Nodes Examined  1   PATHOLOGIC STAGE CLASSIFICATION (pTNM, AJCC 8th Edition)      TNM Descriptors  m (multiple foci of invasive carcinoma)   Primary Tumor (pT)  pT1c   Regional Lymph Nodes Modifier  (sn): [place] node(s) evaluated.   Regional Lymph Nodes (pN)  pN1mi   ADDITIONAL FINDINGS   Additional Findings  Second focus of invasive carcinoma- invasive lobular carcinoma, histologic grade 2 of 3. Focus suspicious for microinvasive carcinoma of no special type (ductal). Extensive non-invasive lobular neoplasia (LCIS and ALH). Perineural invasion identified.   SPECIAL STUDIES   Breast Biomarker Testing Performed on Previous Biopsy     Estrogen Receptor (ER) Status  Positive (greater than 10% of cells demonstrate nuclear positivity)    Percentage of Cells with Nuclear Positivity  100 %   Breast Biomarker Testing Performed on Previous Biopsy     Progesterone Receptor (PgR) Status  Positive   Percentage of Cells with Nuclear Positivity  100 %   Breast Biomarker Testing Performed on Previous Biopsy     HER2 (by immunohistochemistry)  Negative (Score 1+)   Testing Performed on Case Number  [id number]   Comment(s)   Comment(s)  8th ed. AJCC Pathologic Prognostic Stage: IA. Best representative tumor block: B4.   .

## 2025-04-29 ENCOUNTER — TELEPHONE (OUTPATIENT)
Age: 60
End: 2025-04-29

## 2025-04-29 NOTE — TELEPHONE ENCOUNTER
Patient calling to request Dr. Florence's office mail her an after visit summary.  She was seen yesterday and [facility] and wasn't given one.  Please mail to her asap.

## 2025-05-08 ENCOUNTER — OFFICE VISIT (OUTPATIENT)
Dept: SURGICAL ONCOLOGY | Facility: CLINIC | Age: 60
End: 2025-05-08
Payer: COMMERCIAL

## 2025-05-08 VITALS
SYSTOLIC BLOOD PRESSURE: 128 MMHG | OXYGEN SATURATION: 99 % | TEMPERATURE: 97.4 F | BODY MASS INDEX: 18.02 KG/M2 | HEIGHT: 64 IN | HEART RATE: 99 BPM | DIASTOLIC BLOOD PRESSURE: 74 MMHG

## 2025-05-08 DIAGNOSIS — Z79.811 USE OF ANASTROZOLE (ARIMIDEX): ICD-10-CM

## 2025-05-08 DIAGNOSIS — R91.1 LUNG NODULE: ICD-10-CM

## 2025-05-08 DIAGNOSIS — D25.9 UTERINE LEIOMYOMA, UNSPECIFIED LOCATION: ICD-10-CM

## 2025-05-08 DIAGNOSIS — N63.21 MASS OF UPPER OUTER QUADRANT OF LEFT BREAST: ICD-10-CM

## 2025-05-08 DIAGNOSIS — C50.911 CARCINOMA OF RIGHT BREAST METASTATIC TO AXILLARY LYMPH NODE (HCC): ICD-10-CM

## 2025-05-08 DIAGNOSIS — Z17.0 MALIGNANT NEOPLASM OF OVERLAPPING SITES OF RIGHT BREAST IN FEMALE, ESTROGEN RECEPTOR POSITIVE (HCC): Primary | ICD-10-CM

## 2025-05-08 DIAGNOSIS — C50.811 MALIGNANT NEOPLASM OF OVERLAPPING SITES OF RIGHT BREAST IN FEMALE, ESTROGEN RECEPTOR POSITIVE (HCC): Primary | ICD-10-CM

## 2025-05-08 DIAGNOSIS — C77.3 CARCINOMA OF RIGHT BREAST METASTATIC TO AXILLARY LYMPH NODE (HCC): ICD-10-CM

## 2025-05-08 PROCEDURE — 99213 OFFICE O/P EST LOW 20 MIN: CPT | Performed by: NURSE PRACTITIONER

## 2025-05-08 RX ORDER — THIAMINE HCL 100 MG
TABLET ORAL
COMMUNITY
Start: 2025-05-06

## 2025-05-08 NOTE — PROGRESS NOTES
Name: Digna Juárez      : 1965      MRN: [id number]  Encounter Provider: ELZA Graham  Encounter Date: 2025   Encounter department: [facility] [place]  :  Assessment & Plan  Malignant neoplasm of overlapping sites of right breast in female, estrogen receptor positive (HCC)    Orders:  •  CT chest abdomen pelvis w contrast; Future    Carcinoma of right breast metastatic to axillary lymph node (HCC)    Orders:  •  CT chest abdomen pelvis w contrast; Future    Use of anastrozole (Arimidex)         Uterine leiomyoma, unspecified location    Orders:  •  Ambulatory Referral to Obstetrics / Gynecology; Future    Lung nodule    Orders:  •  CT chest abdomen pelvis w contrast; Future    Mass of upper outer quadrant of left breast    Orders:  •  US breast left limited (diagnostic); Future    Patient is a 60-year-old female that was initially diagnosed with a right-sided breast cancer in 2021.  She underwent genetic testing which was negative.  She underwent a right mastectomy and sentinel node biopsy with Dr. Lemus.  She was taking anastrozole.  She then developed a recurrence in her right axilla.  She underwent an axillary node dissection with Dr. Doran.  She completed adjuvant radiation therapy and is currently maintained on exemestane.    She had staging scans performed which revealed lung nodules and an ill-defined thyroid nodule.  There was also a hypodense region of her liver.  She had an MRI of her abdomen and there were no suspicious findings noted in the liver.  However a 3-month follow-up CT scan was recommended.  There was also irregular thickening of the left adrenal gland and a an enhancing uterine lesion.  A pelvic ultrasound was recommended.  She had a thyroid biopsy which was benign, [place] 2.  She had a repeat CT of her chest abdomen and pelvis in 2024 which revealed a stable 4 mm left upper lobe lung nodule and a stable focus in the  liver with no new findings.  I have recommended that she have a repeat CT scan to ensure stability of these findings.  I will order this to be performed next month so it is 6 months from her previous imaging.    Recent pelvic ultrasound which revealed multiple uterine fibroids.  Patient states that she does not currently follow with a gynecologist.  Referral placed to Dr. Amador for further management recommendations.    She had a left diagnostic mammogram and left breast ultrasound in May 2024 which was BI-RADS 2, category 4 density.  She is scheduled for her next mammogram on 5/27/2025.  Clinical exam today reveals a dominant 1 to 2 cm mobile rounded mass in the upper outer quadrant of her left breast.  I am going to add on a diagnostic ultrasound to her mammogram for further evaluation.    Otherwise she offers no new complaints today and there are no other worrisome findings.  Assuming her upcoming imaging reveals no suspicious findings we will plan to see her back again in 6 months for an initial survivorship visit.  She was instructed to contact us with any changes or concerns in the interim.  All of her questions were answered today.          History of Present Illness   Digna Juárez is a 60 y.o. year old female who presents today for a follow-up visit.  She has not appreciated any changes on self-exam.  She denies persistent headaches, back pain or bone pain, cough or shortness of breath, abdominal pain.     Oncology History   Cancer Staging   Carcinoma of right breast metastatic to axillary lymph node (HCC)  Staging form: Breast, AJCC 8th Edition  - Clinical stage from 9/12/2024: Stage IA (rcT0, cN1mi(f), cM0, G1, ER+, [place]+, HER2-) - Signed by Ashleigh Doran MD on 10/10/2024  Stage prefix: Recurrence  Method of lymph node assessment: Core biopsy  Histologic grading system: 3 grade system  - Pathologic stage from 11/19/2024: rpT0, pN1, cM0 - Signed by Ashleigh Doran MD on 12/5/2024  Stage prefix:  Recurrence  Method of lymph node assessment: Axillary lymph node dissection    Malignant neoplasm of overlapping sites of right breast in female, estrogen receptor positive (HCC)  Staging form: Breast, AJCC 8th Edition  - Pathologic stage from 4/13/2021: Stage IA (pT1c, pN1mi(sn), cM0, G2, ER+, SD+, HER2-) - Signed by ELZA Santos on 11/7/2023  Stage prefix: Initial diagnosis  Method of lymph node assessment: [place] lymph node biopsy  Multigene prognostic tests performed: MammaPrint  Histologic grading system: 3 grade system  Oncology History   Malignant neoplasm of overlapping sites of right breast in female, estrogen receptor positive (HCC)   4/13/2021 -  Cancer Staged    Staging form: Breast, AJCC 8th Edition  - Pathologic stage from 4/13/2021: Stage IA (pT1c, pN1mi(sn), cM0, G2, ER+, SD+, HER2-) - Signed by ELZA Santos on 11/7/2023  Stage prefix: Initial diagnosis  Method of lymph node assessment: [place] lymph node biopsy  Multigene prognostic tests performed: MammaPrint  Histologic grading system: 3 grade system       4/16/2021 Biopsy    Right breast US guided biopsy:  A. 11 o'clock 7 cm from the nipple  Invasive lobular carcinoma  Grade 1  ER 90, SD 45, HER2 1+  Lymphovascular invasion: not identified    B. 10 o'clock 7 cm from the nipple  Invasive mammary carcinoma of no special type  Grade 2  , , HER2 1+  Lymphovascular invasion: not identified    Concordant. Malignancy appears multifocal. MRI recommended.     4/30/2021 Observation    Bilateral breast MRI  Multifocal right breast cancer; 10:00 mass measures up to 1.8 cm and abuts pectoral muscle, without evidence of invasion. Two areas of carcinoma have a total extent of disease of 5 cm. Left breast clear. Axilla negative.     6/10/2021 Genetic Testing    The following genes were evaluated: BATSHEVA, BRCA1, BRCA2, CDH1, CHEK2, PALB2, PTEN, STK11, TP53  Additional genes analyzed for a total of 20  Negative result.  No pathogenic sequence variants or deletions/dupllications identified  [employer]     6/25/2021 Surgery    Right breast ANJALI  directed mastectomy with sentinel lymph node biopsy  Invasive carcinoma of no special type (ductal)  Grade 2  2 cm (2 foci)  Margins negative  1/1 Lymph node with micrometastases (0.4 mm)  Anatomic Stage IB  Prognostic Stage IA     7/7/2021 Genomic Testing    MammaPrint for FLEX trial  Low risk (Luminal A)     7/28/2021 - 11/2024 Hormone Therapy    Anastrozole 1 mg daily       7/29/2021 - 7/29/2021 Radiation    Consult with Dr. Florence  Patient declined post-mastectomy RT     Carcinoma of right breast metastatic to axillary lymph node (HCC)   9/12/2024 Biopsy    Right axillary lymph node ultrasound-guided biopsy (open coil)  Invasive mammary carcinoma of no special type (ductal) involving fibroadipose tissue  Grade 1  ER ; [place] 1; HER2 0  In situ follicular neoplasia (involvement by follicular lymphoma elsewhere in the lymph node or in other lymph nodes cannot be ruled out; correlation with systemic imaging to rule out lymphadenopathy elsewhere is recommended as clinically indicated)    Concordant. Minute focus of invasive carcinoma is present outside of the lymph node, within the fibroadipose tissue. This focus may represent metastasis vs primary carcinoma. Single morphologically abnormal lymph node seen on US. Left breast clear on 5/2024 imaging.     9/12/2024 -  Cancer Staged    Staging form: Breast, AJCC 8th Edition  - Clinical stage from 9/12/2024: Stage IA (rcT0, cN1mi(f), cM0, G1, ER+, [place]+, HER2-) - Signed by Ashleigh Doran MD on 10/10/2024  Stage prefix: Recurrence  Method of lymph node assessment: Core biopsy  Histologic grading system: 3 grade system       9/17/2024 Observation    CT chest, abdomen, pelvis IMPRESSION:     Bilateral pulmonary nodules measuring 3 to 5 mm as described.  Follow-up CT chest recommended in 3 months considering history of recurrent breast cancer.      Ill-defined hypodense right thyroid gland nodule measures up to 1.5 cm.  Thyroid ultrasound recommended for further characterization.     Lobulated low-density right axillary node measuring 2.2 x 1 cm, consistent with biopsy-proven malignancy.  1.5 cm rounded soft tissue nodule in the left breast with coarse calcification versus biopsy clip.  Correlation with mammographic history recommended.     Ill-defined heterogeneously hypodense lesion within the subcapsular inferior right liver margin.  1.2 cm indeterminate left adrenal gland nodule.  MRI with contrast recommended for further evaluation of the above two lesions.     Punctate sclerotic lesion in the left sternum.  3 mm sclerotic lesion in the posterior/medial right fifth rib, potentially representing a bone island.     Prominent uterus considering age with probable partially exophytic fibroid.     Bladder wall thickening considering degree of distention.  Correlate with urinalysis for possible cystitis.     Note of no priors for comparison     9/23/2024 Observation    MRI abdomen IMPRESSION:     No suspicious focal hepatic lesion. The 1.3 cm observation on prior CT may represent volume averaging of the hepatic border with adjacent mesenteric fat or small amount of focal fat infiltration. However given history of recently diagnosed metastatic   right breast neoplasm, follow-up CT abdomen with contrast in 3 months is recommended to assess for stability of findings     Irregular thickening of the left adrenal gland without discrete nodule findings can represent hyperplasia versus early small metastatic nodule. No remote prior cross-sectional studies of the abdomen are available for direct comparison. Attention on   follow-up CT abdomen in 3 months is recommended to assess for stability.     A 2.5 cm incompletely characterized hypoenhancing uterine lesion, statistically likely representing fibroid. Left ovarian 1.2 cm cystic lesion, also incompletely  characterized. Nonemergent pelvic ultrasound is recommended for complete characterization.     Small volume free pelvic fluid of uncertain etiology. Clinical correlation is recommended.     9/24/2024 Observation    NM Bone scan IMPRESSION:     1.  No scintigraphic evidence of osseous metastasis. Incidental findings as above.     11/19/2024 Surgery    RIGHT axillary dissection  ONE out of ELEVEN (1/11) lymph nodes positive for metastatic carcinoma  4 mm  < 2 mm extranodal extension  Persistent in situ follicular neoplasm  No evidence of overt follicular lymphoma     11/19/2024 -  Cancer Staged    Staging form: Breast, AJCC 8th Edition  - Pathologic stage from 11/19/2024: rpT0, pN1, cM0 - Signed by Ashleigh Doran MD on 12/5/2024  Stage prefix: Recurrence  Method of lymph node assessment: Axillary lymph node dissection       1/9/2025 - 1/30/2025 Radiation    The patient saw @[facility]@ for radiation treatment. This is the current list of radiation treatment:  Radiation Treatments       Active   No active radiation treatments to show.     Historical   Plans   VMAT Rcw/node   Most recent treatment: Dose planned: 266 cGy (fraction 16 on 1/30/2025)   Total: Dose planned: 4,256 cGy (16 fractions)   Elapsed Days: 21      Reference Points   R CW   Most recent treatment: Dose given: 266 cGy (on 1/30/2025)   Total: Dose given: 4,256 cGy   Elapsed Days: 21                   Plan ID Energy Fractions Dose per Fraction (cGy) Dose Correction (cGy) Total Dose Delivered (cGy) Elapsed Days   VMAT Rcw/node 6X 16 / 16 266 0 4,256 21         Review of Systems   Constitutional:  Negative for activity change, appetite change, chills, fatigue, fever and unexpected weight change.   HENT:  Positive for congestion (resolving URI).    Respiratory:  Negative for cough and shortness of breath.    Cardiovascular:  Negative for chest pain.   Gastrointestinal:  Negative for abdominal pain, constipation, diarrhea, nausea and vomiting.   Musculoskeletal:   "Negative for arthralgias, back pain, gait problem and myalgias.   Skin:  Negative for color change and rash.   Neurological:  Negative for dizziness and headaches.   Hematological:  Negative for adenopathy.   Psychiatric/Behavioral:  Negative for agitation and confusion.    All other systems reviewed and are negative.   A complete review of systems is negative other than that noted above in the HPI.           Objective   /74 (BP Location: Left arm, Patient Position: Sitting, Cuff Size: Standard)   Pulse 99   Temp (!) 97.4 °F (36.3 °C) (Temporal)   Ht 5' 4\" (1.626 m)   SpO2 99%   BMI 18.02 kg/m²     Pain Screening:  Pain Score: 0-No pain  ECOG    Physical Exam  Vitals reviewed.   Constitutional:       General: She is not in acute distress.     Appearance: Normal appearance. She is well-developed. She is not diaphoretic.   HENT:      Head: Normocephalic and atraumatic.   Cardiovascular:      Rate and Rhythm: Normal rate and regular rhythm.      Heart sounds: Normal heart sounds.   Pulmonary:      Effort: Pulmonary effort is normal.      Breath sounds: Normal breath sounds.   Chest:   Breasts:     Left: Mass present. No swelling, bleeding, inverted nipple, nipple discharge, skin change or tenderness.          Comments: 1-2 cm rounded mobile mass upper outer quadrant 2:00    Right mastectomy site free of masses, skin changes or nodules. No right arm lymphedema  Abdominal:      Palpations: Abdomen is soft. There is no mass.      Tenderness: There is no abdominal tenderness.   Musculoskeletal:         General: Normal range of motion.      Cervical back: Normal range of motion.   Lymphadenopathy:      Upper Body:      Right upper body: No supraclavicular or axillary adenopathy.      Left upper body: No supraclavicular or axillary adenopathy.   Skin:     General: Skin is warm and dry.      Findings: No rash.   Neurological:      Mental Status: She is alert and oriented to person, place, and time.   Psychiatric:   "       Speech: Speech normal.

## 2025-05-15 ENCOUNTER — TELEPHONE (OUTPATIENT)
Age: 60
End: 2025-05-15

## 2025-05-15 NOTE — TELEPHONE ENCOUNTER
Pt scheduled appt for 6/5/25, requesting appt card or letter be sent and also needs [employer] services for appt. Message sent to office for assistance.

## 2025-05-27 ENCOUNTER — HOSPITAL ENCOUNTER (OUTPATIENT)
Dept: MAMMOGRAPHY | Facility: CLINIC | Age: 60
Discharge: HOME | End: 2025-05-27
Payer: COMMERCIAL

## 2025-05-27 ENCOUNTER — HOSPITAL ENCOUNTER (OUTPATIENT)
Dept: ULTRASOUND IMAGING | Facility: CLINIC | Age: 60
Discharge: HOME | End: 2025-05-27
Attending: NURSE PRACTITIONER
Payer: COMMERCIAL

## 2025-05-27 ENCOUNTER — APPOINTMENT (OUTPATIENT)
Dept: LAB | Facility: CLINIC | Age: 60
End: 2025-05-27
Payer: COMMERCIAL

## 2025-05-27 VITALS — WEIGHT: 105.16 LBS | BODY MASS INDEX: 17.95 KG/M2 | HEIGHT: 64 IN

## 2025-05-27 DIAGNOSIS — E55.9 VITAMIN D DEFICIENCY: ICD-10-CM

## 2025-05-27 DIAGNOSIS — Z13.6 SCREENING FOR CARDIOVASCULAR, RESPIRATORY, AND GENITOURINARY DISEASES: ICD-10-CM

## 2025-05-27 DIAGNOSIS — C77.3 CARCINOMA OF RIGHT BREAST METASTATIC TO AXILLARY LYMPH NODE (HCC): ICD-10-CM

## 2025-05-27 DIAGNOSIS — Z13.83 SCREENING FOR CARDIOVASCULAR, RESPIRATORY, AND GENITOURINARY DISEASES: ICD-10-CM

## 2025-05-27 DIAGNOSIS — C50.811 MALIGNANT NEOPLASM OF OVERLAPPING SITES OF RIGHT FEMALE BREAST, UNSPECIFIED ESTROGEN RECEPTOR STATUS (HCC): ICD-10-CM

## 2025-05-27 DIAGNOSIS — R53.82 CHRONIC FATIGUE: ICD-10-CM

## 2025-05-27 DIAGNOSIS — Z17.0 MALIGNANT NEOPLASM OF OVERLAPPING SITES OF RIGHT BREAST IN FEMALE, ESTROGEN RECEPTOR POSITIVE (HCC): ICD-10-CM

## 2025-05-27 DIAGNOSIS — Z13.89 SCREENING FOR CARDIOVASCULAR, RESPIRATORY, AND GENITOURINARY DISEASES: ICD-10-CM

## 2025-05-27 DIAGNOSIS — N63.21 MASS OF UPPER OUTER QUADRANT OF LEFT BREAST: ICD-10-CM

## 2025-05-27 DIAGNOSIS — E78.2 MIXED HYPERLIPIDEMIA: ICD-10-CM

## 2025-05-27 DIAGNOSIS — C50.911 CARCINOMA OF RIGHT BREAST METASTATIC TO AXILLARY LYMPH NODE (HCC): ICD-10-CM

## 2025-05-27 DIAGNOSIS — C50.811 MALIGNANT NEOPLASM OF OVERLAPPING SITES OF RIGHT BREAST IN FEMALE, ESTROGEN RECEPTOR POSITIVE (HCC): ICD-10-CM

## 2025-05-27 LAB
25(OH)D3 SERPL-MCNC: 106.1 NG/ML (ref 30–100)
ALBUMIN SERPL BCG-MCNC: 4.4 G/DL (ref 3.5–5)
ALP SERPL-CCNC: 76 U/L (ref 34–104)
ALT SERPL W P-5'-P-CCNC: 14 U/L (ref 7–52)
ANION GAP SERPL CALCULATED.3IONS-SCNC: 10 MMOL/L (ref 4–13)
AST SERPL W P-5'-P-CCNC: 16 U/L (ref 13–39)
BILIRUB SERPL-MCNC: 0.88 MG/DL (ref 0.2–1)
BILIRUB UR QL STRIP: NEGATIVE
BUN SERPL-MCNC: 8 MG/DL (ref 5–25)
CALCIUM SERPL-MCNC: 9.9 MG/DL (ref 8.4–10.2)
CHLORIDE SERPL-SCNC: 102 MMOL/L (ref 96–108)
CHOLEST SERPL-MCNC: 152 MG/DL (ref ?–200)
CLARITY UR: CLEAR
CO2 SERPL-SCNC: 30 MMOL/L (ref 21–32)
COLOR UR: COLORLESS
CREAT SERPL-MCNC: 0.82 MG/DL (ref 0.6–1.3)
CREAT UR-MCNC: 25 MG/DL
ERYTHROCYTE [DISTWIDTH] IN BLOOD BY AUTOMATED COUNT: 12.6 % (ref 11.6–15.1)
GFR SERPL CREATININE-BSD FRML MDRD: 77 ML/MIN/1.73SQ M
GLUCOSE P FAST SERPL-MCNC: 102 MG/DL (ref 65–99)
GLUCOSE UR STRIP-MCNC: NEGATIVE MG/DL
HCT VFR BLD AUTO: 39.5 % (ref 34.8–46.1)
HDLC SERPL-MCNC: 62 MG/DL
HGB BLD-MCNC: 12.6 G/DL (ref 11.5–15.4)
HGB UR QL STRIP.AUTO: NEGATIVE
KETONES UR STRIP-MCNC: NEGATIVE MG/DL
LDLC SERPL CALC-MCNC: 73 MG/DL (ref 0–100)
LEUKOCYTE ESTERASE UR QL STRIP: NEGATIVE
MCH RBC QN AUTO: 32 PG (ref 26.8–34.3)
MCHC RBC AUTO-ENTMCNC: 31.9 G/DL (ref 31.4–37.4)
MCV RBC AUTO: 100 FL (ref 82–98)
MICROALBUMIN UR-MCNC: <7 MG/L
NITRITE UR QL STRIP: NEGATIVE
NONHDLC SERPL-MCNC: 90 MG/DL
PH UR STRIP.AUTO: 7 [PH]
PLATELET # BLD AUTO: 192 THOUSANDS/UL (ref 149–390)
PMV BLD AUTO: 12.4 FL (ref 8.9–12.7)
POTASSIUM SERPL-SCNC: 3.7 MMOL/L (ref 3.5–5.3)
PROT SERPL-MCNC: 6.9 G/DL (ref 6.4–8.4)
PROT UR STRIP-MCNC: NEGATIVE MG/DL
RBC # BLD AUTO: 3.94 MILLION/UL (ref 3.81–5.12)
SODIUM SERPL-SCNC: 142 MMOL/L (ref 135–147)
SP GR UR STRIP.AUTO: 1.01 (ref 1–1.03)
TRIGL SERPL-MCNC: 85 MG/DL (ref ?–150)
TSH SERPL DL<=0.05 MIU/L-ACNC: 3.26 UIU/ML (ref 0.45–4.5)
UROBILINOGEN UR STRIP-ACNC: <2 MG/DL
WBC # BLD AUTO: 5.26 THOUSAND/UL (ref 4.31–10.16)

## 2025-05-27 PROCEDURE — 80061 LIPID PANEL: CPT

## 2025-05-27 PROCEDURE — 84443 ASSAY THYROID STIM HORMONE: CPT

## 2025-05-27 PROCEDURE — 76642 ULTRASOUND BREAST LIMITED: CPT

## 2025-05-27 PROCEDURE — 85027 COMPLETE CBC AUTOMATED: CPT

## 2025-05-27 PROCEDURE — 77065 DX MAMMO INCL CAD UNI: CPT

## 2025-05-27 PROCEDURE — 36415 COLL VENOUS BLD VENIPUNCTURE: CPT

## 2025-05-27 PROCEDURE — 82306 VITAMIN D 25 HYDROXY: CPT

## 2025-05-27 PROCEDURE — 80053 COMPREHEN METABOLIC PANEL: CPT

## 2025-05-27 PROCEDURE — G0279 TOMOSYNTHESIS, MAMMO: HCPCS

## 2025-06-03 ENCOUNTER — OFFICE VISIT (OUTPATIENT)
Dept: FAMILY MEDICINE CLINIC | Facility: CLINIC | Age: 60
End: 2025-06-03
Payer: COMMERCIAL

## 2025-06-03 VITALS
WEIGHT: 100.8 LBS | OXYGEN SATURATION: 98 % | TEMPERATURE: 98.2 F | DIASTOLIC BLOOD PRESSURE: 76 MMHG | HEART RATE: 75 BPM | HEIGHT: 64 IN | BODY MASS INDEX: 17.21 KG/M2 | SYSTOLIC BLOOD PRESSURE: 118 MMHG

## 2025-06-03 DIAGNOSIS — C77.3 CARCINOMA OF RIGHT BREAST METASTATIC TO AXILLARY LYMPH NODE (HCC): Primary | ICD-10-CM

## 2025-06-03 DIAGNOSIS — R91.1 LUNG NODULE: ICD-10-CM

## 2025-06-03 DIAGNOSIS — E55.9 VITAMIN D INSUFFICIENCY: ICD-10-CM

## 2025-06-03 DIAGNOSIS — D25.9 UTERINE LEIOMYOMA, UNSPECIFIED LOCATION: ICD-10-CM

## 2025-06-03 DIAGNOSIS — I10 PRIMARY HYPERTENSION: ICD-10-CM

## 2025-06-03 DIAGNOSIS — C50.911 CARCINOMA OF RIGHT BREAST METASTATIC TO AXILLARY LYMPH NODE (HCC): Primary | ICD-10-CM

## 2025-06-03 DIAGNOSIS — E03.8 OTHER SPECIFIED HYPOTHYROIDISM: ICD-10-CM

## 2025-06-03 PROCEDURE — 99214 OFFICE O/P EST MOD 30 MIN: CPT | Performed by: FAMILY MEDICINE

## 2025-06-03 RX ORDER — ACETAMINOPHEN 160 MG
2000 TABLET,DISINTEGRATING ORAL 3 TIMES WEEKLY
Start: 2025-06-04

## 2025-06-03 NOTE — PROGRESS NOTES
"Name: Digna Juárez      : 1965      MRN: [id number]  Encounter Provider: NATALIE Knox DO  Encounter Date: 6/3/2025   Encounter department: [facility]    :  Assessment & Plan  Carcinoma of right breast metastatic to axillary lymph node (HCC)  Surg 2024 in [place], Dr. Ashleigh Doran        Vitamin D insufficiency    Orders:  •  Cholecalciferol (Vitamin D3) 50 MCG (2000 UT) capsule; Take 1 capsule (2,000 Units total) by mouth 3 (three) times a week    Lung nodule  Bilateral small lung nodules will undergo repeat scan  1 week from now       Primary hypertension  Taking lisinopril 10 mg once daily as well as atenolol 50 mg daily for blood pressure control       Other specified hypothyroidism  Continues on levothyroxine 50 once       Uterine leiomyoma, unspecified location  Will see OB/GYN in 2 days in [place].         Assessment & Plan             History of Present Illness     History of Present Illness       Review of Systems  Objective   /76 (BP Location: Left arm, Patient Position: Sitting, Cuff Size: Standard)   Pulse 75   Temp 98.2 °F (36.8 °C) (Temporal)   Ht 5' 4\" (1.626 m)   Wt 45.7 kg (100 lb 12.8 oz)   SpO2 98%   BMI 17.30 kg/m²     Physical Exam    Physical Exam    "

## 2025-06-05 ENCOUNTER — CONSULT (OUTPATIENT)
Dept: GYNECOLOGY | Facility: CLINIC | Age: 60
End: 2025-06-05
Attending: NURSE PRACTITIONER

## 2025-06-05 DIAGNOSIS — D21.9 FIBROIDS: ICD-10-CM

## 2025-06-05 DIAGNOSIS — C77.3 CARCINOMA OF RIGHT BREAST METASTATIC TO AXILLARY LYMPH NODE (HCC): Primary | ICD-10-CM

## 2025-06-05 DIAGNOSIS — Z01.419 ENCOUNTER FOR GYNECOLOGICAL EXAMINATION WITH PAPANICOLAOU SMEAR OF CERVIX: ICD-10-CM

## 2025-06-05 DIAGNOSIS — C50.911 CARCINOMA OF RIGHT BREAST METASTATIC TO AXILLARY LYMPH NODE (HCC): Primary | ICD-10-CM

## 2025-06-05 PROCEDURE — G0145 SCR C/V CYTO,THINLAYER,RESCR: HCPCS | Performed by: OBSTETRICS & GYNECOLOGY

## 2025-06-05 NOTE — PROGRESS NOTES
Name: Digna Juárez      : 1965      MRN: [id number]  Encounter Provider: Jeff Amador DO  Encounter Date: 2025   Encounter department: [facility]  :  Assessment & Plan  Carcinoma of right breast metastatic to axillary lymph node (HCC)         Fibroids  Reviewed ultrasound findings with patient.  The fibroids are stable.  She is asymptomatic from the fibroids.  Plan is to follow-up.  She has been advised if she has any vaginal bleeding or increasing pelvic pain she will return to the office           History of Present Illness   HPI  Digna Juárez is a 60 y.o. female G0 new patient, referred to office secondary to fibroid uterus.   Recent ultrasound:  PELVIC ULTRASOUND, COMPLETE     INDICATION: The patient is 60 years old. D25.9: Leiomyoma of uterus, unspecified.     COMPARISON: 10/11/2024     TECHNIQUE: Transabdominal pelvic ultrasound was performed in sagittal and transverse planes with a curvilinear transducer. Additional transvaginal imaging was performed to better evaluate the endometrium and ovaries. Imaging included volumetric sweeps as   well as traditional still imaging technique.     FINDINGS:     UTERUS:  The uterus is anteverted in position, measuring 8.1 x 3.9 x 6.3 cm.  Again identified is somewhat lobulated uterus with heterogeneous echotexture due to presence of several nodular masses consistent with fibroids. These include:  Fibroid 1: 2.7 x 2.4 x 2.9 cm (previously 2.4 x 2.1 x 2.5 cm); subserosal posterior uterine body  Fibroid 2: 1.4 x 1.0 x 1.3 cm (previously 1.6 x 0.9 x 1.1 cm); intramural right frontal  Fibroid 3: 2.4 x 2.2 x 3.0 cm (previously 2.4 x 1.9 x 3.0 cm); diffusely echogenic submucosal left uterine body with some distortion of the endometrial stripe  The cervix appears within normal limits.     ENDOMETRIUM:  The endometrial echo complex has an AP caliber of 3.0 mm.  Appearance within normal limits.     OVARIES/ADNEXA:  Right  ovary: 2.6 x 1.9 x 1.2 cm. 3.1 mL.  Ovarian Doppler flow is within normal limits.  No suspicious ovarian or adnexal abnormality.     Left ovary: 2.0 x 2.1 x 1.2 cm. 2.8 mL.  Ovarian Doppler flow is within normal limits.  No suspicious ovarian or adnexal abnormality.     OTHER:  No free fluid or loculated fluid collections.     IMPRESSION:     Stable fibroid uterus.      Patient has a history of breast cancer diagnosed first in June 2022.  Recurrence in 2024.  Status post right mastectomy    In September 2024 patient had a CT scan of the abdomen and pelvis; this revealed a partially exophytic posterior right lower uterine hypodense lesion most consistent with a fibroid.  Otherwise unremarkable.;  She then had an ultrasound in October 2024.  This revealed uterus measuring 7.9 x 4.4 x 6.5 cm with 1 fibroid measuring 2.4 x 2.5 x 2.1 cm, this was subserosal.  There was an intramural fibroid right fundal area measuring 1.6 x 1.1 x 0.9 cm and along with a submucosal location in the left uterine body lower uterine segment measuring 2.4 x 1.9 x 3 cm.  Patient denies any vaginal irritation, burning, discharge or bleeding.  She has no bulk symptoms related to fibroids.    Last Pap smear was several years ago.  Patient is menopausal since her early 50s.  Never went on hormone replacement therapy.        Review of Systems   Constitutional: Negative.    HENT:  Negative for sore throat and trouble swallowing.    Gastrointestinal: Negative.    Genitourinary: Negative.      Past Medical History   Past Medical History[1]  Past Surgical History[2]  Family History[3]   reports that she has never smoked. She has never used smokeless tobacco. She reports current alcohol use of about 2.0 standard drinks of alcohol per week. She reports that she does not use drugs.  Current Outpatient Medications   Medication Instructions    atenolol (TENORMIN) 50 mg, Oral, Daily, Take 100 mg by mouth every other morning.    Calcium Citrate-Vitamin D3  315-6.25 MG-MCG TABS     exemestane (AROMASIN) 25 mg, Oral, Daily    famotidine (PEPCID) 40 mg, Oral, Daily    levothyroxine 50 mcg tablet TAKE ONE TABLET BY MOUTH EVERY DAY IN THE EARLY MORNING 1/2 HOUR BEFORE BREAKFAST    lisinopril (ZESTRIL) 10 mg, Oral, Daily    Multiple Vitamin (Daily-Alanis) TABS 1 tablet, Oral, Daily    OLANZapine (ZYPREXA) 7.5 mg, Oral, Daily at bedtime    Vitamin D3 2,000 Units, Oral, 3 times weekly   Allergies[4]   Medications Ordered Prior to Encounter[5]   Social History[6]     Objective   There were no vitals taken for this visit.     Physical Exam  Vitals reviewed.     Cardiovascular:      Rate and Rhythm: Normal rate and regular rhythm.      Pulses: Normal pulses.      Heart sounds: Normal heart sounds.   Pulmonary:      Effort: Pulmonary effort is normal.      Breath sounds: Normal breath sounds.   Abdominal:      General: There is no distension.      Palpations: Abdomen is soft. There is no mass.      Tenderness: There is no abdominal tenderness. There is no guarding or rebound.      Hernia: No hernia is present. There is no hernia in the left inguinal area or right inguinal area.   Genitourinary:     General: Normal vulva.      Labia:         Right: No rash, tenderness or lesion.         Left: No rash, tenderness or lesion.       Vagina: Normal.      Cervix: Normal.      Uterus: Normal.       Adnexa:         Right: No mass, tenderness or fullness.          Left: No mass, tenderness or fullness.     Lymphadenopathy:      Lower Body: No right inguinal adenopathy. No left inguinal adenopathy.     Neurological:      Mental Status: She is alert.                [1]   Past Medical History:  Diagnosis Date    Abnormal weight loss     Anorexia nervosa     Disease of thyroid gland     Elevated blood sugar     Occasional     Fibroadenoma of both breasts     GERD (gastroesophageal reflux disease)     History of radiation therapy     Hypertension     Hyperthyroidism     Osteopenia      Schizophrenia (HCC)    [2]   Past Surgical History:  Procedure Laterality Date    BREAST BIOPSY Right 04/16/2021    BREAST CYST EXCISION Left 2011    CHOLECYSTECTOMY      LYMPH NODE DISSECTION Right 11/19/2024    Procedure: RIGHT AXILLARY DISSECTION;  Surgeon: Ashleigh Doran MD;  Location: [facility];  Service: Surgical Oncology    MASTECTOMY Right 2021    MASTECTOMY W/ SENTINEL NODE BIOPSY Right 06/25/2021    Procedure: BREAST MASTECTOMY WITH BIOPSY LYMPH NODE SENTINEL; ANJALI  GUIDED, LYMPHATIC MAPPING WITH BLUE DYE AND RADIOACTIVE DYE (INJECT AT 1130 BY DR LEMUS IN THE OR);  Surgeon: Jim Lemus MD;  Location: [facility];  Service: Surgical Oncology    US BREAST CLIP NEEDLE LOC RIGHT Right 06/14/2021    US BREAST NEEDLE LOC RIGHT EACH ADDITIONAL Right 06/14/2021    US GUIDANCE BREAST BIOPSY RIGHT EACH ADDITIONAL Right 04/16/2021    US GUIDED BREAST BIOPSY RIGHT COMPLETE Right 04/16/2021    US GUIDED BREAST LYMPH NODE BIOPSY RIGHT Right 9/12/2024    US GUIDED THYROID BIOPSY  10/24/2024   [3]   Family History  Adopted: Yes   [4] No Known Allergies  [5]   Current Outpatient Medications on File Prior to Visit   Medication Sig Dispense Refill    atenolol (TENORMIN) 50 mg tablet Take 1 tablet (50 mg total) by mouth daily Take 100 mg by mouth every other morning. 90 tablet 1    Calcium Citrate-Vitamin D3 315-6.25 MG-MCG TABS       Cholecalciferol (Vitamin D3) 50 MCG (2000 UT) capsule Take 1 capsule (2,000 Units total) by mouth 3 (three) times a week      exemestane (AROMASIN) 25 MG tablet TAKE ONE TABLET BY MOUTH EVERY DAY 30 tablet 5    famotidine (PEPCID) 40 MG tablet TAKE ONE TABLET BY MOUTH EVERY MORNING 30 tablet 5    levothyroxine 50 mcg tablet TAKE ONE TABLET BY MOUTH EVERY DAY IN THE EARLY MORNING 1/2 HOUR BEFORE BREAKFAST 100 tablet 3    lisinopril (ZESTRIL) 10 mg tablet Take 1 tablet (10 mg total) by mouth daily 100 tablet 3    Multiple Vitamin (Daily-Alanis) TABS TAKE ONE TABLET BY MOUTH EVERY DAY 90 tablet 3     OLANZapine (ZyPREXA) 7.5 mg tablet Take 1 tablet (7.5 mg total) by mouth daily at bedtime 90 tablet 2     No current facility-administered medications on file prior to visit.   [6]   Social History  Tobacco Use    Smoking status: Never    Smokeless tobacco: Never   Vaping Use    Vaping status: Never Used   Substance and Sexual Activity    Alcohol use: Yes     Alcohol/week: 2.0 standard drinks of alcohol     Types: 2 Cans of beer per week    Drug use: Never    Sexual activity: Not Currently     Comment: Sexually active:   No - As per Josette

## 2025-06-09 ENCOUNTER — TELEPHONE (OUTPATIENT)
Age: 60
End: 2025-06-09

## 2025-06-09 ENCOUNTER — HOSPITAL ENCOUNTER (OUTPATIENT)
Dept: CT IMAGING | Facility: HOSPITAL | Age: 60
Discharge: HOME | End: 2025-06-09
Attending: NURSE PRACTITIONER
Payer: COMMERCIAL

## 2025-06-09 DIAGNOSIS — C50.911 CARCINOMA OF RIGHT BREAST METASTATIC TO AXILLARY LYMPH NODE (HCC): ICD-10-CM

## 2025-06-09 DIAGNOSIS — R91.1 LUNG NODULE: ICD-10-CM

## 2025-06-09 DIAGNOSIS — C50.811 MALIGNANT NEOPLASM OF OVERLAPPING SITES OF RIGHT BREAST IN FEMALE, ESTROGEN RECEPTOR POSITIVE (HCC): ICD-10-CM

## 2025-06-09 DIAGNOSIS — C77.3 CARCINOMA OF RIGHT BREAST METASTATIC TO AXILLARY LYMPH NODE (HCC): ICD-10-CM

## 2025-06-09 DIAGNOSIS — Z17.0 MALIGNANT NEOPLASM OF OVERLAPPING SITES OF RIGHT BREAST IN FEMALE, ESTROGEN RECEPTOR POSITIVE (HCC): ICD-10-CM

## 2025-06-09 PROCEDURE — 74177 CT ABD & PELVIS W/CONTRAST: CPT

## 2025-06-09 PROCEDURE — 71260 CT THORAX DX C+: CPT

## 2025-06-09 RX ADMIN — IOHEXOL 50 ML: 350 INJECTION, SOLUTION INTRAVENOUS at 16:07

## 2025-06-09 NOTE — TELEPHONE ENCOUNTER
PT called to inquire if a Lyft ride for today's 6/9 CT scan @ [facility] was schduled. There was nothing noted in her chart, so I contacted Johnna in the office, who was advised by the OM that LYFT rides are only done if PT has appt in the office. Pt said she will call central scheduling to let them know this and that they will need to scheduled the LYFT, since she already drank the barium needed for the Ct Scan.

## 2025-06-12 ENCOUNTER — TELEPHONE (OUTPATIENT)
Dept: SURGICAL ONCOLOGY | Facility: CLINIC | Age: 60
End: 2025-06-12

## 2025-06-12 ENCOUNTER — VBI (OUTPATIENT)
Dept: ADMINISTRATIVE | Facility: OTHER | Age: 60
End: 2025-06-12

## 2025-06-12 ENCOUNTER — RESULTS FOLLOW-UP (OUTPATIENT)
Dept: SURGICAL ONCOLOGY | Facility: CLINIC | Age: 60
End: 2025-06-12

## 2025-06-12 ENCOUNTER — TELEPHONE (OUTPATIENT)
Age: 60
End: 2025-06-12

## 2025-06-12 NOTE — TELEPHONE ENCOUNTER
Patient calling in returning call from Brenton Ramsey PA-C.  I relayed message to patient as per Brenton, she confirmed understanding and thanked me.

## 2025-06-12 NOTE — TELEPHONE ENCOUNTER
06/12/25 12:49 PM     Chart reviewed for [facility]: Colonoscopy ; nothing is submitted to the patient's insurance at this time.     Stella Tolentino   [facility]

## 2025-06-12 NOTE — TELEPHONE ENCOUNTER
Spoke with pt to let her know her recent CT had no suspicious findings and that the appt for today is being cancelled.  Also scheduled appt for 11/12/25 @ 3 PM and informed pt that at this appt provider would place an order for another CT.     Scheduling [facility] for Nov appt

## 2025-06-13 LAB
LAB AP GYN PRIMARY INTERPRETATION: NORMAL
Lab: NORMAL

## 2025-06-16 ENCOUNTER — TELEPHONE (OUTPATIENT)
Age: 60
End: 2025-06-16

## 2025-06-16 NOTE — TELEPHONE ENCOUNTER
Patient called in asking about her dose of Vitamin D.  She states Dr. Knox instructed her to stop taking them for 2 weeks and then the week of 6/16, start taking them every other day.  She is asking if every other day is just for this week or to continue every other day going forward indefinitely.  Please advise.

## 2025-06-17 NOTE — TELEPHONE ENCOUNTER
"Called pt -- no answer and \"wireless caller not available\" -- unable to lvm. Will outreach again later today.   "

## 2025-06-17 NOTE — TELEPHONE ENCOUNTER
Pt called again. Providers message given. Patient aware and understands. No further action needed.

## 2025-07-11 DIAGNOSIS — E55.9 VITAMIN D INSUFFICIENCY: ICD-10-CM

## 2025-07-11 RX ORDER — LORAZEPAM 0.5 MG
1 TABLET ORAL DAILY
Qty: 30 CAPSULE | Refills: 5 | Status: SHIPPED | OUTPATIENT
Start: 2025-07-11

## 2025-07-25 DIAGNOSIS — E56.9 VITAMIN DEFICIENCY: ICD-10-CM

## 2025-07-25 DIAGNOSIS — I10 HYPERTENSION, UNSPECIFIED TYPE: ICD-10-CM

## 2025-07-26 DIAGNOSIS — E03.9 HYPOTHYROIDISM, UNSPECIFIED TYPE: ICD-10-CM

## 2025-07-28 RX ORDER — MULTIVITAMIN WITH FOLIC ACID 400 MCG
1 TABLET ORAL DAILY
Qty: 90 TABLET | Refills: 1 | Status: SHIPPED | OUTPATIENT
Start: 2025-07-28

## 2025-07-28 RX ORDER — ATENOLOL 50 MG/1
50 TABLET ORAL DAILY
Qty: 90 TABLET | Refills: 1 | Status: SHIPPED | OUTPATIENT
Start: 2025-07-28 | End: 2026-01-24

## 2025-07-28 RX ORDER — LEVOTHYROXINE SODIUM 50 UG/1
TABLET ORAL
Qty: 30 TABLET | Refills: 5 | Status: SHIPPED | OUTPATIENT
Start: 2025-07-28

## (undated) DEVICE — LIGACLIP MCA MULTIPLE CLIP APPLIERS, 20 MEDIUM CLIPS: Brand: LIGACLIP

## (undated) DEVICE — GAUZE SPONGES,16 PLY: Brand: CURITY

## (undated) DEVICE — SUT MONOCRYL 4-0 PS-2 18 IN Y496G

## (undated) DEVICE — CHEST/BREAST DRAPE: Brand: CONVERTORS

## (undated) DEVICE — SCD SEQUENTIAL COMPRESSION COMFORT SLEEVE MEDIUM KNEE LENGTH: Brand: KENDALL SCD

## (undated) DEVICE — JACKSON-PRATT 100CC BULB RESERVOIR: Brand: CARDINAL HEALTH

## (undated) DEVICE — SUT SILK 2-0 SH 30 IN K833H

## (undated) DEVICE — SUT ETHILON 2-0 FS 18 IN 664H

## (undated) DEVICE — SUT VICRYL 2-0 REEL 54 IN J286G

## (undated) DEVICE — DRAPE SHEET THREE QUARTER

## (undated) DEVICE — PENCIL ELECTROSURG E-Z CLEAN -0035H

## (undated) DEVICE — SMOKE EVACUATION TUBING WITH 8 IN INTEGRAL WAND AND SPONGE GUARD: Brand: BUFFALO FILTER

## (undated) DEVICE — CHLORAPREP HI-LITE 26ML ORANGE

## (undated) DEVICE — DRAPE PROBE NEO-PROBE/ULTRASOUND

## (undated) DEVICE — DRAIN SPONGES,6 PLY: Brand: EXCILON

## (undated) DEVICE — MEDI-VAC YANKAUER SUCTION HANDLE W/BULBOUS AND CONTROL VENT: Brand: CARDINAL HEALTH

## (undated) DEVICE — DRAPE EQUIPMENT RF WAND

## (undated) DEVICE — SUT VICRYL 2-0 SH 27 IN UNDYED J417H

## (undated) DEVICE — LIGHT HANDLE COVER SLEEVE DISP BLUE STELLAR

## (undated) DEVICE — BETHLEHEM UNIVERSAL MINOR GEN: Brand: CARDINAL HEALTH

## (undated) DEVICE — SUT MONOCRYL 3-0 SH 27 IN Y416H

## (undated) DEVICE — INTENDED FOR TISSUE SEPARATION, AND OTHER PROCEDURES THAT REQUIRE A SHARP SURGICAL BLADE TO PUNCTURE OR CUT.: Brand: BARD-PARKER SAFETY BLADES SIZE 15, STERILE

## (undated) DEVICE — NEEDLE 25G X 1 1/2

## (undated) DEVICE — SUPER SPONGES,MEDIUM: Brand: KERLIX

## (undated) DEVICE — JP CHANNEL DRAIN 19FR, FULL FLUTES: Brand: JACKSON-PRATT

## (undated) DEVICE — EXOFIN PRECISION PEN HIGH VISCOSITY TOPICAL SKIN ADHESIVE: Brand: EXOFIN PRECISION PEN, 1G

## (undated) DEVICE — SURGICEL 2 X 14

## (undated) DEVICE — BETHLEHEM UNIVERSAL BREAST PK: Brand: CARDINAL HEALTH

## (undated) DEVICE — GLOVE SRG BIOGEL 6

## (undated) DEVICE — SUT MONOCRYL 4-0 PS-2 27 IN Y426H

## (undated) DEVICE — PLUMEPEN PRO 10FT

## (undated) DEVICE — SINGLE PORT MANIFOLD: Brand: NEPTUNE 2

## (undated) DEVICE — TUBING SUCTION 5MM X 12 FT

## (undated) DEVICE — SUT ETHILON 2-0 FSLX 30 IN 1674H

## (undated) DEVICE — ELECTRODE BLADE MOD  E-Z CLEAN 6.5IN -0014M

## (undated) DEVICE — 3M™ STERI-STRIP™ REINFORCED ADHESIVE SKIN CLOSURES, R1547, 1/2 IN X 4 IN (12 MM X 100 MM), 6 STRIPS/ENVELOPE: Brand: 3M™ STERI-STRIP™

## (undated) DEVICE — GLOVE SRG BIOGEL 7

## (undated) DEVICE — JP PERF DRN SIL FLT 10MM FULL: Brand: CARDINAL HEALTH